# Patient Record
Sex: MALE | Race: BLACK OR AFRICAN AMERICAN | Employment: OTHER | URBAN - METROPOLITAN AREA
[De-identification: names, ages, dates, MRNs, and addresses within clinical notes are randomized per-mention and may not be internally consistent; named-entity substitution may affect disease eponyms.]

---

## 2017-06-01 ENCOUNTER — ED HISTORICAL/CONVERTED ENCOUNTER (OUTPATIENT)
Dept: OTHER | Age: 72
End: 2017-06-01

## 2019-04-05 ENCOUNTER — OP HISTORICAL/CONVERTED ENCOUNTER (OUTPATIENT)
Dept: OTHER | Age: 74
End: 2019-04-05

## 2019-10-17 ENCOUNTER — OP HISTORICAL/CONVERTED ENCOUNTER (OUTPATIENT)
Dept: OTHER | Age: 74
End: 2019-10-17

## 2019-11-21 ENCOUNTER — IP HISTORICAL/CONVERTED ENCOUNTER (OUTPATIENT)
Dept: OTHER | Age: 74
End: 2019-11-21

## 2019-12-05 ENCOUNTER — OP HISTORICAL/CONVERTED ENCOUNTER (OUTPATIENT)
Dept: OTHER | Age: 74
End: 2019-12-05

## 2020-06-04 ENCOUNTER — OP HISTORICAL/CONVERTED ENCOUNTER (OUTPATIENT)
Dept: OTHER | Age: 75
End: 2020-06-04

## 2020-10-26 ENCOUNTER — APPOINTMENT (OUTPATIENT)
Dept: GENERAL RADIOLOGY | Age: 75
DRG: 375 | End: 2020-10-26
Attending: EMERGENCY MEDICINE
Payer: MEDICARE

## 2020-10-26 ENCOUNTER — HOSPITAL ENCOUNTER (INPATIENT)
Age: 75
LOS: 16 days | Discharge: HOME HEALTH | DRG: 375 | End: 2020-11-11
Attending: EMERGENCY MEDICINE | Admitting: INTERNAL MEDICINE
Payer: MEDICARE

## 2020-10-26 ENCOUNTER — APPOINTMENT (OUTPATIENT)
Dept: ENDOSCOPY | Age: 75
DRG: 375 | End: 2020-10-26
Attending: INTERNAL MEDICINE
Payer: MEDICARE

## 2020-10-26 ENCOUNTER — APPOINTMENT (OUTPATIENT)
Dept: CT IMAGING | Age: 75
DRG: 375 | End: 2020-10-26
Attending: EMERGENCY MEDICINE
Payer: MEDICARE

## 2020-10-26 DIAGNOSIS — E86.0 DEHYDRATION: ICD-10-CM

## 2020-10-26 DIAGNOSIS — E87.6 HYPOKALEMIA: ICD-10-CM

## 2020-10-26 DIAGNOSIS — R63.4 WEIGHT LOSS: ICD-10-CM

## 2020-10-26 DIAGNOSIS — K22.89 ESOPHAGEAL MASS: Primary | ICD-10-CM

## 2020-10-26 LAB
ALBUMIN SERPL-MCNC: 3.1 G/DL (ref 3.5–5)
ALBUMIN/GLOB SERPL: 0.7 {RATIO} (ref 1.1–2.2)
ALP SERPL-CCNC: 54 U/L (ref 45–117)
ALT SERPL-CCNC: 26 U/L (ref 12–78)
ANION GAP SERPL CALC-SCNC: 8 MMOL/L (ref 5–15)
AST SERPL W P-5'-P-CCNC: 37 U/L (ref 15–37)
BASOPHILS # BLD: 0 K/UL (ref 0–0.1)
BASOPHILS NFR BLD: 0 % (ref 0–1)
BILIRUB SERPL-MCNC: 1.5 MG/DL (ref 0.2–1)
BUN SERPL-MCNC: 8 MG/DL (ref 6–20)
BUN/CREAT SERPL: 10 (ref 12–20)
CA-I BLD-MCNC: 9.5 MG/DL (ref 8.5–10.1)
CHLORIDE SERPL-SCNC: 105 MMOL/L (ref 97–108)
CO2 SERPL-SCNC: 26 MMOL/L (ref 21–32)
CREAT SERPL-MCNC: 0.78 MG/DL (ref 0.7–1.3)
DIFFERENTIAL METHOD BLD: NORMAL
EOSINOPHIL # BLD: 0.1 K/UL (ref 0–0.4)
EOSINOPHIL NFR BLD: 1 % (ref 0–7)
ERYTHROCYTE [DISTWIDTH] IN BLOOD BY AUTOMATED COUNT: 12.6 % (ref 11.5–14.5)
GLOBULIN SER CALC-MCNC: 4.4 G/DL (ref 2–4)
GLUCOSE SERPL-MCNC: 102 MG/DL (ref 65–100)
HCT VFR BLD AUTO: 41.9 % (ref 36.6–50.3)
HGB BLD-MCNC: 13.9 G/DL (ref 12.1–17)
IMM GRANULOCYTES # BLD AUTO: 0 K/UL (ref 0–0.04)
IMM GRANULOCYTES NFR BLD AUTO: 0 % (ref 0–0.5)
LACTATE SERPL-SCNC: 1.3 MMOL/L (ref 0.4–2)
LYMPHOCYTES # BLD: 2.1 K/UL (ref 0.8–3.5)
LYMPHOCYTES NFR BLD: 34 % (ref 12–49)
MCH RBC QN AUTO: 32.4 PG (ref 26–34)
MCHC RBC AUTO-ENTMCNC: 33.2 G/DL (ref 30–36.5)
MCV RBC AUTO: 97.7 FL (ref 80–99)
MONOCYTES # BLD: 0.7 K/UL (ref 0–1)
MONOCYTES NFR BLD: 12 % (ref 5–13)
NEUTS SEG # BLD: 3.2 K/UL (ref 1.8–8)
NEUTS SEG NFR BLD: 53 % (ref 32–75)
PLATELET # BLD AUTO: 193 K/UL (ref 150–400)
PMV BLD AUTO: 11.8 FL (ref 8.9–12.9)
POTASSIUM SERPL-SCNC: 3.2 MMOL/L (ref 3.5–5.1)
PROCALCITONIN SERPL-MCNC: 0.09 NG/ML
PROT SERPL-MCNC: 7.5 G/DL (ref 6.4–8.2)
RBC # BLD AUTO: 4.29 M/UL (ref 4.1–5.7)
SODIUM SERPL-SCNC: 139 MMOL/L (ref 136–145)
TSH SERPL DL<=0.05 MIU/L-ACNC: 0.74 UIU/ML (ref 0.36–3.74)
WBC # BLD AUTO: 6 K/UL (ref 4.1–11.1)

## 2020-10-26 PROCEDURE — 65270000029 HC RM PRIVATE

## 2020-10-26 PROCEDURE — 99283 EMERGENCY DEPT VISIT LOW MDM: CPT

## 2020-10-26 PROCEDURE — 74011000636 HC RX REV CODE- 636: Performed by: EMERGENCY MEDICINE

## 2020-10-26 PROCEDURE — 84443 ASSAY THYROID STIM HORMONE: CPT

## 2020-10-26 PROCEDURE — 80053 COMPREHEN METABOLIC PANEL: CPT

## 2020-10-26 PROCEDURE — 36415 COLL VENOUS BLD VENIPUNCTURE: CPT

## 2020-10-26 PROCEDURE — 83605 ASSAY OF LACTIC ACID: CPT

## 2020-10-26 PROCEDURE — 96360 HYDRATION IV INFUSION INIT: CPT

## 2020-10-26 PROCEDURE — 96361 HYDRATE IV INFUSION ADD-ON: CPT

## 2020-10-26 PROCEDURE — 70491 CT SOFT TISSUE NECK W/DYE: CPT

## 2020-10-26 PROCEDURE — 71045 X-RAY EXAM CHEST 1 VIEW: CPT

## 2020-10-26 PROCEDURE — 71260 CT THORAX DX C+: CPT

## 2020-10-26 PROCEDURE — 85025 COMPLETE CBC W/AUTO DIFF WBC: CPT

## 2020-10-26 PROCEDURE — 84145 PROCALCITONIN (PCT): CPT

## 2020-10-26 PROCEDURE — 74011250637 HC RX REV CODE- 250/637: Performed by: EMERGENCY MEDICINE

## 2020-10-26 PROCEDURE — 74011250636 HC RX REV CODE- 250/636: Performed by: EMERGENCY MEDICINE

## 2020-10-26 RX ORDER — ENOXAPARIN SODIUM 100 MG/ML
40 INJECTION SUBCUTANEOUS DAILY
Status: DISCONTINUED | OUTPATIENT
Start: 2020-10-27 | End: 2020-10-29

## 2020-10-26 RX ORDER — POLYETHYLENE GLYCOL 3350 17 G/17G
17 POWDER, FOR SOLUTION ORAL DAILY PRN
Status: DISCONTINUED | OUTPATIENT
Start: 2020-10-26 | End: 2020-10-31

## 2020-10-26 RX ORDER — SODIUM CHLORIDE 0.9 % (FLUSH) 0.9 %
5-40 SYRINGE (ML) INJECTION AS NEEDED
Status: DISCONTINUED | OUTPATIENT
Start: 2020-10-26 | End: 2020-11-11 | Stop reason: HOSPADM

## 2020-10-26 RX ORDER — POTASSIUM CHLORIDE 1.5 G/1.77G
40 POWDER, FOR SOLUTION ORAL
Status: ACTIVE | OUTPATIENT
Start: 2020-10-26 | End: 2020-10-27

## 2020-10-26 RX ORDER — DILTIAZEM HYDROCHLORIDE 120 MG/1
120 CAPSULE, COATED, EXTENDED RELEASE ORAL DAILY
Status: DISCONTINUED | OUTPATIENT
Start: 2020-10-27 | End: 2020-10-31

## 2020-10-26 RX ORDER — POTASSIUM CHLORIDE 1.5 G/1.77G
20 POWDER, FOR SOLUTION ORAL 2 TIMES DAILY WITH MEALS
Status: DISCONTINUED | OUTPATIENT
Start: 2020-10-26 | End: 2020-10-26

## 2020-10-26 RX ORDER — METOPROLOL SUCCINATE 25 MG/1
25 TABLET, EXTENDED RELEASE ORAL DAILY
Status: DISCONTINUED | OUTPATIENT
Start: 2020-10-27 | End: 2020-10-31

## 2020-10-26 RX ORDER — SODIUM CHLORIDE 9 MG/ML
75 INJECTION, SOLUTION INTRAVENOUS CONTINUOUS
Status: DISCONTINUED | OUTPATIENT
Start: 2020-10-26 | End: 2020-10-29

## 2020-10-26 RX ORDER — ACETAMINOPHEN 650 MG/1
650 SUPPOSITORY RECTAL
Status: DISCONTINUED | OUTPATIENT
Start: 2020-10-26 | End: 2020-11-11 | Stop reason: HOSPADM

## 2020-10-26 RX ORDER — SODIUM CHLORIDE 0.9 % (FLUSH) 0.9 %
5-40 SYRINGE (ML) INJECTION EVERY 8 HOURS
Status: DISCONTINUED | OUTPATIENT
Start: 2020-10-26 | End: 2020-11-11 | Stop reason: HOSPADM

## 2020-10-26 RX ORDER — ONDANSETRON 2 MG/ML
4 INJECTION INTRAMUSCULAR; INTRAVENOUS
Status: DISCONTINUED | OUTPATIENT
Start: 2020-10-26 | End: 2020-11-11 | Stop reason: HOSPADM

## 2020-10-26 RX ORDER — PROMETHAZINE HYDROCHLORIDE 25 MG/1
12.5 TABLET ORAL
Status: DISCONTINUED | OUTPATIENT
Start: 2020-10-26 | End: 2020-11-11 | Stop reason: HOSPADM

## 2020-10-26 RX ORDER — ACETAMINOPHEN 325 MG/1
650 TABLET ORAL
Status: DISCONTINUED | OUTPATIENT
Start: 2020-10-26 | End: 2020-11-11 | Stop reason: HOSPADM

## 2020-10-26 RX ADMIN — Medication 10 ML: at 21:46

## 2020-10-26 RX ADMIN — Medication 10 ML: at 22:00

## 2020-10-26 RX ADMIN — SODIUM CHLORIDE 1000 ML: 9 INJECTION, SOLUTION INTRAVENOUS at 13:33

## 2020-10-26 RX ADMIN — IOPAMIDOL 100 ML: 755 INJECTION, SOLUTION INTRAVENOUS at 14:25

## 2020-10-26 RX ADMIN — POTASSIUM CHLORIDE 20 MEQ: 1.5 FOR SOLUTION ORAL at 15:25

## 2020-10-26 NOTE — ED TRIAGE NOTES
Reports difficulty swallowing, change in voice, and difficulty eating (35lbs). Pt reports significant weight loss recently. Unable to eat.

## 2020-10-26 NOTE — ED PROVIDER NOTES
EMERGENCY DEPARTMENT HISTORY AND PHYSICAL EXAM      Date: 10/26/2020  Patient Name: Soni Hendricks    History of Presenting Illness     Chief Complaint   Patient presents with    Gland Swelling    Weight Loss       History Provided By: Patient    HPI: Soni Hendricks, 76 y.o. male with a past medical history significant HTN presents to the ED with chief complaint of Gland Swelling and Weight Loss  . Patient has been having weight loss of at least 30 pounds. Wants to eat but tries to eat and then it comes right back up. He is very hungry. Having lots of discomfort in his esophagus. Stool. Does have a very dry mouth. Concerned his glands are swelling. No fevers that he knows of. Does feel weak. Has not seen a doctor about the issue yet. There are no other complaints, changes, or physical findings at this time. PCP: No primary care provider on file. Current Facility-Administered Medications   Medication Dose Route Frequency Provider Last Rate Last Dose    potassium chloride (KLOR-CON) packet for solution 20 mEq  20 mEq Oral BID WITH MEALS Nolvia Serrano MD   20 mEq at 10/26/20 1525    0.9% sodium chloride infusion  75 mL/hr IntraVENous CONTINUOUS Nolvia Serrano MD           Past History     Past Medical History:  Past Medical History:   Diagnosis Date    Hypertension        Past Surgical History:  History reviewed. No pertinent surgical history. Family History:  History reviewed. No pertinent family history. Social History:  Social History     Tobacco Use    Smoking status: Current Some Day Smoker    Smokeless tobacco: Never Used   Substance Use Topics    Alcohol use: Not on file    Drug use: Not on file       Allergies:  No Known Allergies      Review of Systems   Review of Systems   Constitutional: Positive for chills, fatigue and unexpected weight change. Negative for fever. HENT: Negative. Negative for congestion, ear pain, nosebleeds and sore throat.     Eyes: Negative. Negative for pain, discharge and visual disturbance. Respiratory: Negative. Negative for cough, chest tightness and shortness of breath. Cardiovascular: Negative. Negative for chest pain and leg swelling. Gastrointestinal: Positive for vomiting. Negative for abdominal pain, blood in stool, constipation, diarrhea and nausea. Endocrine: Negative. Genitourinary: Negative. Negative for difficulty urinating, dysuria and flank pain. Musculoskeletal: Negative. Negative for back pain and myalgias. Skin: Negative. Negative for rash and wound. Allergic/Immunologic: Negative. Neurological: Negative. Negative for dizziness, syncope, weakness, numbness and headaches. Hematological: Negative. Does not bruise/bleed easily. Psychiatric/Behavioral: Negative. Negative for agitation, confusion, hallucinations and suicidal ideas. All other systems reviewed and are negative. Physical Exam   Physical Exam  Vitals signs and nursing note reviewed. Constitutional:       General: He is not in acute distress. Appearance: He is normal weight. He is not ill-appearing. HENT:      Head: Normocephalic and atraumatic. Right Ear: External ear normal.      Left Ear: External ear normal.      Nose: Nose normal. No rhinorrhea. Mouth/Throat:      Mouth: Mucous membranes are dry. Pharynx: Oropharynx is clear. Eyes:      Extraocular Movements: Extraocular movements intact. Conjunctiva/sclera: Conjunctivae normal.      Pupils: Pupils are equal, round, and reactive to light. Neck:      Musculoskeletal: Normal range of motion and neck supple. Cardiovascular:      Rate and Rhythm: Normal rate and regular rhythm. Pulses: Normal pulses. Heart sounds: Normal heart sounds. Pulmonary:      Effort: Pulmonary effort is normal. No respiratory distress. Breath sounds: Normal breath sounds. Abdominal:      General: Abdomen is flat.  Bowel sounds are normal. Palpations: Abdomen is soft. Musculoskeletal: Normal range of motion. General: No tenderness or deformity. Skin:     General: Skin is warm and dry. Capillary Refill: Capillary refill takes less than 2 seconds. Findings: No bruising, lesion or rash. Neurological:      General: No focal deficit present. Mental Status: He is alert and oriented to person, place, and time. Mental status is at baseline. Psychiatric:         Mood and Affect: Mood normal.         Behavior: Behavior normal.         Thought Content: Thought content normal.         Judgment: Judgment normal.         Diagnostic Study Results     Labs -     Recent Results (from the past 12 hour(s))   CBC WITH AUTOMATED DIFF    Collection Time: 10/26/20 12:30 PM   Result Value Ref Range    WBC 6.0 4.1 - 11.1 K/uL    RBC 4.29 4. 10 - 5.70 M/uL    HGB 13.9 12.1 - 17.0 g/dL    HCT 41.9 36.6 - 50.3 %    MCV 97.7 80.0 - 99.0 FL    MCH 32.4 26.0 - 34.0 PG    MCHC 33.2 30.0 - 36.5 g/dL    RDW 12.6 11.5 - 14.5 %    PLATELET 817 341 - 321 K/uL    MPV 11.8 8.9 - 12.9 FL    NEUTROPHILS 53 32 - 75 %    LYMPHOCYTES 34 12 - 49 %    MONOCYTES 12 5 - 13 %    EOSINOPHILS 1 0 - 7 %    BASOPHILS 0 0 - 1 %    IMMATURE GRANULOCYTES 0 0.0 - 0.5 %    ABS. NEUTROPHILS 3.2 1.8 - 8.0 K/UL    ABS. LYMPHOCYTES 2.1 0.8 - 3.5 K/UL    ABS. MONOCYTES 0.7 0.0 - 1.0 K/UL    ABS. EOSINOPHILS 0.1 0.0 - 0.4 K/UL    ABS. BASOPHILS 0.0 0.0 - 0.1 K/UL    ABS. IMM.  GRANS. 0.0 0.00 - 0.04 K/UL    DF AUTOMATED     METABOLIC PANEL, COMPREHENSIVE    Collection Time: 10/26/20 12:30 PM   Result Value Ref Range    Sodium 139 136 - 145 mmol/L    Potassium 3.2 (L) 3.5 - 5.1 mmol/L    Chloride 105 97 - 108 mmol/L    CO2 26 21 - 32 mmol/L    Anion gap 8 5 - 15 mmol/L    Glucose 102 (H) 65 - 100 mg/dL    BUN 8 6 - 20 mg/dL    Creatinine 0.78 0.70 - 1.30 mg/dL    BUN/Creatinine ratio 10 (L) 12 - 20      GFR est AA >60 >60 ml/min/1.73m2    GFR est non-AA >60 >60 ml/min/1.73m2    Calcium 9.5 8.5 - 10.1 mg/dL    Bilirubin, total 1.5 (H) 0.2 - 1.0 mg/dL    AST (SGOT) 37 15 - 37 U/L    ALT (SGPT) 26 12 - 78 U/L    Alk. 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 I called Dr. Padma Brennan with this result and recommendation   for upper endoscopy at 3:15 PM.      Emphysema. Coronary artery calcifications. Right kidney stone. Please see full report. CT NECK SOFT TISSUE W CONT   Final Result      XR CHEST SNGL V   Final Result   Impression: Unremarkable chest x-ray, except for degenerative change of the   spine. 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 I called Dr. Padma Brennan with this result and recommendation   for upper endoscopy at 3:15 PM.       Emphysema. Coronary artery calcifications. Right kidney stone. Please see full report. Narrative:  History is pain.  Weight loss. Difficulty swallowing. No comparison chest CT is available. TECHNIQUE: Serial axial images were obtained from the thoracic inlet through the   lung bases at 5 mm intervals during IV contrast administration of 100 cc   Isovue-370. Lung, bone and soft tissue windows are evaluated as well as sagittal   and coronal reformatted images. Dose reduction: All CT scans at this facility are performed using dose reduction   optimization techniques as appropriate to a performed exam including the   following: Automated exposure control, adjustments of the mA and/or kV according   to patient size, or use of iterative reconstruction technique. Findings: The heart is not enlarged. There are coronary artery calcifications. There is no pericardial or pleural effusion. The pulmonary arteries enhance   adequately and no filling defects are identified to suggest pulmonary emboli. The aorta is not dilated and there is no dissection. There is no axillary,   mediastinal or hilar adenopathy. The esophagus is dilated and filled with fluid from image    , series 2       No evidence of acute. On images 67-90, series 2, there appears to be a soft   tissue mass of the mid esophagus and right esophageal wall. It extends   approximately 6 cm in length. On image 79, series 2, the esophagus measures 3.8   x 2.9 cm and 56 HU in density. The right lateral esophageal wall enhances on   image 82 measuring 68 HU in density. This is consistent with neoplasm until   proven otherwise. Recommend upper endoscopy and biopsy. On lung windows, there is no pneumothorax or infiltrate. There is emphysema. Most delayed mass is identified. Incidental note is made of a right midpole   renal calcification. On bone windows, the osseous structures appear within normal limits. There is no   soft tissue contusion.             10/26/20 1440  CT NECK SOFT TISSUE W CONT Final result    Narrative:  History is weight loss. Dysphagia. Dose reduction: All CT scans at this facility are performed using dose reduction   optimization techniques as appropriate to a performed exam including the   following: Automated exposure control, adjustments of the mA and/or kV according   to patient size, or use of iterative reconstruction technique. Serial axial images were obtained from the upper chest through the lower skull   at 3 mm intervals during IV contrast injection 100 cc Isovue 370. Sagittal and   coronal reformatted images reviewed. Please see the chest CT for remarks regarding the patient's esophageal mass and   dilatation. There is mild prominence of the adenoidal tissue. The patient is edentulous. No   nasopharyngeal, oropharyngeal or hypopharyngeal mass is identified. There is   mild left sphenoid sinusitis. There is no mastoiditis. No bone destruction is   identified. There is diffuse degenerative change of the cervical spine. Multiple   bridging osteophytes are present from C4 to C7 anteriorly. There is also   multilevel facet hypertrophy. No lytic or blastic lesion is identified. Esophageal mass with fluid better described on the chest CT of the same day. No acute pathology of the neck identified. Significant degenerative change of   the mid to lower cervical spine. Please see full report. CXR Results  (Last 48 hours)               10/26/20 1303  XR CHEST SNGL V Final result    Impression:  Impression: Unremarkable chest x-ray, except for degenerative change of the   spine. Narrative:  History is weak. Comparison is with a chest x-ray of 6/4/2020. An AP portable upright view of the chest demonstrates no pneumonia, pneumothorax   or effusion. The heart is not enlarged. The osseous structures demonstrate   degenerative change of the spine. Medical Decision Making and ED Course   I am the first provider for this patient.     I reviewed the vital signs, available nursing notes, past medical history, past surgical history, family history and social history. Vital Signs-Reviewed the patient's vital signs. Patient Vitals for the past 12 hrs:   Temp Pulse Resp BP SpO2   10/26/20 1217 99.1 °F (37.3 °C) 75 18 (!) 143/114 100 %       EKG interpretation:         Records Reviewed: Previous Hospital chart. EMS run report      ED Course:   Initial assessment performed. The patients presenting problems have been discussed, and they are in agreement with the care plan formulated and outlined with them. I have encouraged them to ask questions as they arise throughout their visit. Orders Placed This Encounter    XR CHEST SNGL V     Standing Status:   Standing     Number of Occurrences:   1     Order Specific Question:   Transport     Answer:   BED [2]     Order Specific Question:   Reason for Exam     Answer:   weak    CT CHEST W CONT     Standing Status:   Standing     Number of Occurrences:   1     Order Specific Question:   Transport     Answer:   Ambulatory [1]     Order Specific Question:   Reason for Exam     Answer:   pain     Order Specific Question:   Does patient have history of Renal Disease? Answer:   No    CT NECK SOFT TISSUE W CONT     Standing Status:   Standing     Number of Occurrences:   1     Order Specific Question:   Transport     Answer:   Ambulatory [1]     Order Specific Question:   Reason for Exam     Answer:   neck     Order Specific Question:   Does patient have history of Renal Disease?      Answer:   No    CBC WITH AUTOMATED DIFF     Standing Status:   Standing     Number of Occurrences:   1    METABOLIC PANEL, COMPREHENSIVE     Standing Status:   Standing     Number of Occurrences:   1    TSH 3RD GENERATION     Standing Status:   Standing     Number of Occurrences:   1    PROCALCITONIN     Standing Status:   Standing     Number of Occurrences:   1    LACTIC ACID     Standing Status:   Standing     Number of Occurrences:   1    NOTIFY PROVIDER: SPECIFY Notify provider on pt's arrival to floor ONE TIME STAT     Standing Status:   Standing     Number of Occurrences:   1     Order Specific Question:   Please describe the test or procedure you would like to order. Answer:   Notify provider on pt's arrival to floor    IP CONSULT TO GASTROENTEROLOGY     Standing Status:   Standing     Number of Occurrences:   1     Order Specific Question:   Reason for Consult: Answer:   esophageal mass     Order Specific Question:   Did you call or speak to the consulting provider? Answer: Yes    sodium chloride 0.9 % bolus infusion 1,000 mL    potassium chloride (KLOR-CON) packet for solution 20 mEq    iopamidoL (ISOVUE-370) 76 % injection 100 mL    0.9% sodium chloride infusion    IP CONSULT TO HOSPITALIST     Standing Status:   Standing     Number of Occurrences:   1     Order Specific Question:   Reason for Consult: Answer:   TO ADMIT     Order Specific Question:   Did you call or speak to the consulting provider? Answer: Yes              CONSULTANTS:  Discussed the case with radiology Dr. Alma Garsia  Gastroenterology consulted based on CAT scan findings. Dr Addi Sanders  discussed case with hospitalist for admission. Dr Kayli Bolanos    Provider Notes (Medical Decision Making):   72-year-old male disks presents with dysphagia. Differential includes mass, esophagitis, lymphadenopathy, dehydration. CAT scan does suggest neoplasm. Patient is dehydrated will IV give fluids as well as consult GI who will gladly do an upper endoscopy. Hospitalist will admit. Procedures                       Disposition       Emergency Department Disposition:  Admitted      Diagnosis     Clinical Impression:   1. Esophageal mass    2. Hypokalemia    3. Weight loss    4. Dehydration        Attestations:    Diana Gonsales MD    Please note that this dictation was completed with [employer], the computer voice recognition software.   Quite often unanticipated grammatical, syntax, homophones, and other interpretive errors are inadvertently transcribed by the computer software. Please disregard these errors. Please excuse any errors that have escaped final proofreading. Thank you.

## 2020-10-26 NOTE — H&P
GENERAL GENERIC H&P/CONSULT  Presenting complaint: Generalized weakness    Subjective: 66-year-old male with past medical history of hypertension presents with complaints of difficulty swallowing as well as generalized weakness. Patient states the symptoms have been going on over the past few months following which he presented to the ED. Patient denies any fevers chills lightheadedness dizziness dyspnea orthopnea paroxysmal nocturnal dyspnea chest pain palpitations headache focal weakness loss of sensation auditory or visual symptoms abdominal stool or urinary complaints or any other associated symptoms. Patient endorses no recent sick contacts or travel activity    Past Medical History:   Diagnosis Date    Hypertension       History reviewed. No pertinent surgical history. Prior to Admission medications    Not on File     No Known Allergies social historypatient lives at home, current some day smoker, no history of EtOH abuse or substance abuse. Social History     Tobacco Use    Smoking status: Current Some Day Smoker    Smokeless tobacco: Never Used   Substance Use Topics    Alcohol use: Not on file      History reviewed. No pertinent family history. Review of Systems   Constitutional: Positive for activity change, appetite change, fatigue and unexpected weight change. Negative for chills, diaphoresis and fever. HENT: Negative for congestion, dental problem, drooling, ear discharge, ear pain, facial swelling, hearing loss, mouth sores, nosebleeds, postnasal drip, rhinorrhea, sinus pressure, sinus pain, sneezing, sore throat, tinnitus, trouble swallowing and voice change. Eyes: Negative for photophobia, pain, discharge, redness, itching and visual disturbance. Respiratory: Negative for apnea, cough, choking, chest tightness, shortness of breath, wheezing and stridor. Cardiovascular: Negative for chest pain, palpitations and leg swelling.    Gastrointestinal: Negative for abdominal distention, abdominal pain, anal bleeding, blood in stool, constipation, diarrhea, nausea, rectal pain and vomiting. Endocrine: Negative for cold intolerance, heat intolerance, polydipsia, polyphagia and polyuria. Genitourinary: Negative for decreased urine volume, difficulty urinating, discharge, dysuria, enuresis, flank pain, frequency, genital sores, hematuria, penile pain, penile swelling, scrotal swelling, testicular pain and urgency. Musculoskeletal: Negative for arthralgias, back pain, gait problem, joint swelling, myalgias, neck pain and neck stiffness. Skin: Negative for color change, pallor, rash and wound. Allergic/Immunologic: Negative for environmental allergies, food allergies and immunocompromised state. Neurological: Negative for dizziness, tremors, seizures, syncope, facial asymmetry, speech difficulty, weakness, light-headedness, numbness and headaches. Hematological: Negative for adenopathy. Does not bruise/bleed easily. Psychiatric/Behavioral: Negative for agitation, behavioral problems, confusion, decreased concentration, dysphoric mood, hallucinations, self-injury, sleep disturbance and suicidal ideas. The patient is not nervous/anxious and is not hyperactive. Objective:    No intake/output data recorded. No intake/output data recorded. Patient Vitals for the past 8 hrs:   BP Temp Pulse Resp SpO2 Height Weight   10/26/20 1217 (!) 143/114 99.1 °F (37.3 °C) 75 18 100 % 6' 1\" (1.854 m) 64.1 kg (141 lb 6.4 oz)     Physical Exam  Constitutional:       General: He is not in acute distress. Appearance: Normal appearance. He is normal weight. He is not ill-appearing, toxic-appearing or diaphoretic. HENT:      Head: Normocephalic and atraumatic. Nose: Nose normal. No congestion or rhinorrhea. Mouth/Throat:      Mouth: Mucous membranes are moist.      Pharynx: Oropharynx is clear. No oropharyngeal exudate or posterior oropharyngeal erythema.    Eyes:      General: No scleral icterus. Right eye: No discharge. Left eye: No discharge. Extraocular Movements: Extraocular movements intact. Conjunctiva/sclera: Conjunctivae normal.      Pupils: Pupils are equal, round, and reactive to light. Neck:      Musculoskeletal: Normal range of motion and neck supple. No neck rigidity or muscular tenderness. Vascular: No carotid bruit. Cardiovascular:      Rate and Rhythm: Normal rate and regular rhythm. Pulses: Normal pulses. Heart sounds: Normal heart sounds. No murmur. No friction rub. No gallop. Pulmonary:      Effort: Pulmonary effort is normal. No respiratory distress. Breath sounds: Normal breath sounds. No stridor. No wheezing, rhonchi or rales. Chest:      Chest wall: No tenderness. Abdominal:      General: Abdomen is flat. Bowel sounds are normal. There is no distension. Palpations: Abdomen is soft. There is no mass. Tenderness: There is no abdominal tenderness. There is no right CVA tenderness, left CVA tenderness, guarding or rebound. Hernia: No hernia is present. Musculoskeletal: Normal range of motion. General: No swelling, tenderness, deformity or signs of injury. Right lower leg: No edema. Left lower leg: No edema. Lymphadenopathy:      Cervical: No cervical adenopathy. Skin:     General: Skin is warm. Capillary Refill: Capillary refill takes less than 2 seconds. Coloration: Skin is not jaundiced or pale. Findings: No bruising, erythema, lesion or rash. Neurological:      General: No focal deficit present. Mental Status: He is alert and oriented to person, place, and time. Mental status is at baseline. Cranial Nerves: No cranial nerve deficit. Sensory: No sensory deficit. Motor: No weakness.       Coordination: Coordination normal.      Gait: Gait normal.      Deep Tendon Reflexes: Reflexes normal.   Psychiatric:         Mood and Affect: Mood normal. Behavior: Behavior normal.         Thought Content: Thought content normal.         Judgment: Judgment normal.          Labs:    Recent Results (from the past 24 hour(s))   CBC WITH AUTOMATED DIFF    Collection Time: 10/26/20 12:30 PM   Result Value Ref Range    WBC 6.0 4.1 - 11.1 K/uL    RBC 4.29 4. 10 - 5.70 M/uL    HGB 13.9 12.1 - 17.0 g/dL    HCT 41.9 36.6 - 50.3 %    MCV 97.7 80.0 - 99.0 FL    MCH 32.4 26.0 - 34.0 PG    MCHC 33.2 30.0 - 36.5 g/dL    RDW 12.6 11.5 - 14.5 %    PLATELET 215 982 - 916 K/uL    MPV 11.8 8.9 - 12.9 FL    NEUTROPHILS 53 32 - 75 %    LYMPHOCYTES 34 12 - 49 %    MONOCYTES 12 5 - 13 %    EOSINOPHILS 1 0 - 7 %    BASOPHILS 0 0 - 1 %    IMMATURE GRANULOCYTES 0 0.0 - 0.5 %    ABS. NEUTROPHILS 3.2 1.8 - 8.0 K/UL    ABS. LYMPHOCYTES 2.1 0.8 - 3.5 K/UL    ABS. MONOCYTES 0.7 0.0 - 1.0 K/UL    ABS. EOSINOPHILS 0.1 0.0 - 0.4 K/UL    ABS. BASOPHILS 0.0 0.0 - 0.1 K/UL    ABS. IMM. GRANS. 0.0 0.00 - 0.04 K/UL    DF AUTOMATED     METABOLIC PANEL, COMPREHENSIVE    Collection Time: 10/26/20 12:30 PM   Result Value Ref Range    Sodium 139 136 - 145 mmol/L    Potassium 3.2 (L) 3.5 - 5.1 mmol/L    Chloride 105 97 - 108 mmol/L    CO2 26 21 - 32 mmol/L    Anion gap 8 5 - 15 mmol/L    Glucose 102 (H) 65 - 100 mg/dL    BUN 8 6 - 20 mg/dL    Creatinine 0.78 0.70 - 1.30 mg/dL    BUN/Creatinine ratio 10 (L) 12 - 20      GFR est AA >60 >60 ml/min/1.73m2    GFR est non-AA >60 >60 ml/min/1.73m2    Calcium 9.5 8.5 - 10.1 mg/dL    Bilirubin, total 1.5 (H) 0.2 - 1.0 mg/dL    AST (SGOT) 37 15 - 37 U/L    ALT (SGPT) 26 12 - 78 U/L    Alk.  phosphatase 54 45 - 117 U/L    Protein, total 7.5 6.4 - 8.2 g/dL    Albumin 3.1 (L) 3.5 - 5.0 g/dL    Globulin 4.4 (H) 2.0 - 4.0 g/dL    A-G Ratio 0.7 (L) 1.1 - 2.2     TSH 3RD GENERATION    Collection Time: 10/26/20 12:30 PM   Result Value Ref Range    TSH 0.74 0.36 - 3.74 uIU/mL   PROCALCITONIN    Collection Time: 10/26/20 12:30 PM   Result Value Ref Range    Procalcitonin 0.09 (H) 0 ng/mL   LACTIC ACID    Collection Time: 10/26/20 12:30 PM   Result Value Ref Range    Lactic acid 1.3 0.4 - 2.0 mmol/L       ECG: normal EKG, normal sinus rhythm, unchanged from previous tracings   CT chest:IMPRESSION  Impression: Enhancing mass of the mid thoracic esophagus measuring proximally 6  x 4 x 3 cm causing dilatation esophagus and obstruction of the upper esophagus  which is fluid filled and patulous. These findings are consistent with neoplasm  until proven otherwise. I called Dr. Kimmie Canales with this result and recommendation  for upper endoscopy at 3:15 PM.     CT neck soft tissue: There is mild prominence of the adenoidal tissue. The patient is edentulous. No  nasopharyngeal, oropharyngeal or hypopharyngeal mass is identified. There is  mild left sphenoid sinusitis. There is no mastoiditis. No bone destruction is  identified. There is diffuse degenerative change of the cervical spine. Multiple  bridging osteophytes are present from C4 to C7 anteriorly. There is also  multilevel facet hypertrophy. No lytic or blastic lesion is identified.     Esophageal mass with fluid better described on the chest CT of the same day.     No acute pathology of the neck identified. Significant degenerative change of  the mid to lower cervical spine. Please see full report.     Assessment:  Active Problems:    Esophageal mass (10/26/2020)        Plan:    Esophageal masspatient presents with above-mentioned symptomatology and based on patient's clinical presentation as well as physical examination patient symptomatology is consistent with an esophageal mass due to unclear etiology  We will obtain gastroenterology consult further evaluation  We will obtain oncology consult further evaluation    Hypokalemiareplete potassium recheck potassium    Hypertensioncontinue home antihypertensive medications    ProphylaxisHeparin subcu  FENcardiac diet, replete potassium and magnesium  Full code, will clarify about surrogate decision-maker, admitted for further management    Signed:   Evie Melo MD 10/26/2020

## 2020-10-26 NOTE — Clinical Note
Resolve 8.5F catheter sutured in place. Dsg applied with stayfix/gauze and tape. Catheter attached to atrium with -20 cm of suction.

## 2020-10-26 NOTE — ED NOTES
Assumed care of patient. A&Ox4; no acute distress; no respiratory distress. Patient requesting something to eat; patient is requesting soup specifically. Told patient  I would review his chart for diet order, and see what I can get him to eat that he can tolerate with his throat.

## 2020-10-27 ENCOUNTER — ANESTHESIA (OUTPATIENT)
Dept: ENDOSCOPY | Age: 75
DRG: 375 | End: 2020-10-27
Payer: MEDICARE

## 2020-10-27 ENCOUNTER — ANESTHESIA EVENT (OUTPATIENT)
Dept: ENDOSCOPY | Age: 75
DRG: 375 | End: 2020-10-27
Payer: MEDICARE

## 2020-10-27 ENCOUNTER — APPOINTMENT (OUTPATIENT)
Dept: ENDOSCOPY | Age: 75
DRG: 375 | End: 2020-10-27
Attending: INTERNAL MEDICINE
Payer: MEDICARE

## 2020-10-27 LAB
ALBUMIN SERPL-MCNC: 2.8 G/DL (ref 3.5–5)
ALBUMIN/GLOB SERPL: 0.7 {RATIO} (ref 1.1–2.2)
ALP SERPL-CCNC: 48 U/L (ref 45–117)
ALT SERPL-CCNC: 25 U/L (ref 12–78)
ANION GAP SERPL CALC-SCNC: 3 MMOL/L (ref 5–15)
AST SERPL W P-5'-P-CCNC: 37 U/L (ref 15–37)
BASOPHILS # BLD: 0 K/UL (ref 0–0.1)
BASOPHILS NFR BLD: 1 % (ref 0–1)
BILIRUB SERPL-MCNC: 1.5 MG/DL (ref 0.2–1)
BUN SERPL-MCNC: 7 MG/DL (ref 6–20)
BUN/CREAT SERPL: 11 (ref 12–20)
CA-I BLD-MCNC: 9.6 MG/DL (ref 8.5–10.1)
CHLORIDE SERPL-SCNC: 111 MMOL/L (ref 97–108)
CO2 SERPL-SCNC: 26 MMOL/L (ref 21–32)
CREAT SERPL-MCNC: 0.63 MG/DL (ref 0.7–1.3)
DIFFERENTIAL METHOD BLD: ABNORMAL
EOSINOPHIL # BLD: 0.1 K/UL (ref 0–0.4)
EOSINOPHIL NFR BLD: 2 % (ref 0–7)
ERYTHROCYTE [DISTWIDTH] IN BLOOD BY AUTOMATED COUNT: 12.4 % (ref 11.5–14.5)
GLOBULIN SER CALC-MCNC: 4.2 G/DL (ref 2–4)
GLUCOSE SERPL-MCNC: 72 MG/DL (ref 65–100)
HCT VFR BLD AUTO: 38.5 % (ref 36.6–50.3)
HGB BLD-MCNC: 12.7 G/DL (ref 12.1–17)
IMM GRANULOCYTES # BLD AUTO: 0 K/UL (ref 0–0.04)
IMM GRANULOCYTES NFR BLD AUTO: 0 % (ref 0–0.5)
LYMPHOCYTES # BLD: 1.8 K/UL (ref 0.8–3.5)
LYMPHOCYTES NFR BLD: 30 % (ref 12–49)
MCH RBC QN AUTO: 31.9 PG (ref 26–34)
MCHC RBC AUTO-ENTMCNC: 33 G/DL (ref 30–36.5)
MCV RBC AUTO: 96.7 FL (ref 80–99)
MONOCYTES # BLD: 1 K/UL (ref 0–1)
MONOCYTES NFR BLD: 18 % (ref 5–13)
NEUTS SEG # BLD: 2.9 K/UL (ref 1.8–8)
NEUTS SEG NFR BLD: 49 % (ref 32–75)
PLATELET # BLD AUTO: 178 K/UL (ref 150–400)
PMV BLD AUTO: 12.1 FL (ref 8.9–12.9)
POTASSIUM SERPL-SCNC: 3.9 MMOL/L (ref 3.5–5.1)
PROT SERPL-MCNC: 7 G/DL (ref 6.4–8.2)
RBC # BLD AUTO: 3.98 M/UL (ref 4.1–5.7)
SODIUM SERPL-SCNC: 140 MMOL/L (ref 136–145)
WBC # BLD AUTO: 5.9 K/UL (ref 4.1–11.1)

## 2020-10-27 PROCEDURE — 74011250636 HC RX REV CODE- 250/636: Performed by: NURSE ANESTHETIST, CERTIFIED REGISTERED

## 2020-10-27 PROCEDURE — 65270000029 HC RM PRIVATE

## 2020-10-27 PROCEDURE — 85025 COMPLETE CBC W/AUTO DIFF WBC: CPT

## 2020-10-27 PROCEDURE — 36415 COLL VENOUS BLD VENIPUNCTURE: CPT

## 2020-10-27 PROCEDURE — 88305 TISSUE EXAM BY PATHOLOGIST: CPT

## 2020-10-27 PROCEDURE — 74011250637 HC RX REV CODE- 250/637: Performed by: INTERNAL MEDICINE

## 2020-10-27 PROCEDURE — 2709999900 HC NON-CHARGEABLE SUPPLY: Performed by: INTERNAL MEDICINE

## 2020-10-27 PROCEDURE — 74011000250 HC RX REV CODE- 250: Performed by: NURSE ANESTHETIST, CERTIFIED REGISTERED

## 2020-10-27 PROCEDURE — 0DB18ZX EXCISION OF UPPER ESOPHAGUS, VIA NATURAL OR ARTIFICIAL OPENING ENDOSCOPIC, DIAGNOSTIC: ICD-10-PCS | Performed by: INTERNAL MEDICINE

## 2020-10-27 PROCEDURE — 76040000019: Performed by: INTERNAL MEDICINE

## 2020-10-27 PROCEDURE — 80053 COMPREHEN METABOLIC PANEL: CPT

## 2020-10-27 PROCEDURE — 76060000031 HC ANESTHESIA FIRST 0.5 HR: Performed by: INTERNAL MEDICINE

## 2020-10-27 RX ORDER — LIDOCAINE HYDROCHLORIDE 20 MG/ML
INJECTION, SOLUTION EPIDURAL; INFILTRATION; INTRACAUDAL; PERINEURAL AS NEEDED
Status: DISCONTINUED | OUTPATIENT
Start: 2020-10-27 | End: 2020-10-27 | Stop reason: HOSPADM

## 2020-10-27 RX ORDER — SODIUM CHLORIDE 9 MG/ML
INJECTION, SOLUTION INTRAVENOUS
Status: DISCONTINUED | OUTPATIENT
Start: 2020-10-27 | End: 2020-10-27 | Stop reason: HOSPADM

## 2020-10-27 RX ORDER — PROPOFOL 10 MG/ML
INJECTION, EMULSION INTRAVENOUS AS NEEDED
Status: DISCONTINUED | OUTPATIENT
Start: 2020-10-27 | End: 2020-10-27 | Stop reason: HOSPADM

## 2020-10-27 RX ADMIN — LIDOCAINE HYDROCHLORIDE 100 MG: 20 INJECTION, SOLUTION EPIDURAL; INFILTRATION; INTRACAUDAL; PERINEURAL at 15:10

## 2020-10-27 RX ADMIN — PROPOFOL 100 MG: 10 INJECTION, EMULSION INTRAVENOUS at 15:13

## 2020-10-27 RX ADMIN — Medication 10 ML: at 21:52

## 2020-10-27 RX ADMIN — PROPOFOL 50 MG: 10 INJECTION, EMULSION INTRAVENOUS at 15:10

## 2020-10-27 RX ADMIN — DILTIAZEM HYDROCHLORIDE 120 MG: 120 CAPSULE, COATED, EXTENDED RELEASE ORAL at 11:09

## 2020-10-27 RX ADMIN — Medication 10 ML: at 05:13

## 2020-10-27 RX ADMIN — METOPROLOL SUCCINATE 25 MG: 25 TABLET, EXTENDED RELEASE ORAL at 11:09

## 2020-10-27 RX ADMIN — SODIUM CHLORIDE: 9 INJECTION, SOLUTION INTRAVENOUS at 15:06

## 2020-10-27 NOTE — CONSULTS
Consult    Patient: Rayna Teague MRN: [id number]  SSN: [id number]    YOB: 1945  Age: 76 y.o. Sex: male      Subjective:      Rayna Teague is a 76 y.o. male who is being seen for Dysphagia, frequent choking, severe weight loss. Patient has a difficult swallowing with heartburn, chest discomfort months, noted the abdominal pain, constipation, weights 2030 pound weight loss,   no blood in stool,No pneumonia history,  Long history of tobacco use, alcohol use,    Past Medical History:   Diagnosis Date    Hypertension      History reviewed. No pertinent surgical history. History reviewed. No pertinent family history.   Social History     Tobacco Use    Smoking status: Current Some Day Smoker    Smokeless tobacco: Never Used   Substance Use Topics    Alcohol use: Not on file      Current Facility-Administered Medications   Medication Dose Route Frequency Provider Last Rate Last Dose    0.9% sodium chloride infusion  75 mL/hr IntraVENous CONTINUOUS Pettis, Danna Kayser, MD        potassium chloride (KLOR-CON) packet for solution 40 mEq  40 mEq Oral NOW Karmen Coyle MD        sodium chloride (NS) flush 5-40 mL  5-40 mL IntraVENous Q8H Karmen Coyle MD        sodium chloride (NS) flush 5-40 mL  5-40 mL IntraVENous PRN Jeanette Herndon MD        acetaminophen (TYLENOL) tablet 650 mg  650 mg Oral Q6H PRN Jeanette Herndon MD        Or    acetaminophen (TYLENOL) suppository 650 mg  650 mg Rectal Q6H PRN Jeanette Herndon MD        polyethylene glycol [facility]) packet 17 g  17 g Oral DAILY PRN Jeanette Herndon MD        promethazine (PHENERGAN) tablet 12.5 mg  12.5 mg Oral Q6H PRN Jeanette Herndon MD        Or    [facility]ron [facility]) injection 4 mg  4 mg IntraVENous Q6H PRTYLER Herndon MD        [START ON 10/27/2020] enoxaparin (LOVENOX) injection 40 mg  40 mg SubCUTAneous DAILY Jonna Moises Elder MD        [START ON 10/27/2020] dilTIAZem ER (CARDIZEM CD) capsule 120 mg  120 mg Oral DAILY Moises Coyle MD        [START ON 10/27/2020] metoprolol succinate (TOPROL-XL) XL tablet 25 mg  25 mg Oral DAILY Karmen Coyle MD            No Known Allergies    Review of Systems:  Review of Systems   Constitutional: Negative for chills and fever. Eyes: Negative for double vision. Respiratory: Negative for sputum production. Cardiovascular: Positive for chest pain. Gastrointestinal: Positive for constipation, heartburn and nausea. Genitourinary: Negative for frequency. Musculoskeletal: Positive for myalgias. Skin: Negative. Neurological: Negative for tremors, weakness and headaches. Psychiatric/Behavioral: Positive for depression. Objective:     Vitals:    10/26/20 1217 10/26/20 1400 10/26/20 1900 10/26/20 2000   BP: (!) 143/114 (!) 160/68 (!) 156/64 (!) 169/65   Pulse: 75 69 (!) 58 61   Resp: 18  16 18   Temp: 99.1 °F (37.3 °C)      SpO2: 100% 100% 97% 97%   Weight: 64.1 kg (141 lb 6.4 oz)      Height: 6' 1\" (1.854 m)           Physical Exam:  Physical Exam   Constitutional: He appears distressed. HENT:   Head: Atraumatic. Eyes: EOM are normal.   Neck: Neck supple. No JVD present. Tracheal deviation present. Cardiovascular: Normal rate. No murmur heard. Pulmonary/Chest: No respiratory distress. He has no wheezes. He has no rales. Abdominal: He exhibits no distension and no mass. There is abdominal tenderness. There is no guarding. Musculoskeletal: Normal range of motion. General: No deformity. Neurological: He is alert. No cranial nerve deficit. Coordination normal.   Skin: Skin is warm. Psychiatric: He has a normal mood and affect. Recent Results (from the past 24 hour(s))   CBC WITH AUTOMATED DIFF    Collection Time: 10/26/20 12:30 PM   Result Value Ref Range    WBC 6.0 4.1 - 11.1 K/uL    RBC 4.29 4. 10 - 5.70 M/uL    HGB 13.9 12.1 - 17.0 g/dL    HCT 41.9 36.6 - 50.3 %    MCV 97.7 80.0 - 99.0 FL    MCH 32.4 26.0 - 34.0 PG    MCHC 33.2 30.0 - 36.5 g/dL    RDW 12.6 11.5 - 14.5 %    PLATELET 586 333 - 243 K/uL    MPV 11.8 8.9 - 12.9 FL    NEUTROPHILS 53 32 - 75 %    LYMPHOCYTES 34 12 - 49 %    MONOCYTES 12 5 - 13 %    EOSINOPHILS 1 0 - 7 %    BASOPHILS 0 0 - 1 %    IMMATURE GRANULOCYTES 0 0.0 - 0.5 %    ABS. NEUTROPHILS 3.2 1.8 - 8.0 K/UL    ABS. LYMPHOCYTES 2.1 0.8 - 3.5 K/UL    ABS. MONOCYTES 0.7 0.0 - 1.0 K/UL    ABS. EOSINOPHILS 0.1 0.0 - 0.4 K/UL    ABS. BASOPHILS 0.0 0.0 - 0.1 K/UL    ABS. IMM. GRANS. 0.0 0.00 - 0.04 K/UL    DF AUTOMATED     METABOLIC PANEL, COMPREHENSIVE    Collection Time: 10/26/20 12:30 PM   Result Value Ref Range    Sodium 139 136 - 145 mmol/L    Potassium 3.2 (L) 3.5 - 5.1 mmol/L    Chloride 105 97 - 108 mmol/L    CO2 26 21 - 32 mmol/L    Anion gap 8 5 - 15 mmol/L    Glucose 102 (H) 65 - 100 mg/dL    BUN 8 6 - 20 mg/dL    Creatinine 0.78 0.70 - 1.30 mg/dL    BUN/Creatinine ratio 10 (L) 12 - 20      GFR est AA >60 >60 ml/min/1.73m2    GFR est non-AA >60 >60 ml/min/1.73m2    Calcium 9.5 8.5 - 10.1 mg/dL    Bilirubin, total 1.5 (H) 0.2 - 1.0 mg/dL    AST (SGOT) 37 15 - 37 U/L    ALT (SGPT) 26 12 - 78 U/L    Alk.  phosphatase 54 45 - 117 U/L    Protein, total 7.5 6.4 - 8.2 g/dL    Albumin 3.1 (L) 3.5 - 5.0 g/dL    Globulin 4.4 (H) 2.0 - 4.0 g/dL    A-G Ratio 0.7 (L) 1.1 - 2.2     TSH 3RD GENERATION    Collection Time: 10/26/20 12:30 PM   Result Value Ref Range    TSH 0.74 0.36 - 3.74 uIU/mL   PROCALCITONIN    Collection Time: 10/26/20 12:30 PM   Result Value Ref Range    Procalcitonin 0.09 (H) 0 ng/mL   LACTIC ACID    Collection Time: 10/26/20 12:30 PM   Result Value Ref Range    Lactic acid 1.3 0.4 - 2.0 mmol/L        CT CHEST W CONT   Final Result   Impression: Enhancing mass of the mid thoracic esophagus measuring proximally 6   x 4 x 3 cm causing dilatation esophagus and obstruction of the upper esophagus   which is fluid filled and patulous. 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 I called Dr. Abigail Haddad with this result and recommendation   for upper endoscopy at 3:15 PM.      Emphysema. Coronary artery calcifications. Right kidney stone. Please see full report. CT NECK SOFT TISSUE W CONT   Final Result      XR CHEST SNGL V   Final Result   Impression: Unremarkable chest x-ray, except for degenerative change of the   spine. Assessment:     Hospital Problems  Never Reviewed          Codes Class Noted POA    Esophageal mass ICD-10-CM: K22.8  ICD-9-CM: 530.89  10/26/2020 Unknown          fascia, nausea 1, esophageal mass, esophageal obstruction,  History of tobacco use and alcohol abuse,  Weight loss, possible esophageal malignancy    Plan:    This nothing by mouth for now  IV hydration,  EGD in the morning to evaluate esophagus,  Indication,  risk was discussed with patient in detail    Signed By: Nacho Hays MD     October 26, 2020         Thank you for allowing me to participate in this patients care  Cc Referring Physician   None

## 2020-10-27 NOTE — ANESTHESIA POSTPROCEDURE EVALUATION
Procedure(s):  ESOPHAGOGASTRODUODENOSCOPY (EGD). total IV anesthesia, general    Anesthesia Post Evaluation      Multimodal analgesia: multimodal analgesia not used between 6 hours prior to anesthesia start to PACU discharge  Patient location during evaluation: bedside (Endoscopy suite)  Patient participation: complete - patient cannot participate  Level of consciousness: sleepy but conscious  Pain score: 0  Pain management: adequate  Airway patency: patent  Anesthetic complications: no  Cardiovascular status: acceptable  Respiratory status: acceptable and nasal cannula  Hydration status: acceptable  Comments: This patient remained on the stretcher. The patient was handed off to the endoscopy nursing team.  All questions regarding pre-, intra-, and postoperative care were answered.   Post anesthesia nausea and vomiting:  none      INITIAL Post-op Vital signs:   Vitals Value Taken Time   /62 10/27/2020  3:18 PM   Temp 36.9 °C (98.4 °F) 10/27/2020  3:18 PM   Pulse 70 10/27/2020  3:18 PM   Resp 17 10/27/2020  3:18 PM   SpO2 98 % 10/27/2020  3:18 PM

## 2020-10-27 NOTE — ROUTINE PROCESS
TRANSFER - OUT REPORT: 
 
Verbal report given to Chata Samano RN on Jim Hi  being transferred to [address] [place] [address] for routine progression of care Report consisted of patients Situation, Background, Assessment and  
Recommendations(SBAR). Information from the following report(s) SBAR and ED Summary was reviewed with the receiving nurse. Lines:  
Peripheral IV 10/26/20 Left Antecubital (Active) Opportunity for questions and clarification was provided. Patient transported with: 
 Registered Nurse

## 2020-10-27 NOTE — PROGRESS NOTES
Progress Note    Patient: Jim Hi MRN: [id number]  SSN: [id number]    YOB: 1945  Age: 76 y.o. Sex: male      Admit Date: 10/26/2020    LOS: 1 day     Subjective:     75M, H/o HTN and GERD with dysphagia likely s/t suspected esophageal cancer. SUBJECTIVE: Reports significant heart burn and saliva. PLAN:   NPO  IV protonix  Plan for EGD  Depending on EGD, will evaluate for speech and fix his nutrition (PEG) before any plans for discharge. Review of Systems:  History reviewed. No pertinent family history. Review of Systems   Constitutional: Positive for activity change, appetite change, fatigue and unexpected weight change. Negative for chills, diaphoresis and fever. HENT: Negative for congestion, dental problem, drooling, ear discharge, ear pain, facial swelling, hearing loss, mouth sores, nosebleeds, postnasal drip, rhinorrhea, sinus pressure, sinus pain, sneezing, sore throat, tinnitus, trouble swallowing and voice change. Eyes: Negative for photophobia, pain, discharge, redness, itching and visual disturbance. Respiratory: Negative for apnea, cough, choking, chest tightness, shortness of breath, wheezing and stridor. Cardiovascular: Negative for chest pain, palpitations and leg swelling. Gastrointestinal: Negative for abdominal distention, abdominal pain, anal bleeding, blood in stool, constipation, diarrhea, nausea, rectal pain and vomiting. Endocrine: Negative for cold intolerance, heat intolerance, polydipsia, polyphagia and polyuria. Genitourinary: Negative for decreased urine volume, difficulty urinating, discharge, dysuria, enuresis, flank pain, frequency, genital sores, hematuria, penile pain, penile swelling, scrotal swelling, testicular pain and urgency. Musculoskeletal: Negative for arthralgias, back pain, gait problem, joint swelling, myalgias, neck pain and neck stiffness. Skin: Negative for color change, pallor, rash and wound. Allergic/Immunologic: Negative for environmental allergies, food allergies and immunocompromised state. Neurological: Negative for dizziness, tremors, seizures, syncope, facial asymmetry, speech difficulty, weakness, light-headedness, numbness and headaches. Hematological: Negative for adenopathy. Does not bruise/bleed easily. Psychiatric/Behavioral: Negative for agitation, behavioral problems, confusion, decreased concentration, dysphoric mood, hallucinations, self-injury, sleep disturbance and suicidal ideas. The patient is not nervous/anxious and is not hyperactive. Objective:     Vitals:    10/26/20 1400 10/26/20 1900 10/26/20 2000 10/27/20 0404   BP: (!) 160/68 (!) 156/64 (!) 169/65    Pulse: 69 (!) 58 61    Resp:  16 18    Temp:       SpO2: 100% 97% 97%    Weight:    64 kg (141 lb 1.5 oz)   Height:            Intake and Output:  Current Shift: No intake/output data recorded. Last three shifts: 10/25 1901 - 10/27 0700  In: 1000 [I.V.:1000]  Out: -       Physical Exam:   Physical Exam  Constitutional:       General: He is not in acute distress. Appearance: Normal appearance. He is normal weight. He is not ill-appearing, toxic-appearing or diaphoretic. HENT:      Head: Normocephalic and atraumatic. Nose: Nose normal. No congestion or rhinorrhea. Mouth/Throat:      Mouth: Mucous membranes are moist.      Pharynx: Oropharynx is clear. No oropharyngeal exudate or posterior oropharyngeal erythema. Eyes:      General: No scleral icterus. Right eye: No discharge. Left eye: No discharge. Extraocular Movements: Extraocular movements intact. Conjunctiva/sclera: Conjunctivae normal.      Pupils: Pupils are equal, round, and reactive to light. Neck:      Musculoskeletal: Normal range of motion and neck supple. No neck rigidity or muscular tenderness. Vascular: No carotid bruit. Cardiovascular:      Rate and Rhythm: Normal rate and regular rhythm.       Pulses: Normal pulses. Heart sounds: Normal heart sounds. No murmur. No friction rub. No gallop. Pulmonary:      Effort: Pulmonary effort is normal. No respiratory distress. Breath sounds: Normal breath sounds. No stridor. No wheezing, rhonchi or rales. Chest:      Chest wall: No tenderness. Abdominal:      General: Abdomen is flat. Bowel sounds are normal. There is no distension. Palpations: Abdomen is soft. There is no mass. Tenderness: There is no abdominal tenderness. There is no right CVA tenderness, left CVA tenderness, guarding or rebound. Hernia: No hernia is present. Musculoskeletal: Normal range of motion. General: No swelling, tenderness, deformity or signs of injury. Right lower leg: No edema. Left lower leg: No edema. Lymphadenopathy:      Cervical: No cervical adenopathy. Skin:     General: Skin is warm. Capillary Refill: Capillary refill takes less than 2 seconds. Coloration: Skin is not jaundiced or pale. Findings: No bruising, erythema, lesion or rash. Neurological:      General: No focal deficit present. Mental Status: He is alert and oriented to person, place, and time. Mental status is at baseline. Cranial Nerves: No cranial nerve deficit. Sensory: No sensory deficit. Motor: No weakness. Coordination: Coordination normal.      Gait: Gait normal.      Deep Tendon Reflexes: Reflexes normal.   Psychiatric:         Mood and Affect: Mood normal.         Behavior: Behavior normal.         Thought Content: Thought content normal.         Judgment: Judgment normal.        Lab/Data Review:  Recent Results (from the past 24 hour(s))   CBC WITH AUTOMATED DIFF    Collection Time: 10/26/20 12:30 PM   Result Value Ref Range    WBC 6.0 4.1 - 11.1 K/uL    RBC 4.29 4. 10 - 5.70 M/uL    HGB 13.9 12.1 - 17.0 g/dL    HCT 41.9 36.6 - 50.3 %    MCV 97.7 80.0 - 99.0 FL    MCH 32.4 26.0 - 34.0 PG    MCHC 33.2 30.0 - 36.5 g/dL    RDW 12.6 11.5 - 14.5 %    PLATELET 342 629 - 434 K/uL    MPV 11.8 8.9 - 12.9 FL    NEUTROPHILS 53 32 - 75 %    LYMPHOCYTES 34 12 - 49 %    MONOCYTES 12 5 - 13 %    EOSINOPHILS 1 0 - 7 %    BASOPHILS 0 0 - 1 %    IMMATURE GRANULOCYTES 0 0.0 - 0.5 %    ABS. NEUTROPHILS 3.2 1.8 - 8.0 K/UL    ABS. LYMPHOCYTES 2.1 0.8 - 3.5 K/UL    ABS. MONOCYTES 0.7 0.0 - 1.0 K/UL    ABS. EOSINOPHILS 0.1 0.0 - 0.4 K/UL    ABS. BASOPHILS 0.0 0.0 - 0.1 K/UL    ABS. IMM. GRANS. 0.0 0.00 - 0.04 K/UL    DF AUTOMATED     METABOLIC PANEL, COMPREHENSIVE    Collection Time: 10/26/20 12:30 PM   Result Value Ref Range    Sodium 139 136 - 145 mmol/L    Potassium 3.2 (L) 3.5 - 5.1 mmol/L    Chloride 105 97 - 108 mmol/L    CO2 26 21 - 32 mmol/L    Anion gap 8 5 - 15 mmol/L    Glucose 102 (H) 65 - 100 mg/dL    BUN 8 6 - 20 mg/dL    Creatinine 0.78 0.70 - 1.30 mg/dL    BUN/Creatinine ratio 10 (L) 12 - 20      GFR est AA >60 >60 ml/min/1.73m2    GFR est non-AA >60 >60 ml/min/1.73m2    Calcium 9.5 8.5 - 10.1 mg/dL    Bilirubin, total 1.5 (H) 0.2 - 1.0 mg/dL    AST (SGOT) 37 15 - 37 U/L    ALT (SGPT) 26 12 - 78 U/L    Alk. phosphatase 54 45 - 117 U/L    Protein, total 7.5 6.4 - 8.2 g/dL    Albumin 3.1 (L) 3.5 - 5.0 g/dL    Globulin 4.4 (H) 2.0 - 4.0 g/dL    A-G Ratio 0.7 (L) 1.1 - 2.2     TSH 3RD GENERATION    Collection Time: 10/26/20 12:30 PM   Result Value Ref Range    TSH 0.74 0.36 - 3.74 uIU/mL   PROCALCITONIN    Collection Time: 10/26/20 12:30 PM   Result Value Ref Range    Procalcitonin 0.09 (H) 0 ng/mL   LACTIC ACID    Collection Time: 10/26/20 12:30 PM   Result Value Ref Range    Lactic acid 1.3 0.4 - 2.0 mmol/L         Assessment and plan:      Plan:     (1) Esophageal massEGD, NPO, IVF and IV protonix. Depending on EGD, will evaluate for speech and fix his nutrition (PEG) before any plans for discharge.  Patient agrees with the plan.      (2) Hypokalemiareplete potassium recheck potassium     (3) Hypertensioncontinue home antihypertensive medications     ProphylaxisHeparin subcu  FENcardiac diet, replete potassium and magnesium  Full code, will clarify about surrogate decision-maker, admitted for further management       Signed By: Debra Neal MD     October 27, 2020

## 2020-10-27 NOTE — CONSULTS
Consult    Subjective:     Cookie Villela is a 76 y.o.  male who is being seen for esophageal mass. Pt admitted with problems with swallowing since more than a month. Pt also having nausea. Denies bleeding. patient losing weight. CT chest and neck done in ER showed large esophageal mass. Pt seen by GI. Pt going to get EGD today. Denies SOB or fever. Past Medical History:   Diagnosis Date    Hypertension       History reviewed. No pertinent surgical history. History reviewed. No pertinent family history.    Social History     Tobacco Use    Smoking status: Current Some Day Smoker    Smokeless tobacco: Never Used   Substance Use Topics    Alcohol use: Not on file       Current Facility-Administered Medications   Medication Dose Route Frequency Provider Last Rate Last Dose    pantoprazole (PROTONIX) 40 mg in 0.9% sodium chloride 10 mL injection  40 mg IntraVENous DAILY Wilberto Pierre MD   Stopped at 10/27/20 1000    0.9% sodium chloride infusion  75 mL/hr IntraVENous CONTINUOUS Luis Serrano MD        sodium chloride (NS) flush 5-40 mL  5-40 mL IntraVENous Karmen Noble MD   10 mL at 10/27/20 0513    sodium chloride (NS) flush 5-40 mL  5-40 mL IntraVENous PRN Salena Lazo MD   10 mL at 10/26/20 2146    acetaminophen (TYLENOL) tablet 650 mg  650 mg Oral Q6H PRN Salena Lazo MD        Or    acetaminophen (TYLENOL) suppository 650 mg  650 mg Rectal Q6H PRN Salena Lazo MD        polyethylene glycol [facility]) packet 17 g  17 g Oral DAILY PRN Salena Lazo MD        promethazine (PHENERGAN) tablet 12.5 mg  12.5 mg Oral Q6H PRN Salena Lazo MD        Or    ondansetron [facility]) injection 4 mg  4 mg IntraVENous Q6H PRN Salena Lazo MD        enoxaparin (LOVENOX) injection 40 mg  40 mg SubCUTAneous DAILY Kardar, Weber Pallas, MD   Stopped at 10/27/20 0900    dilTIAZem ER (CARDIZEM CD) capsule 120 mg  120 mg Oral DAILY Karmen Coyle MD   120 mg at 10/27/20 1109    metoprolol succinate (TOPROL-XL) XL tablet 25 mg  25 mg Oral DAILY Karmen Coyle MD   25 mg at 10/27/20 1109     Facility-Administered Medications Ordered in Other Encounters   Medication Dose Route Frequency Provider Last Rate Last Dose    lidocaine (PF) (XYLOCAINE) 20 mg/mL (2 %) injection   IntraVENous PRN Alysiaken Frank CRNA   100 mg at 10/27/20 1510    propofoL (DIPRIVAN) 10 mg/mL injection   IntraVENous PRN Fracisco rByson CRNA   100 mg at 10/27/20 1513    0.9% sodium chloride infusion   IntraVENous CONTINUOUS Fracisco Bryson CRNA            No Known Allergies     Review of Systems:  12 point review of systems negative other than mentioned in HPI    Objective: Intake and Output:    No intake/output data recorded. 10/25 1901 - 10/27 0700  In: 1000 [I.V.:1000]  Out: -   Blood pressure (!) 157/69, pulse (!) 57, temperature 98.3 °F (36.8 °C), resp. rate 16, height 6' 1\" (1.854 m), weight 64 kg (141 lb 1.5 oz), SpO2 99 %. Physical Exam:   General:  Alert, cooperative, no distress, appears stated age. Lungs:   Clear to auscultation bilaterally. Chest wall:  No tenderness or deformity. Heart:  Regular rate and rhythm, S1, S2 normal, no murmur, click, rub or gallop. Abdomen:   Soft, non-tender. Bowel sounds normal. No masses,  No organomegaly. Extremities: Extremities normal, atraumatic, no cyanosis or edema. Pulses: 2+ and symmetric all extremities. Skin: Skin color, texture, turgor normal. No rashes or lesions   Neurologic: CNII-XII intact. No gross sensory or motor deficits       Images:   CT CHEST W CONT   Final Result   Impression: Enhancing mass of the mid thoracic esophagus measuring proximally 6   x 4 x 3 cm causing dilatation esophagus and obstruction of the upper esophagus   which is fluid filled and patulous. 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  I called Dr. Vides Congress with this result and recommendation   for upper endoscopy at 3:15 PM.      Emphysema. Coronary artery calcifications. Right kidney stone. Please see full report. CT NECK SOFT TISSUE W CONT   Final Result      XR CHEST SNGL V   Final Result   Impression: Unremarkable chest x-ray, except for degenerative change of the   spine. Data Review:   Recent Results (from the past 24 hour(s))   METABOLIC PANEL, COMPREHENSIVE    Collection Time: 10/27/20  6:57 AM   Result Value Ref Range    Sodium 140 136 - 145 mmol/L    Potassium 3.9 3.5 - 5.1 mmol/L    Chloride 111 (H) 97 - 108 mmol/L    CO2 26 21 - 32 mmol/L    Anion gap 3 (L) 5 - 15 mmol/L    Glucose 72 65 - 100 mg/dL    BUN 7 6 - 20 mg/dL    Creatinine 0.63 (L) 0.70 - 1.30 mg/dL    BUN/Creatinine ratio 11 (L) 12 - 20      GFR est AA >60 >60 ml/min/1.73m2    GFR est non-AA >60 >60 ml/min/1.73m2    Calcium 9.6 8.5 - 10.1 mg/dL    Bilirubin, total 1.5 (H) 0.2 - 1.0 mg/dL    AST (SGOT) 37 15 - 37 U/L    ALT (SGPT) 25 12 - 78 U/L    Alk. phosphatase 48 45 - 117 U/L    Protein, total 7.0 6.4 - 8.2 g/dL    Albumin 2.8 (L) 3.5 - 5.0 g/dL    Globulin 4.2 (H) 2.0 - 4.0 g/dL    A-G Ratio 0.7 (L) 1.1 - 2.2     CBC WITH AUTOMATED DIFF    Collection Time: 10/27/20  6:57 AM   Result Value Ref Range    WBC 5.9 4.1 - 11.1 K/uL    RBC 3.98 (L) 4.10 - 5.70 M/uL    HGB 12.7 12.1 - 17.0 g/dL    HCT 38.5 36.6 - 50.3 %    MCV 96.7 80.0 - 99.0 FL    MCH 31.9 26.0 - 34.0 PG    MCHC 33.0 30.0 - 36.5 g/dL    RDW 12.4 11.5 - 14.5 %    PLATELET 252 307 - 699 K/uL    MPV 12.1 8.9 - 12.9 FL    NEUTROPHILS 49 32 - 75 %    LYMPHOCYTES 30 12 - 49 %    MONOCYTES 18 (H) 5 - 13 %    EOSINOPHILS 2 0 - 7 %    BASOPHILS 1 0 - 1 %    IMMATURE GRANULOCYTES 0 0.0 - 0.5 %    ABS. NEUTROPHILS 2.9 1.8 - 8.0 K/UL    ABS. LYMPHOCYTES 1.8 0.8 - 3.5 K/UL    ABS. MONOCYTES 1.0 0.0 - 1.0 K/UL    ABS. EOSINOPHILS 0.1 0.0 - 0.4 K/UL    ABS. BASOPHILS 0.0 0.0 - 0.1 K/UL    ABS. IMM. GRANS. 0.0 0.00 - 0.04 K/UL    DF AUTOMATED          A/p  Esophageal mass:Highly suspicious for primary esophageal cancer. Pt seen by GI. Patient getting EGD and biopsy of esophageal mass today. Check CEA and CT abdomen,pelvis. Further recommendations after biopsy. Zofran prn for nausea. Weight loss:Due to possible malignancy. Start ensure. Pt advised to quit smoking . Thank you for the consult.

## 2020-10-27 NOTE — PROGRESS NOTES
Comprehensive Nutrition Assessment    Type and Reason for Visit: Initial(low BMI)    Nutrition Recommendations/Plan:     Rec'd SLP eval prior to PO diet advancement  RD to add supplementation as appropriate    Consider G-tube placement if deemed inappropriate for PO diet  If G-tube placed and TF desired, rec'd initiation of Jevity 1.5 at 20mL/hr  Continue advancing 10mL q4h to goal rate of 50mL/hr, continuous  Flush with 120mL H20 q4h. Goal feeds provide 1800kcal, 77g pro, 1632mL fluid (Meeting 100% EENs)      Nutrition Assessment:  Admitted for difficulty swallowing and generalized weakness. CT chest indicated neoplasm (10/26). CT neck indicated diffuse degenerative change of cervical spine . Pt is edentulous. Significant degenerative change of mid to lower cervical spine per MD (10/26). Dx with Esophageal mass. EGD scheduled for today. Per MD notes, plans for SLP eval and likely will receive PEG placement. MD notes indicate pt reports being very hungry but unable to eat as food comes right back up 2/2 GERD and dysphagia likely 2/2 suspected esophageal cancer. Has dry mouth. Attempted to interview pt x2, unsuccessful d/t pt d/t out of room for EGD. Labs: Anion gap 3, Cr 0.63. Meds: NS 75mL/hr, diltiazem, PPI, anti-emetic, miralax. Malnutrition Assessment:  Malnutrition Status:   Moderate malnutrition    Context:  Acute illness     Findings of the 6 clinical characteristics of malnutrition:   Energy Intake:  1 - 75% or less of est energy req for 7 or more days  Weight Loss:  7.00 - Greater than 7.5% over 3 months(Per MD notes/chart review - 15.9kg wt loss over past few months >10% BW)     Body Fat Loss:  Unable to assess,     Muscle Mass Loss:  Unable to assess,    Fluid Accumulation:  Unable to assess,      Estimated Daily Nutrient Needs:  Energy (kcal):  1800kcal(28kcal/kg)  Protein (g):  77g pro(1.2g/kg)       Fluid (ml/day):  1600mL(25mL/kg)    Nutrition Related Findings:  Unable to perform NFPE 2/2 pt unavailable. RN relays pt appears thin, visible muscle losses to UE, suspects fat loss. +Swallowing difficulty 2/2 esophageal mass. No current indication n/v or c/d. No recent BM recorded. No edema. Wounds:    None       Current Nutrition Therapies:  DIET NPO    Anthropometric Measures:  · Height:  6' 1\" (185.4 cm)  · Current Body Wt:  64 kg (141 lb 1.5 oz)   · Admission Body Wt:  141 lb 1.5 oz    · Usual Body Wt:    CLIFF    · Ideal Body Wt:  184 lbs:  76.7 %   · BMI Category:  Underweight (BMI less than 22) age over 72     Per H&P, pt reports 35lb wt loss (15.9kg) over past few months - noted possible 19.9%BW loss during this time - severe in nature. Will f/u with pt to confirm time frame. No other wt hx available to assess    Nutrition Diagnosis:   · Inadequate protein-energy intake related to swallowing difficulty(2/2 esophageal dysfunction) as evidenced by weight loss, BMI, poor intake prior to admission      Nutrition Interventions:   Food and/or Nutrient Delivery: (Advance PO diet per SLP recs; possible initiation of TF)  Nutrition Education and Counseling: No recommendations at this time  Coordination of Nutrition Care: Continued inpatient monitoring    Goals:  Initiate means of nutrition to meet >75% EENs  Wt gain 1kg/week +/- 0.5kg  Maintain skin integrity       Nutrition Monitoring and Evaluation:   Behavioral-Environmental Outcomes:  N/A  Food/Nutrient Intake Outcomes: Diet advancement/tolerance  Physical Signs/Symptoms Outcomes: Chewing or swallowing, Nausea/vomiting, Weight, Skin    Discharge Planning:     Too soon to determine     Electronically signed by Danna Schmidt on 10/27/2020 at [address]: [id number]

## 2020-10-27 NOTE — ANESTHESIA PREPROCEDURE EVALUATION
Relevant Problems   No relevant active problems       Anesthetic History   No history of anesthetic complications            Review of Systems / Medical History  Patient summary reviewed, nursing notes reviewed and pertinent labs reviewed    Pulmonary  Within defined limits                 Neuro/Psych   Within defined limits           Cardiovascular    Hypertension                   GI/Hepatic/Renal     GERD: poorly controlled          Comments: Esophageal Mass suspected Esophageal Adenocarcinoma Endo/Other  Within defined limits           Other Findings              Physical Exam    Airway  Mallampati: II  TM Distance: 4 - 6 cm  Neck ROM: normal range of motion   Mouth opening: Normal     Cardiovascular    Rhythm: regular  Rate: normal         Dental  No notable dental hx       Pulmonary  Breath sounds clear to auscultation               Abdominal  GI exam deferred       Other Findings            Anesthetic Plan    ASA: 2  Anesthesia type: total IV anesthesia and general          Induction: Intravenous  Anesthetic plan and risks discussed with: Patient and Family      General anesthesia was prescribed for this patient because by definition it is \"a drug-induced loss of consciousness during which patients are not arousable, even by painful stimulation. \" Sometimes, the ability to independently maintain ventilatory function is often impaired and patients often require assistance in maintaining a patent airway. Occasionally, positive pressure ventilation may be required because of depressed spontaneous ventilation or drug-induced depression of neuromuscular function. This depth of anesthesia is preferred for endoscopic procedures to facilitate the procedure and for patient safety/quality of care.

## 2020-10-27 NOTE — PROGRESS NOTES
CM assessed patient at bedside. Patient's son Maricarmen Messer [phone]) was present at bedside. Patient was admitted to the hospital for difficulty swallowing and generalized weakness. Patient reported that symptoms have been going on for months. Patient has an RUR of 8%. Patient reportedly resides in a one story rooming house with four housemates. Patient uses no DME, no falling, and does not drive. Patient is under the care of Dr. Logan Johnson and had last appointment three weeks ago. Patient uses [facility]. Patient has no POA and no AMD.    NOK is son Maricarmen Messer [phone]). CM will continue to follow patient for discharge planning needs.

## 2020-10-28 ENCOUNTER — APPOINTMENT (OUTPATIENT)
Dept: CT IMAGING | Age: 75
DRG: 375 | End: 2020-10-28
Attending: INTERNAL MEDICINE
Payer: MEDICARE

## 2020-10-28 PROCEDURE — 74011250637 HC RX REV CODE- 250/637: Performed by: PHYSICIAN ASSISTANT

## 2020-10-28 PROCEDURE — 74011250636 HC RX REV CODE- 250/636: Performed by: HOSPITALIST

## 2020-10-28 PROCEDURE — 74177 CT ABD & PELVIS W/CONTRAST: CPT

## 2020-10-28 PROCEDURE — 74011250637 HC RX REV CODE- 250/637: Performed by: INTERNAL MEDICINE

## 2020-10-28 PROCEDURE — 65270000029 HC RM PRIVATE

## 2020-10-28 PROCEDURE — 74011250636 HC RX REV CODE- 250/636: Performed by: INTERNAL MEDICINE

## 2020-10-28 PROCEDURE — C9113 INJ PANTOPRAZOLE SODIUM, VIA: HCPCS | Performed by: HOSPITALIST

## 2020-10-28 PROCEDURE — 74011000636 HC RX REV CODE- 636: Performed by: HOSPITALIST

## 2020-10-28 RX ADMIN — ENOXAPARIN SODIUM 40 MG: 40 INJECTION SUBCUTANEOUS at 09:06

## 2020-10-28 RX ADMIN — Medication 10 ML: at 14:59

## 2020-10-28 RX ADMIN — DILTIAZEM HYDROCHLORIDE 120 MG: 120 CAPSULE, COATED, EXTENDED RELEASE ORAL at 09:06

## 2020-10-28 RX ADMIN — Medication 10 ML: at 05:07

## 2020-10-28 RX ADMIN — NITROGLYCERIN 1 INCH: 20 OINTMENT TOPICAL at 19:02

## 2020-10-28 RX ADMIN — PANTOPRAZOLE SODIUM 40 MG: 40 INJECTION, POWDER, FOR SOLUTION INTRAVENOUS at 09:06

## 2020-10-28 RX ADMIN — METOPROLOL SUCCINATE 25 MG: 25 TABLET, EXTENDED RELEASE ORAL at 09:06

## 2020-10-28 RX ADMIN — Medication 10 ML: at 21:36

## 2020-10-28 RX ADMIN — IOPAMIDOL 100 ML: 755 INJECTION, SOLUTION INTRAVENOUS at 07:40

## 2020-10-28 NOTE — PROGRESS NOTES
Hospitalist Progress Note    Subjective:   Daily Progress Note: 10/28/2020 5:41 PM    Patient with dysphagia and esophageal mass. Unable to eat due to complete obstruction of the esophagus. Patient will need PEG tube placement. Denies chest pain or shortness of breath    Current Facility-Administered Medications   Medication Dose Route Frequency    pantoprazole (PROTONIX) 40 mg in 0.9% sodium chloride 10 mL injection  40 mg IntraVENous DAILY    0.9% sodium chloride infusion  75 mL/hr IntraVENous CONTINUOUS    sodium chloride (NS) flush 5-40 mL  5-40 mL IntraVENous Q8H    sodium chloride (NS) flush 5-40 mL  5-40 mL IntraVENous PRN    acetaminophen (TYLENOL) tablet 650 mg  650 mg Oral Q6H PRN    Or    acetaminophen (TYLENOL) suppository 650 mg  650 mg Rectal Q6H PRN    polyethylene glycol (MIRALAX) packet 17 g  17 g Oral DAILY PRN    promethazine (PHENERGAN) tablet 12.5 mg  12.5 mg Oral Q6H PRN    Or    ondansetron (ZOFRAN) injection 4 mg  4 mg IntraVENous Q6H PRN    enoxaparin (LOVENOX) injection 40 mg  40 mg SubCUTAneous DAILY    dilTIAZem ER (CARDIZEM CD) capsule 120 mg  120 mg Oral DAILY    metoprolol succinate (TOPROL-XL) XL tablet 25 mg  25 mg Oral DAILY        Review of Systems  Review of Systems   Constitutional: Negative for chills and fever. HENT: Positive for sore throat. Negative for tinnitus. Eyes: Negative. Respiratory: Negative for cough and shortness of breath. Cardiovascular: Negative for chest pain and palpitations. Gastrointestinal: Negative for abdominal pain, nausea and vomiting. Dysphagia   Genitourinary: Negative. Musculoskeletal: Negative. Skin: Negative. Neurological: Negative. Psychiatric/Behavioral: Negative.              Objective:     Visit Vitals  BP (!) 177/76 (BP 1 Location: Right arm, BP Patient Position: At rest)   Pulse (!) 55   Temp 98.5 °F (36.9 °C)   Resp 20   Ht 6' 1\" (1.854 m)   Wt 71.7 kg (158 lb 1.1 oz)   SpO2 100%   BMI 20.85 kg/m²      O2 Device: Room air    Temp (24hrs), Av.4 °F (36.9 °C), Min:98 °F (36.7 °C), Max:99 °F (37.2 °C)      10/28 0701 - 10/28 1900  In: -   Out: 200 [Urine:200]  10/26 1901 - 10/28 0700  In: 270 [P.O.:20; I.V.:250]  Out: 550 [Urine:550]    PHYSICAL EXAM:    Physical Exam  Vitals signs reviewed. Constitutional:       Appearance: Normal appearance. He is ill-appearing. HENT:      Head: Normocephalic and atraumatic. Nose: Nose normal.      Mouth/Throat:      Mouth: Mucous membranes are dry. Eyes:      Conjunctiva/sclera: Conjunctivae normal.   Neck:      Musculoskeletal: Normal range of motion and neck supple. Cardiovascular:      Rate and Rhythm: Regular rhythm. Bradycardia present. Pulses: Normal pulses. Heart sounds: Normal heart sounds. Pulmonary:      Effort: Pulmonary effort is normal.      Breath sounds: Normal breath sounds. Abdominal:      General: Bowel sounds are normal.      Palpations: Abdomen is soft. Musculoskeletal: Normal range of motion. Skin:     General: Skin is dry. Neurological:      General: No focal deficit present. Mental Status: He is alert and oriented to person, place, and time. Mental status is at baseline. Data Review    No results found for this or any previous visit (from the past 24 hour(s)). Assessment/Plan:     Active Problems:    Esophageal mass (10/26/2020)      Hospital course:    Patient admitted on 2020 with difficulty swallowing and generalized weakness and 30 pound weight loss. Patient has been unable to swallow solid foods. CT of the chest revealed an obstructing esophageal mass. EGD performed which revealed the same with biopsy taken. Oncology consultation. Patient will need PEG tube placement and will be set up for a stent placement. Considering patient's need for chemotherapy and radiation if biopsy is consistent with a malignant process patient will require PEG tube placement anyways.   IR consultation for PEG on October 20, 2020. Impression\plan:    1. Esophageal mass  EGD performed with complete obstruction of the esophagus  Biopsy pending  Oncology consultation  Continue IV Protonix  Will need immediate PEG for nutrition as NG tube cannot be placed and EGD is unsuccessful due to obstruction of the esophagus  IR consult for PEG tube placement in a.m. Hold anticoagulation for procedure  Remains n.p.o.  CT of the chest reveals a mid thoracic esophageal mass measuring 6 x 4 x 3 cm causing dilatation of the esophagus and complete obstruction of the upper esophagus. 2.  Hypokalemia  Resolved    3. Essential hypertension  Cannot take home antihypertensives at this point  Bradycardia and not a candidate for labetalol  Nitro patch as needed    DVT prophylaxis: Lovenox  Ulcer prophylaxis: IV Protonix    FEN:  Continue IV fluids  N.p.o. due to obstructing mass in the esophagus  Replace electrolytes as needed    CODE STATUS: Full code    Care Plan discussed with: Patient/Family     Discussed case with patient's son Essie Patterson, phone number [phone]    Total time spent with patient: 45 minutes.

## 2020-10-28 NOTE — PROGRESS NOTES
Problem: Pressure Injury - Risk of  Goal: *Prevention of pressure injury  Description: Document Robb Scale and appropriate interventions in the flowsheet.   Outcome: Progressing Towards Goal  Note: Pressure Injury Interventions:  Sensory Interventions: Keep linens dry and wrinkle-free, Maintain/enhance activity level, Minimize linen layers              Mobility Interventions: HOB 30 degrees or less

## 2020-10-29 ENCOUNTER — ANESTHESIA EVENT (OUTPATIENT)
Dept: ENDOSCOPY | Age: 75
DRG: 375 | End: 2020-10-29
Payer: MEDICARE

## 2020-10-29 LAB
ALBUMIN SERPL-MCNC: 2.5 G/DL (ref 3.5–5)
ALBUMIN/GLOB SERPL: 0.7 {RATIO} (ref 1.1–2.2)
ALP SERPL-CCNC: 43 U/L (ref 45–117)
ALT SERPL-CCNC: 21 U/L (ref 12–78)
ANION GAP SERPL CALC-SCNC: 8 MMOL/L (ref 5–15)
AST SERPL W P-5'-P-CCNC: 34 U/L (ref 15–37)
BASOPHILS # BLD: 0 K/UL (ref 0–0.1)
BASOPHILS NFR BLD: 0 % (ref 0–1)
BILIRUB SERPL-MCNC: 1.4 MG/DL (ref 0.2–1)
BUN SERPL-MCNC: 5 MG/DL (ref 6–20)
BUN/CREAT SERPL: 9 (ref 12–20)
CA-I BLD-MCNC: 8.9 MG/DL (ref 8.5–10.1)
CHLORIDE SERPL-SCNC: 108 MMOL/L (ref 97–108)
CO2 SERPL-SCNC: 24 MMOL/L (ref 21–32)
CREAT SERPL-MCNC: 0.56 MG/DL (ref 0.7–1.3)
DIFFERENTIAL METHOD BLD: ABNORMAL
EOSINOPHIL # BLD: 0.1 K/UL (ref 0–0.4)
EOSINOPHIL NFR BLD: 1 % (ref 0–7)
ERYTHROCYTE [DISTWIDTH] IN BLOOD BY AUTOMATED COUNT: 12.1 % (ref 11.5–14.5)
GLOBULIN SER CALC-MCNC: 3.7 G/DL (ref 2–4)
GLUCOSE SERPL-MCNC: 90 MG/DL (ref 65–100)
HCT VFR BLD AUTO: 35.4 % (ref 36.6–50.3)
HGB BLD-MCNC: 11.7 G/DL (ref 12.1–17)
IMM GRANULOCYTES # BLD AUTO: 0 K/UL (ref 0–0.04)
IMM GRANULOCYTES NFR BLD AUTO: 0 % (ref 0–0.5)
LYMPHOCYTES # BLD: 1.3 K/UL (ref 0.8–3.5)
LYMPHOCYTES NFR BLD: 24 % (ref 12–49)
MCH RBC QN AUTO: 31.1 PG (ref 26–34)
MCHC RBC AUTO-ENTMCNC: 33.1 G/DL (ref 30–36.5)
MCV RBC AUTO: 94.1 FL (ref 80–99)
MONOCYTES # BLD: 0.9 K/UL (ref 0–1)
MONOCYTES NFR BLD: 17 % (ref 5–13)
NEUTS SEG # BLD: 3.2 K/UL (ref 1.8–8)
NEUTS SEG NFR BLD: 58 % (ref 32–75)
PLATELET # BLD AUTO: 162 K/UL (ref 150–400)
PMV BLD AUTO: 12.1 FL (ref 8.9–12.9)
POTASSIUM SERPL-SCNC: 3.5 MMOL/L (ref 3.5–5.1)
PROT SERPL-MCNC: 6.2 G/DL (ref 6.4–8.2)
RBC # BLD AUTO: 3.76 M/UL (ref 4.1–5.7)
SODIUM SERPL-SCNC: 140 MMOL/L (ref 136–145)
WBC # BLD AUTO: 5.5 K/UL (ref 4.1–11.1)

## 2020-10-29 PROCEDURE — C9113 INJ PANTOPRAZOLE SODIUM, VIA: HCPCS | Performed by: HOSPITALIST

## 2020-10-29 PROCEDURE — 65270000029 HC RM PRIVATE

## 2020-10-29 PROCEDURE — 74011250636 HC RX REV CODE- 250/636: Performed by: PHYSICIAN ASSISTANT

## 2020-10-29 PROCEDURE — 36415 COLL VENOUS BLD VENIPUNCTURE: CPT

## 2020-10-29 PROCEDURE — 80053 COMPREHEN METABOLIC PANEL: CPT

## 2020-10-29 PROCEDURE — 74011000258 HC RX REV CODE- 258: Performed by: PHYSICIAN ASSISTANT

## 2020-10-29 PROCEDURE — 74011250637 HC RX REV CODE- 250/637: Performed by: PHYSICIAN ASSISTANT

## 2020-10-29 PROCEDURE — 74011250636 HC RX REV CODE- 250/636: Performed by: HOSPITALIST

## 2020-10-29 PROCEDURE — 85025 COMPLETE CBC W/AUTO DIFF WBC: CPT

## 2020-10-29 RX ORDER — CEFAZOLIN SODIUM 2 G/100ML
2 INJECTION, SOLUTION INTRAVENOUS ONCE
Status: COMPLETED | OUTPATIENT
Start: 2020-10-30 | End: 2020-10-30

## 2020-10-29 RX ORDER — DEXTROSE MONOHYDRATE AND SODIUM CHLORIDE 5; .9 G/100ML; G/100ML
75 INJECTION, SOLUTION INTRAVENOUS CONTINUOUS
Status: DISCONTINUED | OUTPATIENT
Start: 2020-10-29 | End: 2020-10-30

## 2020-10-29 RX ADMIN — THIAMINE HYDROCHLORIDE 200 MG: 100 INJECTION, SOLUTION INTRAMUSCULAR; INTRAVENOUS at 12:13

## 2020-10-29 RX ADMIN — NITROGLYCERIN 1 INCH: 20 OINTMENT TOPICAL at 15:05

## 2020-10-29 RX ADMIN — DEXTROSE AND SODIUM CHLORIDE 75 ML/HR: 5; 900 INJECTION, SOLUTION INTRAVENOUS at 09:11

## 2020-10-29 RX ADMIN — Medication 10 ML: at 14:00

## 2020-10-29 RX ADMIN — DEXTROSE AND SODIUM CHLORIDE 75 ML/HR: 5; 900 INJECTION, SOLUTION INTRAVENOUS at 21:45

## 2020-10-29 RX ADMIN — PANTOPRAZOLE SODIUM 40 MG: 40 INJECTION, POWDER, FOR SOLUTION INTRAVENOUS at 09:12

## 2020-10-29 RX ADMIN — Medication 10 ML: at 05:13

## 2020-10-29 RX ADMIN — Medication 10 ML: at 21:47

## 2020-10-29 NOTE — PROGRESS NOTES
Problem: Falls - Risk of  Goal: *Absence of Falls  Description: Document Dontrell Mcdonald Fall Risk and appropriate interventions in the flowsheet.   Outcome: Progressing Towards Goal  Note: Fall Risk Interventions:            Medication Interventions: Patient to call before getting OOB, Teach patient to arise slowly

## 2020-10-29 NOTE — PROGRESS NOTES
Progress Note    Patient: Tayla Pleitez MRN: [id number]  SSN: [id number]    YOB: 1945  Age: 76 y.o.   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,  Past Medical History:   Diagnosis Date    Hypertension         Current Facility-Administered Medications:     dextrose 5% and 0.9% NaCl infusion, 75 mL/hr, IntraVENous, CONTINUOUS, Alejandro Doll PA-C, Last Rate: 75 mL/hr at 10/29/20 0911, 75 mL/hr at 10/29/20 0911    thiamine (B-1) 200 mg in 0.9% sodium chloride 50 mL IVPB, 200 mg, IntraVENous, DAILY, Murali Doll PA-C, 200 mg at 10/29/20 1213    nitroglycerin (NITROBID) 2 % ointment 1 Inch, 1 Inch, Topical, BID PRN, Alejandro Doll PA-C, 1 Inch at 10/29/20 1505    pantoprazole (PROTONIX) 40 mg in 0.9% sodium chloride 10 mL injection, 40 mg, IntraVENous, DAILY, Isabel Lepe MD, 40 mg at 10/29/20 0912    sodium chloride (NS) flush 5-40 mL, 5-40 mL, IntraVENous, Q8H, Karmen Coyle MD, 10 mL at 10/29/20 1400    sodium chloride (NS) flush 5-40 mL, 5-40 mL, IntraVENous, PRN, Karmen Coyle MD, 10 mL at 10/26/20 2146    acetaminophen (TYLENOL) tablet 650 mg, 650 mg, Oral, Q6H PRN **OR** acetaminophen (TYLENOL) suppository 650 mg, 650 mg, Rectal, Q6H PRN, Karmen Coyle MD    polyethylene glycol (MIRALAX) packet 17 g, 17 g, Oral, DAILY PRN, Karmen Coyle MD    promethazine (PHENERGAN) tablet 12.5 mg, 12.5 mg, Oral, Q6H PRN **OR** ondansetron (ZOFRAN) injection 4 mg, 4 mg, IntraVENous, Q6H PRN, Aleja Ron MD    [Held by provider] enoxaparin (LOVENOX) injection 40 mg, 40 mg, SubCUTAneous, DAILY, Don Wilson MD, Stopped at 10/29/20 0900    dilTIAZem ER (CARDIZEM CD) capsule 120 mg, 120 mg, Oral, DAILY, Karmen Coyle MD, Stopped at 10/29/20 0900    [Held by provider] metoprolol succinate (TOPROL-XL) XL tablet 25 mg, 25 mg, Oral, DAILY, Karmen Coyle MD, Stopped at 10/29/20 0900    Objective:     Vitals:    10/29/20 0853 10/29/20 1110 10/29/20 1500 10/29/20 1706   BP:  (!) 173/74 (!) 184/79 (!) 182/82   Pulse:  (!) 55 (!) 52 (!) 57   Resp:  20 20    Temp:  99.2 °F (37.3 °C) 98.3 °F (36.8 °C)    SpO2:  100% 100%    Weight:       Height: 6' 0.99\" (1.854 m)           Intake and Output:  Current Shift: 10/29 0701 - 10/29 1900  In: 0   Out: 400 [Urine:400]  Last three shifts: 10/27 1901 - 10/29 0700  In: 20 [P.O.:20]  Out: 750 [Urine:750]    Physical Exam:   Physical Exam   Constitutional: He appears cachectic. He appears distressed. HENT:   Head: Atraumatic. Neck: Normal carotid pulses and no JVD present. No erythema and normal range of motion present. No thyromegaly present. Cardiovascular: Regular rhythm. Pulmonary/Chest: Effort normal and breath sounds normal. No apnea and no bradypnea. Abdominal: Soft. Bowel sounds are normal. He exhibits no distension and no fluid wave. Neurological: He is alert. He has normal strength. Lab/Data Review:  Recent Results (from the past 24 hour(s))   CBC WITH AUTOMATED DIFF    Collection Time: 10/29/20  8:08 AM   Result Value Ref Range    WBC 5.5 4.1 - 11.1 K/uL    RBC 3.76 (L) 4.10 - 5.70 M/uL    HGB 11.7 (L) 12.1 - 17.0 g/dL    HCT 35.4 (L) 36.6 - 50.3 %    MCV 94.1 80.0 - 99.0 FL    MCH 31.1 26.0 - 34.0 PG    MCHC 33.1 30.0 - 36.5 g/dL    RDW 12.1 11.5 - 14.5 %    PLATELET 524 137 - 488 K/uL    MPV 12.1 8.9 - 12.9 FL    NEUTROPHILS 58 32 - 75 %    LYMPHOCYTES 24 12 - 49 %    MONOCYTES 17 (H) 5 - 13 %    EOSINOPHILS 1 0 - 7 %    BASOPHILS 0 0 - 1 %    IMMATURE GRANULOCYTES 0 0.0 - 0.5 %    ABS. NEUTROPHILS 3.2 1.8 - 8.0 K/UL    ABS.  LYMPHOCYTES 1.3 0.8 - 3.5 K/UL    ABS. MONOCYTES 0.9 0.0 - 1.0 K/UL    ABS. EOSINOPHILS 0.1 0.0 - 0.4 K/UL    ABS. BASOPHILS 0.0 0.0 - 0.1 K/UL    ABS. IMM. GRANS. 0.0 0.00 - 0.04 K/UL    DF AUTOMATED     METABOLIC PANEL, COMPREHENSIVE    Collection Time: 10/29/20  8:08 AM   Result Value Ref Range    Sodium 140 136 - 145 mmol/L    Potassium 3.5 3.5 - 5.1 mmol/L    Chloride 108 97 - 108 mmol/L    CO2 24 21 - 32 mmol/L    Anion gap 8 5 - 15 mmol/L    Glucose 90 65 - 100 mg/dL    BUN 5 (L) 6 - 20 mg/dL    Creatinine 0.56 (L) 0.70 - 1.30 mg/dL    BUN/Creatinine ratio 9 (L) 12 - 20      GFR est AA >60 >60 ml/min/1.73m2    GFR est non-AA >60 >60 ml/min/1.73m2    Calcium 8.9 8.5 - 10.1 mg/dL    Bilirubin, total 1.4 (H) 0.2 - 1.0 mg/dL    AST (SGOT) 34 15 - 37 U/L    ALT (SGPT) 21 12 - 78 U/L    Alk. phosphatase 43 (L) 45 - 117 U/L    Protein, total 6.2 (L) 6.4 - 8.2 g/dL    Albumin 2.5 (L) 3.5 - 5.0 g/dL    Globulin 3.7 2.0 - 4.0 g/dL    A-G Ratio 0.7 (L) 1.1 - 2.2          CT ABD PELV W CONT   Final Result   Impression:   1. No specific evidence of abdominopelvic metastatic disease. 2.  Prostatomegaly. Wall thickening of the urinary bladder may be due to chronic   outlet obstruction and/or cystitis. 3.  Borderline wall thickening of the stomach. May be related to   underdistention, but correlate for gastritis. 4.  Nonobstructing right renal calculus. 5.  See full report for detailed findings. See recently reported chest CT for   supradiaphragmatic findings. CT CHEST W CONT   Final Result   Impression: Enhancing mass of the mid thoracic esophagus measuring proximally 6   x 4 x 3 cm causing dilatation esophagus and obstruction of the upper esophagus   which is fluid filled and patulous. 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 I called Dr. Judson Vasquez with this result and recommendation   for upper endoscopy at 3:15 PM.      Emphysema. Coronary artery calcifications. Right kidney stone.  Please see full report. CT NECK SOFT TISSUE W CONT   Final Result      XR CHEST SNGL V   Final Result   Impression: Unremarkable chest x-ray, except for degenerative change of the   spine.            Assessment:     Active Problems:    Esophageal mass (10/26/2020)    upper esophageal mass, likely due to the esophageal squamous cell cancer, biopsy taken,  Difficult swallow,  Malnutrition,    Plan:   Continue IV hydration, D5 half-normal saline,  An esophageal stent placement with PEG tube placement will be performed  In the am  Indication, risks, options discussed with patient  Plan was discussed with oncologist, patient admitting physician    Signed By: Sandra Goldstein MD     October 29, 2020        Thank you for allowing me to participate in this patients care  Cc Referring Physician   None

## 2020-10-29 NOTE — PROGRESS NOTES
Skin assessment done by this RN with Lane Mccloud RN. Patient has dry, flaking skin on bilateral foot otherwise skin is dry and intact.

## 2020-10-29 NOTE — ANESTHESIA PREPROCEDURE EVALUATION
Relevant Problems   No relevant active problems       Anesthetic History   No history of anesthetic complications            Review of Systems / Medical History  Patient summary reviewed, nursing notes reviewed and pertinent labs reviewed    Pulmonary          Smoker         Neuro/Psych   Within defined limits           Cardiovascular    Hypertension                   GI/Hepatic/Renal  Within defined limits             Comments: Dysphagia  Esophageal Mass Endo/Other        Cancer and anemia     Other Findings   Comments: Possible Esophageal CA    Hypokalemia       Past Medical History:   Diagnosis Date    Hypertension        History reviewed. No pertinent surgical history.     No current outpatient medications    Current Facility-Administered Medications   Medication Dose Route Frequency    potassium chloride 10 mEq in 100 ml IVPB  10 mEq IntraVENous Q1H    dextrose 5% - 0.9% NaCl with KCl 20 mEq/L infusion  75 mL/hr IntraVENous CONTINUOUS    amino acid 4.25 % dextrose 5 % with electrolytes (CLINIMIX E) infusion   IntraVENous CONTINUOUS    fat emulsion 20% (LIPOSYN, INTRAlipid) infusion 250 mL  250 mL IntraVENous CONTINUOUS    thiamine (B-1) 200 mg in 0.9% sodium chloride 50 mL IVPB  200 mg IntraVENous DAILY    ceFAZolin (ANCEF) 2g in 100 mL dextrose (iso-osmotic) IVPB  2 g IntraVENous ONCE    nitroglycerin (NITROBID) 2 % ointment 1 Inch  1 Inch Topical BID PRN    pantoprazole (PROTONIX) 40 mg in 0.9% sodium chloride 10 mL injection  40 mg IntraVENous DAILY    sodium chloride (NS) flush 5-40 mL  5-40 mL IntraVENous Q8H    sodium chloride (NS) flush 5-40 mL  5-40 mL IntraVENous PRN    acetaminophen (TYLENOL) tablet 650 mg  650 mg Oral Q6H PRN    Or    acetaminophen (TYLENOL) suppository 650 mg  650 mg Rectal Q6H PRN    polyethylene glycol (MIRALAX) packet 17 g  17 g Oral DAILY PRN    promethazine (PHENERGAN) tablet 12.5 mg  12.5 mg Oral Q6H PRN    Or    ondansetron (ZOFRAN) injection 4 mg  4 mg IntraVENous Q6H PRN    dilTIAZem ER (CARDIZEM CD) capsule 120 mg  120 mg Oral DAILY    [Held by provider] metoprolol succinate (TOPROL-XL) XL tablet 25 mg  25 mg Oral DAILY       Patient Vitals for the past 24 hrs:   Temp Pulse Resp BP SpO2   10/30/20 1126  (!) 56 16 (!) 181/77 98 %   10/30/20 1044 36.6 °C (97.9 °F) 60 18 (!) 145/80 99 %   10/30/20 0824 37.3 °C (99.2 °F) (!) 56 18 (!) 179/8 100 %   10/30/20 0218 37.1 °C (98.8 °F) 61 18 (!) 148/75 99 %   10/29/20 1936 37.6 °C (99.6 °F) (!) 59 18 (!) 179/78 99 %   10/29/20 1811    (!) 167/79    10/29/20 1706  (!) 57  (!) 182/82    10/29/20 1500 36.8 °C (98.3 °F) (!) 52 20 (!) 184/79 100 %       Lab Results   Component Value Date/Time    WBC 4.9 10/30/2020 06:46 AM    HGB 11.8 (L) 10/30/2020 06:46 AM    HCT 35.7 (L) 10/30/2020 06:46 AM    PLATELET 084 77/15/8846 06:46 AM    MCV 93.9 10/30/2020 06:46 AM     Lab Results   Component Value Date/Time    Sodium 141 10/30/2020 06:46 AM    Potassium 3.0 (L) 10/30/2020 06:46 AM    Chloride 109 (H) 10/30/2020 06:46 AM    CO2 25 10/30/2020 06:46 AM    Anion gap 7 10/30/2020 06:46 AM    Glucose 118 (H) 10/30/2020 06:46 AM    BUN 2 (L) 10/30/2020 06:46 AM    Creatinine 0.50 (L) 10/30/2020 06:46 AM    BUN/Creatinine ratio 4 (L) 10/30/2020 06:46 AM    GFR est AA >60 10/30/2020 06:46 AM    GFR est non-AA >60 10/30/2020 06:46 AM    Calcium 8.9 10/30/2020 06:46 AM     No results found for: APTT, PTP, INR, INREXT  Lab Results   Component Value Date/Time    Glucose 118 (H) 10/30/2020 06:46 AM     Physical Exam    Airway  Mallampati: I  TM Distance: 4 - 6 cm  Neck ROM: normal range of motion   Mouth opening: Normal     Cardiovascular    Rhythm: regular  Rate: normal         Dental  No notable dental hx       Pulmonary  Breath sounds clear to auscultation               Abdominal  GI exam deferred       Other Findings            Anesthetic Plan    ASA: 4  Anesthesia type: general and total IV anesthesia          Induction: Intravenous  Anesthetic plan and risks discussed with: Patient      General anesthesia was prescribed for this patient because by definition it is \"a drug-induced loss of consciousness during which patients are not arousable, even by painful stimulation. \" Sometimes, the ability to independently maintain ventilatory function is often impaired and patients often require assistance in maintaining a patent airway. Occasionally, positive pressure ventilation may be required because of depressed spontaneous ventilation or drug-induced depression of neuromuscular function. This depth of anesthesia is preferred for endoscopic procedures to facilitate the procedure and for patient safety/quality of care.

## 2020-10-29 NOTE — PROGRESS NOTES
Subjective problem with  swallowing  Subjective:      HPI :Pt having difficulty with swallowing. Nausea better.     {   Objective     Current Facility-Administered Medications:     nitroglycerin (NITROBID) 2 % ointment 1 Inch, 1 Inch, Topical, BID PRN, Evan Doll PA-C, 1 Inch at 10/28/20 1902    pantoprazole (PROTONIX) 40 mg in 0.9% sodium chloride 10 mL injection, 40 mg, IntraVENous, DAILY, Adriana Lepe MD, 40 mg at 10/28/20 0906    0.9% sodium chloride infusion, 75 mL/hr, IntraVENous, CONTINUOUS, Jessica Serrano MD    sodium chloride (NS) flush 5-40 mL, 5-40 mL, IntraVENous, Q8H, Karmen Coyle MD, 10 mL at 10/28/20 2136    sodium chloride (NS) flush 5-40 mL, 5-40 mL, IntraVENous, PRN, Karmen Coyle MD, 10 mL at 10/26/20 2146    acetaminophen (TYLENOL) tablet 650 mg, 650 mg, Oral, Q6H PRN **OR** acetaminophen (TYLENOL) suppository 650 mg, 650 mg, Rectal, Q6H PRN, Karmen Coyle MD    polyethylene glycol (MIRALAX) packet 17 g, 17 g, Oral, DAILY PRN, Karmen Coyle MD    promethazine (PHENERGAN) tablet 12.5 mg, 12.5 mg, Oral, Q6H PRN **OR** ondansetron (ZOFRAN) injection 4 mg, 4 mg, IntraVENous, Q6H PRN, Huseyin Vera MD    [Held by provider] enoxaparin (LOVENOX) injection 40 mg, 40 mg, SubCUTAneous, DAILY, Moises Coyle MD, 40 mg at 10/28/20 0347    dilTIAZem ER (CARDIZEM CD) capsule 120 mg, 120 mg, Oral, DAILY, Huseyin Vera MD, 120 mg at 10/28/20 1342    [Held by provider] metoprolol succinate (TOPROL-XL) XL tablet 25 mg, 25 mg, Oral, DAILY, Karmen Coyle MD, 25 mg at 10/28/20 0883    Objective:     Visit Vitals  BP (!) 163/74   Pulse 68   Temp 99.5 °F (37.5 °C)   Resp 18   Ht 6' 1\" (1.854 m)   Wt 71.7 kg (158 lb 1.1 oz)   SpO2 100%   BMI 20.85 kg/m²      Physical Exam   Lungs:CTA  Heart:RRR  Abdomen:soft,NT  EXT:no edema    Data Review:   No results found for this or any previous visit (from the past 24 hour(s)). Assessment   Assessment/Plan:      Esophageal mass:highly suspicious for esophageal cancer. EGD showed upper esophageal mass. Biopsy results pending. CT abdomen,pelvis negative except enlarged prostate. Plans for stent placement and PEG tube noted. PET scan as out pt. Most likely needs radiation and chemotherapy. D/w GI.may need EUS also. Pt advised to quit smoking.

## 2020-10-29 NOTE — PROGRESS NOTES
Hospitalist Progress Note    Subjective:   Daily Progress Note: 10/29/2020 5:41 PM    Patient with dysphagia and esophageal mass. Unable to eat due to complete obstruction of the esophagus. Patient will need PEG tube placement. Denies chest pain or shortness of breath    Current Facility-Administered Medications   Medication Dose Route Frequency    dextrose 5% and 0.9% NaCl infusion  75 mL/hr IntraVENous CONTINUOUS    thiamine (B-1) 200 mg in 0.9% sodium chloride 50 mL IVPB  200 mg IntraVENous DAILY    nitroglycerin (NITROBID) 2 % ointment 1 Inch  1 Inch Topical BID PRN    pantoprazole (PROTONIX) 40 mg in 0.9% sodium chloride 10 mL injection  40 mg IntraVENous DAILY    sodium chloride (NS) flush 5-40 mL  5-40 mL IntraVENous Q8H    sodium chloride (NS) flush 5-40 mL  5-40 mL IntraVENous PRN    acetaminophen (TYLENOL) tablet 650 mg  650 mg Oral Q6H PRN    Or    acetaminophen (TYLENOL) suppository 650 mg  650 mg Rectal Q6H PRN    polyethylene glycol (MIRALAX) packet 17 g  17 g Oral DAILY PRN    promethazine (PHENERGAN) tablet 12.5 mg  12.5 mg Oral Q6H PRN    Or    ondansetron (ZOFRAN) injection 4 mg  4 mg IntraVENous Q6H PRN    [Held by provider] enoxaparin (LOVENOX) injection 40 mg  40 mg SubCUTAneous DAILY    dilTIAZem ER (CARDIZEM CD) capsule 120 mg  120 mg Oral DAILY    [Held by provider] metoprolol succinate (TOPROL-XL) XL tablet 25 mg  25 mg Oral DAILY        Review of Systems  Review of Systems   Constitutional: Negative for chills and fever. HENT: Positive for sore throat. Negative for tinnitus. Eyes: Negative. Respiratory: Negative for cough and shortness of breath. Cardiovascular: Negative for chest pain and palpitations. Gastrointestinal: Negative for abdominal pain, nausea and vomiting. Dysphagia   Genitourinary: Negative. Musculoskeletal: Negative. Skin: Negative. Neurological: Negative. Psychiatric/Behavioral: Negative.              Objective:     Visit Vitals  BP (!) 162/69 (BP 1 Location: Right arm, BP Patient Position: At rest)   Pulse (!) 54   Temp 99.2 °F (37.3 °C)   Resp 20   Ht 6' 0.99\" (1.854 m)   Wt 71.7 kg (158 lb 1.1 oz)   SpO2 100%   BMI 20.86 kg/m²      O2 Device: Room air    Temp (24hrs), Av.9 °F (37.2 °C), Min:98 °F (36.7 °C), Max:99.5 °F (37.5 °C)      10/29 0701 - 10/29 1900  In: -   Out: 100 [Urine:100]  10/27 1901 - 10/29 0700  In: 20 [P.O.:20]  Out: 750 [Urine:750]    PHYSICAL EXAM:    Physical Exam  Vitals signs reviewed. Constitutional:       Appearance: Normal appearance. He is ill-appearing. HENT:      Head: Normocephalic and atraumatic. Nose: Nose normal.      Mouth/Throat:      Mouth: Mucous membranes are dry. Eyes:      Conjunctiva/sclera: Conjunctivae normal.   Neck:      Musculoskeletal: Normal range of motion and neck supple. Cardiovascular:      Rate and Rhythm: Regular rhythm. Bradycardia present. Pulses: Normal pulses. Heart sounds: Normal heart sounds. Pulmonary:      Effort: Pulmonary effort is normal.      Breath sounds: Normal breath sounds. Abdominal:      General: Bowel sounds are normal.      Palpations: Abdomen is soft. Musculoskeletal: Normal range of motion. Skin:     General: Skin is dry. Neurological:      General: No focal deficit present. Mental Status: He is alert and oriented to person, place, and time. Mental status is at baseline. Data Review    No results found for this or any previous visit (from the past 24 hour(s)). Assessment/Plan:     Active Problems:    Esophageal mass (10/26/2020)      Hospital course:    Patient admitted on 2020 with difficulty swallowing and generalized weakness and 30 pound weight loss. Patient has been unable to swallow solid foods. CT of the chest revealed an obstructing esophageal mass. EGD performed which revealed the same with biopsy taken. Oncology consultation.   Patient will need PEG tube placement and will be set up for a stent placement. Considering patient's need for chemotherapy and radiation if biopsy is consistent with a malignant process patient will require PEG tube placement anyways. IR consultation for PEG on October 20, 2020. Impression\plan:    1. Esophageal mass  EGD performed with complete obstruction of the esophagus  Biopsy consistent with adenocarcinoma  Oncology consultation  Continue IV Protonix  Will need immediate PEG for nutrition as NG tube cannot be placed and EGD is unsuccessful due to obstruction of the esophagus  IR consult for PEG tube placement  Hold anticoagulation for procedure  Remains n.p.o.  CT of the chest reveals a mid thoracic esophageal mass measuring 6 x 4 x 3 cm causing dilatation of the esophagus and complete obstruction of the upper esophagus. 2.  Hypokalemia  Resolved    3. Essential hypertension  Cannot take home antihypertensives at this point  Bradycardia and not a candidate for labetalol  Nitro patch as needed    DVT prophylaxis: Lovenox  Ulcer prophylaxis: IV Protonix    FEN:  Continue IV fluids  N.p.o. due to obstructing mass in the esophagus  Replace electrolytes as needed    CODE STATUS: Full code    Care Plan discussed with: Patient/Family     Discussed case with patient's son Francine Moore, phone number [phone]    Total time spent with patient: 40 minutes.

## 2020-10-29 NOTE — PROGRESS NOTES
Progress Note    Patient: Cookie Villela MRN: [id number]  SSN: [id number]    YOB: 1945  Age: 76 y.o.   Sex: male      Admit Date: 10/26/2020    LOS: 2 days     Subjective:   Patient seen, examined, difficult swallow, frequent spread of saliva  Yesterday's EGD showed esophageal mass at the upper esophagus about 5 cm below the upper esophageal sphincter, biopsy taken, result pending  Past Medical History:   Diagnosis Date    Hypertension         Current Facility-Administered Medications:     nitroglycerin (NITROBID) 2 % ointment 1 Inch, 1 Inch, Topical, BID PRN, Michael Doll PA-C, 1 Inch at 10/28/20 1902    pantoprazole (PROTONIX) 40 mg in 0.9% sodium chloride 10 mL injection, 40 mg, IntraVENous, DAILY, Sorin Lepe MD, 40 mg at 10/28/20 0906    0.9% sodium chloride infusion, 75 mL/hr, IntraVENous, CONTINUOUS, Luis Serrano MD    sodium chloride (NS) flush 5-40 mL, 5-40 mL, IntraVENous, Q8H, Karmen Coyle MD, 10 mL at 10/28/20 2136    sodium chloride (NS) flush 5-40 mL, 5-40 mL, IntraVENous, PRN, Karmen Coyle MD, 10 mL at 10/26/20 2146    acetaminophen (TYLENOL) tablet 650 mg, 650 mg, Oral, Q6H PRN **OR** acetaminophen (TYLENOL) suppository 650 mg, 650 mg, Rectal, Q6H PRN, Karmen Coyle MD    polyethylene glycol (MIRALAX) packet 17 g, 17 g, Oral, DAILY PRN, Karmen Coyle MD    promethazine (PHENERGAN) tablet 12.5 mg, 12.5 mg, Oral, Q6H PRN **OR** ondansetron (ZOFRAN) injection 4 mg, 4 mg, IntraVENous, Q6H PRN, Salena Lazo MD    [Held by provider] enoxaparin (LOVENOX) injection 40 mg, 40 mg, SubCUTAneous, DAILY, Karmen Coyle MD, 40 mg at 10/28/20 2043    dilTIAZem ER (CARDIZEM CD) capsule 120 mg, 120 mg, Oral, DAILY, Kardar, Weber Pallas, MD, 120 mg at 10/28/20 0906    [Held by provider] metoprolol succinate (TOPROL-XL) XL tablet 25 mg, 25 mg, Oral, DAILY, Karmen Coyle Kori Holden MD, 25 mg at 10/28/20 6654    Objective:     Vitals:    10/28/20 1058 10/28/20 1456 10/28/20 1901 10/28/20 2042   BP: (!) 166/74 (!) 177/76 (!) 187/79 (!) 163/74   Pulse: (!) 57 (!) 55 68    Resp: 19 20 18    Temp: 98 °F (36.7 °C) 98.5 °F (36.9 °C) 99.5 °F (37.5 °C)    SpO2: 100% 100% 100%    Weight:       Height:            Intake and Output:  Current Shift: No intake/output data recorded. Last three shifts: 10/27 0701 - 10/28 1900  In: 270 [P.O.:20; I.V.:250]  Out: 750 [Urine:750]    Physical Exam:   Physical Exam   Constitutional: He appears cachectic. He appears distressed. HENT:   Head: Atraumatic. Neck: Normal carotid pulses and no JVD present. No erythema and normal range of motion present. No thyromegaly present. Cardiovascular: Regular rhythm. Pulmonary/Chest: Effort normal and breath sounds normal. No apnea and no bradypnea. Abdominal: Soft. Bowel sounds are normal. He exhibits no distension and no fluid wave. Neurological: He is alert. He has normal strength. Lab/Data Review:  No results found for this or any previous visit (from the past 24 hour(s)). CT ABD PELV W CONT   Final Result   Impression:   1. No specific evidence of abdominopelvic metastatic disease. 2.  Prostatomegaly. Wall thickening of the urinary bladder may be due to chronic   outlet obstruction and/or cystitis. 3.  Borderline wall thickening of the stomach. May be related to   underdistention, but correlate for gastritis. 4.  Nonobstructing right renal calculus. 5.  See full report for detailed findings. See recently reported chest CT for   supradiaphragmatic findings. CT CHEST W CONT   Final Result   Impression: Enhancing mass of the mid thoracic esophagus measuring proximally 6   x 4 x 3 cm causing dilatation esophagus and obstruction of the upper esophagus   which is fluid filled and patulous. 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  I called Dr. Chente Fitzpatrick with this result and recommendation   for upper endoscopy at 3:15 PM.      Emphysema. Coronary artery calcifications. Right kidney stone. Please see full report. CT NECK SOFT TISSUE W CONT   Final Result      XR CHEST SNGL V   Final Result   Impression: Unremarkable chest x-ray, except for degenerative change of the   spine.            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

## 2020-10-29 NOTE — PROGRESS NOTES
Comprehensive Nutrition Assessment    Type and Reason for Visit: Reassess(Interim)    Nutrition Recommendations/Plan:     Consider addition of D5 or D10 for protein-sparing kcal while awaiting G-tube placement     Monitor need for insulin for euglycemia     Replete lytes PRN     Add IV thiamine 200mg daily for at least 3 days, pt at risk for refeeding    As PEG-tube placed and ready for use, rec'd initiation of Jevity 1.5 at 20mL/hr  Continue advancing 10mL q4h to goal rate of 50mL/hr, continuous  Flush with 120mL H20 q4h. Goal feeds provide 1800kcal, 77g pro, 1632mL fluid (Meeting 100% EENs)    Nursing to document TF rate, flushes, GRV, and GI s/s in I/Os    Nutrition Assessment:  Admitted for difficulty swallowing and generalized weakness. CT chest indicated neoplasm (10/26). CT neck indicated diffuse degenerative change of cervical spine. Significant degenerative change of mid to lower cervical spine per MD (10/26). Dx with Esophageal mass. S/p EGD confirmed esophageal mass with obstruction - biopsy pending. Heme/onc consulted. GI plans for PEG with esophageal stent placement tomorrow. MD notes indicate pt reports being very hungry but unable to eat as food comes right back up 2/2 GERD and dysphagia likely 2/2 suspected esophageal cancer. Has dry mouth. Currently NPO x4. Rec'd addition of D10 while awaiting PEG placement for protein sparing kcal. Briefly spoke with pt at bedside who appears eager for PEG. Labs (10/27): Anion gap 3, Cr 0.63. Meds:  diltiazem, metoprolol, PPI, anti-emetic, miralax. Malnutrition Assessment:  Malnutrition Status:  Severe malnutrition    Context:  Chronic illness         Estimated Daily Nutrient Needs:  Energy (kcal): 1800kcal(28kcal/kg); Weight Used for Energy Requirements: Admission  Protein (g): 77g pro(1.2g/kg); Weight Used for Protein Requirements: Admission  Fluid (ml/day): 1600mL(25mL/kg);  Weight Used for Fluid Requirements: Admission      Nutrition Related Findings: NFPE finding severe fat and muscle losses. +Swallowing difficulty 2/2 esophageal mass. Pt is edentulous. No current indication n/v or c/d. Last BM 10/26. No edema. Wounds:    None       Current Nutrition Therapies:  DIET NPO  · Goal TF & Flush Orders Provides: As PEG placed and available for use, rec'd initiation of Jevity 1.5 at 20mL/hr, advancing 10mL q4h to goal rate of 50mL/hr +120mL H20 q4h; provides 1800kcal, 77g pro, 1632mL fluid      Anthropometric Measures:  · Height:  6' 0.99\" (185.4 cm)  · Current Body Wt:  71.7 kg (158 lb 1.1 oz)   · Admission Body Wt:  141 lb 1.5 oz    · Usual Body Wt:  79.4 kg (175 lb)(~2 months ago per pt)     · Ideal Body Wt:  184 lbs:  85.9 %   · BMI Category:  Underweight (BMI less than 22) age over 72       Nutrition Diagnosis:   · Inadequate protein-energy intake related to swallowing difficulty(2/2 esophageal dysfunction) as evidenced by weight loss, BMI, poor intake prior to admission      Nutrition Interventions:   Food and/or Nutrient Delivery: Continue NPO, Start tube feeding(once PEG placed)  Nutrition Education and Counseling: No recommendations at this time  Coordination of Nutrition Care: Continue to monitor while inpatient    Goals:  Initiate means of nutrition to meet >75% EENs (progressing)  Wt gain 1kg/week +/- 0.5kg (not progressing)  Maintain skin integrity (progressing)    Nutrition Monitoring and Evaluation:   Behavioral-Environmental Outcomes:  N/A  Food/Nutrient Intake Outcomes: Enteral nutrition intake/tolerance  Physical Signs/Symptoms Outcomes: Weight    Discharge Planning:     Too soon to determine     Electronically signed by Deven Beyer on 10/29/2020 at 9:06 AM    Contact:  [phone]

## 2020-10-30 ENCOUNTER — ANESTHESIA (OUTPATIENT)
Dept: ENDOSCOPY | Age: 75
DRG: 375 | End: 2020-10-30
Payer: MEDICARE

## 2020-10-30 ENCOUNTER — APPOINTMENT (OUTPATIENT)
Dept: RADIATION THERAPY | Age: 75
End: 2020-10-30

## 2020-10-30 ENCOUNTER — APPOINTMENT (OUTPATIENT)
Dept: GENERAL RADIOLOGY | Age: 75
DRG: 375 | End: 2020-10-30
Attending: INTERNAL MEDICINE
Payer: MEDICARE

## 2020-10-30 ENCOUNTER — APPOINTMENT (OUTPATIENT)
Dept: ENDOSCOPY | Age: 75
DRG: 375 | End: 2020-10-30
Attending: INTERNAL MEDICINE
Payer: MEDICARE

## 2020-10-30 LAB
ALBUMIN SERPL-MCNC: 2.5 G/DL (ref 3.5–5)
ALBUMIN/GLOB SERPL: 0.6 {RATIO} (ref 1.1–2.2)
ALP SERPL-CCNC: 44 U/L (ref 45–117)
ALT SERPL-CCNC: 22 U/L (ref 12–78)
ANION GAP SERPL CALC-SCNC: 7 MMOL/L (ref 5–15)
AST SERPL W P-5'-P-CCNC: 34 U/L (ref 15–37)
BASOPHILS # BLD: 0 K/UL (ref 0–0.1)
BASOPHILS NFR BLD: 0 % (ref 0–1)
BILIRUB SERPL-MCNC: 1.4 MG/DL (ref 0.2–1)
BUN SERPL-MCNC: 2 MG/DL (ref 6–20)
BUN/CREAT SERPL: 4 (ref 12–20)
CA-I BLD-MCNC: 8.9 MG/DL (ref 8.5–10.1)
CHLORIDE SERPL-SCNC: 109 MMOL/L (ref 97–108)
CO2 SERPL-SCNC: 25 MMOL/L (ref 21–32)
CREAT SERPL-MCNC: 0.5 MG/DL (ref 0.7–1.3)
DIFFERENTIAL METHOD BLD: ABNORMAL
EOSINOPHIL # BLD: 0.1 K/UL (ref 0–0.4)
EOSINOPHIL NFR BLD: 3 % (ref 0–7)
ERYTHROCYTE [DISTWIDTH] IN BLOOD BY AUTOMATED COUNT: 12.1 % (ref 11.5–14.5)
GLOBULIN SER CALC-MCNC: 3.9 G/DL (ref 2–4)
GLUCOSE SERPL-MCNC: 118 MG/DL (ref 65–100)
HCT VFR BLD AUTO: 35.7 % (ref 36.6–50.3)
HGB BLD-MCNC: 11.8 G/DL (ref 12.1–17)
IMM GRANULOCYTES # BLD AUTO: 0 K/UL (ref 0–0.04)
IMM GRANULOCYTES NFR BLD AUTO: 0 % (ref 0–0.5)
LYMPHOCYTES # BLD: 1.4 K/UL (ref 0.8–3.5)
LYMPHOCYTES NFR BLD: 29 % (ref 12–49)
MCH RBC QN AUTO: 31.1 PG (ref 26–34)
MCHC RBC AUTO-ENTMCNC: 33.1 G/DL (ref 30–36.5)
MCV RBC AUTO: 93.9 FL (ref 80–99)
MONOCYTES # BLD: 0.8 K/UL (ref 0–1)
MONOCYTES NFR BLD: 17 % (ref 5–13)
NEUTS SEG # BLD: 2.5 K/UL (ref 1.8–8)
NEUTS SEG NFR BLD: 51 % (ref 32–75)
PLATELET # BLD AUTO: 169 K/UL (ref 150–400)
PMV BLD AUTO: 11.8 FL (ref 8.9–12.9)
POTASSIUM SERPL-SCNC: 3 MMOL/L (ref 3.5–5.1)
PROT SERPL-MCNC: 6.4 G/DL (ref 6.4–8.2)
RBC # BLD AUTO: 3.8 M/UL (ref 4.1–5.7)
SODIUM SERPL-SCNC: 141 MMOL/L (ref 136–145)
WBC # BLD AUTO: 4.9 K/UL (ref 4.1–11.1)

## 2020-10-30 PROCEDURE — 74011250636 HC RX REV CODE- 250/636: Performed by: INTERNAL MEDICINE

## 2020-10-30 PROCEDURE — 74011000250 HC RX REV CODE- 250

## 2020-10-30 PROCEDURE — 0DH63UZ INSERTION OF FEEDING DEVICE INTO STOMACH, PERCUTANEOUS APPROACH: ICD-10-PCS | Performed by: INTERNAL MEDICINE

## 2020-10-30 PROCEDURE — 76040000009: Performed by: INTERNAL MEDICINE

## 2020-10-30 PROCEDURE — 74011250636 HC RX REV CODE- 250/636: Performed by: HOSPITALIST

## 2020-10-30 PROCEDURE — 76000 FLUOROSCOPY <1 HR PHYS/QHP: CPT

## 2020-10-30 PROCEDURE — 80053 COMPREHEN METABOLIC PANEL: CPT

## 2020-10-30 PROCEDURE — C9113 INJ PANTOPRAZOLE SODIUM, VIA: HCPCS | Performed by: HOSPITALIST

## 2020-10-30 PROCEDURE — 3E0G76Z INTRODUCTION OF NUTRITIONAL SUBSTANCE INTO UPPER GI, VIA NATURAL OR ARTIFICIAL OPENING: ICD-10-PCS | Performed by: HOSPITALIST

## 2020-10-30 PROCEDURE — 74011250637 HC RX REV CODE- 250/637: Performed by: INTERNAL MEDICINE

## 2020-10-30 PROCEDURE — 74011250636 HC RX REV CODE- 250/636

## 2020-10-30 PROCEDURE — 77030008684 HC TU ET CUF COVD -B: Performed by: NURSE ANESTHETIST, CERTIFIED REGISTERED

## 2020-10-30 PROCEDURE — C1874 STENT, COATED/COV W/DEL SYS: HCPCS | Performed by: INTERNAL MEDICINE

## 2020-10-30 PROCEDURE — 74011000258 HC RX REV CODE- 258: Performed by: PHYSICIAN ASSISTANT

## 2020-10-30 PROCEDURE — 74011000250 HC RX REV CODE- 250: Performed by: NURSE ANESTHETIST, CERTIFIED REGISTERED

## 2020-10-30 PROCEDURE — 77030018712 HC DEV BLLN INFL BSC -B: Performed by: INTERNAL MEDICINE

## 2020-10-30 PROCEDURE — 0D718DZ DILATION OF UPPER ESOPHAGUS WITH INTRALUMINAL DEVICE, VIA NATURAL OR ARTIFICIAL OPENING ENDOSCOPIC: ICD-10-PCS | Performed by: INTERNAL MEDICINE

## 2020-10-30 PROCEDURE — 74011250636 HC RX REV CODE- 250/636: Performed by: PHYSICIAN ASSISTANT

## 2020-10-30 PROCEDURE — 77030012596 HC SPHNTOM BILI BSC -E: Performed by: INTERNAL MEDICINE

## 2020-10-30 PROCEDURE — 85025 COMPLETE CBC W/AUTO DIFF WBC: CPT

## 2020-10-30 PROCEDURE — 76060000034 HC ANESTHESIA 1.5 TO 2 HR: Performed by: INTERNAL MEDICINE

## 2020-10-30 PROCEDURE — 65270000029 HC RM PRIVATE

## 2020-10-30 PROCEDURE — 74011250637 HC RX REV CODE- 250/637: Performed by: PHYSICIAN ASSISTANT

## 2020-10-30 PROCEDURE — 36415 COLL VENOUS BLD VENIPUNCTURE: CPT

## 2020-10-30 PROCEDURE — 74011250636 HC RX REV CODE- 250/636: Performed by: NURSE ANESTHETIST, CERTIFIED REGISTERED

## 2020-10-30 PROCEDURE — 77030005122 HC CATH GASTMY PEG BSC -B: Performed by: INTERNAL MEDICINE

## 2020-10-30 PROCEDURE — 74011000250 HC RX REV CODE- 250: Performed by: PHYSICIAN ASSISTANT

## 2020-10-30 DEVICE — STENT SYSTEM
Type: IMPLANTABLE DEVICE | Site: ESOPHAGUS | Status: FUNCTIONAL
Brand: WALLFLEX™ ESOPHAGEAL

## 2020-10-30 RX ORDER — SODIUM CHLORIDE 9 MG/ML
INJECTION, SOLUTION INTRAVENOUS
Status: DISCONTINUED | OUTPATIENT
Start: 2020-10-30 | End: 2020-10-30 | Stop reason: HOSPADM

## 2020-10-30 RX ORDER — PHENYLEPHRINE HCL IN 0.9% NACL 1 MG/10 ML
SYRINGE (ML) INTRAVENOUS
Status: DISPENSED
Start: 2020-10-30 | End: 2020-10-31

## 2020-10-30 RX ORDER — SUCCINYLCHOLINE CHLORIDE 20 MG/ML
INJECTION INTRAMUSCULAR; INTRAVENOUS AS NEEDED
Status: DISCONTINUED | OUTPATIENT
Start: 2020-10-30 | End: 2020-10-30 | Stop reason: HOSPADM

## 2020-10-30 RX ORDER — CEFAZOLIN SODIUM 1 G/3ML
INJECTION, POWDER, FOR SOLUTION INTRAMUSCULAR; INTRAVENOUS AS NEEDED
Status: DISCONTINUED | OUTPATIENT
Start: 2020-10-30 | End: 2020-10-30 | Stop reason: HOSPADM

## 2020-10-30 RX ORDER — ONDANSETRON 2 MG/ML
INJECTION INTRAMUSCULAR; INTRAVENOUS AS NEEDED
Status: DISCONTINUED | OUTPATIENT
Start: 2020-10-30 | End: 2020-10-30 | Stop reason: HOSPADM

## 2020-10-30 RX ORDER — HYDRALAZINE HYDROCHLORIDE 20 MG/ML
10 INJECTION INTRAMUSCULAR; INTRAVENOUS
Status: DISCONTINUED | OUTPATIENT
Start: 2020-10-30 | End: 2020-10-31

## 2020-10-30 RX ORDER — FENTANYL CITRATE 50 UG/ML
INJECTION, SOLUTION INTRAMUSCULAR; INTRAVENOUS AS NEEDED
Status: DISCONTINUED | OUTPATIENT
Start: 2020-10-30 | End: 2020-10-30 | Stop reason: HOSPADM

## 2020-10-30 RX ORDER — PHENYLEPHRINE HCL IN 0.9% NACL 1 MG/10 ML
SYRINGE (ML) INTRAVENOUS
Status: DISCONTINUED
Start: 2020-10-30 | End: 2020-10-30 | Stop reason: WASHOUT

## 2020-10-30 RX ORDER — POTASSIUM CHLORIDE 7.45 MG/ML
10 INJECTION INTRAVENOUS
Status: COMPLETED | OUTPATIENT
Start: 2020-10-30 | End: 2020-10-30

## 2020-10-30 RX ORDER — HYDROMORPHONE HYDROCHLORIDE 1 MG/ML
0.5 INJECTION, SOLUTION INTRAMUSCULAR; INTRAVENOUS; SUBCUTANEOUS
Status: DISCONTINUED | OUTPATIENT
Start: 2020-10-30 | End: 2020-11-11 | Stop reason: HOSPADM

## 2020-10-30 RX ORDER — LIDOCAINE HYDROCHLORIDE 20 MG/ML
INJECTION, SOLUTION EPIDURAL; INFILTRATION; INTRACAUDAL; PERINEURAL AS NEEDED
Status: DISCONTINUED | OUTPATIENT
Start: 2020-10-30 | End: 2020-10-30 | Stop reason: HOSPADM

## 2020-10-30 RX ORDER — LABETALOL HYDROCHLORIDE 5 MG/ML
INJECTION, SOLUTION INTRAVENOUS
Status: COMPLETED
Start: 2020-10-30 | End: 2020-10-30

## 2020-10-30 RX ORDER — LABETALOL HCL 20 MG/4 ML
5 SYRINGE (ML) INTRAVENOUS
Status: DISCONTINUED | OUTPATIENT
Start: 2020-10-30 | End: 2020-10-31

## 2020-10-30 RX ORDER — DEXAMETHASONE SODIUM PHOSPHATE 4 MG/ML
INJECTION, SOLUTION INTRA-ARTICULAR; INTRALESIONAL; INTRAMUSCULAR; INTRAVENOUS; SOFT TISSUE AS NEEDED
Status: DISCONTINUED | OUTPATIENT
Start: 2020-10-30 | End: 2020-10-30 | Stop reason: HOSPADM

## 2020-10-30 RX ORDER — PROPOFOL 10 MG/ML
INJECTION, EMULSION INTRAVENOUS
Status: COMPLETED
Start: 2020-10-30 | End: 2020-10-30

## 2020-10-30 RX ORDER — LIDOCAINE HYDROCHLORIDE 20 MG/ML
INJECTION, SOLUTION EPIDURAL; INFILTRATION; INTRACAUDAL; PERINEURAL
Status: COMPLETED
Start: 2020-10-30 | End: 2020-10-30

## 2020-10-30 RX ORDER — DEXTROSE, SODIUM CHLORIDE, AND POTASSIUM CHLORIDE 5; .9; .15 G/100ML; G/100ML; G/100ML
75 INJECTION INTRAVENOUS CONTINUOUS
Status: DISCONTINUED | OUTPATIENT
Start: 2020-10-30 | End: 2020-11-05

## 2020-10-30 RX ORDER — FENTANYL CITRATE 50 UG/ML
INJECTION, SOLUTION INTRAMUSCULAR; INTRAVENOUS
Status: COMPLETED
Start: 2020-10-30 | End: 2020-10-30

## 2020-10-30 RX ORDER — METOPROLOL TARTRATE 5 MG/5ML
2.5 INJECTION INTRAVENOUS
Status: DISCONTINUED | OUTPATIENT
Start: 2020-10-30 | End: 2020-10-31

## 2020-10-30 RX ORDER — PROPOFOL 10 MG/ML
INJECTION, EMULSION INTRAVENOUS AS NEEDED
Status: DISCONTINUED | OUTPATIENT
Start: 2020-10-30 | End: 2020-10-30 | Stop reason: HOSPADM

## 2020-10-30 RX ORDER — CEFAZOLIN SODIUM 1 G/3ML
INJECTION, POWDER, FOR SOLUTION INTRAMUSCULAR; INTRAVENOUS
Status: COMPLETED
Start: 2020-10-30 | End: 2020-10-30

## 2020-10-30 RX ORDER — ONDANSETRON 2 MG/ML
INJECTION INTRAMUSCULAR; INTRAVENOUS
Status: COMPLETED
Start: 2020-10-30 | End: 2020-10-30

## 2020-10-30 RX ORDER — LABETALOL HCL 20 MG/4 ML
10 SYRINGE (ML) INTRAVENOUS ONCE
Status: COMPLETED | OUTPATIENT
Start: 2020-10-30 | End: 2020-10-30

## 2020-10-30 RX ORDER — DEXAMETHASONE SODIUM PHOSPHATE 4 MG/ML
INJECTION, SOLUTION INTRA-ARTICULAR; INTRALESIONAL; INTRAMUSCULAR; INTRAVENOUS; SOFT TISSUE
Status: COMPLETED
Start: 2020-10-30 | End: 2020-10-30

## 2020-10-30 RX ORDER — POTASSIUM CHLORIDE 7.45 MG/ML
10 INJECTION INTRAVENOUS
Status: DISPENSED | OUTPATIENT
Start: 2020-10-30 | End: 2020-10-30

## 2020-10-30 RX ADMIN — Medication 10 MG: at 11:56

## 2020-10-30 RX ADMIN — PHENYLEPHRINE HYDROCHLORIDE 300 MCG: 10 INJECTION INTRAVENOUS at 14:01

## 2020-10-30 RX ADMIN — LABETALOL HYDROCHLORIDE 10 MG: 5 INJECTION INTRAVENOUS at 11:56

## 2020-10-30 RX ADMIN — NITROGLYCERIN 1 INCH: 20 OINTMENT TOPICAL at 17:00

## 2020-10-30 RX ADMIN — FENTANYL CITRATE 50 MCG: 50 INJECTION, SOLUTION INTRAMUSCULAR; INTRAVENOUS at 13:51

## 2020-10-30 RX ADMIN — POTASSIUM CHLORIDE 10 MEQ: 7.46 INJECTION, SOLUTION INTRAVENOUS at 16:06

## 2020-10-30 RX ADMIN — CEFAZOLIN SODIUM 2 G: 2 INJECTION, SOLUTION INTRAVENOUS at 14:00

## 2020-10-30 RX ADMIN — POTASSIUM CHLORIDE, DEXTROSE MONOHYDRATE AND SODIUM CHLORIDE 75 ML/HR: 150; 5; 900 INJECTION, SOLUTION INTRAVENOUS at 10:41

## 2020-10-30 RX ADMIN — CEFAZOLIN SODIUM 2 G: 1 INJECTION, POWDER, FOR SOLUTION INTRAMUSCULAR; INTRAVENOUS at 13:57

## 2020-10-30 RX ADMIN — PHENYLEPHRINE HYDROCHLORIDE 100 MCG: 10 INJECTION INTRAVENOUS at 14:33

## 2020-10-30 RX ADMIN — PHENYLEPHRINE HYDROCHLORIDE 100 MCG: 10 INJECTION INTRAVENOUS at 14:05

## 2020-10-30 RX ADMIN — PHENYLEPHRINE HYDROCHLORIDE 100 MCG: 10 INJECTION INTRAVENOUS at 14:38

## 2020-10-30 RX ADMIN — FENTANYL CITRATE 50 MCG: 50 INJECTION, SOLUTION INTRAMUSCULAR; INTRAVENOUS at 14:09

## 2020-10-30 RX ADMIN — Medication 10 ML: at 22:53

## 2020-10-30 RX ADMIN — HYDROMORPHONE HYDROCHLORIDE 0.5 MG: 1 INJECTION, SOLUTION INTRAMUSCULAR; INTRAVENOUS; SUBCUTANEOUS at 17:00

## 2020-10-30 RX ADMIN — LIDOCAINE HYDROCHLORIDE 100 MG: 20 INJECTION, SOLUTION EPIDURAL; INFILTRATION; INTRACAUDAL; PERINEURAL at 13:51

## 2020-10-30 RX ADMIN — POTASSIUM CHLORIDE 10 MEQ: 7.46 INJECTION, SOLUTION INTRAVENOUS at 19:32

## 2020-10-30 RX ADMIN — PROPOFOL 50 MG: 10 INJECTION, EMULSION INTRAVENOUS at 14:24

## 2020-10-30 RX ADMIN — ONDANSETRON 4 MG: 2 INJECTION INTRAMUSCULAR; INTRAVENOUS at 13:55

## 2020-10-30 RX ADMIN — SUCCINYLCHOLINE CHLORIDE 100 MG: 20 INJECTION, SOLUTION INTRAMUSCULAR; INTRAVENOUS at 13:52

## 2020-10-30 RX ADMIN — POTASSIUM CHLORIDE 10 MEQ: 7.46 INJECTION, SOLUTION INTRAVENOUS at 21:06

## 2020-10-30 RX ADMIN — PHENYLEPHRINE HYDROCHLORIDE 200 MCG: 10 INJECTION INTRAVENOUS at 14:25

## 2020-10-30 RX ADMIN — PANTOPRAZOLE SODIUM 40 MG: 40 INJECTION, POWDER, FOR SOLUTION INTRAVENOUS at 08:52

## 2020-10-30 RX ADMIN — PHENYLEPHRINE HYDROCHLORIDE 100 MCG: 10 INJECTION INTRAVENOUS at 14:36

## 2020-10-30 RX ADMIN — CEFAZOLIN SODIUM: 1 INJECTION, POWDER, FOR SOLUTION INTRAMUSCULAR; INTRAVENOUS at 14:00

## 2020-10-30 RX ADMIN — PROPOFOL 150 MG: 10 INJECTION, EMULSION INTRAVENOUS at 13:52

## 2020-10-30 RX ADMIN — DILTIAZEM HYDROCHLORIDE 120 MG: 120 CAPSULE, COATED, EXTENDED RELEASE ORAL at 08:52

## 2020-10-30 RX ADMIN — ASCORBIC ACID, VITAMIN A PALMITATE, CHOLECALCIFEROL, THIAMINE HYDROCHLORIDE, RIBOFLAVIN-5 PHOSPHATE SODIUM, PYRIDOXINE HYDROCHLORIDE, NIACINAMIDE, DEXPANTHENOL, ALPHA-TOCOPHEROL ACETATE, VITAMIN K1, FOLIC ACID, BIOTIN, CYANOCOBALAMIN: 200; 3300; 200; 6; 3.6; 6; 40; 15; 10; 150; 600; 60; 5 INJECTION, SOLUTION INTRAVENOUS at 22:47

## 2020-10-30 RX ADMIN — DEXAMETHASONE SODIUM PHOSPHATE 4 MG: 4 INJECTION, SOLUTION INTRA-ARTICULAR; INTRALESIONAL; INTRAMUSCULAR; INTRAVENOUS; SOFT TISSUE at 13:55

## 2020-10-30 RX ADMIN — SUCCINYLCHOLINE CHLORIDE 100 MG: 20 INJECTION, SOLUTION INTRAMUSCULAR; INTRAVENOUS at 14:24

## 2020-10-30 RX ADMIN — POTASSIUM CHLORIDE 10 MEQ: 7.46 INJECTION, SOLUTION INTRAVENOUS at 10:40

## 2020-10-30 RX ADMIN — I.V. FAT EMULSION 250 ML: 20 EMULSION INTRAVENOUS at 22:47

## 2020-10-30 RX ADMIN — Medication 10 ML: at 06:11

## 2020-10-30 RX ADMIN — SODIUM CHLORIDE: 9 INJECTION, SOLUTION INTRAVENOUS at 13:27

## 2020-10-30 NOTE — CONSULTS
Radiation Oncology Consult    Patient: Melodie Devine MRN: [id number]  SSN: [id number]    YOB: 1945  Age: 76 y.o. Sex: male      Subjective:      Melodie Devine is a 76 y.o. male with past history of low risk prostate cancer (T1c N0 M0, Gl 3+3=6, pre-treatment PSA of 6.8 gn/mL treated with radiotherapy to a total dose of 7560 cGy, completed on 11/23/16), admitted on October 26, 2020 with difficulty swallowing and generalized weakness and 30 pound weight loss. Patient has been unable to swallow solid foods. CT of the chest revealed a near obstructing esophageal mass. 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 Oncology has been consultated, and workup is ongoing. Patient needs a PEG tube and is set up for this and an esophageal stent placement later today. He is now referred for consideration of radiotherapy. Past Medical History:   Diagnosis Date    Hypertension      History reviewed. No pertinent surgical history. Social History     Tobacco Use    Smoking status: Current Some Day Smoker    Smokeless tobacco: Never Used   Substance Use Topics    Alcohol use: Not on file     History reviewed. No pertinent family history.   Prior to Admission medications    Not on File        No Known Allergies    Review of Systems:  Constitutional: No fevers, No chills, No fatigue, +weakness, +30lb weight loss  Eyes: No visual disturbance  Ears, Nose, Mouth, Throat, and Face: No nasal congestion, +sore throat  Respiratory: No cough, No sputum, No wheezing, No SOB  Cardiovascular: No chest pain, No lower extremity edema, No Palpitations   Gastrointestinal: No nausea, No vomiting, No diarrhea, No constipation, No abdominal pain, +dysphagia  Genitourinary: No frequency, No dysuria, No hematuria  Integument/Breast: No rash, No skin lesion(s), No dryness  Musculoskeletal: No arthralgias, No neck pain, No back pain  Neurological: No headaches, No dizziness, No confusion,  No seizures  Behavioral/Psychiatric: No anxiety, No depression      Objective:     Vitals:    10/29/20 1811 10/29/20 1936 10/30/20 0218 10/30/20 0824   BP: (!) 167/79 (!) 179/78 (!) 148/75 (!) 179/8   Pulse:  (!) 59 61 (!) 56   Resp:  18 18 18   Temp:  99.6 °F (37.6 °C) 98.8 °F (37.1 °C) 99.2 °F (37.3 °C)   SpO2:  99% 99% 100%   Weight:       Height:            Physical Exam:  General: alert, cooperative, no distress, cachectic  Eye: conjunctivae/corneas clear. PERRL, EOM's intact. Throat and Neck: normal and no erythema or exudates noted. No mass   Lung: clear to auscultation bilaterally  Heart: regular rate and rhythm,   Abdomen: soft, non-tender. Bowel sounds normal. No masses,  Extremities:  able to move all extremities normal, atraumatic  Skin: Normal.  Neurologic: AOx3. Cranial nerves 2-12 and sensation grossly intact. Psychiatric: non focal    CBC:   Lab Results   Component Value Date/Time    WBC 4.9 10/30/2020 06:46 AM    RBC 3.80 (L) 10/30/2020 06:46 AM    HGB 11.8 (L) 10/30/2020 06:46 AM    HCT 35.7 (L) 10/30/2020 06:46 AM    PLATELET 285 60/64/3466 06:46 AM     BMP:   Lab Results   Component Value Date/Time    Glucose 118 (H) 10/30/2020 06:46 AM    Sodium 141 10/30/2020 06:46 AM    Potassium 3.0 (L) 10/30/2020 06:46 AM    Chloride 109 (H) 10/30/2020 06:46 AM    CO2 25 10/30/2020 06:46 AM    BUN 2 (L) 10/30/2020 06:46 AM    Creatinine 0.50 (L) 10/30/2020 06:46 AM    Calcium 8.9 10/30/2020 06:46 AM     CMP:   Lab Results   Component Value Date/Time    Glucose 118 (H) 10/30/2020 06:46 AM    Sodium 141 10/30/2020 06:46 AM    Potassium 3.0 (L) 10/30/2020 06:46 AM    Chloride 109 (H) 10/30/2020 06:46 AM    CO2 25 10/30/2020 06:46 AM    BUN 2 (L) 10/30/2020 06:46 AM    Creatinine 0.50 (L) 10/30/2020 06:46 AM    Calcium 8.9 10/30/2020 06:46 AM    Anion gap 7 10/30/2020 06:46 AM    BUN/Creatinine ratio 4 (L) 10/30/2020 06:46 AM    Alk.  phosphatase 44 (L) 10/30/2020 06:46 AM    Protein, total 6.4 10/30/2020 06:46 AM    Albumin 2.5 (L) 10/30/2020 06:46 AM    Globulin 3.9 10/30/2020 06:46 AM    A-G Ratio 0.6 (L) 10/30/2020 06:46 AM     Radiology review: Results   CT CHEST W CONT (Accession [id number]) (Order [id number])   Allergies      No Known Allergies    Exam Information     Status  Exam Begun   Exam Ended     Final [99]  10/26/2020  02:20  10/26/2020  14:40    Result Information     Status: Final result (Exam End: 10/26/2020 14:40)  Provider Status: Open    Study Result     History is pain. Weight loss. Difficulty swallowing.     No comparison chest CT is available.     TECHNIQUE: Serial axial images were obtained from the thoracic inlet through the  lung bases at 5 mm intervals during IV contrast administration of 100 cc  Isovue-370. Lung, bone and soft tissue windows are evaluated as well as sagittal  and coronal reformatted images.     Dose reduction: All CT scans at this facility are performed using dose reduction  optimization techniques as appropriate to a performed exam including the  following: Automated exposure control, adjustments of the mA and/or kV according  to patient size, or use of iterative reconstruction technique.     Findings: The heart is not enlarged. There are coronary artery calcifications. There is no pericardial or pleural effusion. The pulmonary arteries enhance  adequately and no filling defects are identified to suggest pulmonary emboli. The aorta is not dilated and there is no dissection. There is no axillary,  mediastinal or hilar adenopathy.     The esophagus is dilated and filled with fluid from image   , series 2     No evidence of acute. On images 67-90, series 2, there appears to be a soft  tissue mass of the mid esophagus and right esophageal wall. It extends  approximately 6 cm in length. On image 79, series 2, the esophagus measures 3.8  x 2.9 cm and 56 HU in density. The right lateral esophageal wall enhances on  image 82 measuring 68 HU in density.  This is consistent with neoplasm until  proven otherwise. Recommend upper endoscopy and biopsy.      On lung windows, there is no pneumothorax or infiltrate. There is emphysema. Most delayed mass is identified. Incidental note is made of a right midpole  renal calcification.     On bone windows, the osseous structures appear within normal limits. There is no  soft tissue contusion.      IMPRESSION  Impression: Enhancing mass of the mid thoracic esophagus measuring proximally 6  x 4 x 3 cm causing dilatation esophagus and obstruction of the upper esophagus  which is fluid filled and patulous. These findings are consistent with neoplasm  until proven otherwise. I called Dr. Bailee Jordan with this result and recommendation  for upper endoscopy at 3:15 PM.     Emphysema.     Coronary artery calcifications. Right kidney stone.  Please see full report.         Imaging     CT CHEST W CONT (Order: [id number]) - 10/26/2020   Result History     CT CHEST W CONT (Order #[id number]) on 10/26/2020 - Order Result History Report           External Results Report    Signed by     Signed  Date/Time   Phone  Pager    Blossom Hernandez  10/26/2020  15:20  [phone]     IR Summary Links     IR Procedure Log    PACS Images      Show images for CT CHEST W CONT    Order Report      Order Details   Results Routing Details     Order ID:  [id number]    Result contact date:  10/26/20    Result status:  Final result    Outcome:  Primary notification suppressed, CC list and Care Team evaluated    Routing Scheme Used:  [facility] ED RESULTS ROUTING SCHEME [1021]    Routing Scheme Line:  Default    Resulting User:  Cristobal Lagos Results In [employer]    Routing Instant:  Mon Oct 26, 2020  3:22 PM    Comments:  Routing scheme or rule configured to have no recipients    Current Status:  Routing Complete    Status History:  Result contact created Mon Oct 26, 2020  3:22 PM       Routing started Mon Oct 26, 2020  3:22 PM       Routing Complete Mon Oct 26, 2020  3:22 PM    Result contact date:  10/26/20    Result status: In process    Outcome:  Result not sent    Resulting User:  Isabel Noonan [[id number]]    Routing Instant:  Mon Oct 26, 2020  2:40 PM    Comments:  Imaging procedure result in progress    Current Status:  Routing Complete    Status History:  Result contact created Mon Oct 26, 2020  2:40 PM       Routing Complete Mon Oct 26, 2020  2:40 PM    Chart Review Routing History     No routing history on file. Results   CT CHEST W CONT (Accession [id number]) (Order [id number])   Allergies      No Known Allergies    Exam Information     Status  Exam Begun   Exam Ended     Final [99]  10/26/2020  02:20  10/26/2020  14:40    Result Information     Status: Final result (Exam End: 10/26/2020 14:40)  Provider Status: Open    Study Result     History is pain. Weight loss. Difficulty swallowing.     No comparison chest CT is available.     TECHNIQUE: Serial axial images were obtained from the thoracic inlet through the  lung bases at 5 mm intervals during IV contrast administration of 100 cc  Isovue-370. Lung, bone and soft tissue windows are evaluated as well as sagittal  and coronal reformatted images.     Dose reduction: All CT scans at this facility are performed using dose reduction  optimization techniques as appropriate to a performed exam including the  following: Automated exposure control, adjustments of the mA and/or kV according  to patient size, or use of iterative reconstruction technique.     Findings: The heart is not enlarged. There are coronary artery calcifications. There is no pericardial or pleural effusion. The pulmonary arteries enhance  adequately and no filling defects are identified to suggest pulmonary emboli. The aorta is not dilated and there is no dissection. There is no axillary,  mediastinal or hilar adenopathy.     The esophagus is dilated and filled with fluid from image   , series 2     No evidence of acute.  On images 67-90, series 2, there appears to be a soft  tissue mass of the mid esophagus and right esophageal wall. It extends  approximately 6 cm in length. On image 79, series 2, the esophagus measures 3.8  x 2.9 cm and 56 HU in density. The right lateral esophageal wall enhances on  image 82 measuring 68 HU in density. This is consistent with neoplasm until  proven otherwise. Recommend upper endoscopy and biopsy.      On lung windows, there is no pneumothorax or infiltrate. There is emphysema. Most delayed mass is identified. Incidental note is made of a right midpole  renal calcification.     On bone windows, the osseous structures appear within normal limits. There is no  soft tissue contusion.      IMPRESSION  Impression: Enhancing mass of the mid thoracic esophagus measuring proximally 6  x 4 x 3 cm causing dilatation esophagus and obstruction of the upper esophagus  which is fluid filled and patulous. These findings are consistent with neoplasm  until proven otherwise. I called Dr. Padma Brennan with this result and recommendation  for upper endoscopy at 3:15 PM.     Emphysema.     Coronary artery calcifications. Right kidney stone.  Please see full report.         Imaging     CT CHEST W CONT (Order: [id number]) - 10/26/2020   Result History     CT CHEST W CONT (Order #[id number]) on 10/26/2020 - Order Result History Report           External Results Report    Signed by     Signed  Date/Time   Phone  Pager    Shelle Goodell  10/26/2020  15:20  [phone]     IR Summary Links     IR Procedure Log    PACS Images      Show images for CT CHEST W CONT    Order Report      Order Details   Results Routing Details     Order ID:  [id number]    Result contact date:  10/26/20    Result status:  Final result    Outcome:  Primary notification suppressed, CC list and Care Team evaluated    Routing Scheme Used:  [facility] ED RESULTS ROUTING SCHEME [1021]    Routing Scheme Line:  Default    Resulting User:  Demond, Rad Results In [employer]    Routing Instant:  Mon Oct 26, 2020  3:22 PM    Comments:  Routing scheme or rule configured to have no recipients    Current Status:  Routing Complete    Status History:  Result contact created Mon Oct 26, 2020  3:22 PM       Routing started Mon Oct 26, 2020  3:22 PM       Routing Complete Mon Oct 26, 2020  3:22 PM    Result contact date:  10/26/20    Result status: In process    Outcome:  Result not sent    Resulting User:  Nandini Morocho [[id number]]    Routing Instant:  Mon Oct 26, 2020  2:40 PM    Comments:  Imaging procedure result in progress    Current Status:  Routing Complete    Status History:  Result contact created Mon Oct 26, 2020  2:40 PM       Routing Complete Mon Oct 26, 2020  2:40 PM    Chart Review Routing History     No routing history on file. Assessment:   77 yo male with history of prostate cancer treated with radiotherapy, now with newly discovered near-obstructing esophageal mass, histologically identified as a poorly differentiated squamous cell carcinoma. Staging work up in progress. Plan:     Pt likely a candidate for radiotherapy. Palliative vs definitive intent depends on outcome of staging work up. Discussed need for pre-treatment PET imaging (ok as an outpatient). Plan likely to include 5040 cGy over 28 days with chemotherapy. Discussed potential acute and late sequelae of radiotherapy. Will discuss with team of physicians and will follow with Mr. Seth Frost after discharge.     Signed By: Donetta Eisenmenger, MD     October 30, 2020

## 2020-10-30 NOTE — PROGRESS NOTES
Subjective problem with  swallowing  Subjective:      Pt having difficulty with swallowing. Denies bleeding. Nausea better.     {  Objective     Current Facility-Administered Medications:     dextrose 5% and 0.9% NaCl infusion, 75 mL/hr, IntraVENous, CONTINUOUS, Sparkle Doll PA-C, Last Rate: 75 mL/hr at 10/29/20 2145, 75 mL/hr at 10/29/20 2145    thiamine (B-1) 200 mg in 0.9% sodium chloride 50 mL IVPB, 200 mg, IntraVENous, DAILY, Murali Doll PA-C, 200 mg at 10/29/20 1213    [START ON 10/30/2020] ceFAZolin (ANCEF) 2g in 100 mL dextrose (iso-osmotic) IVPB, 2 g, IntraVENous, ONCE, Ovi Juan MD    nitroglycerin (NITROBID) 2 % ointment 1 Inch, 1 Inch, Topical, BID PRN, Sparkle Doll PA-C, 1 Inch at 10/29/20 1505    pantoprazole (PROTONIX) 40 mg in 0.9% sodium chloride 10 mL injection, 40 mg, IntraVENous, DAILY, Yoly Lepe MD, 40 mg at 10/29/20 0912    sodium chloride (NS) flush 5-40 mL, 5-40 mL, IntraVENous, Q8H, Karmen Coyle MD, 10 mL at 10/29/20 2147    sodium chloride (NS) flush 5-40 mL, 5-40 mL, IntraVENous, PRN, Karmen Coyle MD, 10 mL at 10/26/20 2146    acetaminophen (TYLENOL) tablet 650 mg, 650 mg, Oral, Q6H PRN **OR** acetaminophen (TYLENOL) suppository 650 mg, 650 mg, Rectal, Q6H PRN, Karmen Coyle MD    polyethylene glycol (MIRALAX) packet 17 g, 17 g, Oral, DAILY PRN, Karmen Coyle MD    promethazine (PHENERGAN) tablet 12.5 mg, 12.5 mg, Oral, Q6H PRN **OR** ondansetron (ZOFRAN) injection 4 mg, 4 mg, IntraVENous, Q6H PRN, Vianey Coyle MD    dilTIAZem ER (CARDIZEM CD) capsule 120 mg, 120 mg, Oral, DAILY, Vianey Coyle MD, Stopped at 10/29/20 0900    [Held by provider] metoprolol succinate (TOPROL-XL) XL tablet 25 mg, 25 mg, Oral, DAILY, Isaura Escobar MD, Stopped at 10/29/20 0900    Objective:     Visit Vitals  BP (!) 179/78 (BP 1 Location: Right arm, BP Patient Position: At rest)   Pulse (!) 59   Temp 99.6 °F (37.6 °C)   Resp 18   Ht 6' 0.99\" (1.854 m)   Wt 71.7 kg (158 lb 1.1 oz)   SpO2 99%   BMI 20.86 kg/m²      Physical Exam   Lungs:CTA  Heart:RRR  Abdomen:soft,NT  EXT:no edema    Data Review:   Recent Results (from the past 24 hour(s))   CBC WITH AUTOMATED DIFF    Collection Time: 10/29/20  8:08 AM   Result Value Ref Range    WBC 5.5 4.1 - 11.1 K/uL    RBC 3.76 (L) 4.10 - 5.70 M/uL    HGB 11.7 (L) 12.1 - 17.0 g/dL    HCT 35.4 (L) 36.6 - 50.3 %    MCV 94.1 80.0 - 99.0 FL    MCH 31.1 26.0 - 34.0 PG    MCHC 33.1 30.0 - 36.5 g/dL    RDW 12.1 11.5 - 14.5 %    PLATELET 476 480 - 210 K/uL    MPV 12.1 8.9 - 12.9 FL    NEUTROPHILS 58 32 - 75 %    LYMPHOCYTES 24 12 - 49 %    MONOCYTES 17 (H) 5 - 13 %    EOSINOPHILS 1 0 - 7 %    BASOPHILS 0 0 - 1 %    IMMATURE GRANULOCYTES 0 0.0 - 0.5 %    ABS. NEUTROPHILS 3.2 1.8 - 8.0 K/UL    ABS. LYMPHOCYTES 1.3 0.8 - 3.5 K/UL    ABS. MONOCYTES 0.9 0.0 - 1.0 K/UL    ABS. EOSINOPHILS 0.1 0.0 - 0.4 K/UL    ABS. BASOPHILS 0.0 0.0 - 0.1 K/UL    ABS. IMM. GRANS. 0.0 0.00 - 0.04 K/UL    DF AUTOMATED     METABOLIC PANEL, COMPREHENSIVE    Collection Time: 10/29/20  8:08 AM   Result Value Ref Range    Sodium 140 136 - 145 mmol/L    Potassium 3.5 3.5 - 5.1 mmol/L    Chloride 108 97 - 108 mmol/L    CO2 24 21 - 32 mmol/L    Anion gap 8 5 - 15 mmol/L    Glucose 90 65 - 100 mg/dL    BUN 5 (L) 6 - 20 mg/dL    Creatinine 0.56 (L) 0.70 - 1.30 mg/dL    BUN/Creatinine ratio 9 (L) 12 - 20      GFR est AA >60 >60 ml/min/1.73m2    GFR est non-AA >60 >60 ml/min/1.73m2    Calcium 8.9 8.5 - 10.1 mg/dL    Bilirubin, total 1.4 (H) 0.2 - 1.0 mg/dL    AST (SGOT) 34 15 - 37 U/L    ALT (SGPT) 21 12 - 78 U/L    Alk.  phosphatase 43 (L) 45 - 117 U/L    Protein, total 6.2 (L) 6.4 - 8.2 g/dL    Albumin 2.5 (L) 3.5 - 5.0 g/dL    Globulin 3.7 2.0 - 4.0 g/dL    A-G Ratio 0.7 (L) 1.1 - 2.2         Assessment   Assessment/Plan:      Squamous cell carcinoma of Esophagus:  CT abdomen,pelvis negative except enlarged prostate. Plans for stent placement and PEG tube noted. PET scan as out pt. Discussed about role of chemotherapy and radiation. D/w GI.may need EUS also. Pt advised to quit smoking. Consult radiation oncology and CTS . Weight loss:due to malignancy    Weight loss:Due to malignancy.

## 2020-10-30 NOTE — ANESTHESIA POSTPROCEDURE EVALUATION
Procedure(s):  PERCUTANEOUS ENDOSCOPIC GASTROSTOMY TUBE INSERTION  ESOPHAGEAL DILATION  ENDOSCOPY WITH PROSTHESIS OR STENT PLACEMENT. general    Anesthesia Post Evaluation      Multimodal analgesia: multimodal analgesia not used between 6 hours prior to anesthesia start to PACU discharge  Patient location during evaluation: bedside  Patient participation: complete - patient participated  Level of consciousness: awake  Pain score: 0  Airway patency: patent  Anesthetic complications: no  Cardiovascular status: acceptable  Respiratory status: acceptable  Hydration status: acceptable  Post anesthesia nausea and vomiting:  none      INITIAL Post-op Vital signs: No vitals data found for the desired time range.

## 2020-10-30 NOTE — PROGRESS NOTES
Post op skin assessment performed by this Rn with Adrianne Alexis Rn. Peg tube noted on the left quadrant of the abdomen-split gauze applied and abdominal binder. . Bilateral feet very dry. No other skin issues noted.

## 2020-10-30 NOTE — PROGRESS NOTES
Hospitalist Progress Note    Subjective:   Daily Progress Note: 10/30/2020 5:41 PM    Patient with dysphagia and esophageal mass. Unable to eat due to complete obstruction of the esophagus. Patient will need PEG tube placement. Denies chest pain or shortness of breath    Current Facility-Administered Medications   Medication Dose Route Frequency    potassium chloride 10 mEq in 100 ml IVPB  10 mEq IntraVENous Q1H    dextrose 5% and 0.9% NaCl infusion  75 mL/hr IntraVENous CONTINUOUS    thiamine (B-1) 200 mg in 0.9% sodium chloride 50 mL IVPB  200 mg IntraVENous DAILY    ceFAZolin (ANCEF) 2g in 100 mL dextrose (iso-osmotic) IVPB  2 g IntraVENous ONCE    nitroglycerin (NITROBID) 2 % ointment 1 Inch  1 Inch Topical BID PRN    pantoprazole (PROTONIX) 40 mg in 0.9% sodium chloride 10 mL injection  40 mg IntraVENous DAILY    sodium chloride (NS) flush 5-40 mL  5-40 mL IntraVENous Q8H    sodium chloride (NS) flush 5-40 mL  5-40 mL IntraVENous PRN    acetaminophen (TYLENOL) tablet 650 mg  650 mg Oral Q6H PRN    Or    acetaminophen (TYLENOL) suppository 650 mg  650 mg Rectal Q6H PRN    polyethylene glycol (MIRALAX) packet 17 g  17 g Oral DAILY PRN    promethazine (PHENERGAN) tablet 12.5 mg  12.5 mg Oral Q6H PRN    Or    ondansetron (ZOFRAN) injection 4 mg  4 mg IntraVENous Q6H PRN    dilTIAZem ER (CARDIZEM CD) capsule 120 mg  120 mg Oral DAILY    [Held by provider] metoprolol succinate (TOPROL-XL) XL tablet 25 mg  25 mg Oral DAILY        Review of Systems  Review of Systems   Constitutional: Negative for chills and fever. HENT: Positive for sore throat. Negative for tinnitus. Eyes: Negative. Respiratory: Negative for cough and shortness of breath. Cardiovascular: Negative for chest pain and palpitations. Gastrointestinal: Negative for abdominal pain, nausea and vomiting. Dysphagia   Genitourinary: Negative. Musculoskeletal: Negative. Skin: Negative. Neurological: Negative. Psychiatric/Behavioral: Negative. Objective:     Visit Vitals  BP (!) 179/8 (BP 1 Location: Right arm, BP Patient Position: At rest)   Pulse (!) 56   Temp 99.2 °F (37.3 °C)   Resp 18   Ht 6' 0.99\" (1.854 m)   Wt 71.7 kg (158 lb 1.1 oz)   SpO2 100%   BMI 20.86 kg/m²      O2 Device: Room air    Temp (24hrs), Av °F (37.2 °C), Min:98.3 °F (36.8 °C), Max:99.6 °F (37.6 °C)      No intake/output data recorded. 10/28 1901 - 10/30 0700  In: 0   Out: 1150 [Urine:1150]    PHYSICAL EXAM:    Physical Exam  Vitals signs reviewed. Constitutional:       Appearance: Normal appearance. He is ill-appearing. HENT:      Head: Normocephalic and atraumatic. Nose: Nose normal.      Mouth/Throat:      Mouth: Mucous membranes are dry. Eyes:      Conjunctiva/sclera: Conjunctivae normal.   Neck:      Musculoskeletal: Normal range of motion and neck supple. Cardiovascular:      Rate and Rhythm: Regular rhythm. Bradycardia present. Pulses: Normal pulses. Heart sounds: Normal heart sounds. Pulmonary:      Effort: Pulmonary effort is normal.      Breath sounds: Normal breath sounds. Abdominal:      General: Bowel sounds are normal.      Palpations: Abdomen is soft. Musculoskeletal: Normal range of motion. Skin:     General: Skin is dry. Neurological:      General: No focal deficit present. Mental Status: He is alert and oriented to person, place, and time. Mental status is at baseline.           Data Review    Recent Results (from the past 24 hour(s))   CBC WITH AUTOMATED DIFF    Collection Time: 10/30/20  6:46 AM   Result Value Ref Range    WBC 4.9 4.1 - 11.1 K/uL    RBC 3.80 (L) 4.10 - 5.70 M/uL    HGB 11.8 (L) 12.1 - 17.0 g/dL    HCT 35.7 (L) 36.6 - 50.3 %    MCV 93.9 80.0 - 99.0 FL    MCH 31.1 26.0 - 34.0 PG    MCHC 33.1 30.0 - 36.5 g/dL    RDW 12.1 11.5 - 14.5 %    PLATELET 537 914 - 907 K/uL    MPV 11.8 8.9 - 12.9 FL    NEUTROPHILS 51 32 - 75 %    LYMPHOCYTES 29 12 - 49 %    MONOCYTES 17 (H) 5 - 13 %    EOSINOPHILS 3 0 - 7 %    BASOPHILS 0 0 - 1 %    IMMATURE GRANULOCYTES 0 0.0 - 0.5 %    ABS. NEUTROPHILS 2.5 1.8 - 8.0 K/UL    ABS. LYMPHOCYTES 1.4 0.8 - 3.5 K/UL    ABS. MONOCYTES 0.8 0.0 - 1.0 K/UL    ABS. EOSINOPHILS 0.1 0.0 - 0.4 K/UL    ABS. BASOPHILS 0.0 0.0 - 0.1 K/UL    ABS. IMM. GRANS. 0.0 0.00 - 0.04 K/UL    DF AUTOMATED     METABOLIC PANEL, COMPREHENSIVE    Collection Time: 10/30/20  6:46 AM   Result Value Ref Range    Sodium 141 136 - 145 mmol/L    Potassium 3.0 (L) 3.5 - 5.1 mmol/L    Chloride 109 (H) 97 - 108 mmol/L    CO2 25 21 - 32 mmol/L    Anion gap 7 5 - 15 mmol/L    Glucose 118 (H) 65 - 100 mg/dL    BUN 2 (L) 6 - 20 mg/dL    Creatinine 0.50 (L) 0.70 - 1.30 mg/dL    BUN/Creatinine ratio 4 (L) 12 - 20      GFR est AA >60 >60 ml/min/1.73m2    GFR est non-AA >60 >60 ml/min/1.73m2    Calcium 8.9 8.5 - 10.1 mg/dL    Bilirubin, total 1.4 (H) 0.2 - 1.0 mg/dL    AST (SGOT) 34 15 - 37 U/L    ALT (SGPT) 22 12 - 78 U/L    Alk. phosphatase 44 (L) 45 - 117 U/L    Protein, total 6.4 6.4 - 8.2 g/dL    Albumin 2.5 (L) 3.5 - 5.0 g/dL    Globulin 3.9 2.0 - 4.0 g/dL    A-G Ratio 0.6 (L) 1.1 - 2.2          Assessment/Plan:     Active Problems:    Esophageal mass (10/26/2020)      Hospital course:    Patient admitted on October 26, 2020 with difficulty swallowing and generalized weakness and 30 pound weight loss. Patient has been unable to swallow solid foods. CT of the chest revealed an obstructing esophageal mass. EGD performed which revealed the same with biopsy taken. Oncology consultation. Patient will need PEG tube placement and will be set up for a stent placement. Considering patient's need for chemotherapy and radiation if biopsy is consistent with a malignant process patient will require PEG tube placement anyways. PEG on October 30, 2020. Impression\plan:    1.   Esophageal mass  EGD performed with complete obstruction of the esophagus  Biopsy consistent with adenocarcinoma  Oncology consultation  Continue IV Protonix  Will need immediate PEG for nutrition as NG tube cannot be placed and EGD is unsuccessful due to obstruction of the esophagus  IR consult for PEG tube placement  Hold anticoagulation for procedure  Remains n.p.o.  CT of the chest reveals a mid thoracic esophageal mass measuring 6 x 4 x 3 cm causing dilatation of the esophagus and complete obstruction of the upper esophagus. 2.  Hypokalemia  IV potassium replacement    3. Essential hypertension  Cannot take home antihypertensives at this point  Bradycardia and not a candidate for labetalol  Nitro patch as needed    DVT prophylaxis: Lovenox  Ulcer prophylaxis: IV Protonix    FEN:  Continue IV fluids  N.p.o. due to obstructing mass in the esophagus  Replace electrolytes as needed    CODE STATUS: Full code    Care Plan discussed with: Patient/Family     Discussed case with patient's son Cindy Aldridge, phone number [phone]    Total time spent with patient: 37 minutes.

## 2020-10-31 LAB
ALBUMIN SERPL-MCNC: 3 G/DL (ref 3.5–5)
ALBUMIN/GLOB SERPL: 0.7 {RATIO} (ref 1.1–2.2)
ALP SERPL-CCNC: 51 U/L (ref 45–117)
ALT SERPL-CCNC: 23 U/L (ref 12–78)
ANION GAP SERPL CALC-SCNC: 5 MMOL/L (ref 5–15)
AST SERPL W P-5'-P-CCNC: 38 U/L (ref 15–37)
BASOPHILS # BLD: 0 K/UL (ref 0–0.1)
BASOPHILS NFR BLD: 0 % (ref 0–1)
BILIRUB SERPL-MCNC: 1.2 MG/DL (ref 0.2–1)
BUN SERPL-MCNC: 4 MG/DL (ref 6–20)
BUN/CREAT SERPL: 6 (ref 12–20)
CA-I BLD-MCNC: 9.5 MG/DL (ref 8.5–10.1)
CHLORIDE SERPL-SCNC: 105 MMOL/L (ref 97–108)
CO2 SERPL-SCNC: 27 MMOL/L (ref 21–32)
CREAT SERPL-MCNC: 0.71 MG/DL (ref 0.7–1.3)
DIFFERENTIAL METHOD BLD: ABNORMAL
EOSINOPHIL # BLD: 0 K/UL (ref 0–0.4)
EOSINOPHIL NFR BLD: 0 % (ref 0–7)
ERYTHROCYTE [DISTWIDTH] IN BLOOD BY AUTOMATED COUNT: 12.2 % (ref 11.5–14.5)
GLOBULIN SER CALC-MCNC: 4.5 G/DL (ref 2–4)
GLUCOSE BLD STRIP.AUTO-MCNC: 125 MG/DL (ref 65–100)
GLUCOSE SERPL-MCNC: 145 MG/DL (ref 65–100)
HCT VFR BLD AUTO: 38.4 % (ref 36.6–50.3)
HGB BLD-MCNC: 12.5 G/DL (ref 12.1–17)
IMM GRANULOCYTES # BLD AUTO: 0.1 K/UL (ref 0–0.04)
IMM GRANULOCYTES NFR BLD AUTO: 1 % (ref 0–0.5)
LYMPHOCYTES # BLD: 1.3 K/UL (ref 0.8–3.5)
LYMPHOCYTES NFR BLD: 12 % (ref 12–49)
MAGNESIUM SERPL-MCNC: 1.4 MG/DL (ref 1.6–2.4)
MCH RBC QN AUTO: 31.1 PG (ref 26–34)
MCHC RBC AUTO-ENTMCNC: 32.6 G/DL (ref 30–36.5)
MCV RBC AUTO: 95.5 FL (ref 80–99)
MONOCYTES # BLD: 1.3 K/UL (ref 0–1)
MONOCYTES NFR BLD: 12 % (ref 5–13)
NEUTS SEG # BLD: 8.6 K/UL (ref 1.8–8)
NEUTS SEG NFR BLD: 75 % (ref 32–75)
PERFORMED BY, TECHID: ABNORMAL
PLATELET # BLD AUTO: 181 K/UL (ref 150–400)
PMV BLD AUTO: 12.3 FL (ref 8.9–12.9)
POTASSIUM SERPL-SCNC: 3.8 MMOL/L (ref 3.5–5.1)
PROT SERPL-MCNC: 7.5 G/DL (ref 6.4–8.2)
RBC # BLD AUTO: 4.02 M/UL (ref 4.1–5.7)
SODIUM SERPL-SCNC: 137 MMOL/L (ref 136–145)
WBC # BLD AUTO: 11.3 K/UL (ref 4.1–11.1)

## 2020-10-31 PROCEDURE — 94762 N-INVAS EAR/PLS OXIMTRY CONT: CPT

## 2020-10-31 PROCEDURE — 83735 ASSAY OF MAGNESIUM: CPT

## 2020-10-31 PROCEDURE — 82962 GLUCOSE BLOOD TEST: CPT

## 2020-10-31 PROCEDURE — 74011000258 HC RX REV CODE- 258: Performed by: PHYSICIAN ASSISTANT

## 2020-10-31 PROCEDURE — 74011250636 HC RX REV CODE- 250/636: Performed by: HOSPITALIST

## 2020-10-31 PROCEDURE — 74011250637 HC RX REV CODE- 250/637: Performed by: PHYSICIAN ASSISTANT

## 2020-10-31 PROCEDURE — 80053 COMPREHEN METABOLIC PANEL: CPT

## 2020-10-31 PROCEDURE — 74011250637 HC RX REV CODE- 250/637: Performed by: HOSPITALIST

## 2020-10-31 PROCEDURE — 36415 COLL VENOUS BLD VENIPUNCTURE: CPT

## 2020-10-31 PROCEDURE — 65270000029 HC RM PRIVATE

## 2020-10-31 PROCEDURE — 74011250636 HC RX REV CODE- 250/636: Performed by: PHYSICIAN ASSISTANT

## 2020-10-31 PROCEDURE — C9113 INJ PANTOPRAZOLE SODIUM, VIA: HCPCS | Performed by: HOSPITALIST

## 2020-10-31 PROCEDURE — 85025 COMPLETE CBC W/AUTO DIFF WBC: CPT

## 2020-10-31 PROCEDURE — 93005 ELECTROCARDIOGRAM TRACING: CPT

## 2020-10-31 PROCEDURE — 77010033678 HC OXYGEN DAILY

## 2020-10-31 PROCEDURE — 74011000250 HC RX REV CODE- 250: Performed by: PHYSICIAN ASSISTANT

## 2020-10-31 PROCEDURE — 74011250636 HC RX REV CODE- 250/636: Performed by: ANESTHESIOLOGY

## 2020-10-31 PROCEDURE — 94760 N-INVAS EAR/PLS OXIMETRY 1: CPT

## 2020-10-31 RX ORDER — METOPROLOL TARTRATE 25 MG/1
25 TABLET, FILM COATED ORAL EVERY 12 HOURS
Status: DISCONTINUED | OUTPATIENT
Start: 2020-10-31 | End: 2020-11-11 | Stop reason: HOSPADM

## 2020-10-31 RX ORDER — FOLIC ACID 1 MG/1
1 TABLET ORAL DAILY
Status: DISCONTINUED | OUTPATIENT
Start: 2020-11-01 | End: 2020-11-11 | Stop reason: HOSPADM

## 2020-10-31 RX ORDER — METOPROLOL TARTRATE 25 MG/1
25 TABLET, FILM COATED ORAL 2 TIMES DAILY
Status: DISCONTINUED | OUTPATIENT
Start: 2020-10-31 | End: 2020-10-31

## 2020-10-31 RX ORDER — DILTIAZEM HYDROCHLORIDE 30 MG/1
30 TABLET, FILM COATED ORAL EVERY 4 HOURS
Status: DISCONTINUED | OUTPATIENT
Start: 2020-10-31 | End: 2020-11-01

## 2020-10-31 RX ORDER — ADENOSINE 3 MG/ML
6 INJECTION, SOLUTION INTRAVENOUS ONCE
Status: DISCONTINUED | OUTPATIENT
Start: 2020-10-31 | End: 2020-10-31 | Stop reason: SDUPTHER

## 2020-10-31 RX ORDER — FAMOTIDINE 20 MG/1
20 TABLET, FILM COATED ORAL 2 TIMES DAILY
Status: DISCONTINUED | OUTPATIENT
Start: 2020-10-31 | End: 2020-11-11 | Stop reason: HOSPADM

## 2020-10-31 RX ORDER — METOPROLOL TARTRATE 5 MG/5ML
2.5 INJECTION INTRAVENOUS
Status: DISCONTINUED | OUTPATIENT
Start: 2020-10-31 | End: 2020-11-11 | Stop reason: HOSPADM

## 2020-10-31 RX ORDER — ADENOSINE 3 MG/ML
6 INJECTION, SOLUTION INTRAVENOUS ONCE
Status: COMPLETED | OUTPATIENT
Start: 2020-10-31 | End: 2020-10-31

## 2020-10-31 RX ORDER — BISMUTH SUBSALICYLATE 262 MG
1 TABLET,CHEWABLE ORAL DAILY
Status: DISCONTINUED | OUTPATIENT
Start: 2020-11-01 | End: 2020-11-11 | Stop reason: HOSPADM

## 2020-10-31 RX ORDER — ADENOSINE 3 MG/ML
6 INJECTION, SOLUTION INTRAVENOUS ONCE
Status: DISCONTINUED | OUTPATIENT
Start: 2020-10-31 | End: 2020-10-31

## 2020-10-31 RX ORDER — ADENOSINE 3 MG/ML
12 INJECTION, SOLUTION INTRAVENOUS ONCE
Status: COMPLETED | OUTPATIENT
Start: 2020-10-31 | End: 2020-10-31

## 2020-10-31 RX ORDER — METOPROLOL TARTRATE 5 MG/5ML
5 INJECTION INTRAVENOUS ONCE
Status: DISCONTINUED | OUTPATIENT
Start: 2020-10-31 | End: 2020-10-31

## 2020-10-31 RX ORDER — ASPIRIN 325 MG/1
100 TABLET, FILM COATED ORAL DAILY
Status: DISCONTINUED | OUTPATIENT
Start: 2020-11-01 | End: 2020-11-11 | Stop reason: HOSPADM

## 2020-10-31 RX ORDER — MAGNESIUM SULFATE HEPTAHYDRATE 40 MG/ML
2 INJECTION, SOLUTION INTRAVENOUS ONCE
Status: COMPLETED | OUTPATIENT
Start: 2020-10-31 | End: 2020-10-31

## 2020-10-31 RX ADMIN — HYDROMORPHONE HYDROCHLORIDE 0.5 MG: 1 INJECTION, SOLUTION INTRAMUSCULAR; INTRAVENOUS; SUBCUTANEOUS at 03:20

## 2020-10-31 RX ADMIN — DILTIAZEM HYDROCHLORIDE 30 MG: 30 TABLET, FILM COATED ORAL at 20:55

## 2020-10-31 RX ADMIN — Medication 10 ML: at 21:47

## 2020-10-31 RX ADMIN — ADENOSINE 6 MG: 3 INJECTION, SOLUTION INTRAVENOUS at 12:39

## 2020-10-31 RX ADMIN — FAMOTIDINE 20 MG: 20 TABLET, FILM COATED ORAL at 20:56

## 2020-10-31 RX ADMIN — MAGNESIUM SULFATE HEPTAHYDRATE 2 G: 40 INJECTION, SOLUTION INTRAVENOUS at 20:56

## 2020-10-31 RX ADMIN — METOPROLOL TARTRATE 2.5 MG: 5 INJECTION INTRAVENOUS at 12:40

## 2020-10-31 RX ADMIN — ADENOSINE 12 MG: 3 INJECTION, SOLUTION INTRAVENOUS at 12:39

## 2020-10-31 RX ADMIN — Medication 10 ML: at 13:24

## 2020-10-31 RX ADMIN — HYDRALAZINE HYDROCHLORIDE 10 MG: 20 INJECTION INTRAMUSCULAR; INTRAVENOUS at 05:49

## 2020-10-31 RX ADMIN — HYDRALAZINE HYDROCHLORIDE 10 MG: 20 INJECTION INTRAMUSCULAR; INTRAVENOUS at 19:22

## 2020-10-31 RX ADMIN — PANTOPRAZOLE SODIUM 40 MG: 40 INJECTION, POWDER, FOR SOLUTION INTRAVENOUS at 09:32

## 2020-10-31 RX ADMIN — HYDROMORPHONE HYDROCHLORIDE 0.5 MG: 1 INJECTION, SOLUTION INTRAMUSCULAR; INTRAVENOUS; SUBCUTANEOUS at 10:27

## 2020-10-31 RX ADMIN — METOPROLOL TARTRATE 25 MG: 25 TABLET, FILM COATED ORAL at 20:56

## 2020-10-31 RX ADMIN — THIAMINE HYDROCHLORIDE 200 MG: 100 INJECTION, SOLUTION INTRAMUSCULAR; INTRAVENOUS at 11:06

## 2020-10-31 RX ADMIN — HYDROMORPHONE HYDROCHLORIDE 0.5 MG: 1 INJECTION, SOLUTION INTRAMUSCULAR; INTRAVENOUS; SUBCUTANEOUS at 15:33

## 2020-10-31 RX ADMIN — HYDRALAZINE HYDROCHLORIDE 10 MG: 20 INJECTION INTRAMUSCULAR; INTRAVENOUS at 09:32

## 2020-10-31 RX ADMIN — POTASSIUM CHLORIDE, DEXTROSE MONOHYDRATE AND SODIUM CHLORIDE 75 ML/HR: 150; 5; 900 INJECTION, SOLUTION INTRAVENOUS at 15:37

## 2020-10-31 RX ADMIN — Medication 10 ML: at 05:22

## 2020-10-31 NOTE — PROGRESS NOTES
Subjective problem with  swallowing  Subjective:      Pt feeling tired. Denies chest pain or dizziness.     {  Objective     Current Facility-Administered Medications:     metoprolol (LOPRESSOR) injection 2.5 mg, 2.5 mg, IntraVENous, Q6H PRN, Murali Doll PA-C, 2.5 mg at 10/31/20 1240    fat emulsion 20% (LIPOSYN, INTRAlipid) infusion 250 mL, 250 mL, IntraVENous, CONTINUOUS, Nasim Doll PA-C    [START ON 11/1/2020] thiamine (B-1) 200 mg in 0.9% sodium chloride 50 mL IVPB, 200 mg, IntraVENous, DAILY, Murali Doll PA-C    metoprolol tartrate (LOPRESSOR) tablet 25 mg, 25 mg, Oral, Q12H, Murali Doll PA-C    magnesium sulfate 2 g/50 ml IVPB (premix or compounded), 2 g, IntraVENous, ONCE, Joel Anderson MD    dextrose 5% - 0.9% NaCl with KCl 20 mEq/L infusion, 75 mL/hr, IntraVENous, CONTINUOUS, Nasim Doll PA-C, Last Rate: 75 mL/hr at 10/31/20 1537, 75 mL/hr at 10/31/20 1537    amino acid 4.25 % dextrose 5 % with electrolytes (CLINIMIX E) infusion, , IntraVENous, CONTINUOUS, Nasim Doll PA-C, Last Rate: 40 mL/hr at 10/30/20 2247    hydrALAZINE (APRESOLINE) 20 mg/mL injection 10 mg, 10 mg, IntraVENous, Q10MIN PRN, Elieser Camarena MD, 10 mg at 10/31/20 1922    HYDROmorphone (DILAUDID) injection 0.5 mg, 0.5 mg, IntraVENous, Q4H PRN, Murali Doll PA-C, 0.5 mg at 10/31/20 1533    sodium chloride (NS) flush 5-40 mL, 5-40 mL, IntraVENous, Q8H, Karmen Coyle MD, 10 mL at 10/31/20 1324    sodium chloride (NS) flush 5-40 mL, 5-40 mL, IntraVENous, PRN, Jay Hunter MD, 10 mL at 10/26/20 2146    acetaminophen (TYLENOL) tablet 650 mg, 650 mg, Oral, Q6H PRN **OR** acetaminophen (TYLENOL) suppository 650 mg, 650 mg, Rectal, Q6H PRN, Karmen Coyle MD    polyethylene glycol [facility]) packet 17 g, 17 g, Oral, DAILY PRN, Karmen Coyle MD    promethazine (PHENERGAN) tablet 12.5 mg, 12.5 mg, Oral, Q6H PRN **OR** ondansetron (ZOFRAN) injection 4 mg, 4 mg, IntraVENous, Q6H PRN, Karmen Coyle MD    dilTIAZem ER (CARDIZEM CD) capsule 120 mg, 120 mg, Oral, DAILY, Karmen Coyle MD, Stopped at 10/31/20 0900    Objective:     Visit Vitals  BP (!) 183/87 (BP 1 Location: Right arm, BP Patient Position: At rest)   Pulse 89   Temp 98.9 °F (37.2 °C)   Resp 18   Ht 6' 0.99\" (1.854 m)   Wt 64.6 kg (142 lb 6.7 oz)   SpO2 98%   BMI 18.79 kg/m²      Physical Exam   Lungs:CTA  Heart:RRR  Abdomen:soft,NT  EXT:no edema    Data Review:   Recent Results (from the past 24 hour(s))   METABOLIC PANEL, COMPREHENSIVE    Collection Time: 10/31/20  5:55 AM   Result Value Ref Range    Sodium 137 136 - 145 mmol/L    Potassium 3.8 3.5 - 5.1 mmol/L    Chloride 105 97 - 108 mmol/L    CO2 27 21 - 32 mmol/L    Anion gap 5 5 - 15 mmol/L    Glucose 145 (H) 65 - 100 mg/dL    BUN 4 (L) 6 - 20 mg/dL    Creatinine 0.71 0.70 - 1.30 mg/dL    BUN/Creatinine ratio 6 (L) 12 - 20      GFR est AA >60 >60 ml/min/1.73m2    GFR est non-AA >60 >60 ml/min/1.73m2    Calcium 9.5 8.5 - 10.1 mg/dL    Bilirubin, total 1.2 (H) 0.2 - 1.0 mg/dL    AST (SGOT) 38 (H) 15 - 37 U/L    ALT (SGPT) 23 12 - 78 U/L    Alk. phosphatase 51 45 - 117 U/L    Protein, total 7.5 6.4 - 8.2 g/dL    Albumin 3.0 (L) 3.5 - 5.0 g/dL    Globulin 4.5 (H) 2.0 - 4.0 g/dL    A-G Ratio 0.7 (L) 1.1 - 2.2     CBC WITH AUTOMATED DIFF    Collection Time: 10/31/20  1:15 PM   Result Value Ref Range    WBC 11.3 (H) 4.1 - 11.1 K/uL    RBC 4.02 (L) 4.10 - 5.70 M/uL    HGB 12.5 12.1 - 17.0 g/dL    HCT 38.4 36.6 - 50.3 %    MCV 95.5 80.0 - 99.0 FL    MCH 31.1 26.0 - 34.0 PG    MCHC 32.6 30.0 - 36.5 g/dL    RDW 12.2 11.5 - 14.5 %    PLATELET 705 836 - 921 K/uL    MPV 12.3 8.9 - 12.9 FL    NEUTROPHILS 75 32 - 75 %    LYMPHOCYTES 12 12 - 49 %    MONOCYTES 12 5 - 13 %    EOSINOPHILS 0 0 - 7 %    BASOPHILS 0 0 - 1 %    IMMATURE GRANULOCYTES 1 (H) 0.0 - 0.5 %    ABS.  NEUTROPHILS 8.6 (H) 1.8 - 8.0 K/UL ABS. LYMPHOCYTES 1.3 0.8 - 3.5 K/UL    ABS. MONOCYTES 1.3 (H) 0.0 - 1.0 K/UL    ABS. EOSINOPHILS 0.0 0.0 - 0.4 K/UL    ABS. BASOPHILS 0.0 0.0 - 0.1 K/UL    ABS. IMM. GRANS. 0.1 (H) 0.00 - 0.04 K/UL    DF AUTOMATED     GLUCOSE, POC    Collection Time: 10/31/20  3:15 PM   Result Value Ref Range    Glucose (POC) 125 (H) 65 - 100 mg/dL    Performed by Melody Dao   Assessment/Plan:      Squamous cell carcinoma of Esophagus:  CT abdomen,pelvis negative for metastatic disease except enlarged prostate. S/p  stent placement due to obstruction. S/p PEG tube . Plans for starting tube feedings noted. PET scan as out pt. Discussed about role of chemotherapy and radiation. D/w GI. Ingrid De La Vega Pt advised to quit smoking and alcohol. Radiation oncology consult noted. F/u with me in 1wk. Weight loss:due to malignancy. SVT:management for primary team and cardiology. Weight loss:Due to malignancy. Leukocyosis. Mild. Possible reactive. Monitor. D/w primary team and GI.

## 2020-10-31 NOTE — ROUTINE PROCESS
Patient had burst of SVT with heart rate in the 170s that lasted about 10 minutes. Dr. Hue Tovar notified and  patient's heart rate dropped back down to 90's. Dr. Hue Tovar stated he will notify Dr. Argenis Salazar to come and see the patient.

## 2020-10-31 NOTE — PROGRESS NOTES
Progress Note    Patient: Manjinder Rodriguez MRN: [id number]  SSN: [id number]    YOB: 1945  Age: 76 y.o. Sex: male      Admit Date: 10/26/2020    LOS: 5 days     Subjective:   Less  spit of saliva   esophageal mass. 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 Status post EGD with balloon dilatation of the esophagus and stent placement.      A PEG tube placed as well  Past Medical History:   Diagnosis Date    Hypertension         Current Facility-Administered Medications:     metoprolol (LOPRESSOR) injection 2.5 mg, 2.5 mg, IntraVENous, Q6H PRN, Murali Doll PA-C, 2.5 mg at 10/31/20 1240    fat emulsion 20% (LIPOSYN, INTRAlipid) infusion 250 mL, 250 mL, IntraVENous, CONTINUOUS, Rosalba Doll PA-C    [START ON 11/1/2020] thiamine (B-1) 200 mg in 0.9% sodium chloride 50 mL IVPB, 200 mg, IntraVENous, DAILY, Murali Doll PA-C    fat emulsion 20% (LIPOSYN, INTRAlipid) infusion 250 mL, 250 mL, IntraVENous, CONTINUOUS, Murali Doll PA-C    dextrose 5% - 0.9% NaCl with KCl 20 mEq/L infusion, 75 mL/hr, IntraVENous, CONTINUOUS, Rosalba Doll PA-C, Last Rate: 75 mL/hr at 10/31/20 1537, 75 mL/hr at 10/31/20 1537    amino acid 4.25 % dextrose 5 % with electrolytes (CLINIMIX E) infusion, , IntraVENous, CONTINUOUS, Rosalba Doll PA-C, Last Rate: 40 mL/hr at 10/30/20 2247    hydrALAZINE (APRESOLINE) 20 mg/mL injection 10 mg, 10 mg, IntraVENous, Q10MIN PRN, Farida Sinha MD, 10 mg at 10/31/20 0932    HYDROmorphone (DILAUDID) injection 0.5 mg, 0.5 mg, IntraVENous, Q4H PRN, Murali Doll PA-C, 0.5 mg at 10/31/20 1533    nitroglycerin (NITROBID) 2 % ointment 1 Inch, 1 Inch, Topical, BID PRN, Rosalba Doll PA-C, 1 Inch at 10/30/20 1700    pantoprazole (PROTONIX) 40 mg in 0.9% sodium chloride 10 mL injection, 40 mg, IntraVENous, DAILY, Jasmine Lepe MD, 40 mg at 10/31/20 0932    sodium chloride (NS) flush 5-40 mL, 5-40 mL, IntraVENous, Q8H, Karmen Coyle MD, 10 mL at 10/31/20 1324    sodium chloride (NS) flush 5-40 mL, 5-40 mL, IntraVENous, PRN, Karmen Coyle MD, 10 mL at 10/26/20 2146    acetaminophen (TYLENOL) tablet 650 mg, 650 mg, Oral, Q6H PRN **OR** acetaminophen (TYLENOL) suppository 650 mg, 650 mg, Rectal, Q6H PRN, Karmen Coyle MD    polyethylene glycol (MIRALAX) packet 17 g, 17 g, Oral, DAILY PRN, Karmen Coyle MD    promethazine (PHENERGAN) tablet 12.5 mg, 12.5 mg, Oral, Q6H PRN **OR** ondansetron (ZOFRAN) injection 4 mg, 4 mg, IntraVENous, Q6H PRN, Esha De La Vega MD    dilTIAZem ER (CARDIZEM CD) capsule 120 mg, 120 mg, Oral, DAILY, Esha De La Vega MD, Stopped at 10/31/20 0900    [Held by provider] metoprolol succinate (TOPROL-XL) XL tablet 25 mg, 25 mg, Oral, DAILY, Liliana Coyle MD, Stopped at 10/29/20 0900    Objective:     Vitals:    10/31/20 1022 10/31/20 1027 10/31/20 1234 10/31/20 1600   BP: (!) 142/79  (!) 151/81    Pulse:    89   Resp:       Temp:       SpO2:       Weight:  64.6 kg (142 lb 6.7 oz)     Height:            Intake and Output:  Current Shift: No intake/output data recorded. Last three shifts: 10/29 1901 - 10/31 0700  In: 500 [I.V.:500]  Out: 1250 [Urine:1250]    Physical Exam:   Physical Exam   Constitutional: He appears cachectic. He appears distressed. HENT:   Head: Atraumatic. Neck: Normal carotid pulses and no JVD present. No erythema and normal range of motion present. No thyromegaly present. Cardiovascular: Regular rhythm. Pulmonary/Chest: Effort normal and breath sounds normal. No apnea and no bradypnea. Abdominal: Soft. Bowel sounds are normal. He exhibits no distension and no fluid wave. Neurological: He is alert. He has normal strength.     PEG tube site skin  minimal lirritation    Lab/Data Review:  Recent Results (from the past 24 hour(s))   METABOLIC PANEL, COMPREHENSIVE    Collection Time: 10/31/20  5:55 AM   Result Value Ref Range    Sodium 137 136 - 145 mmol/L    Potassium 3.8 3.5 - 5.1 mmol/L    Chloride 105 97 - 108 mmol/L    CO2 27 21 - 32 mmol/L    Anion gap 5 5 - 15 mmol/L    Glucose 145 (H) 65 - 100 mg/dL    BUN 4 (L) 6 - 20 mg/dL    Creatinine 0.71 0.70 - 1.30 mg/dL    BUN/Creatinine ratio 6 (L) 12 - 20      GFR est AA >60 >60 ml/min/1.73m2    GFR est non-AA >60 >60 ml/min/1.73m2    Calcium 9.5 8.5 - 10.1 mg/dL    Bilirubin, total 1.2 (H) 0.2 - 1.0 mg/dL    AST (SGOT) 38 (H) 15 - 37 U/L    ALT (SGPT) 23 12 - 78 U/L    Alk. phosphatase 51 45 - 117 U/L    Protein, total 7.5 6.4 - 8.2 g/dL    Albumin 3.0 (L) 3.5 - 5.0 g/dL    Globulin 4.5 (H) 2.0 - 4.0 g/dL    A-G Ratio 0.7 (L) 1.1 - 2.2     CBC WITH AUTOMATED DIFF    Collection Time: 10/31/20  1:15 PM   Result Value Ref Range    WBC 11.3 (H) 4.1 - 11.1 K/uL    RBC 4.02 (L) 4.10 - 5.70 M/uL    HGB 12.5 12.1 - 17.0 g/dL    HCT 38.4 36.6 - 50.3 %    MCV 95.5 80.0 - 99.0 FL    MCH 31.1 26.0 - 34.0 PG    MCHC 32.6 30.0 - 36.5 g/dL    RDW 12.2 11.5 - 14.5 %    PLATELET 692 497 - 650 K/uL    MPV 12.3 8.9 - 12.9 FL    NEUTROPHILS 75 32 - 75 %    LYMPHOCYTES 12 12 - 49 %    MONOCYTES 12 5 - 13 %    EOSINOPHILS 0 0 - 7 %    BASOPHILS 0 0 - 1 %    IMMATURE GRANULOCYTES 1 (H) 0.0 - 0.5 %    ABS. NEUTROPHILS 8.6 (H) 1.8 - 8.0 K/UL    ABS. LYMPHOCYTES 1.3 0.8 - 3.5 K/UL    ABS. MONOCYTES 1.3 (H) 0.0 - 1.0 K/UL    ABS. EOSINOPHILS 0.0 0.0 - 0.4 K/UL    ABS. BASOPHILS 0.0 0.0 - 0.1 K/UL    ABS. IMM. GRANS. 0.1 (H) 0.00 - 0.04 K/UL    DF AUTOMATED     GLUCOSE, POC    Collection Time: 10/31/20  3:15 PM   Result Value Ref Range    Glucose (POC) 125 (H) 65 - 100 mg/dL    Performed by [place]         XR FLUOROSCOPY UNDER 60 MINUTES   Final Result      CT ABD PELV W CONT   Final Result   Impression:   1. No specific evidence of abdominopelvic metastatic disease. 2.  Prostatomegaly. Wall thickening of the urinary bladder may be due to chronic   outlet obstruction and/or cystitis. 3.  Borderline wall thickening of the stomach. May be related to   underdistention, but correlate for gastritis. 4.  Nonobstructing right renal calculus. 5.  See full report for detailed findings. See recently reported chest CT for   supradiaphragmatic findings. CT CHEST W CONT   Final Result   Impression: Enhancing mass of the mid thoracic esophagus measuring proximally 6   x 4 x 3 cm causing dilatation esophagus and obstruction of the upper esophagus   which is fluid filled and patulous. 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 I called Dr. Steven Guan with this result and recommendation   for upper endoscopy at 3:15 PM.      Emphysema. Coronary artery calcifications. Right kidney stone. Please see full report. CT NECK SOFT TISSUE W CONT   Final Result      XR CHEST SNGL V   Final Result   Impression: Unremarkable chest x-ray, except for degenerative change of the   spine.            Assessment:     Active Problems:    Esophageal mass (10/26/2020)    upper esophageal mass, lthe esophageal squamous cell cancer, Difficult swallow,  Malnutrition,  Status post dilation stent replacement  PEG  placement  SVT episode today  Plan:   Continue IV hydration, D5 half-normal saline,  Try clear liquid  Start on tube feeding  Plan was discussed with oncologist, patient admitting physician  Cardiology to follow to control the heart rate  Signed By: Davina Auguste MD     October 31, 2020        Thank you for allowing me to participate in this patients care  Cc Referring Physician   None

## 2020-10-31 NOTE — ROUTINE PROCESS
Jevity 1.5 tube feeding ordered. No kangaroo pump found on the unit. Darrius Berry was notified and changed order to 50mL tube feeding with 10mL flush water to be done every 3 hours.

## 2020-10-31 NOTE — PROGRESS NOTES
Hospitalist Progress Note    Subjective:   Daily Progress Note: 10/31/2020 5:41 PM    Patient with dysphagia and esophageal mass. Unable to eat due to complete obstruction of the esophagus. Status post EGD with balloon dilatation of the esophagus and stent placement.     Current Facility-Administered Medications   Medication Dose Route Frequency    adenosine (ADENOCARD) injection 6 mg  6 mg IntraVENous ONCE    adenosine (ADENOCARD) injection 6 mg  6 mg IntraVENous ONCE    adenosine (ADENOCARD) injection 6 mg  6 mg IntraVENous ONCE    metoprolol (LOPRESSOR) injection 5 mg  5 mg IntraVENous ONCE    adenosine (ADENOCARD) injection 12 mg  12 mg IntraVENous ONCE    dextrose 5% - 0.9% NaCl with KCl 20 mEq/L infusion  75 mL/hr IntraVENous CONTINUOUS    amino acid 4.25 % dextrose 5 % with electrolytes (CLINIMIX E) infusion   IntraVENous CONTINUOUS    labetaloL (NORMODYNE;TRANDATE) 20 mg/4 mL (5 mg/mL) injection 5 mg  5 mg IntraVENous Q5MIN PRN    metoprolol (LOPRESSOR) injection 2.5 mg  2.5 mg IntraVENous Q5MIN PRN    hydrALAZINE (APRESOLINE) 20 mg/mL injection 10 mg  10 mg IntraVENous Q10MIN PRN    HYDROmorphone (DILAUDID) injection 0.5 mg  0.5 mg IntraVENous Q4H PRN    thiamine (B-1) 200 mg in 0.9% sodium chloride 50 mL IVPB  200 mg IntraVENous DAILY    nitroglycerin (NITROBID) 2 % ointment 1 Inch  1 Inch Topical BID PRN    pantoprazole (PROTONIX) 40 mg in 0.9% sodium chloride 10 mL injection  40 mg IntraVENous DAILY    sodium chloride (NS) flush 5-40 mL  5-40 mL IntraVENous Q8H    sodium chloride (NS) flush 5-40 mL  5-40 mL IntraVENous PRN    acetaminophen (TYLENOL) tablet 650 mg  650 mg Oral Q6H PRN    Or    acetaminophen (TYLENOL) suppository 650 mg  650 mg Rectal Q6H PRN    polyethylene glycol (MIRALAX) packet 17 g  17 g Oral DAILY PRN    promethazine (PHENERGAN) tablet 12.5 mg  12.5 mg Oral Q6H PRN    Or    ondansetron (ZOFRAN) injection 4 mg  4 mg IntraVENous Q6H PRN    dilTIAZem ER (CARDIZEM CD) capsule 120 mg  120 mg Oral DAILY    [Held by provider] metoprolol succinate (TOPROL-XL) XL tablet 25 mg  25 mg Oral DAILY        Review of Systems  Review of Systems   Constitutional: Negative for chills and fever. HENT: Positive for sore throat. Negative for tinnitus. Eyes: Negative. Respiratory: Negative for cough and shortness of breath. Cardiovascular: Negative for chest pain and palpitations. Gastrointestinal: Negative for abdominal pain, nausea and vomiting. Dysphagia   Genitourinary: Negative. Musculoskeletal: Negative. Skin: Negative. Neurological: Negative. Psychiatric/Behavioral: Negative. Objective:     Visit Vitals  BP (!) 142/79   Pulse 90   Temp 98.3 °F (36.8 °C)   Resp 20   Ht 6' 0.99\" (1.854 m)   Wt 64.6 kg (142 lb 6.7 oz)   SpO2 98%   BMI 18.79 kg/m²      O2 Device: Room air    Temp (24hrs), Av.8 °F (37.1 °C), Min:98.2 °F (36.8 °C), Max:99.9 °F (37.7 °C)      No intake/output data recorded. 10/29 1901 - 10/31 0700  In: 500 [I.V.:500]  Out: 1250 [Urine:1250]    PHYSICAL EXAM:    Physical Exam  Vitals signs reviewed. Constitutional:       Appearance: Normal appearance. He is ill-appearing. HENT:      Head: Normocephalic and atraumatic. Nose: Nose normal.      Mouth/Throat:      Mouth: Mucous membranes are dry. Eyes:      Conjunctiva/sclera: Conjunctivae normal.   Neck:      Musculoskeletal: Normal range of motion and neck supple. Cardiovascular:      Rate and Rhythm: Normal rate and regular rhythm. Pulses: Normal pulses. Heart sounds: Normal heart sounds. Pulmonary:      Effort: Pulmonary effort is normal.      Breath sounds: Normal breath sounds. Abdominal:      General: Bowel sounds are normal.      Palpations: Abdomen is soft. Musculoskeletal: Normal range of motion. Skin:     General: Skin is dry. Neurological:      General: No focal deficit present.       Mental Status: He is alert and oriented to person, place, and time. Mental status is at baseline. Data Review    Recent Results (from the past 24 hour(s))   METABOLIC PANEL, COMPREHENSIVE    Collection Time: 10/31/20  5:55 AM   Result Value Ref Range    Sodium 137 136 - 145 mmol/L    Potassium 3.8 3.5 - 5.1 mmol/L    Chloride 105 97 - 108 mmol/L    CO2 27 21 - 32 mmol/L    Anion gap 5 5 - 15 mmol/L    Glucose 145 (H) 65 - 100 mg/dL    BUN 4 (L) 6 - 20 mg/dL    Creatinine 0.71 0.70 - 1.30 mg/dL    BUN/Creatinine ratio 6 (L) 12 - 20      GFR est AA >60 >60 ml/min/1.73m2    GFR est non-AA >60 >60 ml/min/1.73m2    Calcium 9.5 8.5 - 10.1 mg/dL    Bilirubin, total 1.2 (H) 0.2 - 1.0 mg/dL    AST (SGOT) 38 (H) 15 - 37 U/L    ALT (SGPT) 23 12 - 78 U/L    Alk. phosphatase 51 45 - 117 U/L    Protein, total 7.5 6.4 - 8.2 g/dL    Albumin 3.0 (L) 3.5 - 5.0 g/dL    Globulin 4.5 (H) 2.0 - 4.0 g/dL    A-G Ratio 0.7 (L) 1.1 - 2.2          Assessment/Plan:     Active Problems:    Esophageal mass (10/26/2020)      Hospital course:    Patient admitted on October 26, 2020 with difficulty swallowing and generalized weakness and 30 pound weight loss. Patient has been unable to swallow solid foods. CT of the chest revealed an obstructing esophageal mass. EGD performed which revealed the same with biopsy taken. Oncology consultation. Patient will need PEG tube placement and will be set up for a stent placement. Considering patient's need for chemotherapy and radiation if biopsy is consistent with a malignant process patient will require PEG tube placement anyways. EGD with balloon dilatation of the esophagus and stent placement. PEG placed. Impression\plan:    1. Esophageal mass  EGD performed with complete obstruction of the esophagus.   Repeat EGD on October 30, 2020 with EGD and balloon dilatation of the esophagus stent placement and PEG placement  Biopsy consistent with, squamous cell carcinoma  Oncology consultation  Continue IV Protonix  Will need immediate PEG for nutrition as NG tube cannot be placed and EGD is unsuccessful due to obstruction of the esophagus  Hold anticoagulation for procedure  Remains n.p.o.  CT of the chest reveals a mid thoracic esophageal mass measuring 6 x 4 x 3 cm causing dilatation of the esophagus and complete obstruction of the upper esophagus. PPN    2. Hypokalemia  Replace as needed    3. Essential hypertension  Diltiazem extended release    4. Protein malnutrition, moderate  30 to 40 pound weight loss  PPN ordered  Tube feedings when appropriate  Consult nutrition    5. Supraventricular tachycardia  Adenosine x2 failed  Metoprolol x1 IV 2.5 mg with good rate control and converted to normal sinus rhythm  Cardiology consultation,   EKG with SVT     DVT prophylaxis: Lovenox  Ulcer prophylaxis: IV Protonix    FEN:  Continue IV fluids  N.p.o. due to obstructing mass in the esophagus  Replace electrolytes as needed  Tube feeding when appropriate    CODE STATUS: Full code    Care Plan discussed with: Patient/Family     Discussed case with patient's son Essie Patterson, phone number [phone]    Critical care timet: 65 minutes. Patient with tachypnea and heart rate of 178. EKG was consistent with SVT. Patient was given adenosine 6 mg and 12 mg with no resolution of the SVT. Patient was given metoprolol 2.5 mg IV with good rate control and converted to normal sinus rhythm. Cardiac consultation pending. Patient denies chest pain or severe shortness of breath during this episode.   Patient maintained blood pressure of approximately 150/80 during this episode

## 2020-10-31 NOTE — ROUTINE PROCESS
Patient's heart rate was 170-180s, [employer] PA notified. EKG was completed and patient was in SVT. Adenosine was ordered for the patient with nursing supervisor Tristan Tang and Darrius Berry present. Code cart was brought in the room and patient connected to defib pads. 6mg of adenosine was pushed IV, followed by 12mg of adenosine IV. Patient heart rate was still in the 170s. 2.5mg metoprolol was given IV and patient's heart rate went down to 88. Patient was put on oxygen and telemetry. Patient is resting with no signs of distress right now. Will continue to monitor heart rate and blood pressure.

## 2020-11-01 ENCOUNTER — APPOINTMENT (OUTPATIENT)
Dept: GENERAL RADIOLOGY | Age: 75
DRG: 375 | End: 2020-11-01
Attending: PHYSICIAN ASSISTANT
Payer: MEDICARE

## 2020-11-01 ENCOUNTER — APPOINTMENT (OUTPATIENT)
Dept: NON INVASIVE DIAGNOSTICS | Age: 75
DRG: 375 | End: 2020-11-01
Attending: INTERNAL MEDICINE
Payer: MEDICARE

## 2020-11-01 LAB
ALBUMIN SERPL-MCNC: 2.9 G/DL (ref 3.5–5)
ALBUMIN/GLOB SERPL: 0.7 {RATIO} (ref 1.1–2.2)
ALP SERPL-CCNC: 56 U/L (ref 45–117)
ALT SERPL-CCNC: 23 U/L (ref 12–78)
ANION GAP SERPL CALC-SCNC: 7 MMOL/L (ref 5–15)
AST SERPL W P-5'-P-CCNC: 44 U/L (ref 15–37)
ATRIAL RATE: 168 BPM
BASOPHILS # BLD: 0 K/UL (ref 0–0.1)
BASOPHILS NFR BLD: 0 % (ref 0–1)
BILIRUB SERPL-MCNC: 1.4 MG/DL (ref 0.2–1)
BUN SERPL-MCNC: 6 MG/DL (ref 6–20)
BUN/CREAT SERPL: 10 (ref 12–20)
CA-I BLD-MCNC: 9 MG/DL (ref 8.5–10.1)
CALCULATED R AXIS, ECG10: 21 DEGREES
CALCULATED T AXIS, ECG11: 61 DEGREES
CHLORIDE SERPL-SCNC: 105 MMOL/L (ref 97–108)
CO2 SERPL-SCNC: 26 MMOL/L (ref 21–32)
CREAT SERPL-MCNC: 0.63 MG/DL (ref 0.7–1.3)
DIAGNOSIS, 93000: NORMAL
DIFFERENTIAL METHOD BLD: ABNORMAL
EOSINOPHIL # BLD: 0 K/UL (ref 0–0.4)
EOSINOPHIL NFR BLD: 0 % (ref 0–7)
ERYTHROCYTE [DISTWIDTH] IN BLOOD BY AUTOMATED COUNT: 12.5 % (ref 11.5–14.5)
GLOBULIN SER CALC-MCNC: 4.2 G/DL (ref 2–4)
GLUCOSE SERPL-MCNC: 125 MG/DL (ref 65–100)
HCT VFR BLD AUTO: 40.4 % (ref 36.6–50.3)
HGB BLD-MCNC: 13.3 G/DL (ref 12.1–17)
IMM GRANULOCYTES # BLD AUTO: 0.1 K/UL (ref 0–0.04)
IMM GRANULOCYTES NFR BLD AUTO: 1 % (ref 0–0.5)
LYMPHOCYTES # BLD: 1.2 K/UL (ref 0.8–3.5)
LYMPHOCYTES NFR BLD: 14 % (ref 12–49)
MCH RBC QN AUTO: 31.4 PG (ref 26–34)
MCHC RBC AUTO-ENTMCNC: 32.9 G/DL (ref 30–36.5)
MCV RBC AUTO: 95.5 FL (ref 80–99)
MONOCYTES # BLD: 1 K/UL (ref 0–1)
MONOCYTES NFR BLD: 11 % (ref 5–13)
NEUTS SEG # BLD: 6.5 K/UL (ref 1.8–8)
NEUTS SEG NFR BLD: 74 % (ref 32–75)
PLATELET # BLD AUTO: 195 K/UL (ref 150–400)
PMV BLD AUTO: 12.6 FL (ref 8.9–12.9)
POTASSIUM SERPL-SCNC: 3.7 MMOL/L (ref 3.5–5.1)
PROT SERPL-MCNC: 7.1 G/DL (ref 6.4–8.2)
Q-T INTERVAL, ECG07: 288 MS
QRS DURATION, ECG06: 72 MS
QTC CALCULATION (BEZET), ECG08: 481 MS
RBC # BLD AUTO: 4.23 M/UL (ref 4.1–5.7)
SODIUM SERPL-SCNC: 138 MMOL/L (ref 136–145)
VENTRICULAR RATE, ECG03: 168 BPM
WBC # BLD AUTO: 8.8 K/UL (ref 4.1–11.1)

## 2020-11-01 PROCEDURE — 71045 X-RAY EXAM CHEST 1 VIEW: CPT

## 2020-11-01 PROCEDURE — 74011000250 HC RX REV CODE- 250: Performed by: INTERNAL MEDICINE

## 2020-11-01 PROCEDURE — 74011000258 HC RX REV CODE- 258: Performed by: PHYSICIAN ASSISTANT

## 2020-11-01 PROCEDURE — 74011250637 HC RX REV CODE- 250/637: Performed by: INTERNAL MEDICINE

## 2020-11-01 PROCEDURE — 80053 COMPREHEN METABOLIC PANEL: CPT

## 2020-11-01 PROCEDURE — 74011000250 HC RX REV CODE- 250: Performed by: PHYSICIAN ASSISTANT

## 2020-11-01 PROCEDURE — C8929 TTE W OR WO FOL WCON,DOPPLER: HCPCS

## 2020-11-01 PROCEDURE — 85025 COMPLETE CBC W/AUTO DIFF WBC: CPT

## 2020-11-01 PROCEDURE — 74011250636 HC RX REV CODE- 250/636: Performed by: PHYSICIAN ASSISTANT

## 2020-11-01 PROCEDURE — 74011250637 HC RX REV CODE- 250/637: Performed by: PHYSICIAN ASSISTANT

## 2020-11-01 PROCEDURE — 74011250636 HC RX REV CODE- 250/636: Performed by: HOSPITALIST

## 2020-11-01 PROCEDURE — 74011250637 HC RX REV CODE- 250/637: Performed by: HOSPITALIST

## 2020-11-01 PROCEDURE — 65270000029 HC RM PRIVATE

## 2020-11-01 PROCEDURE — 36415 COLL VENOUS BLD VENIPUNCTURE: CPT

## 2020-11-01 RX ORDER — DILTIAZEM HCL/D5W 125 MG/125
5 PLASTIC BAG, INJECTION (ML) INTRAVENOUS CONTINUOUS
Status: DISCONTINUED | OUTPATIENT
Start: 2020-11-01 | End: 2020-11-02

## 2020-11-01 RX ADMIN — FOLIC ACID 1 MG: 1 TABLET ORAL at 09:48

## 2020-11-01 RX ADMIN — ASCORBIC ACID, VITAMIN A PALMITATE, CHOLECALCIFEROL, THIAMINE HYDROCHLORIDE, RIBOFLAVIN-5 PHOSPHATE SODIUM, PYRIDOXINE HYDROCHLORIDE, NIACINAMIDE, DEXPANTHENOL, ALPHA-TOCOPHEROL ACETATE, VITAMIN K1, FOLIC ACID, BIOTIN, CYANOCOBALAMIN: 200; 3300; 200; 6; 3.6; 6; 40; 15; 10; 150; 600; 60; 5 INJECTION, SOLUTION INTRAVENOUS at 21:37

## 2020-11-01 RX ADMIN — HYDROMORPHONE HYDROCHLORIDE 0.5 MG: 1 INJECTION, SOLUTION INTRAMUSCULAR; INTRAVENOUS; SUBCUTANEOUS at 06:03

## 2020-11-01 RX ADMIN — POTASSIUM CHLORIDE, DEXTROSE MONOHYDRATE AND SODIUM CHLORIDE 75 ML/HR: 150; 5; 900 INJECTION, SOLUTION INTRAVENOUS at 06:05

## 2020-11-01 RX ADMIN — HYDROMORPHONE HYDROCHLORIDE 0.5 MG: 1 INJECTION, SOLUTION INTRAMUSCULAR; INTRAVENOUS; SUBCUTANEOUS at 01:25

## 2020-11-01 RX ADMIN — METOPROLOL TARTRATE 25 MG: 25 TABLET, FILM COATED ORAL at 09:48

## 2020-11-01 RX ADMIN — Medication 5 MG/HR: at 10:05

## 2020-11-01 RX ADMIN — I.V. FAT EMULSION 250 ML: 20 EMULSION INTRAVENOUS at 21:36

## 2020-11-01 RX ADMIN — Medication 10 ML: at 22:00

## 2020-11-01 RX ADMIN — PIPERACILLIN AND TAZOBACTAM 3.38 G: 3; .375 INJECTION, POWDER, LYOPHILIZED, FOR SOLUTION INTRAVENOUS at 19:51

## 2020-11-01 RX ADMIN — DILTIAZEM HYDROCHLORIDE 30 MG: 30 TABLET, FILM COATED ORAL at 01:25

## 2020-11-01 RX ADMIN — FAMOTIDINE 20 MG: 20 TABLET, FILM COATED ORAL at 21:37

## 2020-11-01 RX ADMIN — MULTIVITAMIN TABLET 1 TABLET: TABLET at 09:48

## 2020-11-01 RX ADMIN — PERFLUTREN 2 ML: 6.52 INJECTION, SUSPENSION INTRAVENOUS at 14:30

## 2020-11-01 RX ADMIN — Medication 10 ML: at 06:28

## 2020-11-01 RX ADMIN — FAMOTIDINE 20 MG: 20 TABLET, FILM COATED ORAL at 09:48

## 2020-11-01 RX ADMIN — DILTIAZEM HYDROCHLORIDE 30 MG: 30 TABLET, FILM COATED ORAL at 06:03

## 2020-11-01 RX ADMIN — METOPROLOL TARTRATE 25 MG: 25 TABLET, FILM COATED ORAL at 21:37

## 2020-11-01 RX ADMIN — THIAMINE HCL TAB 100 MG 100 MG: 100 TAB at 09:48

## 2020-11-01 RX ADMIN — ACETAMINOPHEN 650 MG: 325 TABLET, FILM COATED ORAL at 21:37

## 2020-11-01 NOTE — PROGRESS NOTES
Approximately 8pm Dr. Khloe Mabry came to the floor and assessed patient and ordered 2g mag sulphate, po cardizem 30mg and PO metoprolol. . Also stated to start bolus tube feeding slowly (50mls) X2  and and increase in the morning, Continue PPN for tonight. Within a 2 hr timeframe telemetry called three times of -200 and it lasted less than a minute and the went back in the 90s. \    At 9pm patient fed via PEG tube with 50mls of Jevity 1.5 and 20mls water flush. Patient tolerated well. Will continue to monitor and update Dr. Khloe Mabry.

## 2020-11-01 NOTE — PROGRESS NOTES
Progress Note    Patient: Nobie Cheadle MRN: [id number]  SSN: [id number]    YOB: 1945  Age: 76 y.o. Sex: male      Admit Date: 10/26/2020    LOS: 6 days     Subjective:   Patient now in the cardiac unit,had cardia arrhythmia, heart rate appear to be controlled    Less  spit of saliva   esophageal mass. 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 Status post EGD with balloon dilatation of the esophagus and stent placement.      A PEG tube placed as well  Has shortness of breath, some chest discomfort, chest x-ray showed air under diaphragm, however patient had PEG tube placement 2 days ago with a stent plcement  Past Medical History:   Diagnosis Date    Hypertension         Current Facility-Administered Medications:     dilTIAZem (CARDIZEM) 125 mg/125 mL (1 mg/mL) dextrose 5% infusion, 5 mg/hr, IntraVENous, CONTINUOUS, Giles Khoury MD, Last Rate: 5 mL/hr at 11/01/20 1005, 5 mg/hr at 11/01/20 1005    amino acid 4.25 % dextrose 5 % with electrolytes (CLINIMIX E) infusion, , IntraVENous, CONTINUOUS, Murali Doll PA-C    fat emulsion 20% (LIPOSYN, INTRAlipid) infusion 250 mL, 250 mL, IntraVENous, CONTINUOUS, Murali Doll PA-C    perflutren lipid microspheres (DEFINITY) 1.1 mg/mL contrast injection, , , ,     metoprolol (LOPRESSOR) injection 2.5 mg, 2.5 mg, IntraVENous, Q6H PRN, Murali Doll PA-C, 2.5 mg at 10/31/20 1240    metoprolol tartrate (LOPRESSOR) tablet 25 mg, 25 mg, Oral, Q12H, Murali Doll PA-C, 25 mg at 11/01/20 1946    thiamine mononitrate (B-1) tablet 100 mg, 100 mg, Oral, DAILY, Earlene Mcneill MD, 100 mg at 93/16/58 2592    folic acid (FOLVITE) tablet 1 mg, 1 mg, Oral, DAILY, Earlene Mcneill MD, 1 mg at 11/01/20 0948    multivitamin (ONE A DAY) tablet 1 Tab, 1 Tab, Oral, DAILY, Earlene Mcneill MD, 1 Tab at 11/01/20 9639   famotidine (PEPCID) tablet 20 mg, 20 mg, Oral, BID, Eliana Franco MD, 20 mg at 11/01/20 0948    dextrose 5% - 0.9% NaCl with KCl 20 mEq/L infusion, 75 mL/hr, IntraVENous, CONTINUOUS, Mayelin Doll PA-C, Last Rate: 75 mL/hr at 11/01/20 0605, 75 mL/hr at 11/01/20 0605    HYDROmorphone (DILAUDID) injection 0.5 mg, 0.5 mg, IntraVENous, Q4H PRN, Mayelin Dlol PA-C, 0.5 mg at 11/01/20 0603    sodium chloride (NS) flush 5-40 mL, 5-40 mL, IntraVENous, Q8H, Karmen Coyle MD, 10 mL at 11/01/20 1719    sodium chloride (NS) flush 5-40 mL, 5-40 mL, IntraVENous, PRN, Karmen Whitlock MD, 10 mL at 10/26/20 2146    acetaminophen (TYLENOL) tablet 650 mg, 650 mg, Oral, Q6H PRN **OR** acetaminophen (TYLENOL) suppository 650 mg, 650 mg, Rectal, Q6H PRN, Karmen Whitlock MD    promethazine (PHENERGAN) tablet 12.5 mg, 12.5 mg, Oral, Q6H PRN **OR** ondansetron (ZOFRAN) injection 4 mg, 4 mg, IntraVENous, Q6H PRN, Karmen Whitlock MD    Objective:     Vitals:    11/01/20 0801 11/01/20 0917 11/01/20 1203 11/01/20 1356   BP: (!) 167/88 (!) 177/88 (!) 173/99 (!) 173/99   Pulse: 86 85 99    Resp: 18 20 20    Temp: 98.6 °F (37 °C) 98.8 °F (37.1 °C) 99.3 °F (37.4 °C)    SpO2: 95% 94% 96%    Weight:    64.6 kg (142 lb 6.7 oz)   Height:    6' 0.99\" (1.854 m)        Intake and Output:  Current Shift: 11/01 0701 - 11/01 1900  In: -   Out: 100 [Urine:100]  Last three shifts: 10/30 1901 - 11/01 0700  In: 10   Out: 750 [Urine:750]    Physical Exam:   Physical Exam   Constitutional: He appears cachectic. He appears distressed. HENT:   Head: Atraumatic. Neck: Normal carotid pulses and no JVD present. No erythema and normal range of motion present. No thyromegaly present. Cardiovascular: Regular rhythm. Pulmonary/Chest: Effort normal and breath sounds normal. No apnea and no bradypnea. Abdominal: Soft. Bowel sounds are normal. He exhibits no distension and no fluid wave. Neurological: He is alert. He has normal strength. PEG tube site skin  minimal lirritation    Lab/Data Review:  Recent Results (from the past 24 hour(s))   CBC WITH AUTOMATED DIFF    Collection Time: 11/01/20  2:05 AM   Result Value Ref Range    WBC 8.8 4.1 - 11.1 K/uL    RBC 4.23 4. 10 - 5.70 M/uL    HGB 13.3 12.1 - 17.0 g/dL    HCT 40.4 36.6 - 50.3 %    MCV 95.5 80.0 - 99.0 FL    MCH 31.4 26.0 - 34.0 PG    MCHC 32.9 30.0 - 36.5 g/dL    RDW 12.5 11.5 - 14.5 %    PLATELET 683 380 - 684 K/uL    MPV 12.6 8.9 - 12.9 FL    NEUTROPHILS 74 32 - 75 %    LYMPHOCYTES 14 12 - 49 %    MONOCYTES 11 5 - 13 %    EOSINOPHILS 0 0 - 7 %    BASOPHILS 0 0 - 1 %    IMMATURE GRANULOCYTES 1 (H) 0.0 - 0.5 %    ABS. NEUTROPHILS 6.5 1.8 - 8.0 K/UL    ABS. LYMPHOCYTES 1.2 0.8 - 3.5 K/UL    ABS. MONOCYTES 1.0 0.0 - 1.0 K/UL    ABS. EOSINOPHILS 0.0 0.0 - 0.4 K/UL    ABS. BASOPHILS 0.0 0.0 - 0.1 K/UL    ABS. IMM. GRANS. 0.1 (H) 0.00 - 0.04 K/UL    DF AUTOMATED     METABOLIC PANEL, COMPREHENSIVE    Collection Time: 11/01/20  2:05 AM   Result Value Ref Range    Sodium 138 136 - 145 mmol/L    Potassium 3.7 3.5 - 5.1 mmol/L    Chloride 105 97 - 108 mmol/L    CO2 26 21 - 32 mmol/L    Anion gap 7 5 - 15 mmol/L    Glucose 125 (H) 65 - 100 mg/dL    BUN 6 6 - 20 mg/dL    Creatinine 0.63 (L) 0.70 - 1.30 mg/dL    BUN/Creatinine ratio 10 (L) 12 - 20      GFR est AA >60 >60 ml/min/1.73m2    GFR est non-AA >60 >60 ml/min/1.73m2    Calcium 9.0 8.5 - 10.1 mg/dL    Bilirubin, total 1.4 (H) 0.2 - 1.0 mg/dL    AST (SGOT) 44 (H) 15 - 37 U/L    ALT (SGPT) 23 12 - 78 U/L    Alk.  phosphatase 56 45 - 117 U/L    Protein, total 7.1 6.4 - 8.2 g/dL    Albumin 2.9 (L) 3.5 - 5.0 g/dL    Globulin 4.2 (H) 2.0 - 4.0 g/dL    A-G Ratio 0.7 (L) 1.1 - 2.2     ECHO ADULT COMPLETE    Collection Time: 11/01/20  2:53 PM   Result Value Ref Range    LV ED Vol A4C 127.00 cm3    LV ED Vol A2C 117.00 cm3    LV ES Vol A4C 76.00 cm3    LV ES Vol A2C 51.50 cm3    LVIDd 4.89 4.2 - 5.9 cm Pulmonic Regurgitant End Max Velocity 104.00 cm/s    LVOT Peak Gradient 4.00 mmHg    LVPWd 1.34 (A) 0.6 - 1.0 cm    LV E' Septal Velocity 6.63 cm/s    BP EF 56.0 55 - 100 %    E/E' septal 8.19     LV Ejection Fraction MOD 2C 24 %    Aortic Regurgitant Pressure Half-time 416.00 ms    AR Max Wilner 187.00 cm/s    Pulmonic Regurgitant End Max Velocity 144.00 cm/s    AoV PG 8.00 mmHg    Mitral Valve E Wave Deceleration Time 211.00 ms    MV A Wilner 101.00 cm/s    MV E Wilner 54.30 cm/s    MV E/A 0.54     Pulmonic Regurgitant End Max Velocity 103.00 cm/s    Pulmonic Valve Systolic Peak Instantaneous Gradient 4.00 mmHg    P Vein A Dur 116.00 ms    Pulmonary Vein \"A\" Wave Velocity 42.00 cm/s    RVIDd 3.30 cm    Tricuspid Valve Max Velocity 235.00 cm/s    Triscuspid Valve Regurgitation Peak Gradient 22.00 mmHg        XR CHEST SNGL V   Final Result   Impression:      Interval placement of a proximal to mid esophageal stent. Free air in the imaged upper abdomen. Mild atelectasis at the lung bases. Findings discussed with William Armenta on 11/1/2020 at 1125. XR FLUOROSCOPY UNDER 60 MINUTES   Final Result      CT ABD PELV W CONT   Final Result   Impression:   1. No specific evidence of abdominopelvic metastatic disease. 2.  Prostatomegaly. Wall thickening of the urinary bladder may be due to chronic   outlet obstruction and/or cystitis. 3.  Borderline wall thickening of the stomach. May be related to   underdistention, but correlate for gastritis. 4.  Nonobstructing right renal calculus. 5.  See full report for detailed findings. See recently reported chest CT for   supradiaphragmatic findings. CT CHEST W CONT   Final Result   Impression: Enhancing mass of the mid thoracic esophagus measuring proximally 6   x 4 x 3 cm causing dilatation esophagus and obstruction of the upper esophagus   which is fluid filled and patulous. 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  I called Dr. Zuly Yanes with this result and recommendation   for upper endoscopy at 3:15 PM.      Emphysema. Coronary artery calcifications. Right kidney stone. Please see full report. CT NECK SOFT TISSUE W CONT   Final Result      XR CHEST SNGL V   Final Result   Impression: Unremarkable chest x-ray, except for degenerative change of the   spine.            Assessment:     Active Problems:    Esophageal mass (10/26/2020)    upper esophageal mass, esophageal squamous cell cancer, Difficult swallow,  Malnutrition,  Status post dilation stent replacement  PEG  Placement  Pneumoperitoneum,  likely due to the PEG tube placement, abdominal soft, general abdominal pain, WBC normal  SVT episode   Plan:   PEG tube suction, followed the progress of pneumoperitoneum closely    Continue IV hydration, cardiologist to follow  Try clear liquid  Start on tube feeding  Was on TPN, Protonix couple days  Plan was discussed with oncologist, patient admitting physician    Signed By: Ayala Plascencia MD     November 1, 2020        Thank you for allowing me to participate in this patients care  Cc Referring Physician   None

## 2020-11-01 NOTE — PROGRESS NOTES
As per Anurag Don in Pharmacy there was no order for PPN  Hence none was prepared. Dr. Lauri Ambrosio informed and she said to continue bolus feed and ordered a full liquid diet. She was updated about tele calls and we will continue to monitor.

## 2020-11-01 NOTE — CONSULTS
Consult    NAME: Manjinder Rodriguez   :     MRN:  [id number]     Date/Time:  2020 4:36 AM    Patient PCP: None  ________________________________________________________________________     Assessment:   Primary CARE physician: Lew Tinajero MD    Primary cardiologist: [address] [place] [address] cardiology (Stephen Perla MD)    PROBLEM LIST:  1. Incomplete database secondary to the patient being a poor historian  2. Patient presents for evaluation of dysphagia generalized weakness  3. 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 Status post percutaneous endoscopic gastrotomy (PEG) tube insertion  5. Status post esophageal dilation and stent placement  6. Status post cholecystectomy  7. Previous transient ischemia attack (TIA)   8. Grade II (moder ate) diastolic dysfunction, with pseudonormalization  9. Paroxysmal supraventricular tachycardia (PSVT)  10. Hypertension    11. Nicotine dependence  12. Chronic obstructive pulmonary disease (emphysema)  13. History of prostate adenocarcinoma (status post radiation therapy)  14. Status post cholecystectomy  15. Hypomagnesia      [x]        High complexity decision making was performed        Subjective:   CHIEF COMPLAINT:     HISTORY OF PRESENT ILLNESS:     This 80-year-old -American male with no known coronary disease presents for evaluation of dysphagia and generalized weakness. The history is obtained from a review of the patient's medical record, and the patient himself. He appears to deny any history of previous myocardial infarction, or congestive heart failure. A review of his medical records indicates that 2 years ago he had a similar episode of paroxysmal supraventricular tachycardia or AV hung reentrant tachycardia. At that time he was started on beta-blockers, and radiofrequency ablation was considered.     Since then the patient appears to have not followed up with his cardiologist.  He denies any cardiovascular complaints such as chest pain, pressure tightness. Further he denies any ongoing shortness of breath dyspnea on exertion. There is no orthopnea, or paroxysmal nocturnal dyspnea. The patient was in his usual state of health until recently when he began to experience difficulty swallowing. He also describes a significant weight loss. Because of his complaints he presented to the emergency department. Subsequently he is treated to the surgical unit with telemetry. He is found to have an esophageal mass consistent with poorly differentiated squamous cell carcinoma. He subsequent underwent a percutaneous endoscopic gastrotomy (PEG) tube insertion with esophageal dilation and stenting. While being monitored on telemetry he was found to have an episode of paroxysmal supraventricular tachycardia. Cardiology is consulted to assist in the evaluation and management. Past Medical History:   Diagnosis Date    Hypertension       History reviewed. No pertinent surgical history. No Known Allergies   Meds:  See below  Social History     Tobacco Use    Smoking status: Current Some Day Smoker    Smokeless tobacco: Never Used   Substance Use Topics    Alcohol use: Not on file      History reviewed. No pertinent family history. REVIEW OF SYSTEMS:     [x]         Unable to obtain  ROS due to patient being a poor historian  Pertinent Positives include : Weight loss, and difficulty swallowing    Objective:      Physical Exam:    Last 24hrs VS reviewed since prior progress note.  Most recent are:  Visit Vitals  BP (!) 177/84   Pulse 83   Temp 99.3 °F (37.4 °C)   Resp 18   Ht 6' 0.99\" (1.854 m)   Wt 64.6 kg (142 lb 6.7 oz)   SpO2 98%   BMI 18.79 kg/m²       Intake/Output Summary (Last 24 hours) at 11/1/2020 0436  Last data filed at 11/1/2020 0118  Gross per 24 hour   Intake    Output 350 ml   Net -350 ml         General Appearance: Well developed, well nourished, somnolent but arousable, no acute distress. Ears/Nose/Mouth/Throat: Pupils equal and round, Hearing grossly normal.  Neck: Supple. JVP within normal limits. Carotids good upstrokes, with no bruit. Chest: Lungs clear to auscultation bilaterally. Cardiovascular: JVP is not elevated, PMI is not palpable, normal intensity S1 and S2, without S3  Abdomen: Soft, non-tender, bowel sounds are active. No organomegaly. Extremities: No edema bilaterally. Femoral pulses +2, Distal Pulses +1. Skin: Warm and dry. Neuro: CN II-XII grossly intact, Strength and sensation grossly intact. Data:      Telemetry:  EKG:  []  No new EKG for review  XR FLUOROSCOPY UNDER 60 MINUTES   Final Result      CT ABD PELV W CONT   Final Result   Impression:   1. No specific evidence of abdominopelvic metastatic disease. 2.  Prostatomegaly. Wall thickening of the urinary bladder may be due to chronic   outlet obstruction and/or cystitis. 3.  Borderline wall thickening of the stomach. May be related to   underdistention, but correlate for gastritis. 4.  Nonobstructing right renal calculus. 5.  See full report for detailed findings. See recently reported chest CT for   supradiaphragmatic findings. CT CHEST W CONT   Final Result   Impression: Enhancing mass of the mid thoracic esophagus measuring proximally 6   x 4 x 3 cm causing dilatation esophagus and obstruction of the upper esophagus   which is fluid filled and patulous. 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 I called Dr. Judson Vasquez with this result and recommendation   for upper endoscopy at 3:15 PM.      Emphysema. Coronary artery calcifications. Right kidney stone. Please see full report. CT NECK SOFT TISSUE W CONT   Final Result      XR CHEST SNGL V   Final Result   Impression: Unremarkable chest x-ray, except for degenerative change of the   spine.            Prior to Admission medications    Not on File       Recent Results (from the past 24 hour(s))   METABOLIC PANEL, COMPREHENSIVE    Collection Time: 10/31/20  5:55 AM   Result Value Ref Range    Sodium 137 136 - 145 mmol/L    Potassium 3.8 3.5 - 5.1 mmol/L    Chloride 105 97 - 108 mmol/L    CO2 27 21 - 32 mmol/L    Anion gap 5 5 - 15 mmol/L    Glucose 145 (H) 65 - 100 mg/dL    BUN 4 (L) 6 - 20 mg/dL    Creatinine 0.71 0.70 - 1.30 mg/dL    BUN/Creatinine ratio 6 (L) 12 - 20      GFR est AA >60 >60 ml/min/1.73m2    GFR est non-AA >60 >60 ml/min/1.73m2    Calcium 9.5 8.5 - 10.1 mg/dL    Bilirubin, total 1.2 (H) 0.2 - 1.0 mg/dL    AST (SGOT) 38 (H) 15 - 37 U/L    ALT (SGPT) 23 12 - 78 U/L    Alk. phosphatase 51 45 - 117 U/L    Protein, total 7.5 6.4 - 8.2 g/dL    Albumin 3.0 (L) 3.5 - 5.0 g/dL    Globulin 4.5 (H) 2.0 - 4.0 g/dL    A-G Ratio 0.7 (L) 1.1 - 2.2     MAGNESIUM    Collection Time: 10/31/20  6:46 AM   Result Value Ref Range    Magnesium 1.4 (L) 1.6 - 2.4 mg/dL   CBC WITH AUTOMATED DIFF    Collection Time: 10/31/20  1:15 PM   Result Value Ref Range    WBC 11.3 (H) 4.1 - 11.1 K/uL    RBC 4.02 (L) 4.10 - 5.70 M/uL    HGB 12.5 12.1 - 17.0 g/dL    HCT 38.4 36.6 - 50.3 %    MCV 95.5 80.0 - 99.0 FL    MCH 31.1 26.0 - 34.0 PG    MCHC 32.6 30.0 - 36.5 g/dL    RDW 12.2 11.5 - 14.5 %    PLATELET 646 539 - 516 K/uL    MPV 12.3 8.9 - 12.9 FL    NEUTROPHILS 75 32 - 75 %    LYMPHOCYTES 12 12 - 49 %    MONOCYTES 12 5 - 13 %    EOSINOPHILS 0 0 - 7 %    BASOPHILS 0 0 - 1 %    IMMATURE GRANULOCYTES 1 (H) 0.0 - 0.5 %    ABS. NEUTROPHILS 8.6 (H) 1.8 - 8.0 K/UL    ABS. LYMPHOCYTES 1.3 0.8 - 3.5 K/UL    ABS. MONOCYTES 1.3 (H) 0.0 - 1.0 K/UL    ABS. EOSINOPHILS 0.0 0.0 - 0.4 K/UL    ABS. BASOPHILS 0.0 0.0 - 0.1 K/UL    ABS. IMM.  GRANS. 0.1 (H) 0.00 - 0.04 K/UL    DF AUTOMATED     GLUCOSE, POC    Collection Time: 10/31/20  3:15 PM   Result Value Ref Range    Glucose (POC) 125 (H) 65 - 100 mg/dL    Performed by Melody Mayberry    CBC WITH AUTOMATED DIFF    Collection Time: 11/01/20  2:05 AM   Result Value Ref Range    WBC 8.8 4.1 - 11.1 K/uL RBC 4.23 4. 10 - 5.70 M/uL    HGB 13.3 12.1 - 17.0 g/dL    HCT 40.4 36.6 - 50.3 %    MCV 95.5 80.0 - 99.0 FL    MCH 31.4 26.0 - 34.0 PG    MCHC 32.9 30.0 - 36.5 g/dL    RDW 12.5 11.5 - 14.5 %    PLATELET 431 933 - 385 K/uL    MPV 12.6 8.9 - 12.9 FL    NEUTROPHILS 74 32 - 75 %    LYMPHOCYTES 14 12 - 49 %    MONOCYTES 11 5 - 13 %    EOSINOPHILS 0 0 - 7 %    BASOPHILS 0 0 - 1 %    IMMATURE GRANULOCYTES 1 (H) 0.0 - 0.5 %    ABS. NEUTROPHILS 6.5 1.8 - 8.0 K/UL    ABS. LYMPHOCYTES 1.2 0.8 - 3.5 K/UL    ABS. MONOCYTES 1.0 0.0 - 1.0 K/UL    ABS. EOSINOPHILS 0.0 0.0 - 0.4 K/UL    ABS. BASOPHILS 0.0 0.0 - 0.1 K/UL    ABS. IMM. GRANS. 0.1 (H) 0.00 - 0.04 K/UL    DF AUTOMATED     METABOLIC PANEL, COMPREHENSIVE    Collection Time: 11/01/20  2:05 AM   Result Value Ref Range    Sodium 138 136 - 145 mmol/L    Potassium 3.7 3.5 - 5.1 mmol/L    Chloride 105 97 - 108 mmol/L    CO2 26 21 - 32 mmol/L    Anion gap 7 5 - 15 mmol/L    Glucose 125 (H) 65 - 100 mg/dL    BUN 6 6 - 20 mg/dL    Creatinine 0.63 (L) 0.70 - 1.30 mg/dL    BUN/Creatinine ratio 10 (L) 12 - 20      GFR est AA >60 >60 ml/min/1.73m2    GFR est non-AA >60 >60 ml/min/1.73m2    Calcium 9.0 8.5 - 10.1 mg/dL    Bilirubin, total 1.4 (H) 0.2 - 1.0 mg/dL    AST (SGOT) 44 (H) 15 - 37 U/L    ALT (SGPT) 23 12 - 78 U/L    Alk. phosphatase 56 45 - 117 U/L    Protein, total 7.1 6.4 - 8.2 g/dL    Albumin 2.9 (L) 3.5 - 5.0 g/dL    Globulin 4.2 (H) 2.0 - 4.0 g/dL    A-G Ratio 0.7 (L) 1.1 - 2.2             Plan:   1. Continue telemetry monitor while hospitalized  2. Continue to monitor serum electrolytes, renal functions  3. Replace, and recheck serum magnesium  4. Obtain complete 2D echocardiogram  5.   Continue current cardiovascular medications including diltiazem, and metoprolol     Peggy Feldman MD

## 2020-11-01 NOTE — PROGRESS NOTES
Hospitalist Progress Note    Subjective:   Daily Progress Note: 11/1/2020 5:41 PM    Patient with dysphagia and esophageal mass. Unable to eat due to complete obstruction of the esophagus. Status post EGD with balloon dilatation of the esophagus and stent placement. Denies chest pain or shortness of breath    Current Facility-Administered Medications   Medication Dose Route Frequency    dilTIAZem (CARDIZEM) 125 mg/125 mL (1 mg/mL) dextrose 5% infusion  5 mg/hr IntraVENous CONTINUOUS    metoprolol (LOPRESSOR) injection 2.5 mg  2.5 mg IntraVENous Q6H PRN    metoprolol tartrate (LOPRESSOR) tablet 25 mg  25 mg Oral Q12H    thiamine mononitrate (B-1) tablet 100 mg  100 mg Oral DAILY    folic acid (FOLVITE) tablet 1 mg  1 mg Oral DAILY    multivitamin (ONE A DAY) tablet 1 Tab  1 Tab Oral DAILY    famotidine (PEPCID) tablet 20 mg  20 mg Oral BID    dextrose 5% - 0.9% NaCl with KCl 20 mEq/L infusion  75 mL/hr IntraVENous CONTINUOUS    HYDROmorphone (DILAUDID) injection 0.5 mg  0.5 mg IntraVENous Q4H PRN    sodium chloride (NS) flush 5-40 mL  5-40 mL IntraVENous Q8H    sodium chloride (NS) flush 5-40 mL  5-40 mL IntraVENous PRN    acetaminophen (TYLENOL) tablet 650 mg  650 mg Oral Q6H PRN    Or    acetaminophen (TYLENOL) suppository 650 mg  650 mg Rectal Q6H PRN    promethazine (PHENERGAN) tablet 12.5 mg  12.5 mg Oral Q6H PRN    Or    ondansetron (ZOFRAN) injection 4 mg  4 mg IntraVENous Q6H PRN        Review of Systems  Review of Systems   Constitutional: Negative for chills and fever. HENT: Positive for sore throat. Negative for tinnitus. Eyes: Negative. Respiratory: Negative for cough and shortness of breath. Cardiovascular: Negative for chest pain and palpitations. Gastrointestinal: Negative for abdominal pain, nausea and vomiting. Dysphagia   Genitourinary: Negative. Musculoskeletal: Negative. Skin: Negative. Neurological: Negative. Psychiatric/Behavioral: Negative. Objective:     Visit Vitals  BP (!) 173/99   Pulse 99   Temp 99.3 °F (37.4 °C)   Resp 20   Ht 6' 0.99\" (1.854 m)   Wt 64.6 kg (142 lb 6.7 oz)   SpO2 96%   BMI 18.79 kg/m²    O2 Flow Rate (L/min): 2 l/min O2 Device: Room air    Temp (24hrs), Av °F (37.2 °C), Min:98.6 °F (37 °C), Max:99.3 °F (37.4 °C)      701 - 1900  In: -   Out: 100 [Urine:100]  10/30 1901 - 700  In: 10   Out: 750 [Urine:750]    PHYSICAL EXAM:    Physical Exam  Vitals signs reviewed. Constitutional:       Appearance: Normal appearance. He is ill-appearing. HENT:      Head: Normocephalic and atraumatic. Nose: Nose normal.      Mouth/Throat:      Mouth: Mucous membranes are dry. Eyes:      Conjunctiva/sclera: Conjunctivae normal.   Neck:      Musculoskeletal: Normal range of motion and neck supple. Cardiovascular:      Rate and Rhythm: Normal rate and regular rhythm. Pulses: Normal pulses. Heart sounds: Normal heart sounds. Pulmonary:      Effort: Pulmonary effort is normal.      Breath sounds: Normal breath sounds. Abdominal:      General: Bowel sounds are normal.      Palpations: Abdomen is soft. Musculoskeletal: Normal range of motion. Skin:     General: Skin is dry. Neurological:      General: No focal deficit present. Mental Status: He is alert and oriented to person, place, and time. Mental status is at baseline. Data Review    Recent Results (from the past 24 hour(s))   GLUCOSE, POC    Collection Time: 10/31/20  3:15 PM   Result Value Ref Range    Glucose (POC) 125 (H) 65 - 100 mg/dL    Performed by Elizabet Tate    CBC WITH AUTOMATED DIFF    Collection Time: 20  2:05 AM   Result Value Ref Range    WBC 8.8 4.1 - 11.1 K/uL    RBC 4.23 4. 10 - 5.70 M/uL    HGB 13.3 12.1 - 17.0 g/dL    HCT 40.4 36.6 - 50.3 %    MCV 95.5 80.0 - 99.0 FL    MCH 31.4 26.0 - 34.0 PG    MCHC 32.9 30.0 - 36.5 g/dL    RDW 12.5 11.5 - 14.5 %    PLATELET 641 207 - 133 K/uL    MPV 12.6 8.9 - 12.9 FL    NEUTROPHILS 74 32 - 75 %    LYMPHOCYTES 14 12 - 49 %    MONOCYTES 11 5 - 13 %    EOSINOPHILS 0 0 - 7 %    BASOPHILS 0 0 - 1 %    IMMATURE GRANULOCYTES 1 (H) 0.0 - 0.5 %    ABS. NEUTROPHILS 6.5 1.8 - 8.0 K/UL    ABS. LYMPHOCYTES 1.2 0.8 - 3.5 K/UL    ABS. MONOCYTES 1.0 0.0 - 1.0 K/UL    ABS. EOSINOPHILS 0.0 0.0 - 0.4 K/UL    ABS. BASOPHILS 0.0 0.0 - 0.1 K/UL    ABS. IMM. GRANS. 0.1 (H) 0.00 - 0.04 K/UL    DF AUTOMATED     METABOLIC PANEL, COMPREHENSIVE    Collection Time: 11/01/20  2:05 AM   Result Value Ref Range    Sodium 138 136 - 145 mmol/L    Potassium 3.7 3.5 - 5.1 mmol/L    Chloride 105 97 - 108 mmol/L    CO2 26 21 - 32 mmol/L    Anion gap 7 5 - 15 mmol/L    Glucose 125 (H) 65 - 100 mg/dL    BUN 6 6 - 20 mg/dL    Creatinine 0.63 (L) 0.70 - 1.30 mg/dL    BUN/Creatinine ratio 10 (L) 12 - 20      GFR est AA >60 >60 ml/min/1.73m2    GFR est non-AA >60 >60 ml/min/1.73m2    Calcium 9.0 8.5 - 10.1 mg/dL    Bilirubin, total 1.4 (H) 0.2 - 1.0 mg/dL    AST (SGOT) 44 (H) 15 - 37 U/L    ALT (SGPT) 23 12 - 78 U/L    Alk. phosphatase 56 45 - 117 U/L    Protein, total 7.1 6.4 - 8.2 g/dL    Albumin 2.9 (L) 3.5 - 5.0 g/dL    Globulin 4.2 (H) 2.0 - 4.0 g/dL    A-G Ratio 0.7 (L) 1.1 - 2.2          Assessment/Plan:     Active Problems:    Esophageal mass (10/26/2020)      Hospital course:    Patient admitted on October 26, 2020 with difficulty swallowing and generalized weakness and 30 pound weight loss. Patient has been unable to swallow solid foods. CT of the chest revealed an obstructing esophageal mass. EGD performed which revealed the same with biopsy taken. Oncology consultation. Patient will need PEG tube placement and will be set up for a stent placement. Considering patient's need for chemotherapy and radiation if biopsy is consistent with a malignant process patient will require PEG tube placement anyways. EGD with balloon dilatation of the esophagus and stent placement. PEG placed.  Cardiac consultation. Recurrent SVT requiring adenosine and ultimately metoprolol for rate control. Patient transferred to telemetry for Cardizem drip. Free air on chest x-ray most likely postprocedural.  Will discontinue PEG tube feedings and placed intermittent suction per gastroenterology. Patient with no abdominal pain and abdomen is soft. Impression\plan:    1. Esophageal mass  EGD performed with complete obstruction of the esophagus. Repeat EGD on October 30, 2020 with EGD and balloon dilatation of the esophagus stent placement and PEG placement  Biopsy consistent with, squamous cell carcinoma  Oncology consultation  Continue IV Protonix  Will need immediate PEG for nutrition as NG tube cannot be placed and EGD is unsuccessful due to obstruction of the esophagus  Hold anticoagulation for procedure  Remains n.p.o.  CT of the chest reveals a mid thoracic esophageal mass measuring 6 x 4 x 3 cm causing dilatation of the esophagus and complete obstruction of the upper esophagus. PPN    2. Hypokalemia  Replace as needed    3. Essential hypertension  Diltiazem extended release    4. Protein malnutrition, moderate  30 to 40 pound weight loss  PPN ordered  Tube feedings when appropriate  Consult nutrition    5. Supraventricular tachycardia  Adenosine x2 failed  Metoprolol x1 IV 2.5 mg with good rate control and converted to normal sinus rhythm  Cardiology consultation, diltiazem drip initiated and transferred to telemetry    6. Pneumoperitoneum on chest x-ray  Discussed case with gastroenterology  This is most likely postprocedural with obstruction superiorly from the esophageal mass.   PEG tube placed to suction  Hold tube feedings for now  Continue to monitor for signs of perforation  Patient without abdominal pain    DVT prophylaxis: Lovenox  Ulcer prophylaxis: IV Protonix    FEN:  Continue IV fluids  N.p.o. due to obstructing mass in the esophagus  Replace electrolytes as needed  Tube feeding when appropriate  PPN    CODE STATUS: Full code    Care Plan discussed with: Patient/Family     Discussed case with patient's son Jaime Crane, phone number [phone]

## 2020-11-01 NOTE — PROGRESS NOTES
Telemetry called around 0720 informing this nurse that patient is having busrt of svt 192-Reasoner,NP informed. Transferred to [facility] with cardiac monitor, accompanied by Gita-nursing suupervisor.  Report given to Lima Hutchins

## 2020-11-02 ENCOUNTER — APPOINTMENT (OUTPATIENT)
Dept: GENERAL RADIOLOGY | Age: 75
DRG: 375 | End: 2020-11-02
Attending: INTERNAL MEDICINE
Payer: MEDICARE

## 2020-11-02 ENCOUNTER — APPOINTMENT (OUTPATIENT)
Dept: CT IMAGING | Age: 75
DRG: 375 | End: 2020-11-02
Attending: PHYSICIAN ASSISTANT
Payer: MEDICARE

## 2020-11-02 LAB
ALBUMIN SERPL-MCNC: 2.5 G/DL (ref 3.5–5)
ALBUMIN/GLOB SERPL: 0.6 {RATIO} (ref 1.1–2.2)
ALP SERPL-CCNC: 49 U/L (ref 45–117)
ALT SERPL-CCNC: 20 U/L (ref 12–78)
ANION GAP SERPL CALC-SCNC: 6 MMOL/L (ref 5–15)
APPEARANCE UR: CLEAR
AST SERPL W P-5'-P-CCNC: 36 U/L (ref 15–37)
BACTERIA URNS QL MICRO: NEGATIVE /HPF
BASOPHILS # BLD: 0 K/UL (ref 0–0.1)
BASOPHILS NFR BLD: 0 % (ref 0–1)
BILIRUB SERPL-MCNC: 1.7 MG/DL (ref 0.2–1)
BILIRUB UR QL: NEGATIVE
BUN SERPL-MCNC: 6 MG/DL (ref 6–20)
BUN/CREAT SERPL: 10 (ref 12–20)
CA-I BLD-MCNC: 8.6 MG/DL (ref 8.5–10.1)
CHLORIDE SERPL-SCNC: 103 MMOL/L (ref 97–108)
CO2 SERPL-SCNC: 25 MMOL/L (ref 21–32)
COLOR UR: YELLOW
CREAT SERPL-MCNC: 0.62 MG/DL (ref 0.7–1.3)
DIFFERENTIAL METHOD BLD: ABNORMAL
ECHO AR MAX VEL PISA: 187 CM/S
ECHO AV PEAK GRADIENT: 8 MMHG
ECHO AV REGURGITANT PHT: 416 MS
ECHO LV E' SEPTAL VELOCITY: 6.63 CM/S
ECHO LV EDV A2C: 117 CM3
ECHO LV EDV A4C: 127 CM3
ECHO LV EJECTION FRACTION A2C: 24 %
ECHO LV EJECTION FRACTION BIPLANE: 56 % (ref 55–100)
ECHO LV ESV A2C: 51.5 CM3
ECHO LV ESV A4C: 76 CM3
ECHO LV INTERNAL DIMENSION DIASTOLIC: 4.89 CM (ref 4.2–5.9)
ECHO LV POSTERIOR WALL DIASTOLIC: 1.34 CM (ref 0.6–1)
ECHO LVOT PEAK GRADIENT: 4 MMHG
ECHO MV A VELOCITY: 101 CM/S
ECHO MV E DECELERATION TIME (DT): 211 MS
ECHO MV E VELOCITY: 54.3 CM/S
ECHO MV E/A RATIO: 0.54
ECHO MV E/E' SEPTAL: 8.19
ECHO PV PEAK INSTANTANEOUS GRADIENT SYSTOLIC: 4 MMHG
ECHO PV REGURGITANT MAX VELOCITY: 103 CM/S
ECHO PV REGURGITANT MAX VELOCITY: 104 CM/S
ECHO PV REGURGITANT MAX VELOCITY: 144 CM/S
ECHO PVEIN A DURATION: 116 MS
ECHO PVEIN A VELOCITY: 42 CM/S
ECHO RV INTERNAL DIMENSION: 3.3 CM
ECHO TV MAX VELOCITY: 235 CM/S
ECHO TV REGURGITANT PEAK GRADIENT: 22 MMHG
EOSINOPHIL # BLD: 0 K/UL (ref 0–0.4)
EOSINOPHIL NFR BLD: 0 % (ref 0–7)
ERYTHROCYTE [DISTWIDTH] IN BLOOD BY AUTOMATED COUNT: 12.6 % (ref 11.5–14.5)
GLOBULIN SER CALC-MCNC: 4.1 G/DL (ref 2–4)
GLUCOSE BLD STRIP.AUTO-MCNC: 112 MG/DL (ref 65–100)
GLUCOSE SERPL-MCNC: 137 MG/DL (ref 65–100)
GLUCOSE UR STRIP.AUTO-MCNC: NEGATIVE MG/DL
HCT VFR BLD AUTO: 35.2 % (ref 36.6–50.3)
HGB BLD-MCNC: 11.8 G/DL (ref 12.1–17)
HGB UR QL STRIP: NEGATIVE
IMM GRANULOCYTES # BLD AUTO: 0 K/UL (ref 0–0.04)
IMM GRANULOCYTES NFR BLD AUTO: 0 % (ref 0–0.5)
KETONES UR QL STRIP.AUTO: NEGATIVE MG/DL
LEUKOCYTE ESTERASE UR QL STRIP.AUTO: NEGATIVE
LYMPHOCYTES # BLD: 1.4 K/UL (ref 0.8–3.5)
LYMPHOCYTES NFR BLD: 13 % (ref 12–49)
MCH RBC QN AUTO: 31.5 PG (ref 26–34)
MCHC RBC AUTO-ENTMCNC: 33.5 G/DL (ref 30–36.5)
MCV RBC AUTO: 93.9 FL (ref 80–99)
MONOCYTES # BLD: 1.3 K/UL (ref 0–1)
MONOCYTES NFR BLD: 12 % (ref 5–13)
MUCOUS THREADS URNS QL MICRO: ABNORMAL /LPF
NEUTS SEG # BLD: 8.1 K/UL (ref 1.8–8)
NEUTS SEG NFR BLD: 75 % (ref 32–75)
NITRITE UR QL STRIP.AUTO: NEGATIVE
PERFORMED BY, TECHID: ABNORMAL
PH UR STRIP: 7 [PH] (ref 5–8)
PLATELET # BLD AUTO: 170 K/UL (ref 150–400)
PMV BLD AUTO: 11.8 FL (ref 8.9–12.9)
POTASSIUM SERPL-SCNC: 3.3 MMOL/L (ref 3.5–5.1)
PROCALCITONIN SERPL-MCNC: 0.11 NG/ML
PROT SERPL-MCNC: 6.6 G/DL (ref 6.4–8.2)
PROT UR STRIP-MCNC: NEGATIVE MG/DL
RBC # BLD AUTO: 3.75 M/UL (ref 4.1–5.7)
RBC #/AREA URNS HPF: ABNORMAL /HPF (ref 0–5)
SODIUM SERPL-SCNC: 134 MMOL/L (ref 136–145)
SP GR UR REFRACTOMETRY: 1.02 (ref 1–1.03)
TROPONIN I SERPL-MCNC: <0.05 NG/ML
UROBILINOGEN UR QL STRIP.AUTO: 4 EU/DL (ref 0.1–1)
WBC # BLD AUTO: 10.8 K/UL (ref 4.1–11.1)
WBC URNS QL MICRO: ABNORMAL /HPF (ref 0–4)

## 2020-11-02 PROCEDURE — 74011250637 HC RX REV CODE- 250/637: Performed by: PHYSICIAN ASSISTANT

## 2020-11-02 PROCEDURE — 84153 ASSAY OF PSA TOTAL: CPT

## 2020-11-02 PROCEDURE — 84145 PROCALCITONIN (PCT): CPT

## 2020-11-02 PROCEDURE — 80053 COMPREHEN METABOLIC PANEL: CPT

## 2020-11-02 PROCEDURE — 74011000250 HC RX REV CODE- 250: Performed by: INTERNAL MEDICINE

## 2020-11-02 PROCEDURE — 87086 URINE CULTURE/COLONY COUNT: CPT

## 2020-11-02 PROCEDURE — 36415 COLL VENOUS BLD VENIPUNCTURE: CPT

## 2020-11-02 PROCEDURE — 87077 CULTURE AEROBIC IDENTIFY: CPT

## 2020-11-02 PROCEDURE — 74177 CT ABD & PELVIS W/CONTRAST: CPT

## 2020-11-02 PROCEDURE — 74011000250 HC RX REV CODE- 250: Performed by: PHYSICIAN ASSISTANT

## 2020-11-02 PROCEDURE — 81001 URINALYSIS AUTO W/SCOPE: CPT

## 2020-11-02 PROCEDURE — 74011250637 HC RX REV CODE- 250/637: Performed by: HOSPITALIST

## 2020-11-02 PROCEDURE — 0DJ08ZZ INSPECTION OF UPPER INTESTINAL TRACT, VIA NATURAL OR ARTIFICIAL OPENING ENDOSCOPIC: ICD-10-PCS | Performed by: INTERNAL MEDICINE

## 2020-11-02 PROCEDURE — 87186 SC STD MICRODIL/AGAR DIL: CPT

## 2020-11-02 PROCEDURE — 87070 CULTURE OTHR SPECIMN AEROBIC: CPT

## 2020-11-02 PROCEDURE — 74011000636 HC RX REV CODE- 636: Performed by: HOSPITALIST

## 2020-11-02 PROCEDURE — 74011250636 HC RX REV CODE- 250/636: Performed by: PHYSICIAN ASSISTANT

## 2020-11-02 PROCEDURE — 74011000258 HC RX REV CODE- 258: Performed by: PHYSICIAN ASSISTANT

## 2020-11-02 PROCEDURE — 65270000029 HC RM PRIVATE

## 2020-11-02 PROCEDURE — 74011250637 HC RX REV CODE- 250/637: Performed by: INTERNAL MEDICINE

## 2020-11-02 PROCEDURE — 85025 COMPLETE CBC W/AUTO DIFF WBC: CPT

## 2020-11-02 PROCEDURE — 82962 GLUCOSE BLOOD TEST: CPT

## 2020-11-02 PROCEDURE — 87040 BLOOD CULTURE FOR BACTERIA: CPT

## 2020-11-02 PROCEDURE — 84484 ASSAY OF TROPONIN QUANT: CPT

## 2020-11-02 PROCEDURE — 74011250636 HC RX REV CODE- 250/636: Performed by: INTERNAL MEDICINE

## 2020-11-02 RX ORDER — LABETALOL HCL 20 MG/4 ML
20 SYRINGE (ML) INTRAVENOUS
Status: DISCONTINUED | OUTPATIENT
Start: 2020-11-02 | End: 2020-11-11 | Stop reason: HOSPADM

## 2020-11-02 RX ORDER — DILTIAZEM HYDROCHLORIDE 30 MG/1
30 TABLET, FILM COATED ORAL
Status: DISCONTINUED | OUTPATIENT
Start: 2020-11-02 | End: 2020-11-04

## 2020-11-02 RX ADMIN — POTASSIUM CHLORIDE, DEXTROSE MONOHYDRATE AND SODIUM CHLORIDE 75 ML/HR: 150; 5; 900 INJECTION, SOLUTION INTRAVENOUS at 05:31

## 2020-11-02 RX ADMIN — SODIUM CHLORIDE 1000 MG: 9 INJECTION, SOLUTION INTRAVENOUS at 06:24

## 2020-11-02 RX ADMIN — METOPROLOL TARTRATE 25 MG: 25 TABLET, FILM COATED ORAL at 23:04

## 2020-11-02 RX ADMIN — PIPERACILLIN AND TAZOBACTAM 3.38 G: 3; .375 INJECTION, POWDER, LYOPHILIZED, FOR SOLUTION INTRAVENOUS at 02:17

## 2020-11-02 RX ADMIN — PIPERACILLIN AND TAZOBACTAM 3.38 G: 3; .375 INJECTION, POWDER, LYOPHILIZED, FOR SOLUTION INTRAVENOUS at 11:15

## 2020-11-02 RX ADMIN — DILTIAZEM HYDROCHLORIDE 30 MG: 30 TABLET, FILM COATED ORAL at 09:43

## 2020-11-02 RX ADMIN — HYDROMORPHONE HYDROCHLORIDE 0.5 MG: 1 INJECTION, SOLUTION INTRAMUSCULAR; INTRAVENOUS; SUBCUTANEOUS at 08:56

## 2020-11-02 RX ADMIN — FAMOTIDINE 20 MG: 20 TABLET, FILM COATED ORAL at 23:03

## 2020-11-02 RX ADMIN — LABETALOL HYDROCHLORIDE 20 MG: 5 INJECTION, SOLUTION INTRAVENOUS at 23:40

## 2020-11-02 RX ADMIN — DIATRIZOATE MEGLUMINE AND DIATRIZOATE SODIUM 15 ML: 660; 100 LIQUID ORAL; RECTAL at 13:02

## 2020-11-02 RX ADMIN — I.V. FAT EMULSION 250 ML: 20 EMULSION INTRAVENOUS at 23:04

## 2020-11-02 RX ADMIN — HYDROMORPHONE HYDROCHLORIDE 0.5 MG: 1 INJECTION, SOLUTION INTRAMUSCULAR; INTRAVENOUS; SUBCUTANEOUS at 23:39

## 2020-11-02 RX ADMIN — Medication 5 MG/HR: at 02:18

## 2020-11-02 RX ADMIN — IOPAMIDOL 100 ML: 755 INJECTION, SOLUTION INTRAVENOUS at 14:19

## 2020-11-02 RX ADMIN — Medication 40 ML: at 22:00

## 2020-11-02 RX ADMIN — Medication 10 ML: at 11:11

## 2020-11-02 RX ADMIN — DILTIAZEM HYDROCHLORIDE 30 MG: 30 TABLET, FILM COATED ORAL at 17:53

## 2020-11-02 RX ADMIN — PIPERACILLIN AND TAZOBACTAM 3.38 G: 3; .375 INJECTION, POWDER, LYOPHILIZED, FOR SOLUTION INTRAVENOUS at 17:53

## 2020-11-02 RX ADMIN — ONDANSETRON 4 MG: 2 INJECTION INTRAMUSCULAR; INTRAVENOUS at 11:11

## 2020-11-02 RX ADMIN — SODIUM CHLORIDE 1000 MG: 9 INJECTION, SOLUTION INTRAVENOUS at 23:03

## 2020-11-02 RX ADMIN — ASCORBIC ACID, VITAMIN A PALMITATE, CHOLECALCIFEROL, THIAMINE HYDROCHLORIDE, RIBOFLAVIN-5 PHOSPHATE SODIUM, PYRIDOXINE HYDROCHLORIDE, NIACINAMIDE, DEXPANTHENOL, ALPHA-TOCOPHEROL ACETATE, VITAMIN K1, FOLIC ACID, BIOTIN, CYANOCOBALAMIN: 200; 3300; 200; 6; 3.6; 6; 40; 15; 10; 150; 600; 60; 5 INJECTION, SOLUTION INTRAVENOUS at 23:04

## 2020-11-02 RX ADMIN — Medication 10 ML: at 14:00

## 2020-11-02 RX ADMIN — DILTIAZEM HYDROCHLORIDE 30 MG: 30 TABLET, FILM COATED ORAL at 13:03

## 2020-11-02 NOTE — PROGRESS NOTES
Subjective problem with  swallowing  Subjective:      Pt feeling tired. denies abdominal pain.     {  Objective     Current Facility-Administered Medications:     dilTIAZem (CARDIZEM) 125 mg/125 mL (1 mg/mL) dextrose 5% infusion, 5 mg/hr, IntraVENous, CONTINUOUS, Gregorio Khoury MD, Last Rate: 5 mL/hr at 11/01/20 1005, 5 mg/hr at 11/01/20 1005    amino acid 4.25 % dextrose 5 % with electrolytes (CLINIMIX E) infusion, , IntraVENous, CONTINUOUS, Murali Doll PA-C    fat emulsion 20% (LIPOSYN, INTRAlipid) infusion 250 mL, 250 mL, IntraVENous, CONTINUOUS, Elder Doll BraulioRHONDA rivera    perflutren lipid microspheres (DEFINITY) 1.1 mg/mL contrast injection, , , , , Stopped at 11/01/20 1500    piperacillin-tazobactam (ZOSYN) 3.375 g in 0.9% sodium chloride (MBP/ADV) 100 mL MBP, 3.375 g, IntraVENous, Q8H, Murali Doll PA-C    metoprolol (LOPRESSOR) injection 2.5 mg, 2.5 mg, IntraVENous, Q6H PRN, Murali Doll PA-C, 2.5 mg at 10/31/20 1240    metoprolol tartrate (LOPRESSOR) tablet 25 mg, 25 mg, Oral, Q12H, Murali Doll PA-C, 25 mg at 11/01/20 4649    thiamine mononitrate (B-1) tablet 100 mg, 100 mg, Oral, DAILY, Eligio Nascimento MD, 100 mg at 72/39/96 8402    folic acid (FOLVITE) tablet 1 mg, 1 mg, Oral, DAILY, Eligio Nascimento MD, 1 mg at 11/01/20 0948    multivitamin (ONE A DAY) tablet 1 Tab, 1 Tab, Oral, DAILY, Eligio Nascimento MD, 1 Tab at 11/01/20 0948    famotidine (PEPCID) tablet 20 mg, 20 mg, Oral, BID, Eligio Nascimento MD, 20 mg at 11/01/20 0948    dextrose 5% - 0.9% NaCl with KCl 20 mEq/L infusion, 75 mL/hr, IntraVENous, CONTINUOUS, Elder Doll PA-C, Last Rate: 75 mL/hr at 11/01/20 0605, 75 mL/hr at 11/01/20 0605    HYDROmorphone (DILAUDID) injection 0.5 mg, 0.5 mg, IntraVENous, Q4H PRN, Murali Doll PA-C, 0.5 mg at 11/01/20 0603    sodium chloride (NS) flush 5-40 mL, 5-40 mL, IntraVENous, Q8H, Be Coyle MD, Stopped at 11/01/20 1400    sodium chloride (NS) flush 5-40 mL, 5-40 mL, IntraVENous, PRN, Karmen Coyle MD, 10 mL at 10/26/20 2146    acetaminophen (TYLENOL) tablet 650 mg, 650 mg, Oral, Q6H PRN **OR** acetaminophen (TYLENOL) suppository 650 mg, 650 mg, Rectal, Q6H PRN, Karmen Coyle MD    promethazine (PHENERGAN) tablet 12.5 mg, 12.5 mg, Oral, Q6H PRN **OR** ondansetron (ZOFRAN) injection 4 mg, 4 mg, IntraVENous, Q6H PRN, Karmen Coyle MD    Objective:     Visit Vitals  BP (!) 144/90 (BP 1 Location: Right arm, BP Patient Position: Sitting)   Pulse (!) 102   Temp (!) 100.8 °F (38.2 °C)   Resp 20   Ht 6' 0.99\" (1.854 m)   Wt 64.6 kg (142 lb 6.7 oz)   SpO2 97%   BMI 18.79 kg/m²      Physical Exam   Lungs:CTA  Heart:RRR  Abdomen:soft,NT  EXT:no edema    Data Review:   Recent Results (from the past 24 hour(s))   CBC WITH AUTOMATED DIFF    Collection Time: 11/01/20  2:05 AM   Result Value Ref Range    WBC 8.8 4.1 - 11.1 K/uL    RBC 4.23 4. 10 - 5.70 M/uL    HGB 13.3 12.1 - 17.0 g/dL    HCT 40.4 36.6 - 50.3 %    MCV 95.5 80.0 - 99.0 FL    MCH 31.4 26.0 - 34.0 PG    MCHC 32.9 30.0 - 36.5 g/dL    RDW 12.5 11.5 - 14.5 %    PLATELET 269 454 - 645 K/uL    MPV 12.6 8.9 - 12.9 FL    NEUTROPHILS 74 32 - 75 %    LYMPHOCYTES 14 12 - 49 %    MONOCYTES 11 5 - 13 %    EOSINOPHILS 0 0 - 7 %    BASOPHILS 0 0 - 1 %    IMMATURE GRANULOCYTES 1 (H) 0.0 - 0.5 %    ABS. NEUTROPHILS 6.5 1.8 - 8.0 K/UL    ABS. LYMPHOCYTES 1.2 0.8 - 3.5 K/UL    ABS. MONOCYTES 1.0 0.0 - 1.0 K/UL    ABS. EOSINOPHILS 0.0 0.0 - 0.4 K/UL    ABS. BASOPHILS 0.0 0.0 - 0.1 K/UL    ABS. IMM.  GRANS. 0.1 (H) 0.00 - 0.04 K/UL    DF AUTOMATED     METABOLIC PANEL, COMPREHENSIVE    Collection Time: 11/01/20  2:05 AM   Result Value Ref Range    Sodium 138 136 - 145 mmol/L    Potassium 3.7 3.5 - 5.1 mmol/L    Chloride 105 97 - 108 mmol/L    CO2 26 21 - 32 mmol/L    Anion gap 7 5 - 15 mmol/L    Glucose 125 (H) 65 - 100 mg/dL    BUN 6 6 - 20 mg/dL Creatinine 0.63 (L) 0.70 - 1.30 mg/dL    BUN/Creatinine ratio 10 (L) 12 - 20      GFR est AA >60 >60 ml/min/1.73m2    GFR est non-AA >60 >60 ml/min/1.73m2    Calcium 9.0 8.5 - 10.1 mg/dL    Bilirubin, total 1.4 (H) 0.2 - 1.0 mg/dL    AST (SGOT) 44 (H) 15 - 37 U/L    ALT (SGPT) 23 12 - 78 U/L    Alk. phosphatase 56 45 - 117 U/L    Protein, total 7.1 6.4 - 8.2 g/dL    Albumin 2.9 (L) 3.5 - 5.0 g/dL    Globulin 4.2 (H) 2.0 - 4.0 g/dL    A-G Ratio 0.7 (L) 1.1 - 2.2     ECHO ADULT COMPLETE    Collection Time: 11/01/20  2:53 PM   Result Value Ref Range    LV ED Vol A4C 127.00 cm3    LV ED Vol A2C 117.00 cm3    LV ES Vol A4C 76.00 cm3    LV ES Vol A2C 51.50 cm3    LVIDd 4.89 4.2 - 5.9 cm    Pulmonic Regurgitant End Max Velocity 104.00 cm/s    LVOT Peak Gradient 4.00 mmHg    LVPWd 1.34 (A) 0.6 - 1.0 cm    LV E' Septal Velocity 6.63 cm/s    BP EF 56.0 55 - 100 %    E/E' septal 8.19     LV Ejection Fraction MOD 2C 24 %    Aortic Regurgitant Pressure Half-time 416.00 ms    AR Max Wilner 187.00 cm/s    Pulmonic Regurgitant End Max Velocity 144.00 cm/s    AoV PG 8.00 mmHg    Mitral Valve E Wave Deceleration Time 211.00 ms    MV A Wilner 101.00 cm/s    MV E Wilner 54.30 cm/s    MV E/A 0.54     Pulmonic Regurgitant End Max Velocity 103.00 cm/s    Pulmonic Valve Systolic Peak Instantaneous Gradient 4.00 mmHg    P Vein A Dur 116.00 ms    Pulmonary Vein \"A\" Wave Velocity 42.00 cm/s    RVIDd 3.30 cm    Tricuspid Valve Max Velocity 235.00 cm/s    Triscuspid Valve Regurgitation Peak Gradient 22.00 mmHg       Assessment   Assessment/Plan:      Squamous cell carcinoma of Esophagus:  CT abdomen,pelvis negative for metastatic disease except enlarged prostate. S/p  stent placement due to obstruction. S/p PEG tube . PET scan as out pt. Discussed about role of chemotherapy and radiation. D/w GI. [place] Pt advised to quit smoking and alcohol. Radiation oncology consult noted. D/w pt and pt son Eber Lundberg.     Weight loss:due to malignancy. SVT:management for primary team and cardiology. H/o prostate ca:S/p radiation. Check PSA. Leukocyosis. Mild. Possible reactive. R/o infection. Better. Monitor. Pneumoperitoneum:GI following. S/p PEG tube. PEG tube placed on suction per GI.

## 2020-11-02 NOTE — PROGRESS NOTES
Bedside shift change report given to Ashleigh Neff RN (oncoming nurse) by Domo Blake RN (offgoing nurse). Report included the following information SBAR.

## 2020-11-02 NOTE — PROGRESS NOTES
Progress Note      11/2/2020 7:41 AM  NAME: Delfino Parker   MRN:  [id number]   Admit Diagnosis: Esophageal mass [K22.8]      Problem List:   1. Incomplete database secondary to the patient being a poor historian  2. Patient presents for evaluation of dysphagia generalized weakness  3. 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 Status post percutaneous endoscopic gastrotomy (PEG) tube insertion  5. Status post esophageal dilation and stent placement  6. Status post cholecystectomy  7. Previous transient ischemia attack (TIA)   8. Grade II (moderate) diastolic dysfunction, with pseudonormalization  9. Paroxysmal supraventricular tachycardia (PSVT)  10. Hypertension     11. Nicotine dependence  12. Chronic obstructive pulmonary disease (emphysema)  13. History of prostate adenocarcinoma (status post radiation therapy)  14. Status post cholecystectomy  15. Hypomagnesia       Subjective: The patient is seen and examined in room 489. There were no acute cardiovascular events reported overnight. Currently, he denies any cardiovascular complaints.   Medications Personally Reviewed:    Current Facility-Administered Medications   Medication Dose Route Frequency    labetaloL (NORMODYNE;TRANDATE) injection 20 mg  20 mg IntraVENous Q4H PRN    Vancomycin Pharmacy Dosing   Other PRN    vancomycin (VANCOCIN) 1,000 mg in 0.9% sodium chloride 250 mL (VIAL-MATE)  1,000 mg IntraVENous Q16H    [START ON 11/4/2020] Vancomycin Trough Level 11-4-2020 at 0400   Other ONCE    dilTIAZem (CARDIZEM) 125 mg/125 mL (1 mg/mL) dextrose 5% infusion  5 mg/hr IntraVENous CONTINUOUS    amino acid 4.25 % dextrose 5 % with electrolytes (CLINIMIX E) infusion   IntraVENous CONTINUOUS    fat emulsion 20% (LIPOSYN, INTRAlipid) infusion 250 mL  250 mL IntraVENous CONTINUOUS    piperacillin-tazobactam (ZOSYN) 3.375 g in 0.9% sodium chloride (MBP/ADV) 100 mL MBP  3.375 g IntraVENous Q8H    metoprolol (LOPRESSOR) injection 2.5 mg  2.5 mg IntraVENous Q6H PRN    metoprolol tartrate (LOPRESSOR) tablet 25 mg  25 mg Oral Q12H    thiamine mononitrate (B-1) tablet 100 mg  100 mg Oral DAILY    folic acid (FOLVITE) tablet 1 mg  1 mg Oral DAILY    multivitamin (ONE A DAY) tablet 1 Tab  1 Tab Oral DAILY    famotidine (PEPCID) tablet 20 mg  20 mg Oral BID    dextrose 5% - 0.9% NaCl with KCl 20 mEq/L infusion  75 mL/hr IntraVENous CONTINUOUS    HYDROmorphone (DILAUDID) injection 0.5 mg  0.5 mg IntraVENous Q4H PRN    sodium chloride (NS) flush 5-40 mL  5-40 mL IntraVENous Q8H    sodium chloride (NS) flush 5-40 mL  5-40 mL IntraVENous PRN    acetaminophen (TYLENOL) tablet 650 mg  650 mg Oral Q6H PRN    Or    acetaminophen (TYLENOL) suppository 650 mg  650 mg Rectal Q6H PRN    promethazine (PHENERGAN) tablet 12.5 mg  12.5 mg Oral Q6H PRN    Or    ondansetron (ZOFRAN) injection 4 mg  4 mg IntraVENous Q6H PRN           Objective:      Physical Exam:  Last 24hrs VS reviewed since prior progress note. Most recent are:    Visit Vitals  BP (!) 177/75   Pulse 71   Temp (!) 101.4 °F (38.6 °C)   Resp 20   Ht 6' 0.99\" (1.854 m)   Wt 65.4 kg (144 lb 2.9 oz)   SpO2 100%   BMI 19.03 kg/m²       Intake/Output Summary (Last 24 hours) at 11/2/2020 0741  Last data filed at 11/1/2020 1203  Gross per 24 hour   Intake    Output 100 ml   Net -100 ml        General Appearance: Well developed, well nourished, alert & oriented x 3, no acute distress. Chest: Lungs clear to auscultation bilaterally. Cardiovascular: JVP is not elevated, PMI is not displaced, normal intensity S1 and S2, without S3. Abdomen: Soft, non-tender, bowel sounds are active. Extremities: No edema bilaterally.     Data Review    Telemetry: Sinus rhythm without ventricular ectopy    EKG:   []  No new EKG for review    Lab Data Personally Reviewed:    Recent Labs     11/01/20  0205 10/31/20  1315   WBC 8.8 11.3*   HGB 13.3 12.5   HCT 40.4 38.4    181 No results for input(s): INR, PTP, APTT, INREXT in the last 72 hours. Recent Labs     11/01/20  0205 10/31/20  0646 10/31/20  0555     --  137   K 3.7  --  3.8     --  105   CO2 26  --  27   BUN 6  --  4*   CREA 0.63*  --  0.71   *  --  145*   CA 9.0  --  9.5   MG  --  1.4*  --      No results for input(s): CPK, CKNDX, TROIQ in the last 72 hours. No lab exists for component: CPKMB  No results found for: CHOL, CHOLX, CHLST, CHOLV, HDL, HDLP, LDL, LDLC, DLDLP, TGLX, TRIGL, TRIGP, CHHD, CHHDX    Recent Labs     11/01/20  0205 10/31/20  0555   AP 56 51   TP 7.1 7.5   ALB 2.9* 3.0*   GLOB 4.2* 4.5*     No results for input(s): PH, PCO2, PO2 in the last 72 hours. Assessment/Plan:   1. Continue telemetry monitor while hospitalized  2. Continue to monitor serum electrolytes, renal functions  3. Replace, and recheck serum magnesium  4. Obtain complete 2D echocardiogram  5.   Continue current cardiovascular medications including diltiazem, and metoprolol     Juni Levine MD

## 2020-11-02 NOTE — PROGRESS NOTES
Problem: Pressure Injury - Risk of  Goal: *Prevention of pressure injury  Description: Document Robb Scale and appropriate interventions in the flowsheet. Note: Pressure Injury Interventions:  Sensory Interventions: Keep linens dry and wrinkle-free, Minimize linen layers, Turn and reposition approx. every two hours (pillows and wedges if needed), Monitor skin under medical devices         Activity Interventions: Increase time out of bed, PT/OT evaluation    Mobility Interventions: HOB 30 degrees or less, PT/OT evaluation, Turn and reposition approx. every two hours(pillow and wedges)    Nutrition Interventions: Document food/fluid/supplement intake, Offer support with meals,snacks and hydration                     Problem: Patient Education: Go to Patient Education Activity  Goal: Patient/Family Education  Outcome: Progressing Towards Goal     Problem: Falls - Risk of  Goal: *Absence of Falls  Description: Document Edmonia Morning Fall Risk and appropriate interventions in the flowsheet.   Outcome: Progressing Towards Goal  Note: Fall Risk Interventions:            Medication Interventions: Teach patient to arise slowly

## 2020-11-03 LAB
ANION GAP SERPL CALC-SCNC: 7 MMOL/L (ref 5–15)
BASOPHILS # BLD: 0 K/UL (ref 0–0.1)
BASOPHILS NFR BLD: 0 % (ref 0–1)
BUN SERPL-MCNC: 8 MG/DL (ref 6–20)
BUN/CREAT SERPL: 15 (ref 12–20)
CA-I BLD-MCNC: 8.7 MG/DL (ref 8.5–10.1)
CHLORIDE SERPL-SCNC: 102 MMOL/L (ref 97–108)
CO2 SERPL-SCNC: 25 MMOL/L (ref 21–32)
CREAT SERPL-MCNC: 0.54 MG/DL (ref 0.7–1.3)
DIFFERENTIAL METHOD BLD: ABNORMAL
EOSINOPHIL # BLD: 0.1 K/UL (ref 0–0.4)
EOSINOPHIL NFR BLD: 1 % (ref 0–7)
ERYTHROCYTE [DISTWIDTH] IN BLOOD BY AUTOMATED COUNT: 12.6 % (ref 11.5–14.5)
GLUCOSE SERPL-MCNC: 138 MG/DL (ref 65–100)
HCT VFR BLD AUTO: 36.6 % (ref 36.6–50.3)
HGB BLD-MCNC: 12.1 G/DL (ref 12.1–17)
IMM GRANULOCYTES # BLD AUTO: 0 K/UL (ref 0–0.04)
IMM GRANULOCYTES NFR BLD AUTO: 0 % (ref 0–0.5)
LYMPHOCYTES # BLD: 1 K/UL (ref 0.8–3.5)
LYMPHOCYTES NFR BLD: 11 % (ref 12–49)
MCH RBC QN AUTO: 31.3 PG (ref 26–34)
MCHC RBC AUTO-ENTMCNC: 33.1 G/DL (ref 30–36.5)
MCV RBC AUTO: 94.6 FL (ref 80–99)
MONOCYTES # BLD: 1 K/UL (ref 0–1)
MONOCYTES NFR BLD: 11 % (ref 5–13)
NEUTS SEG # BLD: 7.6 K/UL (ref 1.8–8)
NEUTS SEG NFR BLD: 77 % (ref 32–75)
PLATELET # BLD AUTO: 168 K/UL (ref 150–400)
PMV BLD AUTO: 12.1 FL (ref 8.9–12.9)
POTASSIUM SERPL-SCNC: 3.5 MMOL/L (ref 3.5–5.1)
PSA SERPL-MCNC: 0.3 NG/ML (ref 0.01–4)
RBC # BLD AUTO: 3.87 M/UL (ref 4.1–5.7)
SODIUM SERPL-SCNC: 134 MMOL/L (ref 136–145)
WBC # BLD AUTO: 9.8 K/UL (ref 4.1–11.1)

## 2020-11-03 PROCEDURE — 74011250637 HC RX REV CODE- 250/637: Performed by: INTERNAL MEDICINE

## 2020-11-03 PROCEDURE — 74011000250 HC RX REV CODE- 250: Performed by: PHYSICIAN ASSISTANT

## 2020-11-03 PROCEDURE — 65270000029 HC RM PRIVATE

## 2020-11-03 PROCEDURE — 74011250637 HC RX REV CODE- 250/637: Performed by: HOSPITALIST

## 2020-11-03 PROCEDURE — 74011000250 HC RX REV CODE- 250: Performed by: INTERNAL MEDICINE

## 2020-11-03 PROCEDURE — 74011250637 HC RX REV CODE- 250/637: Performed by: PHYSICIAN ASSISTANT

## 2020-11-03 PROCEDURE — 85025 COMPLETE CBC W/AUTO DIFF WBC: CPT

## 2020-11-03 PROCEDURE — 80048 BASIC METABOLIC PNL TOTAL CA: CPT

## 2020-11-03 PROCEDURE — 74011000258 HC RX REV CODE- 258: Performed by: PHYSICIAN ASSISTANT

## 2020-11-03 PROCEDURE — 74011250636 HC RX REV CODE- 250/636: Performed by: PHYSICIAN ASSISTANT

## 2020-11-03 PROCEDURE — 74011250636 HC RX REV CODE- 250/636: Performed by: INTERNAL MEDICINE

## 2020-11-03 PROCEDURE — 36415 COLL VENOUS BLD VENIPUNCTURE: CPT

## 2020-11-03 RX ADMIN — FAMOTIDINE 20 MG: 20 TABLET, FILM COATED ORAL at 09:33

## 2020-11-03 RX ADMIN — DILTIAZEM HYDROCHLORIDE 30 MG: 30 TABLET, FILM COATED ORAL at 17:18

## 2020-11-03 RX ADMIN — I.V. FAT EMULSION 250 ML: 20 EMULSION INTRAVENOUS at 21:48

## 2020-11-03 RX ADMIN — METOPROLOL TARTRATE 25 MG: 25 TABLET, FILM COATED ORAL at 09:34

## 2020-11-03 RX ADMIN — METOPROLOL TARTRATE 25 MG: 25 TABLET, FILM COATED ORAL at 21:01

## 2020-11-03 RX ADMIN — SODIUM CHLORIDE 1000 MG: 9 INJECTION, SOLUTION INTRAVENOUS at 17:25

## 2020-11-03 RX ADMIN — LABETALOL HYDROCHLORIDE 20 MG: 5 INJECTION, SOLUTION INTRAVENOUS at 21:01

## 2020-11-03 RX ADMIN — FAMOTIDINE 20 MG: 20 TABLET, FILM COATED ORAL at 21:01

## 2020-11-03 RX ADMIN — PIPERACILLIN AND TAZOBACTAM 3.38 G: 3; .375 INJECTION, POWDER, LYOPHILIZED, FOR SOLUTION INTRAVENOUS at 18:26

## 2020-11-03 RX ADMIN — LABETALOL HYDROCHLORIDE 20 MG: 5 INJECTION, SOLUTION INTRAVENOUS at 06:31

## 2020-11-03 RX ADMIN — DILTIAZEM HYDROCHLORIDE 30 MG: 30 TABLET, FILM COATED ORAL at 11:30

## 2020-11-03 RX ADMIN — FOLIC ACID 1 MG: 1 TABLET ORAL at 09:34

## 2020-11-03 RX ADMIN — HYDROMORPHONE HYDROCHLORIDE 0.5 MG: 1 INJECTION, SOLUTION INTRAMUSCULAR; INTRAVENOUS; SUBCUTANEOUS at 23:43

## 2020-11-03 RX ADMIN — POTASSIUM CHLORIDE, DEXTROSE MONOHYDRATE AND SODIUM CHLORIDE 75 ML/HR: 150; 5; 900 INJECTION, SOLUTION INTRAVENOUS at 21:01

## 2020-11-03 RX ADMIN — PIPERACILLIN AND TAZOBACTAM 3.38 G: 3; .375 INJECTION, POWDER, LYOPHILIZED, FOR SOLUTION INTRAVENOUS at 11:43

## 2020-11-03 RX ADMIN — MULTIVITAMIN TABLET 1 TABLET: TABLET at 09:34

## 2020-11-03 RX ADMIN — POTASSIUM CHLORIDE, DEXTROSE MONOHYDRATE AND SODIUM CHLORIDE 75 ML/HR: 150; 5; 900 INJECTION, SOLUTION INTRAVENOUS at 08:11

## 2020-11-03 RX ADMIN — ASCORBIC ACID, VITAMIN A PALMITATE, CHOLECALCIFEROL, THIAMINE HYDROCHLORIDE, RIBOFLAVIN-5 PHOSPHATE SODIUM, PYRIDOXINE HYDROCHLORIDE, NIACINAMIDE, DEXPANTHENOL, ALPHA-TOCOPHEROL ACETATE, VITAMIN K1, FOLIC ACID, BIOTIN, CYANOCOBALAMIN: 200; 3300; 200; 6; 3.6; 6; 40; 15; 10; 150; 600; 60; 5 INJECTION, SOLUTION INTRAVENOUS at 21:49

## 2020-11-03 RX ADMIN — Medication 10 ML: at 17:18

## 2020-11-03 RX ADMIN — PIPERACILLIN AND TAZOBACTAM 3.38 G: 3; .375 INJECTION, POWDER, LYOPHILIZED, FOR SOLUTION INTRAVENOUS at 04:13

## 2020-11-03 RX ADMIN — DILTIAZEM HYDROCHLORIDE 30 MG: 30 TABLET, FILM COATED ORAL at 06:30

## 2020-11-03 RX ADMIN — THIAMINE HCL TAB 100 MG 100 MG: 100 TAB at 09:34

## 2020-11-03 RX ADMIN — Medication 10 ML: at 06:39

## 2020-11-03 NOTE — PROGRESS NOTES
Progress Note    Patient: Jaya Blake MRN: [id number]  SSN: [id number]    YOB: 1945  Age: 76 y.o. Sex: male      Admit Date: 10/26/2020    LOS: 7 days     Subjective:   Patient now in the cardiac unit,had cardia arrhythmia, heart rate appear to be controlled  Able to swallow saliva  . Status post EGD with balloon dilatation of the esophagus and stent placement.      A PEG tube placed as well  Has shortness of breath, some chest discomfort, chest x-ray showed air under diaphragm,   CT today showing pneumoperitoneum and some area on the skin around the PEG tube site  however patient had PEG tube placement 2 days ago with a stent plcement  Past Medical History:   Diagnosis Date    Hypertension         Current Facility-Administered Medications:     labetaloL (NORMODYNE;TRANDATE) injection 20 mg, 20 mg, IntraVENous, Q4H PRN, Karmen Coyle MD    Vancomycin Pharmacy Dosing, , Other, PRN, Cindy Shultz MD    vancomycin (VANCOCIN) 1,000 mg in 0.9% sodium chloride 250 mL (VIAL-MATE), 1,000 mg, IntraVENous, Q16H, Karmen Coyle MD, 1,000 mg at 11/02/20 2303    [START ON 11/4/2020] Vancomycin Trough Level 11-4-2020 at 0400, , Other, Leonor Frances, Zuri Bowen MD    dilTIAZem IR (CARDIZEM) tablet 30 mg, 30 mg, Oral, TIDAC, Dave RAMIREZ MD, 30 mg at 11/02/20 1753    amino acid 4.25 % dextrose 5 % with electrolytes (CLINIMIX E) infusion, , IntraVENous, CONTINUOUS, Dank Doll PA-C, Last Rate: 60 mL/hr at 11/02/20 2304    fat emulsion 20% (LIPOSYN, INTRAlipid) infusion 250 mL, 250 mL, IntraVENous, CONTINUOUS, Dank Doll PA-C, Last Rate: 20.83 mL/hr at 11/02/20 2304, 250 mL at 11/02/20 2304    piperacillin-tazobactam (ZOSYN) 3.375 g in 0.9% sodium chloride (MBP/ADV) 100 mL MBP, 3.375 g, IntraVENous, Q8H, Murali Doll PA-C, Stopped at 11/02/20 1900    metoprolol (LOPRESSOR) injection 2.5 mg, 2.5 mg, IntraVENous, Q6H PRN, Darleen Asher PA-C, 2.5 mg at 10/31/20 1240    metoprolol tartrate (LOPRESSOR) tablet 25 mg, 25 mg, Oral, Q12H, Murali Doll PA-C, 25 mg at 11/02/20 2304    thiamine mononitrate (B-1) tablet 100 mg, 100 mg, Oral, DAILY, Narayan Hudson MD, 100 mg at 83/85/19 2023    folic acid (FOLVITE) tablet 1 mg, 1 mg, Oral, DAILY, Narayan Hudson MD, 1 mg at 11/01/20 0948    multivitamin (ONE A DAY) tablet 1 Tab, 1 Tab, Oral, DAILY, Narayan Hudson MD, 1 Tab at 11/01/20 0948    famotidine (PEPCID) tablet 20 mg, 20 mg, Oral, BID, Narayan Hudson MD, 20 mg at 11/02/20 2303    dextrose 5% - 0.9% NaCl with KCl 20 mEq/L infusion, 75 mL/hr, IntraVENous, CONTINUOUS, Edy Doll PA-C, Last Rate: 75 mL/hr at 11/02/20 0531, 75 mL/hr at 11/02/20 0531    HYDROmorphone (DILAUDID) injection 0.5 mg, 0.5 mg, IntraVENous, Q4H PRN, Murali Doll PA-C, 0.5 mg at 11/02/20 0856    sodium chloride (NS) flush 5-40 mL, 5-40 mL, IntraVENous, Q8H, Karmen Coyle MD, 10 mL at 11/02/20 1400    sodium chloride (NS) flush 5-40 mL, 5-40 mL, IntraVENous, PRN, Karmen Coyle MD, 10 mL at 11/02/20 1111    acetaminophen (TYLENOL) tablet 650 mg, 650 mg, Oral, Q6H PRN, 650 mg at 11/01/20 2137 **OR** acetaminophen (TYLENOL) suppository 650 mg, 650 mg, Rectal, Q6H PRN, Karmen Coyle MD    promethazine (PHENERGAN) tablet 12.5 mg, 12.5 mg, Oral, Q6H PRN **OR** ondansetron (ZOFRAN) injection 4 mg, 4 mg, IntraVENous, Q6H PRN, Jose Roberto Carlisle MD, 4 mg at 11/02/20 1111    Objective:     Vitals:    11/02/20 1600 11/02/20 1905 11/02/20 2000 11/02/20 2304   BP:  (!) 194/85  (!) 186/85   Pulse: 90 95 85 89   Resp:  18  18   Temp:  98.6 °F (37 °C)  99.9 °F (37.7 °C)   SpO2:  98%  97%   Weight:       Height:            Intake and Output:  Current Shift: No intake/output data recorded.   Last three shifts: 11/01 0701 - 11/02 1900  In: -   Out: 500 [Urine:500]    Physical Exam:   Physical Exam   Constitutional: He appears cachectic. He appears distressed. HENT:   Head: Atraumatic. Neck: Normal carotid pulses and no JVD present. No erythema and normal range of motion present. No thyromegaly present. Cardiovascular: Regular rhythm. Pulmonary/Chest: Effort normal and breath sounds normal. No apnea and no bradypnea. Abdominal: Soft. Bowel sounds are normal. He exhibits no distension and no fluid wave. Neurological: He is alert. He has normal strength.     PEG tube site skin  minimal lirritation    Lab/Data Review:  Recent Results (from the past 24 hour(s))   URINALYSIS W/MICROSCOPIC    Collection Time: 11/02/20  2:15 AM   Result Value Ref Range    Color Yellow      Appearance Clear Clear      Specific gravity 1.019 1.003 - 1.030      pH (UA) 7.0 5.0 - 8.0      Protein Negative Negative mg/dL    Glucose Negative Negative mg/dL    Ketone Negative Negative mg/dL    Bilirubin Negative Negative      Blood Negative Negative      Urobilinogen 4.0 (H) 0.1 - 1.0 EU/dL    Nitrites Negative Negative      Leukocyte Esterase Negative Negative      WBC 0-4 0 - 4 /hpf    RBC 0-5 0 - 5 /hpf    Bacteria Negative Negative /hpf    Mucus Trace /lpf   CULTURE, RESPIRATORY/SPUTUM/BRONCH W GRAM STAIN    Collection Time: 11/02/20  2:15 AM    Specimen: Sputum   Result Value Ref Range    Special Requests: No Special Requests      GRAM STAIN 2+ Epithelial cells seen      GRAM STAIN 2+ WBCs seen      GRAM STAIN 2+ Gram Negative Rods      GRAM STAIN 1+ Gram Negative Diplococci      Culture result: PENDING    CULTURE, BLOOD    Collection Time: 11/02/20  2:15 AM    Specimen: Blood   Result Value Ref Range    Special Requests: No Special Requests      Culture result: No growth after 1 hour     CBC WITH AUTOMATED DIFF    Collection Time: 11/02/20  6:46 AM   Result Value Ref Range    WBC 10.8 4.1 - 11.1 K/uL    RBC 3.75 (L) 4.10 - 5.70 M/uL    HGB 11.8 (L) 12.1 - 17.0 g/dL    HCT 35.2 (L) 36.6 - 50.3 %    MCV 93.9 80.0 - 99.0 FL    MCH 31.5 26.0 - 34.0 PG    MCHC 33.5 30.0 - 36.5 g/dL    RDW 12.6 11.5 - 14.5 %    PLATELET 343 749 - 765 K/uL    MPV 11.8 8.9 - 12.9 FL    NEUTROPHILS 75 32 - 75 %    LYMPHOCYTES 13 12 - 49 %    MONOCYTES 12 5 - 13 %    EOSINOPHILS 0 0 - 7 %    BASOPHILS 0 0 - 1 %    IMMATURE GRANULOCYTES 0 0.0 - 0.5 %    ABS. NEUTROPHILS 8.1 (H) 1.8 - 8.0 K/UL    ABS. LYMPHOCYTES 1.4 0.8 - 3.5 K/UL    ABS. MONOCYTES 1.3 (H) 0.0 - 1.0 K/UL    ABS. EOSINOPHILS 0.0 0.0 - 0.4 K/UL    ABS. BASOPHILS 0.0 0.0 - 0.1 K/UL    ABS. IMM. GRANS. 0.0 0.00 - 0.04 K/UL    DF AUTOMATED     METABOLIC PANEL, COMPREHENSIVE    Collection Time: 11/02/20  6:46 AM   Result Value Ref Range    Sodium 134 (L) 136 - 145 mmol/L    Potassium 3.3 (L) 3.5 - 5.1 mmol/L    Chloride 103 97 - 108 mmol/L    CO2 25 21 - 32 mmol/L    Anion gap 6 5 - 15 mmol/L    Glucose 137 (H) 65 - 100 mg/dL    BUN 6 6 - 20 mg/dL    Creatinine 0.62 (L) 0.70 - 1.30 mg/dL    BUN/Creatinine ratio 10 (L) 12 - 20      GFR est AA >60 >60 ml/min/1.73m2    GFR est non-AA >60 >60 ml/min/1.73m2    Calcium 8.6 8.5 - 10.1 mg/dL    Bilirubin, total 1.7 (H) 0.2 - 1.0 mg/dL    AST (SGOT) 36 15 - 37 U/L    ALT (SGPT) 20 12 - 78 U/L    Alk. phosphatase 49 45 - 117 U/L    Protein, total 6.6 6.4 - 8.2 g/dL    Albumin 2.5 (L) 3.5 - 5.0 g/dL    Globulin 4.1 (H) 2.0 - 4.0 g/dL    A-G Ratio 0.6 (L) 1.1 - 2.2     PROCALCITONIN    Collection Time: 11/02/20 10:37 AM   Result Value Ref Range    Procalcitonin 0.11 (H) 0 ng/mL   TROPONIN I    Collection Time: 11/02/20 10:37 AM   Result Value Ref Range    Troponin-I, Qt. <0.05 <0.05 ng/mL   GLUCOSE, POC    Collection Time: 11/02/20  2:59 PM   Result Value Ref Range    Glucose (POC) 112 (H) 65 - 100 mg/dL    Performed by Memo Gonzales         CT ABD PELV W CONT   Final Result   IMPRESSION: (+) Free intraperitoneal air. Interval placement of G-tube with its   tip projected into stomach lumen.    Exact source for free air undetermined. Correlate to recent surgery. Unable to   exclude bowel perforation as possible cause. Moderate volume dependent pleural effusions with associated lower lobe lung   compressive atelectasis. Report called to 4W. Spoke with patient's nurse. XR CHEST PORT   Final Result   IMPRESSION: Probable right perihilar atelectasis. .. Pneumoperitoneum again   noted. Esophageal stent. XR CHEST SNGL V   Final Result   Impression:      Interval placement of a proximal to mid esophageal stent. Free air in the imaged upper abdomen. Mild atelectasis at the lung bases. Findings discussed with Dinorah Waller on 11/1/2020 at 1125. XR FLUOROSCOPY UNDER 60 MINUTES   Final Result      CT ABD PELV W CONT   Final Result   Impression:   1. No specific evidence of abdominopelvic metastatic disease. 2.  Prostatomegaly. Wall thickening of the urinary bladder may be due to chronic   outlet obstruction and/or cystitis. 3.  Borderline wall thickening of the stomach. May be related to   underdistention, but correlate for gastritis. 4.  Nonobstructing right renal calculus. 5.  See full report for detailed findings. See recently reported chest CT for   supradiaphragmatic findings. CT CHEST W CONT   Final Result   Impression: Enhancing mass of the mid thoracic esophagus measuring proximally 6   x 4 x 3 cm causing dilatation esophagus and obstruction of the upper esophagus   which is fluid filled and patulous. 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 I called Dr. Delilah Limon with this result and recommendation   for upper endoscopy at 3:15 PM.      Emphysema. Coronary artery calcifications. Right kidney stone. Please see full report. CT NECK SOFT TISSUE W CONT   Final Result      XR CHEST SNGL V   Final Result   Impression: Unremarkable chest x-ray, except for degenerative change of the   spine.            Assessment:     Active Problems: Esophageal mass (10/26/2020)    upper esophageal mass, esophageal squamous cell cancer, Difficult swallow,  Malnutrition,  Status post dilation stent replacement  PEG  Placement  Pneumoperitoneum,  likely due to the PEG tube placement, abdominal soft, general abdominal pain, WBC normal  SVT episode   Plan:   PEG tube suction, followed the progress of pneumoperitoneum closely  May released  The PEG tube bumper to release air in the abdominal cavity  Continue IV hydration, cardiologist to follow  Try clear liquid,  May start on tube feeding  Was on TPN, Protonix couple days  Plan was discussed with oncologist, patient admitting physician    Signed By: Birdie Madera MD     November 2, 2020        Thank you for allowing me to participate in this patients care  Cc Referring Physician   None

## 2020-11-03 NOTE — PROGRESS NOTES
Reasoner informed radiologist called states pt has a lot of free air in abdomen. No definitive cause.

## 2020-11-03 NOTE — PROGRESS NOTES
CM attempted to meet with patient to discuss DCP however patient was asleep on entrance to room, CM gently woke patient, patient was confused and very groggy and stated he needed his nurse to clean his bed and could not talk at this time. CM notified nurse. CM to follow-up at a later time.

## 2020-11-03 NOTE — PROGRESS NOTES
Progress Note    Patient: Umu Nixon MRN: [id number]  SSN: [id number]    YOB: 1945  Age: 76 y.o. Sex: male      Admit Date: 10/26/2020    LOS: 8 days     Subjective:   Patient examined,  Able to swallow saliva, but not  Tolerated clear liquid  . Status post EGD with balloon dilatation of the esophagus and stent placement.      A PEG tube placed as well      CT showed  pneumoperitoneum and some area on the skin around the PEG tube site  Past Medical History:   Diagnosis Date    Hypertension         Current Facility-Administered Medications:     amino acid 4.25 % dextrose 5 % with electrolytes (CLINIMIX E) infusion, , IntraVENous, CONTINUOUS, Sujata Murray PA-C    fat emulsion 20% (LIPOSYN, INTRAlipid) infusion 250 mL, 250 mL, IntraVENous, CONTINUOUS, Sujata Murray PA-C    labetaloL (NORMODYNE;TRANDATE) injection 20 mg, 20 mg, IntraVENous, Q4H PRN, Karmen Coyle MD, 20 mg at 11/03/20 0631    Vancomycin Pharmacy Dosing, , Other, PRN, Garcia Frost MD    vancomycin (VANCOCIN) 1,000 mg in 0.9% sodium chloride 250 mL (VIAL-MATE), 1,000 mg, IntraVENous, Q16H, Karmen Coyle MD, 1,000 mg at 11/02/20 2303    [START ON 11/4/2020] Vancomycin Trough Level 11-4-2020 at 0400, , Other, Lima Spencer MD    dilTIAZem IR (CARDIZEM) tablet 30 mg, 30 mg, Oral, TIDAC, Ashlie RAMIREZ MD, 30 mg at 11/03/20 1130    amino acid 4.25 % dextrose 5 % with electrolytes (CLINIMIX E) infusion, , IntraVENous, CONTINUOUS, [place] Doll PA-C, Last Rate: 60 mL/hr at 11/02/20 2304    piperacillin-tazobactam (ZOSYN) 3.375 g in 0.9% sodium chloride (MBP/ADV) 100 mL MBP, 3.375 g, IntraVENous, Q8H, Murali Doll PA-C, Last Rate: 25 mL/hr at 11/03/20 1143, 3.375 g at 11/03/20 1143    metoprolol (LOPRESSOR) injection 2.5 mg, 2.5 mg, IntraVENous, Q6H PRN, Murali Doll PA-C, 2.5 mg at 10/31/20 1240    metoprolol tartrate (LOPRESSOR) tablet 25 mg, 25 mg, Oral, Q12H, Murali Doll PA-C, 25 mg at 11/03/20 1689    thiamine mononitrate (B-1) tablet 100 mg, 100 mg, Oral, DAILY, Lissy Hess MD, 100 mg at 22/65/76 6207    folic acid (FOLVITE) tablet 1 mg, 1 mg, Oral, DAILY, Lissy Hess MD, 1 mg at 11/03/20 0934    multivitamin (ONE A DAY) tablet 1 Tab, 1 Tab, Oral, DAILY, Lissy Hess MD, 1 Tab at 11/03/20 0934    famotidine (PEPCID) tablet 20 mg, 20 mg, Oral, BID, Lissy Hess MD, 20 mg at 11/03/20 0933    dextrose 5% - 0.9% NaCl with KCl 20 mEq/L infusion, 75 mL/hr, IntraVENous, CONTINUOUS, Ann Doll PA-C, Last Rate: 75 mL/hr at 11/03/20 0811, 75 mL/hr at 11/03/20 0811    HYDROmorphone (DILAUDID) injection 0.5 mg, 0.5 mg, IntraVENous, Q4H PRN, Ann Doll PA-C, 0.5 mg at 11/02/20 2339    sodium chloride (NS) flush 5-40 mL, 5-40 mL, IntraVENous, Q8H, Karmen Coyle MD, 10 mL at 11/03/20 0435    sodium chloride (NS) flush 5-40 mL, 5-40 mL, IntraVENous, PRN, Karmen Coyle MD, 10 mL at 11/02/20 1111    acetaminophen (TYLENOL) tablet 650 mg, 650 mg, Oral, Q6H PRN, 650 mg at 11/01/20 2137 **OR** acetaminophen (TYLENOL) suppository 650 mg, 650 mg, Rectal, Q6H PRN, Karmen Coyle MD    promethazine (PHENERGAN) tablet 12.5 mg, 12.5 mg, Oral, Q6H PRN **OR** ondansetron (ZOFRAN) injection 4 mg, 4 mg, IntraVENous, Q6H PRN, Jannet Crow MD, 4 mg at 11/02/20 1111    Objective:     Vitals:    11/03/20 0833 11/03/20 1200 11/03/20 1240 11/03/20 1250   BP: (!) 177/89   (!) 167/76   Pulse: 82 71  81   Resp:       Temp: 98.5 °F (36.9 °C)   98 °F (36.7 °C)   SpO2: 96%   96%   Weight:       Height:   6' 1\" (1.854 m)         Intake and Output:  Current Shift: No intake/output data recorded.   Last three shifts: 11/01 1901 - 11/03 0700  In: -   Out: 950 [Urine:950]    Physical Exam:   Physical Exam   Constitutional: He appears cachectic. He appears distressed. HENT:   Head: Atraumatic. Neck: Normal carotid pulses and no JVD present. No erythema and normal range of motion present. No thyromegaly present. Cardiovascular: Regular rhythm. Pulmonary/Chest: Effort normal and breath sounds normal. No apnea and no bradypnea. Abdominal: Soft. Bowel sounds are normal. He exhibits no distension and no fluid wave. Neurological: He is alert. He has normal strength. PEG tube site skin  minimal lirritation    Lab/Data Review:  Recent Results (from the past 24 hour(s))   CBC WITH AUTOMATED DIFF    Collection Time: 11/03/20 10:57 AM   Result Value Ref Range    WBC 9.8 4.1 - 11.1 K/uL    RBC 3.87 (L) 4.10 - 5.70 M/uL    HGB 12.1 12.1 - 17.0 g/dL    HCT 36.6 36.6 - 50.3 %    MCV 94.6 80.0 - 99.0 FL    MCH 31.3 26.0 - 34.0 PG    MCHC 33.1 30.0 - 36.5 g/dL    RDW 12.6 11.5 - 14.5 %    PLATELET 384 296 - 589 K/uL    MPV 12.1 8.9 - 12.9 FL    NEUTROPHILS 77 (H) 32 - 75 %    LYMPHOCYTES 11 (L) 12 - 49 %    MONOCYTES 11 5 - 13 %    EOSINOPHILS 1 0 - 7 %    BASOPHILS 0 0 - 1 %    IMMATURE GRANULOCYTES 0 0.0 - 0.5 %    ABS. NEUTROPHILS 7.6 1.8 - 8.0 K/UL    ABS. LYMPHOCYTES 1.0 0.8 - 3.5 K/UL    ABS. MONOCYTES 1.0 0.0 - 1.0 K/UL    ABS. EOSINOPHILS 0.1 0.0 - 0.4 K/UL    ABS. BASOPHILS 0.0 0.0 - 0.1 K/UL    ABS. IMM. GRANS. 0.0 0.00 - 0.04 K/UL    DF AUTOMATED     METABOLIC PANEL, BASIC    Collection Time: 11/03/20 10:57 AM   Result Value Ref Range    Sodium 134 (L) 136 - 145 mmol/L    Potassium 3.5 3.5 - 5.1 mmol/L    Chloride 102 97 - 108 mmol/L    CO2 25 21 - 32 mmol/L    Anion gap 7 5 - 15 mmol/L    Glucose 138 (H) 65 - 100 mg/dL    BUN 8 6 - 20 mg/dL    Creatinine 0.54 (L) 0.70 - 1.30 mg/dL    BUN/Creatinine ratio 15 12 - 20      GFR est AA >60 >60 ml/min/1.73m2    GFR est non-AA >60 >60 ml/min/1.73m2    Calcium 8.7 8.5 - 10.1 mg/dL        CT ABD PELV W CONT   Final Result   IMPRESSION: (+) Free intraperitoneal air.  Interval placement of G-tube with its   tip projected into stomach lumen. Exact source for free air undetermined. Correlate to recent surgery. Unable to   exclude bowel perforation as possible cause. Moderate volume dependent pleural effusions with associated lower lobe lung   compressive atelectasis. Report called to 4W. Spoke with patient's nurse. XR CHEST PORT   Final Result   IMPRESSION: Probable right perihilar atelectasis. .. Pneumoperitoneum again   noted. Esophageal stent. XR CHEST SNGL V   Final Result   Impression:      Interval placement of a proximal to mid esophageal stent. Free air in the imaged upper abdomen. Mild atelectasis at the lung bases. Findings discussed with Marian Freedman on 11/1/2020 at 1125. XR FLUOROSCOPY UNDER 60 MINUTES   Final Result      CT ABD PELV W CONT   Final Result   Impression:   1. No specific evidence of abdominopelvic metastatic disease. 2.  Prostatomegaly. Wall thickening of the urinary bladder may be due to chronic   outlet obstruction and/or cystitis. 3.  Borderline wall thickening of the stomach. May be related to   underdistention, but correlate for gastritis. 4.  Nonobstructing right renal calculus. 5.  See full report for detailed findings. See recently reported chest CT for   supradiaphragmatic findings. CT CHEST W CONT   Final Result   Impression: Enhancing mass of the mid thoracic esophagus measuring proximally 6   x 4 x 3 cm causing dilatation esophagus and obstruction of the upper esophagus   which is fluid filled and patulous. 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 I called Dr. Do Settle with this result and recommendation   for upper endoscopy at 3:15 PM.      Emphysema. Coronary artery calcifications. Right kidney stone. Please see full report.             CT NECK SOFT TISSUE W CONT   Final Result      XR CHEST SNGL V   Final Result   Impression: Unremarkable chest x-ray, except for degenerative change of the   spine.            Assessment:     Active Problems:    Esophageal mass (10/26/2020)    upper esophageal mass, esophageal squamous cell cancer, Difficult swallow,  Malnutrition,  Status post dilation stent replacement  PEG  Placement  Pneumoperitoneum,  likely due to the PEG tube placement, abdominal soft,  No bdominal pain, WBC normal  SVT episode  , controlled  Plan:   PEG tube, feeding, slowly advanced,     followed the progress of pneumoperitoneum closely  Continue IV hydration, cardiologist to follow  Try clear liquid,   on TPN, Protonix couple days    Signed By: Davina Auguste MD     November 3, 2020        Thank you for allowing me to participate in this patients care  Cc Referring Physician   None

## 2020-11-03 NOTE — PROGRESS NOTES
Hospitalist Progress Note    Subjective:   Daily Progress Note: 11/3/2020 8:40 AM    Hospital Course:  Patient admitted on October 26, 2020 with difficulty swallowing and generalized weakness and 30 pound weight loss. Patient has been unable to swallow solid foods. CT of the chest revealed an obstructing esophageal mass. EGD performed which revealed the same with biopsy taken. Oncology consultation. Patient will need PEG tube placement and will be set up for a stent placement. Considering patient's need for chemotherapy and radiation if biopsy is consistent with a malignant process patient will require PEG tube placement anyways. EGD with balloon dilatation of the esophagus and stent placement. PEG placed. Cardiac consultation. Recurrent SVT requiring adenosine and ultimately metoprolol for rate control. Patient transferred to telemetry for Cardizem drip. Free air on chest x-ray most likely postprocedural.  Will discontinue PEG tube feedings and placed intermittent suction per gastroenterology. Patient with no abdominal pain and abdomen is soft. Worsening abdominal pain with persistent pneumoperitoneum. Also describes right lower quadrant abdominal pain. CT with p.o. and IV contrast.  Surgical consultation, Dr. Loraine Bettencourt. Has started Zosyn and vancomycin empirically. Subjective: Pateint says he has some abdominal discomfort. No ches tpain or SOB. Not passing flatus or have had a BM.      Current Facility-Administered Medications   Medication Dose Route Frequency    amino acid 4.25 % dextrose 5 % with electrolytes (CLINIMIX E) infusion   IntraVENous CONTINUOUS    fat emulsion 20% (LIPOSYN, INTRAlipid) infusion 250 mL  250 mL IntraVENous CONTINUOUS    labetaloL (NORMODYNE;TRANDATE) injection 20 mg  20 mg IntraVENous Q4H PRN    Vancomycin Pharmacy Dosing   Other PRN    vancomycin (VANCOCIN) 1,000 mg in 0.9% sodium chloride 250 mL (VIAL-MATE)  1,000 mg IntraVENous Q16H    [START ON 11/4/2020] Vancomycin Trough Level 11-4-2020 at 0400   Other ONCE    dilTIAZem IR (CARDIZEM) tablet 30 mg  30 mg Oral TIDAC    amino acid 4.25 % dextrose 5 % with electrolytes (CLINIMIX E) infusion   IntraVENous CONTINUOUS    fat emulsion 20% (LIPOSYN, INTRAlipid) infusion 250 mL  250 mL IntraVENous CONTINUOUS    piperacillin-tazobactam (ZOSYN) 3.375 g in 0.9% sodium chloride (MBP/ADV) 100 mL MBP  3.375 g IntraVENous Q8H    metoprolol (LOPRESSOR) injection 2.5 mg  2.5 mg IntraVENous Q6H PRN    metoprolol tartrate (LOPRESSOR) tablet 25 mg  25 mg Oral Q12H    thiamine mononitrate (B-1) tablet 100 mg  100 mg Oral DAILY    folic acid (FOLVITE) tablet 1 mg  1 mg Oral DAILY    multivitamin (ONE A DAY) tablet 1 Tab  1 Tab Oral DAILY    famotidine (PEPCID) tablet 20 mg  20 mg Oral BID    dextrose 5% - 0.9% NaCl with KCl 20 mEq/L infusion  75 mL/hr IntraVENous CONTINUOUS    HYDROmorphone (DILAUDID) injection 0.5 mg  0.5 mg IntraVENous Q4H PRN    sodium chloride (NS) flush 5-40 mL  5-40 mL IntraVENous Q8H    sodium chloride (NS) flush 5-40 mL  5-40 mL IntraVENous PRN    acetaminophen (TYLENOL) tablet 650 mg  650 mg Oral Q6H PRN    Or    acetaminophen (TYLENOL) suppository 650 mg  650 mg Rectal Q6H PRN    promethazine (PHENERGAN) tablet 12.5 mg  12.5 mg Oral Q6H PRN    Or    ondansetron (ZOFRAN) injection 4 mg  4 mg IntraVENous Q6H PRN        Review of Systems  Constitutional: No fevers, No chills, No sweats, No fatigue, No Weakness  Eyes: No redness  Ears, nose, mouth, throat, and face: No nasal congestion, No sore throat, No voice change  Respiratory: No Shortness of Breath, No cough, No wheezing  Cardiovascular: No chest pain, No palpitations, No extremity edema  Gastrointestinal: No nausea, No vomiting, No diarrhea, + abdominal pain  Genitourinary: No frequency, No dysuria, No hematuria  Integument/breast: No skin lesion(s)   Neurological: No Confusion, No headaches, No dizziness      Objective:     Visit Vitals  BP (!) 177/89   Pulse 82   Temp 98.5 °F (36.9 °C)   Resp 18   Ht 6' 0.99\" (1.854 m)   Wt 65.9 kg (145 lb 4.5 oz)   SpO2 96%   BMI 19.17 kg/m²    O2 Flow Rate (L/min): 2 l/min O2 Device: Nasal cannula    Temp (24hrs), Av.8 °F (37.1 °C), Min:98.2 °F (36.8 °C), Max:99.9 °F (37.7 °C)      No intake/output data recorded.  1901 -  0700  In: -   Out: 950 [Urine:950]    PHYSICAL EXAM:  Constitutional: No acute distress  Skin: Extremities and face reveal no rashes. HEENT: Sclerae anicteric. Extra-occular muscles are intact. No oral ulcers. The neck is supple and no masses. Cardiovascular: Regular rate and rhythm. Respiratory:  Clear breath sounds bilaterally with no wheezes, rales, or rhonchi. GI: Abdomen nondistended, soft, and nontender. Normal active bowel sounds. Musculoskeletal: No pitting edema of the lower legs. Able to move all ext  Neurological:  Patient is alert and oriented. Cranial nerves II-XII grossly intact  Psychiatric: Mood appears appropriate       Data Review    Recent Results (from the past 24 hour(s))   PROCALCITONIN    Collection Time: 20 10:37 AM   Result Value Ref Range    Procalcitonin 0.11 (H) 0 ng/mL   TROPONIN I    Collection Time: 20 10:37 AM   Result Value Ref Range    Troponin-I, Qt. <0.05 <0.05 ng/mL   GLUCOSE, POC    Collection Time: 20  2:59 PM   Result Value Ref Range    Glucose (POC) 112 (H) 65 - 100 mg/dL    Performed by Darion Willard        Radiology review: CT abdomen and pelvis    Assessment:   1. Esophageal mass  2. Hypokalemia  3. Hypertension  4. Protein malnutrition, moderate status post PEG tube  5. Supraventricular tachycardia  6. Pneumoperitoneum      Plan:    1. EGD performed with complete obstruction of the esophagus. Repeat EGD on 10/30/2020 with EGD and balloon dilation of the esophagus status post stent placement. CT of the chest revealed a mild thoracic esophageal mass measuring 6 x 4 x 3 cm. Oncology consulted  2. Electrolyte panel ordered for today. We will continue to monitor. Will supplement if potassium is still low  3. Patient is on extended diltiazem. Blood pressure stable. 4.  Patient is status post PEG tube placement. CT of the abdomen pelvis has been reviewed and cleared by GI to start PEG tube feedings. This will be patient sole source of nutrition  5. Patient had episode of supraventricular tachycardia. Cardiology consulted. Was on IV diltiazem and transitioned into oral diltiazem. 6.  Repeat CT of the and pelvis showed pneumoperitoneum with free air fluid. Discussed with GI and they think it is due to PEG tube. Placed on suction. General surgery consult. Procalcitonin within normal limits. Is started on Zosyn and vancomycin  7. CBC BMP in the a.m. CODE STATUS full     DVT prophylaxis: loveonx  Ulcer prophylaxis: pepcid    Care Plan discussed with: Patient/Family, Nurse and     Total time spent with patient: 34 minutes.

## 2020-11-03 NOTE — PROGRESS NOTES
Progress Note      11/3/2020 6:16 AM  NAME: Edna Redman   MRN:  [id number]   Admit Diagnosis: Esophageal mass [K22.8]      Problem List:   1.  Incomplete database secondary to the patient being a poor historian  2. Vivian Drummond presents for evaluation of dysphagia generalized weakness  3.  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  Status post percutaneous endoscopic gastrotomy (PEG) tube insertion  5.  Status post esophageal dilation and stent placement  6.  Status post cholecystectomy  7.  Previous transient ischemia attack (TIA)   8.  Grade II (moderate) diastolic dysfunction, with pseudonormalization  9.  Paroxysmal supraventricular tachycardia (PSVT)  10.  Hypertension     11.  Nicotine dependence  12.  Chronic obstructive pulmonary disease (emphysema)  13.  History of prostate adenocarcinoma (status post radiation therapy)  14.  Status post cholecystectomy  15.  Hypomagnesia       Subjective: The patient is seen and examined in room 489. There were no acute cardiovascular events reported overnight. Currently, he denies any cardiovascular complaints. Yesterday, the patient's IV diltiazem was converted to by mouth form. He is tolerated it well.     Medications Personally Reviewed:    Current Facility-Administered Medications   Medication Dose Route Frequency    labetaloL (NORMODYNE;TRANDATE) injection 20 mg  20 mg IntraVENous Q4H PRN    Vancomycin Pharmacy Dosing   Other PRN    vancomycin (VANCOCIN) 1,000 mg in 0.9% sodium chloride 250 mL (VIAL-MATE)  1,000 mg IntraVENous Q16H    [START ON 11/4/2020] Vancomycin Trough Level 11-4-2020 at 0400   Other ONCE    dilTIAZem IR (CARDIZEM) tablet 30 mg  30 mg Oral TIDAC    amino acid 4.25 % dextrose 5 % with electrolytes (CLINIMIX E) infusion   IntraVENous CONTINUOUS    fat emulsion 20% (LIPOSYN, INTRAlipid) infusion 250 mL  250 mL IntraVENous CONTINUOUS    piperacillin-tazobactam (ZOSYN) 3.375 g in 0.9% sodium chloride (MBP/ADV) 100 mL MBP  3.375 g IntraVENous Q8H    metoprolol (LOPRESSOR) injection 2.5 mg  2.5 mg IntraVENous Q6H PRN    metoprolol tartrate (LOPRESSOR) tablet 25 mg  25 mg Oral Q12H    thiamine mononitrate (B-1) tablet 100 mg  100 mg Oral DAILY    folic acid (FOLVITE) tablet 1 mg  1 mg Oral DAILY    multivitamin (ONE A DAY) tablet 1 Tab  1 Tab Oral DAILY    famotidine (PEPCID) tablet 20 mg  20 mg Oral BID    dextrose 5% - 0.9% NaCl with KCl 20 mEq/L infusion  75 mL/hr IntraVENous CONTINUOUS    HYDROmorphone (DILAUDID) injection 0.5 mg  0.5 mg IntraVENous Q4H PRN    sodium chloride (NS) flush 5-40 mL  5-40 mL IntraVENous Q8H    sodium chloride (NS) flush 5-40 mL  5-40 mL IntraVENous PRN    acetaminophen (TYLENOL) tablet 650 mg  650 mg Oral Q6H PRN    Or    acetaminophen (TYLENOL) suppository 650 mg  650 mg Rectal Q6H PRN    promethazine (PHENERGAN) tablet 12.5 mg  12.5 mg Oral Q6H PRN    Or    ondansetron (ZOFRAN) injection 4 mg  4 mg IntraVENous Q6H PRN           Objective:      Physical Exam:  Last 24hrs VS reviewed since prior progress note. Most recent are:    Visit Vitals  BP (!) 179/86 (BP 1 Location: Right arm)   Pulse 79   Temp 99.9 °F (37.7 °C)   Resp 18   Ht 6' 0.99\" (1.854 m)   Wt 65.4 kg (144 lb 2.9 oz)   SpO2 97%   BMI 19.03 kg/m²       Intake/Output Summary (Last 24 hours) at 11/3/2020 0616  Last data filed at 11/3/2020 0520  Gross per 24 hour   Intake    Output 950 ml   Net -950 ml      Physical exam: Essentially unchanged. Data Review  Telemetry: Sinus rhythm without ventricular ectopy    EKG:   []  No new EKG for review    Lab Data Personally Reviewed:    Recent Labs     11/02/20  0646 11/01/20  0205   WBC 10.8 8.8   HGB 11.8* 13.3   HCT 35.2* 40.4    195     No results for input(s): INR, PTP, APTT, INREXT in the last 72 hours.    Recent Labs     11/02/20  0646 11/01/20  0205   * 138   K 3.3* 3.7    105   CO2 25 26   BUN 6 6   CREA 0.62* 0.63*   * 125*   CA 8.6 9.0     Recent Labs     11/02/20  1037   TROIQ <0.05     No results found for: CHOL, CHOLX, CHLST, CHOLV, HDL, HDLP, LDL, LDLC, DLDLP, TGLX, TRIGL, TRIGP, CHHD, CHHDX    Recent Labs     11/02/20  0646 11/01/20  0205   AP 49 56   TP 6.6 7.1   ALB 2.5* 2.9*   GLOB 4.1* 4.2*     No results for input(s): PH, PCO2, PO2 in the last 72 hours. Assessment/Plan:   1. Continue telemetry monitoring while hospitalized  2. Continue to monitor serum electrolytes, and renal function  3. Continue to monitor hemoglobin/hematocrit  4. Continue current cardiovascular medications including diltiazem, and metoprolol  5.   We will continue to follow with you     Caryle Listen, MD

## 2020-11-03 NOTE — PROGRESS NOTES
Comprehensive Nutrition Assessment    Type and Reason for Visit: Reassess(goal)    Nutrition Recommendations/Plan:  Continue Jevity 1.5 at 20mL/hr  Continue advancing 10mL q4h to goal rate of 50mL/hr, continuous  Flush with 120mL H20 q4h. Goal feeds provide 1800kcal, 77g pro, 1632mL fluid (Meeting 100% EENs)    Rec'd d/c PPN and adjust IV fluids to non-calorie containing     Nursing to document TF rate, flushes, GRV, and GI s/s in I/Os  Monitor BM    Nutrition Assessment:  Admitted for difficulty swallowing and generalized weakness. CT chest indicated neoplasm (10/26). CT neck shows degenerative change throughout  spine. Dx with Esophageal mass. S/p EGD confirmed esophageal mass with obstruction - biopsy showed squamous cell carcinoma. Onc consulted. MD notes indicate pt reports being very hungry but unable to eat as food comes right back up 2/2 GERD and dysphagia likely 2/2 esophageal cancer. Noted pt was on PPN at 40ml/hr starting 10/30. S/p PEG placement and EGD with balloon dilatation of the esophagus and stent placement (10/30). Noted TF initiated 10/31, pt diet advanced to Full liquids, however diet d/c'd and pt back to TF on 11/01 at rate of 50ml/hr. Rate decreased to 20ml/hr on 11/03 2/2 abd distention. Spoke to RN; Confirmed pt on Jevity 1.5 continuous feed, observed at 20ml/hr, per RN manual water flushes provided q4hrs d/t tubing shortage. Reports pt still on PPN despite TF running x 4 days. Per NP pt to stay on PPN for 24 hrs to monitor for tolerance before d/c of PPN. Pt also on D5 at 75ml/hr providing 306kcal. Current regimen of TF, D5, and PPN (AA 4.25% D 5% w/ electrolytes, and fat emulsion 20% infusion 250ml) providing 2015 kcal, 91g pro, 1085ml fluid- not meeting needs. Pt also confirms taking some PO, however pt now NPO. IV nutrition now excessive considering pt tolerating TF well x 4 days, rec'd d/c PPN and D5. Labs: Na 134, Cr 0.54, Glu 138, Mg 1.4.  Meds:  D5, B-1,  diltiazem, pepcid, folvite, dilaudid, labetalol, metoprolol, MVI, zofran, zosyn, vancomycin. Malnutrition Assessment:  Malnutrition Status:  Severe malnutrition    Context:  Chronic illness     Findings of the 6 clinical characteristics of malnutrition:   Energy Intake:  1 - 75% or less of est energy req for 7 or more days  Weight Loss:  7.00 - Greater than 7.5% over 3 months(Per MD notes/chart review - 15.9kg wt loss over past few months >10% BW)     Body Fat Loss:  Unable to assess,     Muscle Mass Loss:  Unable to assess,    Fluid Accumulation:  Unable to assess,        Estimated Daily Nutrient Needs:  Energy (kcal): 1845kcal (28kcal/kg); Weight Used for Energy Requirements: Current  Protein (g): 79g (1.2g/kg); Weight Used for Protein Requirements: Current  Fluid (ml/day): 1650ml (25ml/kg); Weight Used for Fluid Requirements: Current  Needs for wt gain and Cancer    Nutrition Related Findings:  NFPE finding severe fat and muscle losses per previous assessment. Pt is edentulous. Swallowing difficulty 2/2 complete obstruction of esophagus per PA note (10/30). Noted Dysphagia per PA note (10/29). RN reports abd distention. No current indication N/V. No diarrhea. Pt reports severe constipation stating last BM was a while ago. Discussed started a bowel regimen w/ RN. Last documented BM 10/26. No edema.       Wounds:    None       Current Nutrition Therapies:  amino acid 4.25 % dextrose 5 % with electrolytes (CLINIMIX E) infusion  DIET NPO  DIET TUBE FEEDING Jevity 1.5  amino acid 4.25 % dextrose 5 % with electrolytes (CLINIMIX E) infusion  Current Tube Feeding (TF) Orders:   · Feeding Route: PEG  · Formula: Jevity 1.5  · Schedule:Continuous    · Regimen: 20 ml/hr  · Additives/Modulars:    · Water Flushes: manual 120ml q4hrs(per RN)  · Current TF & Flush Orders Provides: rec'd Jevity at 20ml/hr continuous, flush with 120ml q4hrs; providing 480kcal, 30g Pro, 1085ml fluid  · Goal TF & Flush Orders Provides: Rec'd Jevity 1.5 at goal 50ml/hr continuous, flush with 120ml water q4hrs; providing 1800kcal, 77g pro, 1632 ml fluid   Current Parenteral Nutrition Orders:  · Type and Formula: PPN   · Lipids: 250ml, Daily  · Duration: Continuous  · Rate/Volume: 60ml/hr  · Current PN Order Provides: PPN + Lipids + D5 provides 1295kcal, 61g pro  · Goal PN Orders Provides: rec'd d/c      Anthropometric Measures:  · Height:  6' 1\" (185.4 cm)  · Current Body Wt:  65.9 kg (145 lb 4.5 oz)(11/03)   · Admission Body Wt:  141 lb 1.5 oz    · Usual Body Wt:  79.4 kg (175 lb)(per pt ~ 2 months ago)     · Ideal Body Wt:  184 lbs:  79 %   · BMI Category:  Underweight (BMI less than 22) age over 72     Wt hx: 65.9kg (11/03), 65.4kg (11/02),  64.6kg (10/31), 64kg(10/26)  Per H&P, pt reports 35lb wt loss (15.9kg) over past few months - noted possible 19.9%BW loss during this time - severe in nature. Unable to obtain timeframe. Noted recent wt stability. Nutrition Diagnosis:   · Inadequate oral intake related to cognitive or neurological impairment, swallowing difficulty(esophageal obstruction) as evidenced by nutrition support-enteral nutrition      Nutrition Interventions:   Food and/or Nutrient Delivery: Modify tube feeding, Discontinue parenteral nutrition, IV fluid delivery(d/c D5)  Nutrition Education and Counseling: No recommendations at this time  Coordination of Nutrition Care: Continue to monitor while inpatient    Goals:  Intake >75% EEN > 5 days (goal met)  BM every 1-2 days,   wt gain +/-0.5kg/wk (goal met)      Nutrition Monitoring and Evaluation:   Behavioral-Environmental Outcomes: None identified  Food/Nutrient Intake Outcomes: Enteral nutrition intake/tolerance  Physical Signs/Symptoms Outcomes: Weight, Constipation    Discharge Planning:     Too soon to determine     Electronically signed by Reji Mcclain on 11/3/2020 at 10:52 AM    Contact:

## 2020-11-03 NOTE — PROGRESS NOTES
Dr. Hayder Kelsey on floor to see patient. Requested that nurse keep tube feeding rate at 20ml/hr and not advance the rate, nor give water flushes q4hr at this time due to patients distended abdomen. He has noted that his abdomen distention has improved some and it is soft today which has improved from yesterday when it was firm, but requests that the patient should not have too much intake due to the free air in stomach. He states that he will re-assess patient tomorrow.

## 2020-11-03 NOTE — CONSULTS
[id number] SURGERY CONSULT          Chief Complaint: abdominal distension    History of Present Illness:    Mr. Soni Hendricks is a 76y.o. year old * male admitted with esophageal mass. Initially underwent PEG tube placement. Later esophageal stent placed. Has free air & hence consulted. CT scan with contrast through PEG tube shows no extravasation of contrast through PEG tube site or small bowel. Has some abdominal pain with distension. No fever or chills. No BM or flatus. Past Medical History:   Past Medical History:   Diagnosis Date    Hypertension        Past Surgical History: Right inguinal hernia repair with mesh. Allergy:No Known Allergies    Social History:  reports that he has been smoking. He has never used smokeless tobacco.     Family History:History reviewed. No pertinent family history.      Current Medications:  Current Facility-Administered Medications:     labetaloL (NORMODYNE;TRANDATE) injection 20 mg, 20 mg, IntraVENous, Q4H PRN, Karmen Coyle MD    Vancomycin Pharmacy Dosing, , Other, PRN, Mayelin Chapa MD    vancomycin (VANCOCIN) 1,000 mg in 0.9% sodium chloride 250 mL (VIAL-MATE), 1,000 mg, IntraVENous, Q16H, Karmen Coyle MD, 1,000 mg at 11/02/20 2303    [START ON 11/4/2020] Vancomycin Trough Level 11-4-2020 at 0400, , Other, Sundeep Rutherford, Nelly Villarreal MD    dilTIAZem IR (CARDIZEM) tablet 30 mg, 30 mg, Oral, TIDAC, Martha RAMIREZ MD, 30 mg at 11/02/20 1753    amino acid 4.25 % dextrose 5 % with electrolytes (CLINIMIX E) infusion, , IntraVENous, CONTINUOUS, Tr Doll PA-C, Last Rate: 60 mL/hr at 11/02/20 2304    fat emulsion 20% (LIPOSYN, INTRAlipid) infusion 250 mL, 250 mL, IntraVENous, CONTINUOUS, Tr Doll PA-C, Last Rate: 20.83 mL/hr at 11/02/20 2304, 250 mL at 11/02/20 2304    piperacillin-tazobactam (ZOSYN) 3.375 g in 0.9% sodium chloride (MBP/ADV) 100 mL MBP, 3.375 g, IntraVENous, Q8H, Reasoner, Antonio Smith PA-C, Stopped at 11/02/20 1900    metoprolol (LOPRESSOR) injection 2.5 mg, 2.5 mg, IntraVENous, Q6H PRN, Antonio Doll PA-C, 2.5 mg at 10/31/20 1240    metoprolol tartrate (LOPRESSOR) tablet 25 mg, 25 mg, Oral, Q12H, Murali Doll PA-C, 25 mg at 11/02/20 2304    thiamine mononitrate (B-1) tablet 100 mg, 100 mg, Oral, DAILY, Xenia Wilkinson MD, 100 mg at 17/32/06 4671    folic acid (FOLVITE) tablet 1 mg, 1 mg, Oral, DAILY, Xenia Wilkinson MD, 1 mg at 11/01/20 0948    multivitamin (ONE A DAY) tablet 1 Tab, 1 Tab, Oral, DAILY, Xenia Wilkinson MD, 1 Tab at 11/01/20 0948    famotidine (PEPCID) tablet 20 mg, 20 mg, Oral, BID, Xenia Wilkinson MD, 20 mg at 11/02/20 2303    dextrose 5% - 0.9% NaCl with KCl 20 mEq/L infusion, 75 mL/hr, IntraVENous, CONTINUOUS, Murali Doll PA-C, Last Rate: 75 mL/hr at 11/02/20 0531, 75 mL/hr at 11/02/20 0531    HYDROmorphone (DILAUDID) injection 0.5 mg, 0.5 mg, IntraVENous, Q4H PRN, Murali Doll PA-C, 0.5 mg at 11/02/20 0856    sodium chloride (NS) flush 5-40 mL, 5-40 mL, IntraVENous, Q8H, Karmen Coyle MD, 10 mL at 11/02/20 1400    sodium chloride (NS) flush 5-40 mL, 5-40 mL, IntraVENous, PRN, Bird Chandler MD, 10 mL at 11/02/20 1111    acetaminophen (TYLENOL) tablet 650 mg, 650 mg, Oral, Q6H PRN, 650 mg at 11/01/20 2137 **OR** acetaminophen (TYLENOL) suppository 650 mg, 650 mg, Rectal, Q6H PRN, Karmen Coyle MD    promethazine (PHENERGAN) tablet 12.5 mg, 12.5 mg, Oral, Q6H PRN **OR** ondansetron (ZOFRAN) injection 4 mg, 4 mg, IntraVENous, Q6H PRN, Bird Chandler MD, 4 mg at 11/02/20 1111     Immunization History: There is no immunization history on file for this patient. Complete    Review of Systems:     Constitutional:  no fever,  no chills,  no sweats, No weakness, No fatigue, No decreased activity.   Eye: No recent visual problem, No icterus, No discharge, No double vision. Ear/Nose/Mouth/Throat: No decreased hearing, No ear pain, No nasal congestion, No sore throat. Respiratory: No shortness of breath, No cough, No sputum production, No hemoptysis, No wheezing, No cyanosis. Cardiovascular: No chest pain, No palpitations, No bradycardia, No tachycardia, No peripheral edema, No syncope. Gastrointestinal: No nausea,  No vomiting, No diarrhea, No constipation, No heartburn,   abdominal pain & distension. Genitourinary: No dysuria, No hematuria, No change in urine stream, No urethral discharge, No lesions. Hematology/Lymphatics: No bruising tendency, No bleeding tendency, No petechiae, No swollen lymph glands. Endocrine: No excessive thirst, No polyuria, No cold intolerance, No heat intolerance, No excessive hunger. Immunologic: Not immunocompromised, No recurrent fevers, No recurrent infections. Musculoskeletal: No back pain, No neck pain, No joint pain, No muscle pain, No claudication, No decreased range of motion, No trauma. Integumentary: No rash, No pruritus, No abrasions. Neurologic: Alert and oriented X4, No abnormal balance, No headache, No confusion, No numbness, No tingling. Psychiatric: No anxiety, No depression, No frandy. Physical Exam:     Vitals & Measurements:     Wt Readings from Last 3 Encounters:   11/02/20 65.4 kg (144 lb 2.9 oz)     Temp Readings from Last 3 Encounters:   11/02/20 99.9 °F (37.7 °C)     BP Readings from Last 3 Encounters:   11/02/20 (!) 186/85     Pulse Readings from Last 3 Encounters:   11/02/20 89      Ht Readings from Last 3 Encounters:   11/01/20 6' 0.99\" (1.854 m)          General: well appearing, no acute distress  Head: Normal  Face: Nornal  HEENT: atraumatic, PERRLA, moist mucosa, normal pharynx, normal tonsils and adenoids, normal tongue, no fluid in sinuses  Neck: Trachea midline, no carotid bruit, no masses  Chest: Normal.  Respiratory: Normal chest wall expansion, CTA B, no r/r/w, no rubs  Cardiovascular: RRR, no m/r/g, Normal S1 and S2  Abdomen: Soft, tender without guarding or rebound, distended, normal bowel sounds in all quadrants, no hepatosplenomegaly, no tympany. Incision scar: right inguinal area. Genitourinary: No inguinal hernia, normal external gentalia, Testis & scrotum normal, no renal angle tenderness  Rectal: deferred  Musculoskeletal: normal ROM in upper and lower extremities, No joint swelling.   Integumentary: Warm, dry, and pink, with no rash, purpura, or petechia  Heme/Lymph: No lymphadenopathy, no bruises  Neurological:Cranial Nerves II-XII grossly intact, no gross motor or sensory deficit  Psychiatric: Cooperative with normal mood, affect, and cognition      Laboratory Values:   Recent Results (from the past 24 hour(s))   URINALYSIS W/MICROSCOPIC    Collection Time: 11/02/20  2:15 AM   Result Value Ref Range    Color Yellow      Appearance Clear Clear      Specific gravity 1.019 1.003 - 1.030      pH (UA) 7.0 5.0 - 8.0      Protein Negative Negative mg/dL    Glucose Negative Negative mg/dL    Ketone Negative Negative mg/dL    Bilirubin Negative Negative      Blood Negative Negative      Urobilinogen 4.0 (H) 0.1 - 1.0 EU/dL    Nitrites Negative Negative      Leukocyte Esterase Negative Negative      WBC 0-4 0 - 4 /hpf    RBC 0-5 0 - 5 /hpf    Bacteria Negative Negative /hpf    Mucus Trace /lpf   CULTURE, RESPIRATORY/SPUTUM/BRONCH W GRAM STAIN    Collection Time: 11/02/20  2:15 AM    Specimen: Sputum   Result Value Ref Range    Special Requests: No Special Requests      GRAM STAIN 2+ Epithelial cells seen      GRAM STAIN 2+ WBCs seen      GRAM STAIN 2+ Gram Negative Rods      GRAM STAIN 1+ Gram Negative Diplococci      Culture result: PENDING    CULTURE, BLOOD    Collection Time: 11/02/20  2:15 AM    Specimen: Blood   Result Value Ref Range    Special Requests: No Special Requests      Culture result: No growth after 1 hour     CBC WITH AUTOMATED DIFF    Collection Time: 11/02/20  6:46 AM   Result Value Ref Range    WBC 10.8 4.1 - 11.1 K/uL    RBC 3.75 (L) 4.10 - 5.70 M/uL    HGB 11.8 (L) 12.1 - 17.0 g/dL    HCT 35.2 (L) 36.6 - 50.3 %    MCV 93.9 80.0 - 99.0 FL    MCH 31.5 26.0 - 34.0 PG    MCHC 33.5 30.0 - 36.5 g/dL    RDW 12.6 11.5 - 14.5 %    PLATELET 180 879 - 116 K/uL    MPV 11.8 8.9 - 12.9 FL    NEUTROPHILS 75 32 - 75 %    LYMPHOCYTES 13 12 - 49 %    MONOCYTES 12 5 - 13 %    EOSINOPHILS 0 0 - 7 %    BASOPHILS 0 0 - 1 %    IMMATURE GRANULOCYTES 0 0.0 - 0.5 %    ABS. NEUTROPHILS 8.1 (H) 1.8 - 8.0 K/UL    ABS. LYMPHOCYTES 1.4 0.8 - 3.5 K/UL    ABS. MONOCYTES 1.3 (H) 0.0 - 1.0 K/UL    ABS. EOSINOPHILS 0.0 0.0 - 0.4 K/UL    ABS. BASOPHILS 0.0 0.0 - 0.1 K/UL    ABS. IMM. GRANS. 0.0 0.00 - 0.04 K/UL    DF AUTOMATED     METABOLIC PANEL, COMPREHENSIVE    Collection Time: 11/02/20  6:46 AM   Result Value Ref Range    Sodium 134 (L) 136 - 145 mmol/L    Potassium 3.3 (L) 3.5 - 5.1 mmol/L    Chloride 103 97 - 108 mmol/L    CO2 25 21 - 32 mmol/L    Anion gap 6 5 - 15 mmol/L    Glucose 137 (H) 65 - 100 mg/dL    BUN 6 6 - 20 mg/dL    Creatinine 0.62 (L) 0.70 - 1.30 mg/dL    BUN/Creatinine ratio 10 (L) 12 - 20      GFR est AA >60 >60 ml/min/1.73m2    GFR est non-AA >60 >60 ml/min/1.73m2    Calcium 8.6 8.5 - 10.1 mg/dL    Bilirubin, total 1.7 (H) 0.2 - 1.0 mg/dL    AST (SGOT) 36 15 - 37 U/L    ALT (SGPT) 20 12 - 78 U/L    Alk.  phosphatase 49 45 - 117 U/L    Protein, total 6.6 6.4 - 8.2 g/dL    Albumin 2.5 (L) 3.5 - 5.0 g/dL    Globulin 4.1 (H) 2.0 - 4.0 g/dL    A-G Ratio 0.6 (L) 1.1 - 2.2     PROCALCITONIN    Collection Time: 11/02/20 10:37 AM   Result Value Ref Range    Procalcitonin 0.11 (H) 0 ng/mL   TROPONIN I    Collection Time: 11/02/20 10:37 AM   Result Value Ref Range    Troponin-I, Qt. <0.05 <0.05 ng/mL   GLUCOSE, POC    Collection Time: 11/02/20  2:59 PM   Result Value Ref Range    Glucose (POC) 112 (H) 65 - 100 mg/dL    Performed by Darion MELISSA W CONT   Final Result   IMPRESSION: (+) Free intraperitoneal air. Interval placement of G-tube with its   tip projected into stomach lumen. Exact source for free air undetermined. Correlate to recent surgery. Unable to   exclude bowel perforation as possible cause. Moderate volume dependent pleural effusions with associated lower lobe lung   compressive atelectasis. Report called to 4W. Spoke with patient's nurse. XR CHEST PORT   Final Result   IMPRESSION: Probable right perihilar atelectasis. .. Pneumoperitoneum again   noted. Esophageal stent. XR CHEST SNGL V   Final Result   Impression:      Interval placement of a proximal to mid esophageal stent. Free air in the imaged upper abdomen. Mild atelectasis at the lung bases. Findings discussed with Fabi Cardoso on 11/1/2020 at 1125. XR FLUOROSCOPY UNDER 60 MINUTES   Final Result      CT ABD PELV W CONT   Final Result   Impression:   1. No specific evidence of abdominopelvic metastatic disease. 2.  Prostatomegaly. Wall thickening of the urinary bladder may be due to chronic   outlet obstruction and/or cystitis. 3.  Borderline wall thickening of the stomach. May be related to   underdistention, but correlate for gastritis. 4.  Nonobstructing right renal calculus. 5.  See full report for detailed findings. See recently reported chest CT for   supradiaphragmatic findings. CT CHEST W CONT   Final Result   Impression: Enhancing mass of the mid thoracic esophagus measuring proximally 6   x 4 x 3 cm causing dilatation esophagus and obstruction of the upper esophagus   which is fluid filled and patulous. 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 I called Dr. Nahomy Gaona with this result and recommendation   for upper endoscopy at 3:15 PM.      Emphysema. Coronary artery calcifications. Right kidney stone. Please see full report.             CT NECK SOFT TISSUE W CONT   Final Result      XR CHEST SNGL V   Final Result   Impression: Unremarkable chest x-ray, except for degenerative change of the   spine. Assessment:  Problem List Items Addressed This Visit        Other    Esophageal mass - Primary      Other Visit Diagnoses     Hypokalemia        Weight loss        Dehydration           Free air after EGD for placement of esophageal stent by GI. I believe that the free air is secondary to leak through PEG tube site while performing EGD & esophageal stent placement by GI. CT scan shows no obvious extravasation of PO contrast.  However will closely monitor him with antibiotics and change in clinical /physical findings may necessitate an exploration. Plan:    1. Admission  2. Diet: NPO   3. IV fluids  4. SCD  5. IS  6. Pain medications  7. Antibiotics  8. Nausea medication  9. Christiansen  10. G tube to gravity  11. Labs  12. Thank you for the consultation & allowing me to participate in the care of this patient.

## 2020-11-03 NOTE — PROGRESS NOTES
Hematology and Oncology Progress Note    Patient: Tayla Pleitez MRN: [id number]  SSN: [id number]    YOB: 1945  Age: 76 y.o. Sex: male      Admit Date: 10/26/2020    LOS: 8 days     Chief Complaint: Patient was admitted with dysphagia and weight loss    Subjective:     Patient has started on tube feeding. He is not able to swallow anything more than his saliva. He does have some abdominal discomfort. No fever or chills. Objective:     Vitals:    11/03/20 0833 11/03/20 1200 11/03/20 1240 11/03/20 1250   BP: (!) 177/89   (!) 167/76   Pulse: 82 71  81   Resp:       Temp: 98.5 °F (36.9 °C)   98 °F (36.7 °C)   SpO2: 96%   96%   Weight:       Height:   6' 1\" (1.854 m)          Physical Exam:   Constitutional: Elderly -American male looks chronically ill. Not in any acute distress or pain. Eyes: Sclerae anicteric. Conjunctivae no pallor. Has bitemporal wasting. ENMT: Oral mucosa is moist, no thrush, mucositis, or petechiae. Neck: No adenopathy. Cardiovascular: Normal sinus rhythm. Abdomen: Soft. Mild lower abdominal tenderness. Patient has G-tube placed. No hepatosplenomegaly. Extremities: No edema. Skin: No petechiae; no skin rash. Neurologic: Alert/oriented x 3. Lab/Data Review:    Recent Results (from the past 24 hour(s))   CBC WITH AUTOMATED DIFF    Collection Time: 11/03/20 10:57 AM   Result Value Ref Range    WBC 9.8 4.1 - 11.1 K/uL    RBC 3.87 (L) 4.10 - 5.70 M/uL    HGB 12.1 12.1 - 17.0 g/dL    HCT 36.6 36.6 - 50.3 %    MCV 94.6 80.0 - 99.0 FL    MCH 31.3 26.0 - 34.0 PG    MCHC 33.1 30.0 - 36.5 g/dL    RDW 12.6 11.5 - 14.5 %    PLATELET 079 988 - 317 K/uL    MPV 12.1 8.9 - 12.9 FL    NEUTROPHILS 77 (H) 32 - 75 %    LYMPHOCYTES 11 (L) 12 - 49 %    MONOCYTES 11 5 - 13 %    EOSINOPHILS 1 0 - 7 %    BASOPHILS 0 0 - 1 %    IMMATURE GRANULOCYTES 0 0.0 - 0.5 %    ABS. NEUTROPHILS 7.6 1.8 - 8.0 K/UL    ABS. LYMPHOCYTES 1.0 0.8 - 3.5 K/UL    ABS.  MONOCYTES 1.0 0.0 - 1.0 K/UL    ABS. EOSINOPHILS 0.1 0.0 - 0.4 K/UL    ABS. BASOPHILS 0.0 0.0 - 0.1 K/UL    ABS. IMM. GRANS. 0.0 0.00 - 0.04 K/UL    DF AUTOMATED     METABOLIC PANEL, BASIC    Collection Time: 11/03/20 10:57 AM   Result Value Ref Range    Sodium 134 (L) 136 - 145 mmol/L    Potassium 3.5 3.5 - 5.1 mmol/L    Chloride 102 97 - 108 mmol/L    CO2 25 21 - 32 mmol/L    Anion gap 7 5 - 15 mmol/L    Glucose 138 (H) 65 - 100 mg/dL    BUN 8 6 - 20 mg/dL    Creatinine 0.54 (L) 0.70 - 1.30 mg/dL    BUN/Creatinine ratio 15 12 - 20      GFR est AA >60 >60 ml/min/1.73m2    GFR est non-AA >60 >60 ml/min/1.73m2    Calcium 8.7 8.5 - 10.1 mg/dL           Assessment and plan:     Squamous cell carcinoma of Esophagus:  CT abdomen,pelvis negative for metastatic disease except enlarged prostate. S/p  stent placement due to obstruction. S/p PEG tube . PET scan as out pt. Discussed about role of chemotherapy and radiation. D/w GI. Artur Plaza Pt advised to quit smoking and alcohol. Radiation oncology consult noted. -Patient to start chemotherapy on outpatient basis.   -Patient is started on tube feeding. -GI input appreciated. Weight loss:due to malignancy.     SVT:management for primary team and cardiology.     H/o prostate ca:S/p radiation. Check PSA.     Leukocytosis:  Leukocytosis has resolved. Patient's white cell count is now 9800. This dictation was done by dragon, computer voice recognition software. Often unanticipated grammatical, syntax, phones and other interpretive errors are inadvertently transcribed. Please excuse errors that have escaped final proofreading.        Signed By: Jl Duke MD     November 3, 2020

## 2020-11-04 ENCOUNTER — APPOINTMENT (OUTPATIENT)
Dept: CT IMAGING | Age: 75
DRG: 375 | End: 2020-11-04
Attending: SURGERY
Payer: MEDICARE

## 2020-11-04 ENCOUNTER — APPOINTMENT (OUTPATIENT)
Dept: GENERAL RADIOLOGY | Age: 75
DRG: 375 | End: 2020-11-04
Attending: PHYSICIAN ASSISTANT
Payer: MEDICARE

## 2020-11-04 LAB
ALBUMIN SERPL-MCNC: 2.2 G/DL (ref 3.5–5)
ALBUMIN/GLOB SERPL: 0.5 {RATIO} (ref 1.1–2.2)
ALP SERPL-CCNC: 50 U/L (ref 45–117)
ALT SERPL-CCNC: 29 U/L (ref 12–78)
ANION GAP SERPL CALC-SCNC: 1 MMOL/L (ref 5–15)
ANION GAP SERPL CALC-SCNC: 6 MMOL/L (ref 5–15)
AST SERPL W P-5'-P-CCNC: 101 U/L (ref 15–37)
BACTERIA SPEC CULT: NORMAL
BASOPHILS # BLD: 0 K/UL (ref 0–0.1)
BASOPHILS NFR BLD: 0 % (ref 0–1)
BILIRUB SERPL-MCNC: 1.3 MG/DL (ref 0.2–1)
BUN SERPL-MCNC: 8 MG/DL (ref 6–20)
BUN SERPL-MCNC: 8 MG/DL (ref 6–20)
BUN/CREAT SERPL: 15 (ref 12–20)
BUN/CREAT SERPL: 16 (ref 12–20)
CA-I BLD-MCNC: 8.7 MG/DL (ref 8.5–10.1)
CA-I BLD-MCNC: 8.7 MG/DL (ref 8.5–10.1)
CHLORIDE SERPL-SCNC: 102 MMOL/L (ref 97–108)
CHLORIDE SERPL-SCNC: 104 MMOL/L (ref 97–108)
CO2 SERPL-SCNC: 27 MMOL/L (ref 21–32)
CO2 SERPL-SCNC: 28 MMOL/L (ref 21–32)
CREAT SERPL-MCNC: 0.51 MG/DL (ref 0.7–1.3)
CREAT SERPL-MCNC: 0.54 MG/DL (ref 0.7–1.3)
DIFFERENTIAL METHOD BLD: ABNORMAL
EOSINOPHIL # BLD: 0.2 K/UL (ref 0–0.4)
EOSINOPHIL NFR BLD: 2 % (ref 0–7)
ERYTHROCYTE [DISTWIDTH] IN BLOOD BY AUTOMATED COUNT: 13.5 % (ref 11.5–14.5)
GLOBULIN SER CALC-MCNC: 4.6 G/DL (ref 2–4)
GLUCOSE BLD STRIP.AUTO-MCNC: 96 MG/DL (ref 65–100)
GLUCOSE SERPL-MCNC: 102 MG/DL (ref 65–100)
GLUCOSE SERPL-MCNC: 111 MG/DL (ref 65–100)
GRAM STN SPEC: NORMAL
HCT VFR BLD AUTO: 36.3 % (ref 36.6–50.3)
HGB BLD-MCNC: 11.8 G/DL (ref 12.1–17)
IMM GRANULOCYTES # BLD AUTO: 0.1 K/UL (ref 0–0.04)
IMM GRANULOCYTES NFR BLD AUTO: 1 % (ref 0–0.5)
LYMPHOCYTES # BLD: 1.3 K/UL (ref 0.8–3.5)
LYMPHOCYTES NFR BLD: 13 % (ref 12–49)
MAGNESIUM SERPL-MCNC: 1.8 MG/DL (ref 1.6–2.4)
MCH RBC QN AUTO: 30.9 PG (ref 26–34)
MCHC RBC AUTO-ENTMCNC: 32.5 G/DL (ref 30–36.5)
MCV RBC AUTO: 95 FL (ref 80–99)
MONOCYTES # BLD: 1.3 K/UL (ref 0–1)
MONOCYTES NFR BLD: 13 % (ref 5–13)
NEUTS SEG # BLD: 7.3 K/UL (ref 1.8–8)
NEUTS SEG NFR BLD: 71 % (ref 32–75)
NRBC # BLD: 0 K/UL (ref 0–0.01)
NRBC BLD-RTO: 0 PER 100 WBC
PERFORMED BY, TECHID: NORMAL
PLATELET # BLD AUTO: 270 K/UL (ref 150–400)
POTASSIUM SERPL-SCNC: 3.4 MMOL/L (ref 3.5–5.1)
POTASSIUM SERPL-SCNC: 6 MMOL/L (ref 3.5–5.1)
PROT SERPL-MCNC: 6.8 G/DL (ref 6.4–8.2)
RBC # BLD AUTO: 3.82 M/UL (ref 4.1–5.7)
RBC MORPH BLD: ABNORMAL
SODIUM SERPL-SCNC: 131 MMOL/L (ref 136–145)
SODIUM SERPL-SCNC: 137 MMOL/L (ref 136–145)
SPECIAL REQUESTS,SREQ: NORMAL
VANCOMYCIN TROUGH SERPL-MCNC: 8.5 UG/ML (ref 5–10)
WBC # BLD AUTO: 10.2 K/UL (ref 4.1–11.1)

## 2020-11-04 PROCEDURE — 71045 X-RAY EXAM CHEST 1 VIEW: CPT

## 2020-11-04 PROCEDURE — 85025 COMPLETE CBC W/AUTO DIFF WBC: CPT

## 2020-11-04 PROCEDURE — 74011000258 HC RX REV CODE- 258: Performed by: INTERNAL MEDICINE

## 2020-11-04 PROCEDURE — 82962 GLUCOSE BLOOD TEST: CPT

## 2020-11-04 PROCEDURE — 94640 AIRWAY INHALATION TREATMENT: CPT

## 2020-11-04 PROCEDURE — 74011250636 HC RX REV CODE- 250/636: Performed by: PHYSICIAN ASSISTANT

## 2020-11-04 PROCEDURE — 65270000029 HC RM PRIVATE

## 2020-11-04 PROCEDURE — 80202 ASSAY OF VANCOMYCIN: CPT

## 2020-11-04 PROCEDURE — 74011000250 HC RX REV CODE- 250: Performed by: INTERNAL MEDICINE

## 2020-11-04 PROCEDURE — 74011000636 HC RX REV CODE- 636: Performed by: SURGERY

## 2020-11-04 PROCEDURE — 36415 COLL VENOUS BLD VENIPUNCTURE: CPT

## 2020-11-04 PROCEDURE — 94760 N-INVAS EAR/PLS OXIMETRY 1: CPT

## 2020-11-04 PROCEDURE — 74011250636 HC RX REV CODE- 250/636: Performed by: INTERNAL MEDICINE

## 2020-11-04 PROCEDURE — 83735 ASSAY OF MAGNESIUM: CPT

## 2020-11-04 PROCEDURE — 80048 BASIC METABOLIC PNL TOTAL CA: CPT

## 2020-11-04 PROCEDURE — 74011000250 HC RX REV CODE- 250: Performed by: PHYSICIAN ASSISTANT

## 2020-11-04 PROCEDURE — 80053 COMPREHEN METABOLIC PANEL: CPT

## 2020-11-04 PROCEDURE — 74011000258 HC RX REV CODE- 258: Performed by: PHYSICIAN ASSISTANT

## 2020-11-04 PROCEDURE — 74011250636 HC RX REV CODE- 250/636: Performed by: NURSE PRACTITIONER

## 2020-11-04 RX ORDER — DILTIAZEM HCL/D5W 125 MG/125
5 PLASTIC BAG, INJECTION (ML) INTRAVENOUS CONTINUOUS
Status: DISCONTINUED | OUTPATIENT
Start: 2020-11-04 | End: 2020-11-07

## 2020-11-04 RX ORDER — DILTIAZEM HCL/D5W 125 MG/125
5 PLASTIC BAG, INJECTION (ML) INTRAVENOUS CONTINUOUS
Status: DISCONTINUED | OUTPATIENT
Start: 2020-11-04 | End: 2020-11-05

## 2020-11-04 RX ORDER — IPRATROPIUM BROMIDE AND ALBUTEROL SULFATE 2.5; .5 MG/3ML; MG/3ML
3 SOLUTION RESPIRATORY (INHALATION)
Status: COMPLETED | OUTPATIENT
Start: 2020-11-04 | End: 2020-11-05

## 2020-11-04 RX ORDER — METOCLOPRAMIDE HYDROCHLORIDE 5 MG/ML
5 INJECTION INTRAMUSCULAR; INTRAVENOUS ONCE
Status: COMPLETED | OUTPATIENT
Start: 2020-11-04 | End: 2020-11-04

## 2020-11-04 RX ORDER — METOCLOPRAMIDE HYDROCHLORIDE 5 MG/ML
5 INJECTION INTRAMUSCULAR; INTRAVENOUS EVERY 6 HOURS
Status: DISCONTINUED | OUTPATIENT
Start: 2020-11-04 | End: 2020-11-04

## 2020-11-04 RX ADMIN — IPRATROPIUM BROMIDE AND ALBUTEROL SULFATE 3 ML: .5; 3 SOLUTION RESPIRATORY (INHALATION) at 10:39

## 2020-11-04 RX ADMIN — IPRATROPIUM BROMIDE AND ALBUTEROL SULFATE 3 ML: .5; 3 SOLUTION RESPIRATORY (INHALATION) at 14:35

## 2020-11-04 RX ADMIN — IPRATROPIUM BROMIDE AND ALBUTEROL SULFATE 3 ML: .5; 3 SOLUTION RESPIRATORY (INHALATION) at 20:17

## 2020-11-04 RX ADMIN — PIPERACILLIN SODIUM AND TAZOBACTAM SODIUM 3.38 G: 3; .375 INJECTION, POWDER, LYOPHILIZED, FOR SOLUTION INTRAVENOUS at 21:24

## 2020-11-04 RX ADMIN — Medication 5 MG/HR: at 21:27

## 2020-11-04 RX ADMIN — SODIUM CHLORIDE 1000 MG: 9 INJECTION, SOLUTION INTRAVENOUS at 07:05

## 2020-11-04 RX ADMIN — LABETALOL HYDROCHLORIDE 20 MG: 5 INJECTION, SOLUTION INTRAVENOUS at 21:25

## 2020-11-04 RX ADMIN — Medication 10 ML: at 21:28

## 2020-11-04 RX ADMIN — PIPERACILLIN AND TAZOBACTAM 3.38 G: 3; .375 INJECTION, POWDER, LYOPHILIZED, FOR SOLUTION INTRAVENOUS at 02:48

## 2020-11-04 RX ADMIN — DIATRIZOATE MEGLUMINE AND DIATRIZOATE SODIUM 30 ML: 660; 100 LIQUID ORAL; RECTAL at 23:59

## 2020-11-04 RX ADMIN — METOCLOPRAMIDE HYDROCHLORIDE 5 MG: 5 INJECTION INTRAMUSCULAR; INTRAVENOUS at 22:49

## 2020-11-04 RX ADMIN — Medication 5 MG/HR: at 17:07

## 2020-11-04 RX ADMIN — PIPERACILLIN AND TAZOBACTAM 3.38 G: 3; .375 INJECTION, POWDER, LYOPHILIZED, FOR SOLUTION INTRAVENOUS at 12:19

## 2020-11-04 NOTE — ANTIMICROBIAL STEWARDSHIP
The Antimicrobial Stewardship Team has reviewed current therapy. Patient is on Zosyn and Vancomycin for infection. Sputum is growing normal marielle and urine is no growth. MRSA has not been isolated. Consider d/c Vancomycin, as it puts the patient at increased risk for developing resistance, along with C.diff.

## 2020-11-04 NOTE — PROGRESS NOTES
CM met with patient and his son, Marice Kehr, at bedside to discuss DCP. Patient reports feeling weak and has been weak since before hospitalization. CM discussed likely need for SNF at DC, patient agreeable, CM provided patient and son with discharge planning glossary and SNF choice list and explained the referral process and asked that they review SNF choice list and have 3-5 choices for CM next day. CM provided patient and son with CM number. CM continues to follow and will follow-up with patient next day 11/04/2020.

## 2020-11-04 NOTE — PROGRESS NOTES
PT consult received and acknowledged . However , as per nsg. , pt.not appropriate for PT eval at this time sec to pt  not doing well , BP- 178/80 . PT will continue to monitor and reattempt for PT eval when medically appropriate .  Thanks

## 2020-11-04 NOTE — PROGRESS NOTES
Hospitalist Progress Note    Subjective:   Daily Progress Note: 11/4/2020 8:40 AM    Hospital Course:  Patient admitted on October 26, 2020 with difficulty swallowing and generalized weakness and 30 pound weight loss. Patient has been unable to swallow solid foods. CT of the chest revealed an obstructing esophageal mass. EGD performed which revealed the same with biopsy taken. Oncology consultation. Patient will need PEG tube placement and will be set up for a stent placement. Considering patient's need for chemotherapy and radiation if biopsy is consistent with a malignant process patient will require PEG tube placement anyways. EGD with balloon dilatation of the esophagus and stent placement. PEG placed. Cardiac consultation. Recurrent SVT requiring adenosine and ultimately metoprolol for rate control. Patient transferred to telemetry for Cardizem drip. Free air on chest x-ray most likely postprocedural.  Will discontinue PEG tube feedings and placed intermittent suction per gastroenterology. Patient with no abdominal pain and abdomen is soft. Worsening abdominal pain with persistent pneumoperitoneum. Also describes right lower quadrant abdominal pain. CT with p.o. and IV contrast showed free air in the peritoneum however could be related to PEG tube placement. .  Surgical consultation, Dr. Govind Perez. Has started Zosyn and vancomycin empirically. He has not tolerating PEG tube feedings. Therefore we will hold. We will follow up with general surgery and GI    Subjective: Patient says that he is little more short of breath than normal.  He has been spitting up mucus. He denies any abdominal pain.   Per RN he has not tolerated tube feedings and has 120 cc of residual.    Current Facility-Administered Medications   Medication Dose Route Frequency    albuterol-ipratropium (DUO-NEB) 2.5 MG-0.5 MG/3 ML  3 mL Nebulization Q6H RT    amino acid 4.25 % dextrose 5 % with electrolytes (CLINIMIX E) infusion IntraVENous CONTINUOUS    fat emulsion 20% (LIPOSYN, INTRAlipid) infusion 250 mL  250 mL IntraVENous CONTINUOUS    amino acid 4.25 % dextrose 5 % with electrolytes (CLINIMIX E) infusion   IntraVENous CONTINUOUS    fat emulsion 20% (LIPOSYN, INTRAlipid) infusion 250 mL  250 mL IntraVENous CONTINUOUS    labetaloL (NORMODYNE;TRANDATE) injection 20 mg  20 mg IntraVENous Q4H PRN    Vancomycin Pharmacy Dosing   Other PRN    vancomycin (VANCOCIN) 1,000 mg in 0.9% sodium chloride 250 mL (VIAL-MATE)  1,000 mg IntraVENous Q16H    dilTIAZem IR (CARDIZEM) tablet 30 mg  30 mg Oral TIDAC    piperacillin-tazobactam (ZOSYN) 3.375 g in 0.9% sodium chloride (MBP/ADV) 100 mL MBP  3.375 g IntraVENous Q8H    metoprolol (LOPRESSOR) injection 2.5 mg  2.5 mg IntraVENous Q6H PRN    metoprolol tartrate (LOPRESSOR) tablet 25 mg  25 mg Oral Q12H    thiamine mononitrate (B-1) tablet 100 mg  100 mg Oral DAILY    folic acid (FOLVITE) tablet 1 mg  1 mg Oral DAILY    multivitamin (ONE A DAY) tablet 1 Tab  1 Tab Oral DAILY    famotidine (PEPCID) tablet 20 mg  20 mg Oral BID    dextrose 5% - 0.9% NaCl with KCl 20 mEq/L infusion  75 mL/hr IntraVENous CONTINUOUS    HYDROmorphone (DILAUDID) injection 0.5 mg  0.5 mg IntraVENous Q4H PRN    sodium chloride (NS) flush 5-40 mL  5-40 mL IntraVENous Q8H    sodium chloride (NS) flush 5-40 mL  5-40 mL IntraVENous PRN    acetaminophen (TYLENOL) tablet 650 mg  650 mg Oral Q6H PRN    Or    acetaminophen (TYLENOL) suppository 650 mg  650 mg Rectal Q6H PRN    promethazine (PHENERGAN) tablet 12.5 mg  12.5 mg Oral Q6H PRN    Or    ondansetron (ZOFRAN) injection 4 mg  4 mg IntraVENous Q6H PRN        Review of Systems  Constitutional: No fevers, No chills, No sweats, No fatigue, No Weakness  Eyes: No redness  Ears, nose, mouth, throat, and face: No nasal congestion, No sore throat, No voice change  Respiratory: + Shortness of Breath, No cough, No wheezing  Cardiovascular: No chest pain, No palpitations, No extremity edema  Gastrointestinal: No nausea, No vomiting, No diarrhea, + abdominal pain  Genitourinary: No frequency, No dysuria, No hematuria  Integument/breast: No skin lesion(s)   Neurological: No Confusion, No headaches, No dizziness      Objective:     Visit Vitals  BP (!) 194/96 (BP 1 Location: Right arm, BP Patient Position: At rest)   Pulse 93   Temp 100.4 °F (38 °C)   Resp 20   Ht 6' 1\" (1.854 m)   Wt 65.9 kg (145 lb 4.5 oz)   SpO2 98%   BMI 19.17 kg/m²    O2 Flow Rate (L/min): 2 l/min O2 Device: Room air    Temp (24hrs), Av.9 °F (37.2 °C), Min:98 °F (36.7 °C), Max:100.4 °F (38 °C)      No intake/output data recorded.  1901 -  0700  In: -   Out: 550 [Urine:550]    PHYSICAL EXAM:  Constitutional: No acute distress  Skin: Extremities and face reveal no rashes. HEENT: Sclerae anicteric. Extra-occular muscles are intact. No oral ulcers. The neck is supple and no masses. Cardiovascular: Regular rate and rhythm. Respiratory: Slightly labored. Mild stridor  GI: Abdomen distended, soft, and nontender. hypoatice active bowel sounds. Musculoskeletal: No pitting edema of the lower legs. Able to move all ext  Neurological:  Patient is alert and oriented. Cranial nerves II-XII grossly intact  Psychiatric: Mood appears appropriate       Data Review    Recent Results (from the past 24 hour(s))   CBC WITH AUTOMATED DIFF    Collection Time: 20 10:57 AM   Result Value Ref Range    WBC 9.8 4.1 - 11.1 K/uL    RBC 3.87 (L) 4.10 - 5.70 M/uL    HGB 12.1 12.1 - 17.0 g/dL    HCT 36.6 36.6 - 50.3 %    MCV 94.6 80.0 - 99.0 FL    MCH 31.3 26.0 - 34.0 PG    MCHC 33.1 30.0 - 36.5 g/dL    RDW 12.6 11.5 - 14.5 %    PLATELET 778 442 - 830 K/uL    MPV 12.1 8.9 - 12.9 FL    NEUTROPHILS 77 (H) 32 - 75 %    LYMPHOCYTES 11 (L) 12 - 49 %    MONOCYTES 11 5 - 13 %    EOSINOPHILS 1 0 - 7 %    BASOPHILS 0 0 - 1 %    IMMATURE GRANULOCYTES 0 0.0 - 0.5 %    ABS. NEUTROPHILS 7.6 1.8 - 8.0 K/UL    ABS. LYMPHOCYTES 1.0 0.8 - 3.5 K/UL    ABS. MONOCYTES 1.0 0.0 - 1.0 K/UL    ABS. EOSINOPHILS 0.1 0.0 - 0.4 K/UL    ABS. BASOPHILS 0.0 0.0 - 0.1 K/UL    ABS. IMM. GRANS. 0.0 0.00 - 0.04 K/UL    DF AUTOMATED     METABOLIC PANEL, BASIC    Collection Time: 11/03/20 10:57 AM   Result Value Ref Range    Sodium 134 (L) 136 - 145 mmol/L    Potassium 3.5 3.5 - 5.1 mmol/L    Chloride 102 97 - 108 mmol/L    CO2 25 21 - 32 mmol/L    Anion gap 7 5 - 15 mmol/L    Glucose 138 (H) 65 - 100 mg/dL    BUN 8 6 - 20 mg/dL    Creatinine 0.54 (L) 0.70 - 1.30 mg/dL    BUN/Creatinine ratio 15 12 - 20      GFR est AA >60 >60 ml/min/1.73m2    GFR est non-AA >60 >60 ml/min/1.73m2    Calcium 8.7 8.5 - 10.1 mg/dL       Radiology review: CT abdomen and pelvis    Assessment:   1. Esophageal mass  2. Hypokalemia  3. Hypertension  4. Protein malnutrition, moderate status post PEG tube  5. Supraventricular tachycardia  6. Pneumoperitoneum      Plan:    1. EGD performed with complete obstruction of the esophagus. Repeat EGD on 10/30/2020 with EGD and balloon dilation of the esophagus status post stent placement. CT of the chest revealed a mild thoracic esophageal mass measuring 6 x 4 x 3 cm. Oncology consulted  2. Electrolyte panel pending for today. We will continue to monitor. Will supplement if potassium is still low. Reordered PPN  3. Patient is on extended diltiazem. Blood pressure stable. 4.  Patient is status post PEG tube placement. CT of the abdomen pelvis has been reviewed. Stop Peg tube feedings at this time. We will get a chest xray. 5.  Patient had episode of supraventricular tachycardia. Cardiology consulted. Was on IV diltiazem and transitioned into oral diltiazem. 6.  Repeat CT of the and pelvis showed pneumoperitoneum with free air fluid. Discussed with GI and they think it is due to PEG tube. General surgery consult. Procalcitonin within normal limits. Is started on Zosyn and vancomycin  7.   CBC BMP in the a.m.     CODE STATUS full     DVT prophylaxis: loveonx  Ulcer prophylaxis: pepcid    Care Plan discussed with: Patient/Family, Nurse and     Total time spent with patient: 33 minutes.

## 2020-11-04 NOTE — PROGRESS NOTES
OT consult received. Nurse reports pt's BP is elevated and to hold therapy today. Will attempt to see pt another time. Thank you.

## 2020-11-04 NOTE — PROGRESS NOTES
Attempted visit rounding [facility] cardiac telemetry. Only the patient was present during the visit attempt. Patient seemed to be resting/sleeping and did not respond to door knock. Provided silent prayer and support. Chaplains will follow as able and/or needed.     Chaplain Mayito Brito M.Div.    can be reached by calling the  at [facility]  [phone]

## 2020-11-04 NOTE — PROGRESS NOTES
Progress Note      11/4/2020 7:51 AM  NAME: Katy Stevenson   MRN:  [id number]   Admit Diagnosis: Esophageal mass [K22.8]      Problem List:   1.  Incomplete database secondary to the patient being a poor historian  2. Renaldontwaqas Nevarezs presents for evaluation of dysphagia generalized weakness  3.  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  Status post percutaneous endoscopic gastrotomy (PEG) tube insertion  5.  Status post esophageal dilation and stent placement  6.  Status post cholecystectomy  7.  Previous transient ischemia attack (TIA)   8.  Grade II (moderate) diastolic dysfunction, with pseudonormalization  9.  Paroxysmal supraventricular tachycardia (PSVT)  10.  Hypertension     11.  Nicotine dependence  12.  Chronic obstructive pulmonary disease (emphysema)  13.  History of prostate adenocarcinoma (status post radiation therapy)  14.  Status post cholecystectomy  15.  Hypomagnesia       Subjective: The patient is seen and examined in room 489. There were no acute cardiovascular events recorder might. Currently, he denies any cardiovascular complaints. The patient does describe episodes awareness of rapid heart rate.   Telemetry monitor shows no significant dysrhythmia  Medications Personally Reviewed:    Current Facility-Administered Medications   Medication Dose Route Frequency    amino acid 4.25 % dextrose 5 % with electrolytes (CLINIMIX E) infusion   IntraVENous CONTINUOUS    fat emulsion 20% (LIPOSYN, INTRAlipid) infusion 250 mL  250 mL IntraVENous CONTINUOUS    labetaloL (NORMODYNE;TRANDATE) injection 20 mg  20 mg IntraVENous Q4H PRN    Vancomycin Pharmacy Dosing   Other PRN    vancomycin (VANCOCIN) 1,000 mg in 0.9% sodium chloride 250 mL (VIAL-MATE)  1,000 mg IntraVENous Q16H    dilTIAZem IR (CARDIZEM) tablet 30 mg  30 mg Oral TIDAC    piperacillin-tazobactam (ZOSYN) 3.375 g in 0.9% sodium chloride (MBP/ADV) 100 mL MBP  3.375 g IntraVENous Q8H    metoprolol (LOPRESSOR) injection 2.5 mg  2.5 mg IntraVENous Q6H PRN    metoprolol tartrate (LOPRESSOR) tablet 25 mg  25 mg Oral Q12H    thiamine mononitrate (B-1) tablet 100 mg  100 mg Oral DAILY    folic acid (FOLVITE) tablet 1 mg  1 mg Oral DAILY    multivitamin (ONE A DAY) tablet 1 Tab  1 Tab Oral DAILY    famotidine (PEPCID) tablet 20 mg  20 mg Oral BID    dextrose 5% - 0.9% NaCl with KCl 20 mEq/L infusion  75 mL/hr IntraVENous CONTINUOUS    HYDROmorphone (DILAUDID) injection 0.5 mg  0.5 mg IntraVENous Q4H PRN    sodium chloride (NS) flush 5-40 mL  5-40 mL IntraVENous Q8H    sodium chloride (NS) flush 5-40 mL  5-40 mL IntraVENous PRN    acetaminophen (TYLENOL) tablet 650 mg  650 mg Oral Q6H PRN    Or    acetaminophen (TYLENOL) suppository 650 mg  650 mg Rectal Q6H PRN    promethazine (PHENERGAN) tablet 12.5 mg  12.5 mg Oral Q6H PRN    Or    ondansetron (ZOFRAN) injection 4 mg  4 mg IntraVENous Q6H PRN           Objective:      Physical Exam:  Last 24hrs VS reviewed since prior progress note. Most recent are:    Visit Vitals  BP (!) 194/96 (BP 1 Location: Right arm, BP Patient Position: At rest)   Pulse 93   Temp 100.4 °F (38 °C)   Resp 20   Ht 6' 1\" (1.854 m)   Wt 65.9 kg (145 lb 4.5 oz)   SpO2 98%   BMI 19.17 kg/m²     No intake or output data in the 24 hours ending 11/04/20 0751     General Appearance: Well developed, well nourished, alert, no acute distress. Chest: Lungs clear to auscultation bilaterally. Cardiovascular: JVP is not elevated, PMI is not displaced, normal intensity S1 and S2, without S3. Abdomen: Soft, non-tender, bowel sounds are active. Extremities: No edema bilaterally.     Data Review    Telemetry: Sinus rhythm without ventricular ectopy    EKG:   []  No new EKG for review    Lab Data Personally Reviewed:    Recent Labs     11/03/20  1057 11/02/20  0646   WBC 9.8 10.8   HGB 12.1 11.8*   HCT 36.6 35.2*    170     No results for input(s): INR, PTP, APTT, INREXT in the last 72 hours. Recent Labs     11/03/20  1057 11/02/20  0646   * 134*   K 3.5 3.3*    103   CO2 25 25   BUN 8 6   CREA 0.54* 0.62*   * 137*   CA 8.7 8.6     Recent Labs     11/02/20  1037   TROIQ <0.05     No results found for: CHOL, CHOLX, CHLST, CHOLV, HDL, HDLP, LDL, LDLC, DLDLP, TGLX, TRIGL, TRIGP, CHHD, CHHDX    Recent Labs     11/02/20  0646   AP 49   TP 6.6   ALB 2.5*   GLOB 4.1*     No results for input(s): PH, PCO2, PO2 in the last 72 hours. Assessment/Plan:   1. Continue telemetry monitoring  2. Continue to monitor serum elects lites, and renal function  3.   Continue current cardiovascular medications as listed     Placido Flaherty MD

## 2020-11-05 ENCOUNTER — APPOINTMENT (OUTPATIENT)
Dept: INTERVENTIONAL RADIOLOGY/VASCULAR | Age: 75
DRG: 375 | End: 2020-11-05
Attending: SURGERY
Payer: MEDICARE

## 2020-11-05 ENCOUNTER — APPOINTMENT (OUTPATIENT)
Dept: CT IMAGING | Age: 75
DRG: 375 | End: 2020-11-05
Attending: SURGERY
Payer: MEDICARE

## 2020-11-05 LAB
ANION GAP SERPL CALC-SCNC: 5 MMOL/L (ref 5–15)
BASOPHILS # BLD: 0.1 K/UL (ref 0–0.1)
BASOPHILS NFR BLD: 1 % (ref 0–1)
BUN SERPL-MCNC: 8 MG/DL (ref 6–20)
BUN/CREAT SERPL: 17 (ref 12–20)
CA-I BLD-MCNC: 8.8 MG/DL (ref 8.5–10.1)
CHLORIDE SERPL-SCNC: 103 MMOL/L (ref 97–108)
CO2 SERPL-SCNC: 27 MMOL/L (ref 21–32)
CREAT SERPL-MCNC: 0.46 MG/DL (ref 0.7–1.3)
DIFFERENTIAL METHOD BLD: ABNORMAL
EOSINOPHIL # BLD: 0.3 K/UL (ref 0–0.4)
EOSINOPHIL NFR BLD: 3 % (ref 0–7)
ERYTHROCYTE [DISTWIDTH] IN BLOOD BY AUTOMATED COUNT: 12.6 % (ref 11.5–14.5)
GLUCOSE BLD STRIP.AUTO-MCNC: 120 MG/DL (ref 65–100)
GLUCOSE SERPL-MCNC: 116 MG/DL (ref 65–100)
HCT VFR BLD AUTO: 32.9 % (ref 36.6–50.3)
HGB BLD-MCNC: 11 G/DL (ref 12.1–17)
IMM GRANULOCYTES # BLD AUTO: 0 K/UL (ref 0–0.04)
IMM GRANULOCYTES NFR BLD AUTO: 0 % (ref 0–0.5)
LYMPHOCYTES # BLD: 1.2 K/UL (ref 0.8–3.5)
LYMPHOCYTES NFR BLD: 14 % (ref 12–49)
MCH RBC QN AUTO: 31.5 PG (ref 26–34)
MCHC RBC AUTO-ENTMCNC: 33.4 G/DL (ref 30–36.5)
MCV RBC AUTO: 94.3 FL (ref 80–99)
MONOCYTES # BLD: 1.6 K/UL (ref 0–1)
MONOCYTES NFR BLD: 19 % (ref 5–13)
NEUTS SEG # BLD: 5.2 K/UL (ref 1.8–8)
NEUTS SEG NFR BLD: 63 % (ref 32–75)
PERFORMED BY, TECHID: ABNORMAL
PLATELET # BLD AUTO: 183 K/UL (ref 150–400)
PMV BLD AUTO: 12.4 FL (ref 8.9–12.9)
POTASSIUM SERPL-SCNC: 3.5 MMOL/L (ref 3.5–5.1)
RBC # BLD AUTO: 3.49 M/UL (ref 4.1–5.7)
SODIUM SERPL-SCNC: 135 MMOL/L (ref 136–145)
WBC # BLD AUTO: 8.3 K/UL (ref 4.1–11.1)

## 2020-11-05 PROCEDURE — 74011000250 HC RX REV CODE- 250: Performed by: INTERNAL MEDICINE

## 2020-11-05 PROCEDURE — 74011250636 HC RX REV CODE- 250/636: Performed by: PHYSICIAN ASSISTANT

## 2020-11-05 PROCEDURE — 85025 COMPLETE CBC W/AUTO DIFF WBC: CPT

## 2020-11-05 PROCEDURE — 82962 GLUCOSE BLOOD TEST: CPT

## 2020-11-05 PROCEDURE — 97530 THERAPEUTIC ACTIVITIES: CPT

## 2020-11-05 PROCEDURE — 74011000636 HC RX REV CODE- 636: Performed by: SURGERY

## 2020-11-05 PROCEDURE — 74011250636 HC RX REV CODE- 250/636: Performed by: INTERNAL MEDICINE

## 2020-11-05 PROCEDURE — 74011000250 HC RX REV CODE- 250: Performed by: PHYSICIAN ASSISTANT

## 2020-11-05 PROCEDURE — 74177 CT ABD & PELVIS W/CONTRAST: CPT

## 2020-11-05 PROCEDURE — 36415 COLL VENOUS BLD VENIPUNCTURE: CPT

## 2020-11-05 PROCEDURE — 97161 PT EVAL LOW COMPLEX 20 MIN: CPT

## 2020-11-05 PROCEDURE — 74011000258 HC RX REV CODE- 258: Performed by: PHYSICIAN ASSISTANT

## 2020-11-05 PROCEDURE — 74011000258 HC RX REV CODE- 258: Performed by: INTERNAL MEDICINE

## 2020-11-05 PROCEDURE — 94640 AIRWAY INHALATION TREATMENT: CPT

## 2020-11-05 PROCEDURE — C1729 CATH, DRAINAGE: HCPCS

## 2020-11-05 PROCEDURE — 80048 BASIC METABOLIC PNL TOTAL CA: CPT

## 2020-11-05 PROCEDURE — 65270000029 HC RM PRIVATE

## 2020-11-05 RX ORDER — FUROSEMIDE 10 MG/ML
40 INJECTION INTRAMUSCULAR; INTRAVENOUS EVERY 12 HOURS
Status: COMPLETED | OUTPATIENT
Start: 2020-11-05 | End: 2020-11-05

## 2020-11-05 RX ADMIN — Medication 10 ML: at 22:17

## 2020-11-05 RX ADMIN — PIPERACILLIN SODIUM AND TAZOBACTAM SODIUM 3.38 G: 3; .375 INJECTION, POWDER, LYOPHILIZED, FOR SOLUTION INTRAVENOUS at 19:22

## 2020-11-05 RX ADMIN — ASCORBIC ACID, VITAMIN A PALMITATE, CHOLECALCIFEROL, THIAMINE HYDROCHLORIDE, RIBOFLAVIN-5 PHOSPHATE SODIUM, PYRIDOXINE HYDROCHLORIDE, NIACINAMIDE, DEXPANTHENOL, ALPHA-TOCOPHEROL ACETATE, VITAMIN K1, FOLIC ACID, BIOTIN, CYANOCOBALAMIN: 200; 3300; 200; 6; 3.6; 6; 40; 15; 10; 150; 600; 60; 5 INJECTION, SOLUTION INTRAVENOUS at 22:07

## 2020-11-05 RX ADMIN — I.V. FAT EMULSION 250 ML: 20 EMULSION INTRAVENOUS at 22:07

## 2020-11-05 RX ADMIN — FUROSEMIDE 40 MG: 10 INJECTION, SOLUTION INTRAMUSCULAR; INTRAVENOUS at 22:06

## 2020-11-05 RX ADMIN — HYDROMORPHONE HYDROCHLORIDE 0.5 MG: 1 INJECTION, SOLUTION INTRAMUSCULAR; INTRAVENOUS; SUBCUTANEOUS at 05:44

## 2020-11-05 RX ADMIN — I.V. FAT EMULSION 250 ML: 20 EMULSION INTRAVENOUS at 00:00

## 2020-11-05 RX ADMIN — Medication 10 ML: at 06:00

## 2020-11-05 RX ADMIN — Medication 5 MG/HR: at 19:22

## 2020-11-05 RX ADMIN — PIPERACILLIN SODIUM AND TAZOBACTAM SODIUM 3.38 G: 3; .375 INJECTION, POWDER, LYOPHILIZED, FOR SOLUTION INTRAVENOUS at 03:56

## 2020-11-05 RX ADMIN — PIPERACILLIN SODIUM AND TAZOBACTAM SODIUM 3.38 G: 3; .375 INJECTION, POWDER, LYOPHILIZED, FOR SOLUTION INTRAVENOUS at 11:38

## 2020-11-05 RX ADMIN — LABETALOL HYDROCHLORIDE 20 MG: 5 INJECTION, SOLUTION INTRAVENOUS at 04:43

## 2020-11-05 RX ADMIN — FUROSEMIDE 40 MG: 10 INJECTION, SOLUTION INTRAMUSCULAR; INTRAVENOUS at 11:38

## 2020-11-05 RX ADMIN — LABETALOL HYDROCHLORIDE 20 MG: 5 INJECTION, SOLUTION INTRAVENOUS at 09:01

## 2020-11-05 RX ADMIN — ASCORBIC ACID, VITAMIN A PALMITATE, CHOLECALCIFEROL, THIAMINE HYDROCHLORIDE, RIBOFLAVIN-5 PHOSPHATE SODIUM, PYRIDOXINE HYDROCHLORIDE, NIACINAMIDE, DEXPANTHENOL, ALPHA-TOCOPHEROL ACETATE, VITAMIN K1, FOLIC ACID, BIOTIN, CYANOCOBALAMIN: 200; 3300; 200; 6; 3.6; 6; 40; 15; 10; 150; 600; 60; 5 INJECTION, SOLUTION INTRAVENOUS at 00:01

## 2020-11-05 RX ADMIN — IOPAMIDOL 100 ML: 755 INJECTION, SOLUTION INTRAVENOUS at 03:34

## 2020-11-05 RX ADMIN — IPRATROPIUM BROMIDE AND ALBUTEROL SULFATE 3 ML: .5; 3 SOLUTION RESPIRATORY (INHALATION) at 02:05

## 2020-11-05 NOTE — PROGRESS NOTES
Progress Note      11/5/2020 6:37 AM  NAME: Edna Redman   MRN:  [id number]   Admit Diagnosis: Esophageal mass [K22.8]      Problem List:   1.  Incomplete database secondary to the patient being a poor historian  2. Vivian Drummond presents for evaluation of dysphagia generalized weakness  3.  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  Status post percutaneous endoscopic gastrotomy (PEG) tube insertion  5.  Status post esophageal dilation and stent placement  6.  Status post cholecystectomy  7.  Previous transient ischemia attack (TIA)   8.  Grade II (moderate) diastolic dysfunction, with pseudonormalization  9.  Paroxysmal supraventricular tachycardia (PSVT)  10.  Hypertension     11.  Nicotine dependence  12.  Chronic obstructive pulmonary disease (emphysema)  13.  History of prostate adenocarcinoma (status post radiation therapy)  14.  Status post cholecystectomy  15.  Hypomagnesia       Subjective: The patient is seen and examined in room 489. There were no acute cardiovascular events reported overnight. Currently, he denies any cardiovascular complaints. The patient denies awareness of rapid heart rate, palpitations or missed beats.      Medications Personally Reviewed:    Current Facility-Administered Medications   Medication Dose Route Frequency    amino acid 4.25 % dextrose 5 % with electrolytes (CLINIMIX E) infusion   IntraVENous CONTINUOUS    fat emulsion 20% (LIPOSYN, INTRAlipid) infusion 250 mL  250 mL IntraVENous CONTINUOUS    piperacillin-tazobactam (ZOSYN) 3.375 g in 0.9% sodium chloride (MBP/ADV) 100 mL MBP  3.375 g IntraVENous Q8H    dilTIAZem (CARDIZEM) 125 mg/125 mL (1 mg/mL) dextrose 5% infusion  5 mg/hr IntraVENous CONTINUOUS    labetaloL (NORMODYNE;TRANDATE) 20 mg/4 mL (5 mg/mL) injection 20 mg  20 mg IntraVENous Q4H PRN    metoprolol (LOPRESSOR) injection 2.5 mg  2.5 mg IntraVENous Q6H PRN    metoprolol tartrate (LOPRESSOR) tablet 25 mg  25 mg Oral Q12H    thiamine mononitrate (B-1) tablet 100 mg  100 mg Oral DAILY    folic acid (FOLVITE) tablet 1 mg  1 mg Oral DAILY    multivitamin (ONE A DAY) tablet 1 Tab  1 Tab Oral DAILY    famotidine (PEPCID) tablet 20 mg  20 mg Oral BID    dextrose 5% - 0.9% NaCl with KCl 20 mEq/L infusion  75 mL/hr IntraVENous CONTINUOUS    HYDROmorphone (DILAUDID) injection 0.5 mg  0.5 mg IntraVENous Q4H PRN    sodium chloride (NS) flush 5-40 mL  5-40 mL IntraVENous Q8H    sodium chloride (NS) flush 5-40 mL  5-40 mL IntraVENous PRN    acetaminophen (TYLENOL) tablet 650 mg  650 mg Oral Q6H PRN    Or    acetaminophen (TYLENOL) suppository 650 mg  650 mg Rectal Q6H PRN    promethazine (PHENERGAN) tablet 12.5 mg  12.5 mg Oral Q6H PRN    Or    ondansetron (ZOFRAN) injection 4 mg  4 mg IntraVENous Q6H PRN           Objective:      Physical Exam:  Last 24hrs VS reviewed since prior progress note. Most recent are:    Visit Vitals  BP (!) 195/85   Pulse 79   Temp 99.1 °F (37.3 °C)   Resp 20   Ht 6' 1\" (1.854 m)   Wt 66.1 kg (145 lb 11.6 oz)   SpO2 96%   BMI 19.23 kg/m²       Intake/Output Summary (Last 24 hours) at 11/5/2020 7009  Last data filed at 11/5/2020 0359  Gross per 24 hour   Intake    Output 2900 ml   Net -2900 ml        General Appearance: Well developed, well nourished, alert, no acute distress. Chest: Lungs clear to auscultation bilaterally. Cardiovascular: JVP is not elevated, PMI is not displaced, normal intensity S1 and S2, without S3. Abdomen: Soft, non-tender, bowel sounds are active. Extremities: No edema bilaterally. Data Review    Telemetry: Sinus rhythm without ventricular ectopy    EKG:   [x]  No new EKG for review    Lab Data Personally Reviewed:    Recent Labs     11/04/20  0745 11/03/20  1057   WBC 10.2 9.8   HGB 11.8* 12.1   HCT 36.3* 36.6    168     No results for input(s): INR, PTP, APTT, INREXT in the last 72 hours.    Recent Labs     11/04/20  2121 11/04/20  1335 11/03/20  1057    131* 134*   K 3.4* 6.0* 3.5    102 102   CO2 27 28 25   BUN 8 8 8   CREA 0.51* 0.54* 0.54*   * 102* 138*   CA 8.7 8.7 8.7   MG 1.8  --   --      Recent Labs     11/02/20  1037   TROIQ <0.05     No results found for: CHOL, CHOLX, CHLST, CHOLV, HDL, HDLP, LDL, LDLC, DLDLP, TGLX, TRIGL, TRIGP, CHHD, CHHDX    Recent Labs     11/04/20  1335 11/02/20  0646   AP 50 49   TP 6.8 6.6   ALB 2.2* 2.5*   GLOB 4.6* 4.1*     No results for input(s): PH, PCO2, PO2 in the last 72 hours. Assessment/Plan:   1. Continue telemetry monitoring  2. Continue to monitor serum electrolytes, renal function  3.   Continue current cardiovascular medications       Casey Covington MD

## 2020-11-05 NOTE — CONSULTS
Vascular and Interventional Radiology Consult Note     NAME:  Ellie Crow   :     MRN:  [id number]      Date:  10/26/2020    Consulting service:  Surgery  Consulting provider: Dr. Nazario Ambrosio    HPI:     Ellie Crow is a 76 y.o. male, w/ newly diagnosed esophageal SCC causing obstruction, s/p EGD w/ esophageal stenting and PEG placement. Patient was then found to have large pneumoperitoneum. Patient denies denies any N/V, F/C, diarrhea, SOA/CP, or new limb numbness/tingling. He does endorses some mild abdominal discomfort. ROS:  See HPI    Patient Vitals for the past 24 hrs:   Temp Pulse Resp BP SpO2   20 1052 98.8 °F (37.1 °C) 68 20 (!) 175/86 96 %   20 0717 98.9 °F (37.2 °C) 60 20 (!) 178/87 96 %   20 0354 99.1 °F (37.3 °C) 79 20 (!) 195/85 96 %   20 0201     97 %   20 2317 99.4 °F (37.4 °C) 74 20 (!) 164/81 96 %   20 2016     97 %   20 1940 98.8 °F (37.1 °C) 74 20 (!) 175/85 98 %   20 1504 98.3 °F (36.8 °C) 78 20 (!) 178/80 97 %   20 1436     98 %         Recent Results (from the past 24 hour(s))   METABOLIC PANEL, COMPREHENSIVE    Collection Time: 20  1:35 PM   Result Value Ref Range    Sodium 131 (L) 136 - 145 mmol/L    Potassium 6.0 (H) 3.5 - 5.1 mmol/L    Chloride 102 97 - 108 mmol/L    CO2 28 21 - 32 mmol/L    Anion gap 1 (L) 5 - 15 mmol/L    Glucose 102 (H) 65 - 100 mg/dL    BUN 8 6 - 20 mg/dL    Creatinine 0.54 (L) 0.70 - 1.30 mg/dL    BUN/Creatinine ratio 15 12 - 20      GFR est AA >60 >60 ml/min/1.73m2    GFR est non-AA >60 >60 ml/min/1.73m2    Calcium 8.7 8.5 - 10.1 mg/dL    Bilirubin, total 1.3 (H) 0.2 - 1.0 mg/dL    AST (SGOT) 101 (H) 15 - 37 U/L    ALT (SGPT) 29 12 - 78 U/L    Alk.  phosphatase 50 45 - 117 U/L    Protein, total 6.8 6.4 - 8.2 g/dL    Albumin 2.2 (L) 3.5 - 5.0 g/dL    Globulin 4.6 (H) 2.0 - 4.0 g/dL    A-G Ratio 0.5 (L) 1.1 - 2.2     VANCOMYCIN, TROUGH    Collection Time: 20  1:35 PM Result Value Ref Range    Vancomycin,trough 8.5 5.0 - 10.0 ug/mL   MAGNESIUM    Collection Time: 11/04/20  9:21 PM   Result Value Ref Range    Magnesium 1.8 1.6 - 2.4 mg/dL   METABOLIC PANEL, BASIC    Collection Time: 11/04/20  9:21 PM   Result Value Ref Range    Sodium 137 136 - 145 mmol/L    Potassium 3.4 (L) 3.5 - 5.1 mmol/L    Chloride 104 97 - 108 mmol/L    CO2 27 21 - 32 mmol/L    Anion gap 6 5 - 15 mmol/L    Glucose 111 (H) 65 - 100 mg/dL    BUN 8 6 - 20 mg/dL    Creatinine 0.51 (L) 0.70 - 1.30 mg/dL    BUN/Creatinine ratio 16 12 - 20      GFR est AA >60 >60 ml/min/1.73m2    GFR est non-AA >60 >60 ml/min/1.73m2    Calcium 8.7 8.5 - 10.1 mg/dL   GLUCOSE, POC    Collection Time: 11/04/20 10:48 PM   Result Value Ref Range    Glucose (POC) 96 65 - 100 mg/dL    Performed by SARAH BETH BRUMFIELD    METABOLIC PANEL, BASIC    Collection Time: 11/05/20  6:37 AM   Result Value Ref Range    Sodium 135 (L) 136 - 145 mmol/L    Potassium 3.5 3.5 - 5.1 mmol/L    Chloride 103 97 - 108 mmol/L    CO2 27 21 - 32 mmol/L    Anion gap 5 5 - 15 mmol/L    Glucose 116 (H) 65 - 100 mg/dL    BUN 8 6 - 20 mg/dL    Creatinine 0.46 (L) 0.70 - 1.30 mg/dL    BUN/Creatinine ratio 17 12 - 20      GFR est AA >60 >60 ml/min/1.73m2    GFR est non-AA >60 >60 ml/min/1.73m2    Calcium 8.8 8.5 - 10.1 mg/dL   CBC WITH AUTOMATED DIFF    Collection Time: 11/05/20  6:37 AM   Result Value Ref Range    WBC 8.3 4.1 - 11.1 K/uL    RBC 3.49 (L) 4.10 - 5.70 M/uL    HGB 11.0 (L) 12.1 - 17.0 g/dL    HCT 32.9 (L) 36.6 - 50.3 %    MCV 94.3 80.0 - 99.0 FL    MCH 31.5 26.0 - 34.0 PG    MCHC 33.4 30.0 - 36.5 g/dL    RDW 12.6 11.5 - 14.5 %    PLATELET 013 019 - 996 K/uL    MPV 12.4 8.9 - 12.9 FL    NEUTROPHILS 63 32 - 75 %    LYMPHOCYTES 14 12 - 49 %    MONOCYTES 19 (H) 5 - 13 %    EOSINOPHILS 3 0 - 7 %    BASOPHILS 1 0 - 1 %    IMMATURE GRANULOCYTES 0 0.0 - 0.5 %    ABS. NEUTROPHILS 5.2 1.8 - 8.0 K/UL    ABS. LYMPHOCYTES 1.2 0.8 - 3.5 K/UL    ABS.  MONOCYTES 1.6 (H) 0.0 - 1.0 K/UL    ABS. EOSINOPHILS 0.3 0.0 - 0.4 K/UL    ABS. BASOPHILS 0.1 0.0 - 0.1 K/UL    ABS. IMM. GRANS. 0.0 0.00 - 0.04 K/UL    DF AUTOMATED     GLUCOSE, POC    Collection Time: 11/05/20  7:16 AM   Result Value Ref Range    Glucose (POC) 120 (H) 65 - 100 mg/dL    Performed by Darion Willard         Imaging:  Xr Chest Sngl V    Result Date: 11/1/2020  Impression: Interval placement of a proximal to mid esophageal stent. Free air in the imaged upper abdomen. Mild atelectasis at the lung bases. Findings discussed with Janeth Blair on 11/1/2020 at 1125. Xr Chest Sngl V    Result Date: 10/26/2020  Impression: Unremarkable chest x-ray, except for degenerative change of the spine. Ct Chest W Cont    Result Date: 10/26/2020  Impression: Enhancing mass of the mid thoracic esophagus measuring proximally 6 x 4 x 3 cm causing dilatation esophagus and obstruction of the upper esophagus which is fluid filled and patulous. These findings are consistent with neoplasm until proven otherwise. I called Dr. Mabel Melgar with this result and recommendation for upper endoscopy at 3:15 PM. Emphysema. Coronary artery calcifications. Right kidney stone. Please see full report. Ct Abd Pelv W Cont    Result Date: 11/5/2020  Impression: 1. Persistent large volume of pneumoperitoneum. Hollow viscus perforation is not excluded, but there is no extraluminal contrast material. This alternatively could be due to leakage of the air around the gastrostomy tube insertion site. 2.  Moderate bilateral pleural effusions. 3.  Cholelithiasis. Nonobstructing right renal calculus. 4.  See full report for detailed findings. Ct Abd Pelv W Cont    Result Date: 11/2/2020  IMPRESSION: (+) Free intraperitoneal air. Interval placement of G-tube with its tip projected into stomach lumen. Exact source for free air undetermined. Correlate to recent surgery. Unable to exclude bowel perforation as possible cause.  Moderate volume dependent pleural effusions with associated lower lobe lung compressive atelectasis. Report called to 4W. Spoke with patient's nurse. Ct Abd Pelv W Cont    Result Date: 10/28/2020  Impression: 1. No specific evidence of abdominopelvic metastatic disease. 2.  Prostatomegaly. Wall thickening of the urinary bladder may be due to chronic outlet obstruction and/or cystitis. 3.  Borderline wall thickening of the stomach. May be related to underdistention, but correlate for gastritis. 4.  Nonobstructing right renal calculus. 5.  See full report for detailed findings. See recently reported chest CT for supradiaphragmatic findings. Xr Chest Port    Result Date: 11/2/2020  IMPRESSION: Probable right perihilar atelectasis. .. Pneumoperitoneum again noted. Esophageal stent. Physical Exam:  Skin:  Extremities and face reveal no rashes. HEENT: Sclerae anicteric. Extra-occular muscles are intact. Cardiovascular: Regular rate and rhythm. Respiratory:  Comfortable breathing with no accessory muscle use. GI:  Abdomen distended, soft, and nontender. Rectal:  Deferred  Musculoskeletal:  No pitting edema of the lower legs. Neurological:  Patient is alert and oriented. Assessment/Plan:  Melodie Devine is a 76 y.o. male, w/ h/o newly diagnosed obstructive esophageal SCC, s/p EGD stenting and PEG placement, who was found to have large pneumoperitoneum.      - Will placement a percutaneous drainage catheter since patient is symptomatic from the large pneumoperitoneum  --- Risk and benefit and alternative treatment was thoroughly explained to the patient  --- He expressed understanding and decided to proceed with the procedure  - Labs all reviewed, appropriate  - Thanks for involving [facility] in patient's care     Ramiro Ding MD PhD

## 2020-11-05 NOTE — PROGRESS NOTES
Hematology and Oncology Progress Note    Patient: Manjinder Rodriguez MRN: [id number]  SSN: [id number]    YOB: 1945  Age: 76 y.o. Sex: male      Admit Date: 10/26/2020    LOS: 9 days     Chief Complaint: Patient is complaining of abdominal pain    Subjective:     Patient is accompanied by his son. Patient is complaining of abdominal pain. His tube feeding is stopped. Objective:     Vitals:    11/04/20 1105 11/04/20 1436 11/04/20 1504 11/04/20 1940   BP: (!) 175/85  (!) 178/80 (!) 175/85   Pulse: 88  78 74   Resp: 20 20 20   Temp: 99.3 °F (37.4 °C)  98.3 °F (36.8 °C) 98.8 °F (37.1 °C)   SpO2: 97% 98% 97% 98%   Weight:       Height:          Physical Exam:   Constitutional: Elderly -American male looks chronically ill.  Not in any acute distress or pain.  Eyes: Sclerae anicteric. Conjunctivae no pallor. Has bitemporal wasting. ENMT: Oral mucosa is moist, no thrush, mucositis, or petechiae. Neck: No adenopathy. Cardiovascular: Normal sinus rhythm. Abdomen: Abdomen is distended and has some tenderness. Patient has G-tube placed. No hepatosplenomegaly. Extremities: No edema. Skin: No petechiae; no skin rash. Neurologic: Alert/oriented x 3. Lab/Data Review:    Recent Results (from the past 24 hour(s))   CBC WITH AUTOMATED DIFF    Collection Time: 11/04/20  7:45 AM   Result Value Ref Range    WBC 10.2 4.1 - 11.1 K/uL    RBC 3.82 (L) 4.10 - 5.70 M/uL    HGB 11.8 (L) 12.1 - 17.0 g/dL    HCT 36.3 (L) 36.6 - 50.3 %    MCV 95.0 80.0 - 99.0 FL    MCH 30.9 26.0 - 34.0 PG    MCHC 32.5 30.0 - 36.5 g/dL    RDW 13.5 11.5 - 14.5 %    PLATELET 325 114 - 697 K/uL    NRBC 0.0 0  WBC    ABSOLUTE NRBC 0.00 0.00 - 0.01 K/uL    NEUTROPHILS 71 32 - 75 %    LYMPHOCYTES 13 12 - 49 %    MONOCYTES 13 5 - 13 %    EOSINOPHILS 2 0 - 7 %    BASOPHILS 0 0 - 1 %    IMMATURE GRANULOCYTES 1 (H) 0.0 - 0.5 %    ABS. NEUTROPHILS 7.3 1.8 - 8.0 K/UL    ABS. LYMPHOCYTES 1.3 0.8 - 3.5 K/UL    ABS.  MONOCYTES 1.3 (H) 0.0 - 1.0 K/UL    ABS. EOSINOPHILS 0.2 0.0 - 0.4 K/UL    ABS. BASOPHILS 0.0 0.0 - 0.1 K/UL    ABS. IMM. GRANS. 0.1 (H) 0.00 - 0.04 K/UL    DF AUTOMATED      RBC COMMENTS Pily cells  1+       METABOLIC PANEL, COMPREHENSIVE    Collection Time: 11/04/20  1:35 PM   Result Value Ref Range    Sodium 131 (L) 136 - 145 mmol/L    Potassium 6.0 (H) 3.5 - 5.1 mmol/L    Chloride 102 97 - 108 mmol/L    CO2 28 21 - 32 mmol/L    Anion gap 1 (L) 5 - 15 mmol/L    Glucose 102 (H) 65 - 100 mg/dL    BUN 8 6 - 20 mg/dL    Creatinine 0.54 (L) 0.70 - 1.30 mg/dL    BUN/Creatinine ratio 15 12 - 20      GFR est AA >60 >60 ml/min/1.73m2    GFR est non-AA >60 >60 ml/min/1.73m2    Calcium 8.7 8.5 - 10.1 mg/dL    Bilirubin, total 1.3 (H) 0.2 - 1.0 mg/dL    AST (SGOT) 101 (H) 15 - 37 U/L    ALT (SGPT) 29 12 - 78 U/L    Alk. phosphatase 50 45 - 117 U/L    Protein, total 6.8 6.4 - 8.2 g/dL    Albumin 2.2 (L) 3.5 - 5.0 g/dL    Globulin 4.6 (H) 2.0 - 4.0 g/dL    A-G Ratio 0.5 (L) 1.1 - 2.2     VANCOMYCIN, TROUGH    Collection Time: 11/04/20  1:35 PM   Result Value Ref Range    Vancomycin,trough 8.5 5.0 - 10.0 ug/mL           Assessment and plan:     Squamous cell carcinoma of Esophagus:  CT abdomen,pelvis negative for metastatic disease except enlarged prostate. S/p  stent placement due to obstruction. S/p PEG tube . PET scan as out pt. Discussed about role of chemotherapy and radiation. D/w GI. Adriana Son Pt advised to quit smoking and alcohol. Radiation oncology consult noted.   -Patient to start chemotherapy on outpatient basis.   -Patient was started on tube feeding but it is now stopped because of concern of free air in the peritoneum.     Weight loss:due to malignancy.     SVT:management for primary team and cardiology.     H/o prostate ca:S/p radiation. Check PSA.     Free air in the peritoneum: Surgery and GI is following patient patient is started on empiric antibiotics. -I have discussed oncologic recommendations with patient and his son.   Case was also discussed with patient's nurse. This dictation was done by [employer], [employer] voice recognition software. Often unanticipated grammatical, syntax, phones and other interpretive errors are inadvertently transcribed. Please excuse errors that have escaped final proofreading.        Signed By: Mary Doshi MD     November 4, 2020

## 2020-11-05 NOTE — PROGRESS NOTES
Bedside shift change report given to Jalen Olvera RN (oncoming nurse) by Deven Alvarenga RN (offgoing nurse). Report included the following information SBAR.

## 2020-11-05 NOTE — PROGRESS NOTES
OT eval order received and acknowledged. OT eval attempted at 1434 however pt ff floor. Will continue to follow patient and attempt OT eval at a later time. Thank you.

## 2020-11-05 NOTE — PROCEDURES
Interventional Radiology Post Procedure Note    11/5/2020    Indications: large pneumoperitoneum    Procedure(s): percutaneous drainage catheter placement    Preliminary Findings (full report to follow): large pneumoperitoneum    Fluoro Time: < 1 minute    Contrast: none     Implants: 8.5F MPD catheter    Complications: None    Plan: catheter to low wall suction     Follow Up: PRN

## 2020-11-05 NOTE — PROGRESS NOTES
CM attempted to meet with patient in room to discuss DCP, however patient off the floor at this time. CM to discuss with patient PT/OT recc of [facility] at a later time. If patient is to return home he will need [facility] and a company set up in order to provide tube feeds. If patient would like to continue with SNF, CM to obtain choice.

## 2020-11-05 NOTE — CONSULTS
[id number] [facility] SURGERY PROGRESS NOTE          Chief Complaint: None    History of Present Illness:    Mr. Ellie Crow is a 76y.o. year old * male admitted with esophageal cancer. Initially underwent PEG tube placement followed by esophageal stent placement. Has free air & hence consulted. CT scan with contrast through PEG tube shows no extravasation of contrast through PEG tube site or small bowel. Has some abdominal pain with distension. No fever or chills. No BM or flatus. But overall he feels lot better. No nausea or vomiting. He was placed on tube feeds by GI. Since he had invcreased residual tube feeds have been stopped. Also on TPN. He feels lot better. Past Medical History:   Past Medical History:   Diagnosis Date    Hypertension        Past Surgical History: Right inguinal hernia repair with mesh. Allergy:No Known Allergies    Social History:  reports that he has been smoking. He has never used smokeless tobacco.     Family History:History reviewed. No pertinent family history.      Current Medications:  Current Facility-Administered Medications:     albuterol-ipratropium (DUO-NEB) 2.5 MG-0.5 MG/3 ML, 3 mL, Nebulization, Q6H RT, Sujata Murray PA-C, 3 mL at 11/04/20 1435    amino acid 4.25 % dextrose 5 % with electrolytes (CLINIMIX E) infusion, , IntraVENous, CONTINUOUS, Sujata Murray PA-C    fat emulsion 20% (LIPOSYN, INTRAlipid) infusion 250 mL, 250 mL, IntraVENous, CONTINUOUS, Sujata Murray PA-C    dilTIAZem (CARDIZEM) 125 mg/125 mL (1 mg/mL) dextrose 5% infusion, 5 mg/hr, IntraVENous, CONTINUOUS, Sujata Murray PA-C, Last Rate: 5 mL/hr at 11/04/20 1917, 5 mg/hr at 11/04/20 1917    piperacillin-tazobactam (ZOSYN) 3.375 g in 0.9% sodium chloride (MBP/ADV) 100 mL MBP, 3.375 g, IntraVENous, Q8H, Karmen Coyle MD    amino acid 4.25 % dextrose 5 % with electrolytes (CLINIMIX E) infusion, , IntraVENous, CONTINUOUS, Sujata Murray PA-C, Last Rate: 83.8 mL/hr at 11/03/20 2149    labetaloL (NORMODYNE;TRANDATE) injection 20 mg, 20 mg, IntraVENous, Q4H PRN, Karmen Coyle MD, 20 mg at 11/03/20 2101    metoprolol (LOPRESSOR) injection 2.5 mg, 2.5 mg, IntraVENous, Q6H PRN, Dank Doll PA-C, 2.5 mg at 10/31/20 1240    metoprolol tartrate (LOPRESSOR) tablet 25 mg, 25 mg, Oral, Q12H, Dank Doll PA-C, Stopped at 11/04/20 0900    thiamine mononitrate (B-1) tablet 100 mg, 100 mg, Oral, DAILY, Khushi Moreno MD, Stopped at 05/92/46 1890    folic acid (FOLVITE) tablet 1 mg, 1 mg, Oral, DAILY, Khushi Moreno MD, Stopped at 11/04/20 0900    multivitamin (ONE A DAY) tablet 1 Tab, 1 Tab, Oral, DAILY, Khushi Moreno MD, Stopped at 11/04/20 0900    famotidine (PEPCID) tablet 20 mg, 20 mg, Oral, BID, Khushi Moreno MD, Stopped at 11/04/20 0900    dextrose 5% - 0.9% NaCl with KCl 20 mEq/L infusion, 75 mL/hr, IntraVENous, CONTINUOUS, Dank Doll PA-C, Last Rate: 75 mL/hr at 11/04/20 1918, 75 mL/hr at 11/04/20 1918    HYDROmorphone (DILAUDID) injection 0.5 mg, 0.5 mg, IntraVENous, Q4H PRN, Murali Doll PA-C, 0.5 mg at 11/03/20 2343    sodium chloride (NS) flush 5-40 mL, 5-40 mL, IntraVENous, Q8H, Zuri Coyle MD, Stopped at 11/03/20 2200    sodium chloride (NS) flush 5-40 mL, 5-40 mL, IntraVENous, PRN, Cindy Shultz MD, 10 mL at 11/02/20 1111    acetaminophen (TYLENOL) tablet 650 mg, 650 mg, Oral, Q6H PRN, 650 mg at 11/01/20 2137 **OR** acetaminophen (TYLENOL) suppository 650 mg, 650 mg, Rectal, Q6H PRN, Karmen Coyle MD    promethazine (PHENERGAN) tablet 12.5 mg, 12.5 mg, Oral, Q6H PRN **OR** ondansetron (ZOFRAN) injection 4 mg, 4 mg, IntraVENous, Q6H PRN, Cindy Shultz MD, 4 mg at 11/02/20 1111     Immunization History: There is no immunization history on file for this patient.    Complete    Review of Systems:     Constitutional:  no fever,  no chills,  no sweats, No weakness, No fatigue, No decreased activity. Eye: No recent visual problem, No icterus, No discharge, No double vision. Ear/Nose/Mouth/Throat: No decreased hearing, No ear pain, No nasal congestion, No sore throat. Respiratory: No shortness of breath, No cough, No sputum production, No hemoptysis, No wheezing, No cyanosis. Cardiovascular: No chest pain, No palpitations, No bradycardia, No tachycardia, No peripheral edema, No syncope. Gastrointestinal: No nausea,  No vomiting, No diarrhea, No constipation, No heartburn,   abdominal pain & distension. Genitourinary: No dysuria, No hematuria, No change in urine stream, No urethral discharge, No lesions. Hematology/Lymphatics: No bruising tendency, No bleeding tendency, No petechiae, No swollen lymph glands. Endocrine: No excessive thirst, No polyuria, No cold intolerance, No heat intolerance, No excessive hunger. Immunologic: Not immunocompromised, No recurrent fevers, No recurrent infections. Musculoskeletal: No back pain, No neck pain, No joint pain, No muscle pain, No claudication, No decreased range of motion, No trauma. Integumentary: No rash, No pruritus, No abrasions. Neurologic: Alert and oriented X4, No abnormal balance, No headache, No confusion, No numbness, No tingling. Psychiatric: No anxiety, No depression, No frandy. Physical Exam:     Vitals & Measurements:     Wt Readings from Last 3 Encounters:   11/03/20 65.9 kg (145 lb 4.5 oz)     Temp Readings from Last 3 Encounters:   11/04/20 98.8 °F (37.1 °C)     BP Readings from Last 3 Encounters:   11/04/20 (!) 175/85     Pulse Readings from Last 3 Encounters:   11/04/20 74      Ht Readings from Last 3 Encounters:   11/03/20 6' 1\" (1.854 m)          General: well appearing, no acute distress  Head: Normal  Face: Nornal  HEENT: atraumatic, PERRLA, moist mucosa, normal pharynx, normal tonsils and adenoids, normal tongue, no fluid in sinuses  Neck: Trachea midline, no carotid bruit, no masses  Chest: Normal.  Respiratory: Normal chest wall expansion, CTA B, no r/r/w, no rubs  Cardiovascular: RRR, no m/r/g, Normal S1 and S2  Abdomen: Soft, mildly tender without guarding or rebound, distended, normal bowel sounds in all quadrants, no hepatosplenomegaly, no tympany. Incision scar: right inguinal area. Genitourinary: No inguinal hernia, normal external gentalia, Testis & scrotum normal, no renal angle tenderness  Rectal: deferred  Musculoskeletal: normal ROM in upper and lower extremities, No joint swelling. Integumentary: Warm, dry, and pink, with no rash, purpura, or petechia  Heme/Lymph: No lymphadenopathy, no bruises  Neurological:Cranial Nerves II-XII grossly intact, no gross motor or sensory deficit  Psychiatric: Cooperative with normal mood, affect, and cognition      Laboratory Values:   Recent Results (from the past 24 hour(s))   CBC WITH AUTOMATED DIFF    Collection Time: 11/04/20  7:45 AM   Result Value Ref Range    WBC 10.2 4.1 - 11.1 K/uL    RBC 3.82 (L) 4.10 - 5.70 M/uL    HGB 11.8 (L) 12.1 - 17.0 g/dL    HCT 36.3 (L) 36.6 - 50.3 %    MCV 95.0 80.0 - 99.0 FL    MCH 30.9 26.0 - 34.0 PG    MCHC 32.5 30.0 - 36.5 g/dL    RDW 13.5 11.5 - 14.5 %    PLATELET 208 020 - 655 K/uL    NRBC 0.0 0  WBC    ABSOLUTE NRBC 0.00 0.00 - 0.01 K/uL    NEUTROPHILS 71 32 - 75 %    LYMPHOCYTES 13 12 - 49 %    MONOCYTES 13 5 - 13 %    EOSINOPHILS 2 0 - 7 %    BASOPHILS 0 0 - 1 %    IMMATURE GRANULOCYTES 1 (H) 0.0 - 0.5 %    ABS. NEUTROPHILS 7.3 1.8 - 8.0 K/UL    ABS. LYMPHOCYTES 1.3 0.8 - 3.5 K/UL    ABS. MONOCYTES 1.3 (H) 0.0 - 1.0 K/UL    ABS. EOSINOPHILS 0.2 0.0 - 0.4 K/UL    ABS. BASOPHILS 0.0 0.0 - 0.1 K/UL    ABS. IMM.  GRANS. 0.1 (H) 0.00 - 0.04 K/UL    DF AUTOMATED      RBC COMMENTS [place] cells  1+       METABOLIC PANEL, COMPREHENSIVE    Collection Time: 11/04/20  1:35 PM   Result Value Ref Range    Sodium 131 (L) 136 - 145 mmol/L    Potassium 6.0 (H) 3.5 - 5.1 mmol/L    Chloride 102 97 - 108 mmol/L    CO2 28 21 - 32 mmol/L    Anion gap 1 (L) 5 - 15 mmol/L    Glucose 102 (H) 65 - 100 mg/dL    BUN 8 6 - 20 mg/dL    Creatinine 0.54 (L) 0.70 - 1.30 mg/dL    BUN/Creatinine ratio 15 12 - 20      GFR est AA >60 >60 ml/min/1.73m2    GFR est non-AA >60 >60 ml/min/1.73m2    Calcium 8.7 8.5 - 10.1 mg/dL    Bilirubin, total 1.3 (H) 0.2 - 1.0 mg/dL    AST (SGOT) 101 (H) 15 - 37 U/L    ALT (SGPT) 29 12 - 78 U/L    Alk. phosphatase 50 45 - 117 U/L    Protein, total 6.8 6.4 - 8.2 g/dL    Albumin 2.2 (L) 3.5 - 5.0 g/dL    Globulin 4.6 (H) 2.0 - 4.0 g/dL    A-G Ratio 0.5 (L) 1.1 - 2.2     VANCOMYCIN, TROUGH    Collection Time: 11/04/20  1:35 PM   Result Value Ref Range    Vancomycin,trough 8.5 5.0 - 10.0 ug/mL         XR CHEST PORT   Final Result      CT ABD PELV W CONT   Final Result   IMPRESSION: (+) Free intraperitoneal air. Interval placement of G-tube with its   tip projected into stomach lumen. Exact source for free air undetermined. Correlate to recent surgery. Unable to   exclude bowel perforation as possible cause. Moderate volume dependent pleural effusions with associated lower lobe lung   compressive atelectasis. Report called to 4W. Spoke with patient's nurse. XR CHEST PORT   Final Result   IMPRESSION: Probable right perihilar atelectasis. .. Pneumoperitoneum again   noted. Esophageal stent. XR CHEST SNGL V   Final Result   Impression:      Interval placement of a proximal to mid esophageal stent. Free air in the imaged upper abdomen. Mild atelectasis at the lung bases. Findings discussed with Sharonda Bermudez on 11/1/2020 at 1125. XR FLUOROSCOPY UNDER 60 MINUTES   Final Result      CT ABD PELV W CONT   Final Result   Impression:   1. No specific evidence of abdominopelvic metastatic disease. 2.  Prostatomegaly. Wall thickening of the urinary bladder may be due to chronic   outlet obstruction and/or cystitis. 3.  Borderline wall thickening of the stomach. May be related to   underdistention, but correlate for gastritis. 4.  Nonobstructing right renal calculus. 5.  See full report for detailed findings. See recently reported chest CT for   supradiaphragmatic findings. CT CHEST W CONT   Final Result   Impression: Enhancing mass of the mid thoracic esophagus measuring proximally 6   x 4 x 3 cm causing dilatation esophagus and obstruction of the upper esophagus   which is fluid filled and patulous. 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 I called Dr. Lyric Blackman with this result and recommendation   for upper endoscopy at 3:15 PM.      Emphysema. Coronary artery calcifications. Right kidney stone. Please see full report. CT NECK SOFT TISSUE W CONT   Final Result      XR CHEST SNGL V   Final Result   Impression: Unremarkable chest x-ray, except for degenerative change of the   spine. Assessment:  Problem List Items Addressed This Visit        Other    Esophageal mass - Primary      Other Visit Diagnoses     Hypokalemia        Weight loss        Dehydration           Free air after EGD for placement of esophageal stent by GI. I believe that the free air is secondary to leak through PEG tube site while performing EGD & esophageal stent placement by GI. CT scan shows no obvious extravasation of PO contrast.  However will closely monitor him with antibiotics and change in clinical /physical findings may necessitate an exploration. Plan:    1. Admission  2. Diet: NPO  3. Do not increase in the tube feed rates. Check for residual.   4. IV fluids  5. SCD  6. IS  7. Pain medications  8. Antibiotics  9. Nausea medication  10. Christiansen  11. G tube to gravity  12. Labs & repeat CT scan of abdomen and pelvi swith PO contrast  12. Thank you for the consultation & allowing me to participate in the care of this patient.

## 2020-11-05 NOTE — PROGRESS NOTES
Hospitalist Progress Note    Subjective:   Daily Progress Note: 11/5/2020 8:40 AM    Hospital Course:  Patient admitted on October 26, 2020 with difficulty swallowing and generalized weakness and 30 pound weight loss. Patient has been unable to swallow solid foods. CT of the chest revealed an obstructing esophageal mass. EGD performed which revealed the same with biopsy taken. Oncology consultation. Patient will need PEG tube placement and will be set up for a stent placement. Considering patient's need for chemotherapy and radiation if biopsy is consistent with a malignant process patient will require PEG tube placement anyways. EGD with balloon dilatation of the esophagus and stent placement. PEG placed. Cardiac consultation. Recurrent SVT requiring adenosine and ultimately metoprolol for rate control. Patient transferred to telemetry for Cardizem drip. Free air on chest x-ray most likely postprocedural.  Will discontinue PEG tube feedings and placed intermittent suction per gastroenterology. Patient with no abdominal pain and abdomen is soft. Worsening abdominal pain with persistent pneumoperitoneum. Also describes right lower quadrant abdominal pain. CT with p.o. and IV contrast showed free air in the peritoneum however could be related to PEG tube placement. .  Surgical consultation, Dr. Ned Byrnes. Has started Zosyn and vancomycin empirically. He has not tolerating PEG tube feedings. Therefore we will hold. We will follow up with general surgery and GI. Culture remains negative. Have stopped vancoymcin. Subjective: Patient says his breathing is a little better today. Says his abdominal pain comes and goes but feels it is not as hard as yesterday.      Current Facility-Administered Medications   Medication Dose Route Frequency    amino acid 4.25 % dextrose 5 % with electrolytes (CLINIMIX E) infusion   IntraVENous CONTINUOUS    fat emulsion 20% (LIPOSYN, INTRAlipid) infusion 250 mL  250 mL IntraVENous CONTINUOUS    furosemide (LASIX) injection 40 mg  40 mg IntraVENous Q12H    amino acid 4.25 % dextrose 5 % with electrolytes (CLINIMIX E) infusion   IntraVENous CONTINUOUS    piperacillin-tazobactam (ZOSYN) 3.375 g in 0.9% sodium chloride (MBP/ADV) 100 mL MBP  3.375 g IntraVENous Q8H    dilTIAZem (CARDIZEM) 125 mg/125 mL (1 mg/mL) dextrose 5% infusion  5 mg/hr IntraVENous CONTINUOUS    labetaloL (NORMODYNE;TRANDATE) 20 mg/4 mL (5 mg/mL) injection 20 mg  20 mg IntraVENous Q4H PRN    metoprolol (LOPRESSOR) injection 2.5 mg  2.5 mg IntraVENous Q6H PRN    metoprolol tartrate (LOPRESSOR) tablet 25 mg  25 mg Oral Q12H    thiamine mononitrate (B-1) tablet 100 mg  100 mg Oral DAILY    folic acid (FOLVITE) tablet 1 mg  1 mg Oral DAILY    multivitamin (ONE A DAY) tablet 1 Tab  1 Tab Oral DAILY    famotidine (PEPCID) tablet 20 mg  20 mg Oral BID    HYDROmorphone (DILAUDID) injection 0.5 mg  0.5 mg IntraVENous Q4H PRN    sodium chloride (NS) flush 5-40 mL  5-40 mL IntraVENous Q8H    sodium chloride (NS) flush 5-40 mL  5-40 mL IntraVENous PRN    acetaminophen (TYLENOL) tablet 650 mg  650 mg Oral Q6H PRN    Or    acetaminophen (TYLENOL) suppository 650 mg  650 mg Rectal Q6H PRN    promethazine (PHENERGAN) tablet 12.5 mg  12.5 mg Oral Q6H PRN    Or    ondansetron (ZOFRAN) injection 4 mg  4 mg IntraVENous Q6H PRN        Review of Systems  Constitutional: No fevers, No chills, No sweats, ++ fatigue, ++ Weakness  Eyes: No redness  Ears, nose, mouth, throat, and face: No nasal congestion, No sore throat, No voice change  Respiratory: + Shortness of Breath, No cough, No wheezing  Cardiovascular: No chest pain, No palpitations, No extremity edema  Gastrointestinal: No nausea, No vomiting, No diarrhea, + abdominal pain  Genitourinary: No frequency, No dysuria, No hematuria  Integument/breast: No skin lesion(s)   Neurological: No Confusion, No headaches, No dizziness      Objective:     Visit Vitals  BP (!) 178/87 (BP 1 Location: Right arm, BP Patient Position: At rest)   Pulse 60   Temp 98.9 °F (37.2 °C)   Resp 20   Ht 6' 1\" (1.854 m)   Wt 66.1 kg (145 lb 11.6 oz)   SpO2 96%   BMI 19.23 kg/m²    O2 Flow Rate (L/min): 2 l/min O2 Device: Room air    Temp (24hrs), Av °F (37.2 °C), Min:98.3 °F (36.8 °C), Max:99.4 °F (37.4 °C)      No intake/output data recorded.  1901 -  0700  In: -   Out: 3225 [Urine:2825]    PHYSICAL EXAM:  Constitutional: No acute distress  Skin: Extremities and face reveal no rashes. HEENT: Sclerae anicteric. Extra-occular muscles are intact. No oral ulcers. The neck is supple and no masses. Cardiovascular: Regular rate and rhythm. Respiratory: nonlabored, diminished  GI: Abdomen distended, soft, and nontender. hypoatice active bowel sounds. Musculoskeletal: No pitting edema of the lower legs. Able to move all ext  Neurological:  Patient is alert and oriented. Cranial nerves II-XII grossly intact  Psychiatric: Mood appears appropriate       Data Review    Recent Results (from the past 24 hour(s))   METABOLIC PANEL, COMPREHENSIVE    Collection Time: 20  1:35 PM   Result Value Ref Range    Sodium 131 (L) 136 - 145 mmol/L    Potassium 6.0 (H) 3.5 - 5.1 mmol/L    Chloride 102 97 - 108 mmol/L    CO2 28 21 - 32 mmol/L    Anion gap 1 (L) 5 - 15 mmol/L    Glucose 102 (H) 65 - 100 mg/dL    BUN 8 6 - 20 mg/dL    Creatinine 0.54 (L) 0.70 - 1.30 mg/dL    BUN/Creatinine ratio 15 12 - 20      GFR est AA >60 >60 ml/min/1.73m2    GFR est non-AA >60 >60 ml/min/1.73m2    Calcium 8.7 8.5 - 10.1 mg/dL    Bilirubin, total 1.3 (H) 0.2 - 1.0 mg/dL    AST (SGOT) 101 (H) 15 - 37 U/L    ALT (SGPT) 29 12 - 78 U/L    Alk.  phosphatase 50 45 - 117 U/L    Protein, total 6.8 6.4 - 8.2 g/dL    Albumin 2.2 (L) 3.5 - 5.0 g/dL    Globulin 4.6 (H) 2.0 - 4.0 g/dL    A-G Ratio 0.5 (L) 1.1 - 2.2     VANCOMYCIN, TROUGH    Collection Time: 20  1:35 PM   Result Value Ref Range    Vancomycin,trough 8.5 5.0 - 10.0 ug/mL   MAGNESIUM    Collection Time: 11/04/20  9:21 PM   Result Value Ref Range    Magnesium 1.8 1.6 - 2.4 mg/dL   METABOLIC PANEL, BASIC    Collection Time: 11/04/20  9:21 PM   Result Value Ref Range    Sodium 137 136 - 145 mmol/L    Potassium 3.4 (L) 3.5 - 5.1 mmol/L    Chloride 104 97 - 108 mmol/L    CO2 27 21 - 32 mmol/L    Anion gap 6 5 - 15 mmol/L    Glucose 111 (H) 65 - 100 mg/dL    BUN 8 6 - 20 mg/dL    Creatinine 0.51 (L) 0.70 - 1.30 mg/dL    BUN/Creatinine ratio 16 12 - 20      GFR est AA >60 >60 ml/min/1.73m2    GFR est non-AA >60 >60 ml/min/1.73m2    Calcium 8.7 8.5 - 10.1 mg/dL   GLUCOSE, POC    Collection Time: 11/04/20 10:48 PM   Result Value Ref Range    Glucose (POC) 96 65 - 100 mg/dL    Performed by SARAH BETH BRUMFIELD    CBC WITH AUTOMATED DIFF    Collection Time: 11/05/20  6:37 AM   Result Value Ref Range    WBC 8.3 4.1 - 11.1 K/uL    RBC 3.49 (L) 4.10 - 5.70 M/uL    HGB 11.0 (L) 12.1 - 17.0 g/dL    HCT 32.9 (L) 36.6 - 50.3 %    MCV 94.3 80.0 - 99.0 FL    MCH 31.5 26.0 - 34.0 PG    MCHC 33.4 30.0 - 36.5 g/dL    RDW 12.6 11.5 - 14.5 %    PLATELET 908 450 - 069 K/uL    MPV 12.4 8.9 - 12.9 FL    NEUTROPHILS 63 32 - 75 %    LYMPHOCYTES 14 12 - 49 %    MONOCYTES 19 (H) 5 - 13 %    EOSINOPHILS 3 0 - 7 %    BASOPHILS 1 0 - 1 %    IMMATURE GRANULOCYTES 0 0.0 - 0.5 %    ABS. NEUTROPHILS 5.2 1.8 - 8.0 K/UL    ABS. LYMPHOCYTES 1.2 0.8 - 3.5 K/UL    ABS. MONOCYTES 1.6 (H) 0.0 - 1.0 K/UL    ABS. EOSINOPHILS 0.3 0.0 - 0.4 K/UL    ABS. BASOPHILS 0.1 0.0 - 0.1 K/UL    ABS. IMM. GRANS. 0.0 0.00 - 0.04 K/UL    DF AUTOMATED     GLUCOSE, POC    Collection Time: 11/05/20  7:16 AM   Result Value Ref Range    Glucose (POC) 120 (H) 65 - 100 mg/dL    Performed by Sandrine Marcelo        Radiology review: CT abdomen and pelvis on 11/5/2020    Assessment:   1. Esophageal mass  2. Hypokalemia  3. Hypertension  4. Protein malnutrition, moderate status post PEG tube  5. Supraventricular tachycardia  6. Pneumoperitoneum      Plan:    1. EGD performed with complete obstruction of the esophagus. Repeat EGD on 10/30/2020 with EGD and balloon dilation of the esophagus status post stent placement. CT of the chest revealed a mild thoracic esophageal mass measuring 6 x 4 x 3 cm. Oncology consulted. Repeat CT abdomen and pelvis on 11/5/2020 shows persistent large volume of pneumoperitoneum, moderate bilateral pleural effusions. 2.  Electrolyte panel pending for today. We will continue to monitor. Reordered PPN  3. As patient is not tolerating oral intake. On IV dilt. Blood pressure stable. 4.  Patient is status post PEG tube placement. CT of the abdomen pelvis has been reviewed. Stop Peg tube feedings at this time. Give IV lasix due to pleural effusions. General surgery consulted. 5.  Patient had episode of supraventricular tachycardia. Cardiology consulted. Was on IV diltiazem   6. Repeat CT of the and pelvis showed pneumoperitoneum with free air fluid. Discussed with GI and they think it is due to PEG tube. General surgery consult. Procalcitonin within normal limits. Is started on Zosyn. Cultures negative. Therefore stopped vancomycin. 7.  CBC BMP in the a.m. CODE STATUS full     DVT prophylaxis: loveonx  Ulcer prophylaxis: pepcid    Care Plan discussed with: Patient/Family, Nurse and     Total time spent with patient: 34 minutes.

## 2020-11-05 NOTE — PROGRESS NOTES
Comprehensive Nutrition Assessment    Type and Reason for Visit: Reassess(interim)    Nutrition Recommendations/Plan:   Continue Rec'd PPN order until MD clears  This provides 1180kcal, 85g pro - inadequate to meet needs    As medically able Reinitiate Jevity 1.5 at 20mL/hr  Advance by 10mL q4h to goal rate of 50mL/hr, continuous  Flush with 120mL H20 q4h. Goal feeds provide 1800kcal, 77g pro, 1632mL fluid (Meeting 100% EENs)      Nursing to document TF rate, flushes, GRV, and GI s/s in I/Os    Monitor BM    Nutrition Assessment:  Admitted for difficulty swallowing and generalized weakness. CT chest indicated neoplasm (10/26). CT neck shows degenerative change throughout  spine. Dx with Esophageal mass. S/p EGD confirmed esophageal mass with obstruction - biopsy showed squamous cell carcinoma. Onc consulted. MD notes indicate pt reports being very hungry but unable to eat as food comes right back up 2/2 GERD and dysphagia likely 2/2 esophageal cancer. Noted pt was on PPN at 40ml/hr starting 10/30. S/p PEG placement and EGD with balloon dilatation of the esophagus and stent placement (10/30). Noted TF initiated 10/31, pt diet advanced to Full liquids, however diet d/c'd and pt back to TF on 11/01 at rate of 50ml/hr. Rate decreased to 20ml/hr on 11/03 2/2 abd distention. During previous assessment noted pt on Jevity 1.5 continuous feed, observed at 20ml/hr, with manual water flushes q4hrs, PPN, and D5. Today noted, TF d/c'd d/t pt intolerance 2/2 free air in abd and GRV >100 ml per RN per MD order 11/04. Pt currently only on PPN at 85ml/hr plus 250ml lipid daily- provide 1180kcal, 85g pro - inadequate to meet needs. DI spoke to RN about restarting TF, RN reported pt to remain NPO until MD clears. Labs: H/H 11/32.9, Na 135, Cr 0.46, Glu . Meds: B-1,  diltiazem, pepcid, folvite, dilaudid, labetalol, lopressor, MVI, zosyn.        Malnutrition Assessment:  Malnutrition Status:  Severe malnutrition    Context: Chronic illness     Findings of the 6 clinical characteristics of malnutrition:   Energy Intake:  1 - 75% or less of est energy req for 7 or more days  Weight Loss:  7.00 - Greater than 7.5% over 3 months(Per MD notes/chart review - 15.9kg wt loss over past few months >10% BW)     Body Fat Loss:  Unable to assess,     Muscle Mass Loss:  Unable to assess,    Fluid Accumulation:  Unable to assess,        Estimated Daily Nutrient Needs:  Energy (kcal): 1845kcal (28kcal/kg); Weight Used for Energy Requirements: Current  Protein (g): 79g (1.2g/kg); Weight Used for Protein Requirements: Current  Fluid (ml/day): 1650ml (25ml/kg); Weight Used for Fluid Requirements: Current  Needs based on cancer and wt gain    Nutrition Related Findings:  NFPE finding severe fat and muscle losses per previous assessment. Pt is edentulous. Swallowing difficulty 2/2 complete obstruction of esophagus per PA note (10/30). No N/V/C/D per RN. Last BM 11/05 per pt. Noted Dysphagia per PA note (10/29). Noted abd distention improved.  No edema      Wounds:    None       Current Nutrition Therapies:  DIET NPO  DIET TUBE FEEDING Jevity 1.5 Updated per Dr. Yonis Titus request.  amino acid 4.25 % dextrose 5 % with electrolytes (CLINIMIX E) infusion  amino acid 4.25 % dextrose 5 % with electrolytes (CLINIMIX E) infusion  Current Parenteral Nutrition Orders:  · Type and Formula: PPN   · Lipids: 250ml, Daily  · Duration: Continuous  · Rate/Volume: 60ml/hr  · Current PN Order Provides: PPN + Lipds provide 1180kcal, 85g pro  · Goal PN Orders Provides: PPN + Lipds provide 1180kcal, 85g pro      Anthropometric Measures:  · Height:  6' 1\" (185.4 cm)  · Current Body Wt:  64.5 kg (142 lb 3.2 oz)(11/05)   · Admission Body Wt:  141 lb 1.5 oz    · Usual Body Wt:  79.4 kg (175 lb)(per pt ~ 2 months ago)     · Ideal Body Wt:  184 lbs:  77.3 %   · BMI Category:  Underweight (BMI less than 22) age over 72     Wt hx: 64.5kg (11/05), 65.9kg (11/03), 65.4kg (11/02),  64.6kg (10/31), 64kg(10/26)  Per H&P, pt reports 35lb wt loss (15.9kg) over past few months - noted possible 19.9%BW loss during this time - severe in nature. Unable to obtain timeframe. Noted recent wt stability. Nutrition Diagnosis:   · Inadequate oral intake related to cognitive or neurological impairment(esophageal cancer) as evidenced by nutrition support-parenteral nutrition      Nutrition Interventions:   Food and/or Nutrient Delivery: Continue parenteral nutrition  Nutrition Education and Counseling: No recommendations at this time  Coordination of Nutrition Care: Continue to monitor while inpatient    Goals:  Intake >75% EEN > 5 days, (not progressing)  BM every 1-2 days, (progressing)  wt gain +/-0.5kg/wk   (not progressing)    Nutrition Monitoring and Evaluation:   Behavioral-Environmental Outcomes: None identified  Food/Nutrient Intake Outcomes: Enteral nutrition intake/tolerance  Physical Signs/Symptoms Outcomes: Weight, Constipation    Discharge Planning:     Too soon to determine     Electronically signed by Pascual Goodwin on 11/5/2020 at 12:25 PM    Contact:

## 2020-11-05 NOTE — PROGRESS NOTES
Paged on call MD d/t concern for elevated potassium. 2045  Received orders from Humaira NP for stat k+ and magnesium labs and 5mg IV reglan. 2100  Order for diltiazem gtt put in per telemetry note for when [facility] RN called to notify telemetry.

## 2020-11-05 NOTE — PROGRESS NOTES
Problem: Mobility Impaired (Adult and Pediatric)  Goal: *Acute Goals and Plan of Care (Insert Text)  Description:   I with HEP x 7 days  SBA with bed mob x7 days  SBA with all transfers x7 days  Amb 100-125ft with LRAD and SBAX1 x 7 days  Outcome: Not Met    PHYSICAL THERAPY EVALUATION  Patient: Broderick Uriostegui (76 y.o. male)  Date: 11/5/2020  Primary Diagnosis: Esophageal mass [K22.8]  Procedure(s) (LRB):  PERCUTANEOUS ENDOSCOPIC GASTROSTOMY TUBE INSERTION (N/A)  ESOPHAGEAL DILATION (N/A)  ENDOSCOPY WITH PROSTHESIS OR STENT PLACEMENT (N/A) 6 Days Post-Op   Precautions: fall    ASSESSMENT  77yo M admitted to  hospital with new dx of esophageal mass, presents to PT with decreased bed mob, transfers, LE strength, gt, activity tolerance, and overall functional mobility. PMH listed below. Pt lives in 1 story boarding/rooming home with GF and has 5 steps to enter home with rails. PTA pt states he was I with ADLs and amb without AD PTA. Currently, pt is CGA with bed mob and transfers. Pt was able to amb approx 4ft at bedside with UE support on RW and CGAx1. Distance limited due to pt being hooked up to 2 IV lines on 2 different IV poles. Pt fatigued post activity. Pt continues to have elevated BP at times, was elevated this morning per nurse, but pt was given medication for BP and nursing cleared PT to work with pt. No symptoms reported during activity during eval other than fatigue. Pt may benefit from skilled PT to address his functional deficits. Recommend HHPT upon d/c at this time. PLAN :  Recommendations and Planned Interventions: bed mobility training, transfer training, gait training, therapeutic exercises, patient and family training/education, and therapeutic activities      Frequency/Duration: Patient will be followed by physical therapy:  5 times a week to address goals.     Recommendation for discharge: (in order for the patient to meet his/her long term goals)  HHPT SUBJECTIVE:   Patient supine in bed upon PT arrival, agreeable to work with PT    OBJECTIVE DATA SUMMARY:   HISTORY:    Past Medical History:   Diagnosis Date    Hypertension    History reviewed. No pertinent surgical history. Personal factors and/or comorbidities impacting plan of care:     Home Situation  Home Environment: Other (comment)(rooming house, has 1 room in 1 story house)  # Steps to Enter: 5  Rails to Enter: Yes  Hand Rails : Bilateral  One/Two Story Residence: One story  Living Alone: No  Support Systems: Spouse/Significant Other/Partner(pt states he lives with his GF)  Patient Expects to be Discharged to[de-identified] (back to rooming house)  Current DME Used/Available at Home: None      EXAMINATION/PRESENTATION/DECISION MAKING:   Critical Behavior:  Neurologic State: Alert  Orientation Level: Oriented X4  Cognition: Appropriate safety awareness, Follows commands     Hearing: Auditory  Auditory Impairment: None    Range Of Motion:  AROM: Generally decreased, functional B LE  Strength:    Strength: Generally decreased, functional B LE    Tone & Sensation:     Sensation: Intact(to LT B LE)      Functional Mobility:  Bed Mobility:     Supine to Sit: Contact guard assistance  Sit to Supine: Contact guard assistance     Transfers:  Sit to Stand: Contact guard assistance  Stand to Sit: Contact guard assistance  Stand Pivot Transfers: Contact guard assistance       Balance:   Sitting: Intact  Standing: With support  Ambulation/Gait Training:  Distance (ft): 4 Feet (ft)(gt distance limited due to pt hooked up to 2 IV poles)  Assistive Device: Walker, rolling  Ambulation - Level of Assistance: Contact guard assistance      Functional Measure:    [employer] AM-PAC 6 Clicks         Basic Mobility Inpatient Short Form  How much difficulty does the patient currently have. .. Unable A Lot A Little None   1. Turning over in bed (including adjusting bedclothes, sheets and blankets)?    [] 1   [] 2   [x] 3 [] 4   2. Sitting down on and standing up from a chair with arms ( e.g., wheelchair, bedside commode, etc.)   [] 1   [] 2   [x] 3   [] 4   3. Moving from lying on back to sitting on the side of the bed? [] 1   [] 2   [x] 3   [] 4          How much help from another person does the patient currently need. .. Total A Lot A Little None   4. Moving to and from a bed to a chair (including a wheelchair)? [] 1   [] 2   [x] 3   [] 4   5. Need to walk in hospital room? [] 1   [] 2   [x] 3   [] 4   6. Climbing 3-5 steps with a railing? [] 1   [] 2   [x] 3   [] 4   © 2007, Trustees of [address] [place] [address] [address] [id number], under license to [employer]. All rights reserved     Score:  Initial: 18 Most Recent: X (Date: -- )   Interpretation of Tool:  Represents activities that are increasingly more difficult (i.e. Bed mobility, Transfers, Gait). Score 24 23 22-20 19-15 14-10 9-7 6   Modifier CH CI CJ CK CL CM CN          Physical Therapy Evaluation Charge Determination   History Examination Presentation Decision-Making   MEDIUM  Complexity : 1-2 comorbidities / personal factors will impact the outcome/ POC  MEDIUM Complexity : 3 Standardized tests and measures addressing body structure, function, activity limitation and / or participation in recreation  LOW Complexity : Stable, uncomplicated  Other Functional Measure [facility] 6 MED      Based on the above components, the patient evaluation is determined to be of the following complexity level: LOW     Pain Rating:  No c/o pain during session today    Activity Tolerance:   Good  Please refer to the flowsheet for vital signs taken during this treatment. After treatment patient left in no apparent distress:   Supine in bed and Call bell within reach    COMMUNICATION/EDUCATION:   The patients plan of care was discussed with: Case management. Patient/family have participated as able in goal setting and plan of care.     Thank you for this referral.  Mable Cue   Time Calculation: 21 mins

## 2020-11-06 LAB
ANION GAP SERPL CALC-SCNC: 6 MMOL/L (ref 5–15)
BASOPHILS # BLD: 0 K/UL (ref 0–0.1)
BASOPHILS NFR BLD: 0 % (ref 0–1)
BUN SERPL-MCNC: 12 MG/DL (ref 6–20)
BUN/CREAT SERPL: 21 (ref 12–20)
CA-I BLD-MCNC: 8.9 MG/DL (ref 8.5–10.1)
CHLORIDE SERPL-SCNC: 99 MMOL/L (ref 97–108)
CO2 SERPL-SCNC: 30 MMOL/L (ref 21–32)
CREAT SERPL-MCNC: 0.57 MG/DL (ref 0.7–1.3)
DIFFERENTIAL METHOD BLD: ABNORMAL
EOSINOPHIL # BLD: 0.4 K/UL (ref 0–0.4)
EOSINOPHIL NFR BLD: 5 % (ref 0–7)
ERYTHROCYTE [DISTWIDTH] IN BLOOD BY AUTOMATED COUNT: 12.5 % (ref 11.5–14.5)
GLUCOSE BLD STRIP.AUTO-MCNC: 114 MG/DL (ref 65–100)
GLUCOSE BLD STRIP.AUTO-MCNC: 118 MG/DL (ref 65–100)
GLUCOSE BLD STRIP.AUTO-MCNC: 135 MG/DL (ref 65–100)
GLUCOSE SERPL-MCNC: 115 MG/DL (ref 65–100)
HCT VFR BLD AUTO: 37.6 % (ref 36.6–50.3)
HGB BLD-MCNC: 12.8 G/DL (ref 12.1–17)
IMM GRANULOCYTES # BLD AUTO: 0 K/UL (ref 0–0.04)
IMM GRANULOCYTES NFR BLD AUTO: 0 % (ref 0–0.5)
LYMPHOCYTES # BLD: 1.5 K/UL (ref 0.8–3.5)
LYMPHOCYTES NFR BLD: 17 % (ref 12–49)
MCH RBC QN AUTO: 31.8 PG (ref 26–34)
MCHC RBC AUTO-ENTMCNC: 34 G/DL (ref 30–36.5)
MCV RBC AUTO: 93.5 FL (ref 80–99)
MONOCYTES # BLD: 1.5 K/UL (ref 0–1)
MONOCYTES NFR BLD: 18 % (ref 5–13)
NEUTS SEG # BLD: 5.1 K/UL (ref 1.8–8)
NEUTS SEG NFR BLD: 60 % (ref 32–75)
PERFORMED BY, TECHID: ABNORMAL
PLATELET # BLD AUTO: 195 K/UL (ref 150–400)
PMV BLD AUTO: 12.5 FL (ref 8.9–12.9)
POTASSIUM SERPL-SCNC: 3.2 MMOL/L (ref 3.5–5.1)
RBC # BLD AUTO: 4.02 M/UL (ref 4.1–5.7)
SODIUM SERPL-SCNC: 135 MMOL/L (ref 136–145)
WBC # BLD AUTO: 8.5 K/UL (ref 4.1–11.1)

## 2020-11-06 PROCEDURE — 74011000258 HC RX REV CODE- 258: Performed by: PHYSICIAN ASSISTANT

## 2020-11-06 PROCEDURE — 74011000250 HC RX REV CODE- 250: Performed by: PHYSICIAN ASSISTANT

## 2020-11-06 PROCEDURE — 0W9G30Z DRAINAGE OF PERITONEAL CAVITY WITH DRAINAGE DEVICE, PERCUTANEOUS APPROACH: ICD-10-PCS | Performed by: RADIOLOGY

## 2020-11-06 PROCEDURE — 74011250636 HC RX REV CODE- 250/636: Performed by: INTERNAL MEDICINE

## 2020-11-06 PROCEDURE — 82962 GLUCOSE BLOOD TEST: CPT

## 2020-11-06 PROCEDURE — 80048 BASIC METABOLIC PNL TOTAL CA: CPT

## 2020-11-06 PROCEDURE — 85025 COMPLETE CBC W/AUTO DIFF WBC: CPT

## 2020-11-06 PROCEDURE — 74011000258 HC RX REV CODE- 258: Performed by: INTERNAL MEDICINE

## 2020-11-06 PROCEDURE — 74011000250 HC RX REV CODE- 250: Performed by: INTERNAL MEDICINE

## 2020-11-06 PROCEDURE — 36415 COLL VENOUS BLD VENIPUNCTURE: CPT

## 2020-11-06 PROCEDURE — 65270000029 HC RM PRIVATE

## 2020-11-06 PROCEDURE — 97535 SELF CARE MNGMENT TRAINING: CPT

## 2020-11-06 PROCEDURE — 97165 OT EVAL LOW COMPLEX 30 MIN: CPT

## 2020-11-06 PROCEDURE — 74011250636 HC RX REV CODE- 250/636: Performed by: PHYSICIAN ASSISTANT

## 2020-11-06 RX ADMIN — HYDROMORPHONE HYDROCHLORIDE 0.5 MG: 1 INJECTION, SOLUTION INTRAMUSCULAR; INTRAVENOUS; SUBCUTANEOUS at 22:35

## 2020-11-06 RX ADMIN — PIPERACILLIN SODIUM AND TAZOBACTAM SODIUM 3.38 G: 3; .375 INJECTION, POWDER, LYOPHILIZED, FOR SOLUTION INTRAVENOUS at 19:56

## 2020-11-06 RX ADMIN — PIPERACILLIN SODIUM AND TAZOBACTAM SODIUM 3.38 G: 3; .375 INJECTION, POWDER, LYOPHILIZED, FOR SOLUTION INTRAVENOUS at 13:28

## 2020-11-06 RX ADMIN — PIPERACILLIN SODIUM AND TAZOBACTAM SODIUM 3.38 G: 3; .375 INJECTION, POWDER, LYOPHILIZED, FOR SOLUTION INTRAVENOUS at 02:30

## 2020-11-06 RX ADMIN — Medication 5 MG/HR: at 22:36

## 2020-11-06 RX ADMIN — I.V. FAT EMULSION 250 ML: 20 EMULSION INTRAVENOUS at 22:36

## 2020-11-06 RX ADMIN — Medication 10 ML: at 05:19

## 2020-11-06 RX ADMIN — Medication 10 ML: at 22:38

## 2020-11-06 RX ADMIN — ASCORBIC ACID, VITAMIN A PALMITATE, CHOLECALCIFEROL, THIAMINE HYDROCHLORIDE, RIBOFLAVIN-5 PHOSPHATE SODIUM, PYRIDOXINE HYDROCHLORIDE, NIACINAMIDE, DEXPANTHENOL, ALPHA-TOCOPHEROL ACETATE, VITAMIN K1, FOLIC ACID, BIOTIN, CYANOCOBALAMIN: 200; 3300; 200; 6; 3.6; 6; 40; 15; 10; 150; 600; 60; 5 INJECTION, SOLUTION INTRAVENOUS at 22:36

## 2020-11-06 NOTE — PROGRESS NOTES
Problem: Patient Education: Go to Patient Education Activity  Goal: Patient/Family Education  Outcome: Progressing Towards Goal     Problem: Falls - Risk of  Goal: *Absence of Falls  Description: Document Gertrudis Molina Fall Risk and appropriate interventions in the flowsheet.   Outcome: Progressing Towards Goal  Note: Fall Risk Interventions:  Mobility Interventions: Bed/chair exit alarm    Mentation Interventions: Bed/chair exit alarm    Medication Interventions: Teach patient to arise slowly    Elimination Interventions: Bed/chair exit alarm              Problem: Patient Education: Go to Patient Education Activity  Goal: Patient/Family Education  Outcome: Progressing Towards Goal

## 2020-11-06 NOTE — PROGRESS NOTES
CM met with patient and son in room to discuss DCP. At this time patient is recc for [facility] opposed to SNF, CM discussed this with patient. Patient stated \"I don't feel like I'm that bad off\" and that he would like to return home. Patient lives with his partner, Juan Daniel Torres, and he stated that she would be able to assist in care. CM asked to contact Ms. Juan Daniel Torres but he states that her phone is out of service till the 18th of this month. CM explained that it would be important for her to be involved with DCP and with the PEG feeding teachings. Patient's son, Marguerite Richmond, states he is able to assist and would like to be taught, however he does work during the days. Patient gave no preference for [facility] or DME agency. Referrals sent via alessandro. CM continues to follow.

## 2020-11-06 NOTE — ROUTINE PROCESS
Bedside shift change report given to Gt Hernandez RN (oncoming nurse) by Mai Kelley RN (offgoing nurse). Report included the following information SBAR.

## 2020-11-06 NOTE — PROGRESS NOTES
Hospitalist Progress Note    Subjective:   Daily Progress Note: 11/6/2020 8:40 AM    Hospital Course:  Patient admitted on October 26, 2020 with difficulty swallowing and generalized weakness and 30 pound weight loss. Patient has been unable to swallow solid foods. CT of the chest revealed an obstructing esophageal mass. EGD performed which revealed the same with biopsy taken. Oncology consultation. Patient will need PEG tube placement and will be set up for a stent placement. Considering patient's need for chemotherapy and radiation if biopsy is consistent with a malignant process patient will require PEG tube placement anyways. EGD with balloon dilatation of the esophagus and stent placement. PEG placed. Cardiac consultation. Recurrent SVT requiring adenosine and ultimately metoprolol for rate control. Patient transferred to telemetry for Cardizem drip. Free air on chest x-ray most likely postprocedural.  Will discontinue PEG tube feedings and placed intermittent suction per gastroenterology. Patient with no abdominal pain and abdomen is soft. Worsening abdominal pain with persistent pneumoperitoneum. Also describes right lower quadrant abdominal pain. CT with p.o. and IV contrast showed free air in the peritoneum however could be related to PEG tube placement. .  Surgical consultation, Dr. Arturo Adams. Has started Zosyn and vancomycin empirically. He has not tolerating PEG tube feedings. Therefore we will hold. We will follow up with general surgery and GI. Culture remains negative. Have stopped vancoymcin. IR consulted and placed a percutaneous drainage cath on 11/5/2020. Placed on low suction. Subjective: Pateint says his abdomen is not as hard as yesterday. Less pain.  No SOB>    Current Facility-Administered Medications   Medication Dose Route Frequency    amino acid 4.25 % dextrose 5 % with electrolytes (CLINIMIX E) infusion   IntraVENous CONTINUOUS    fat emulsion 20% (LIPOSYN, INTRAlipid) infusion 250 mL  250 mL IntraVENous CONTINUOUS    amino acid 4.25 % dextrose 5 % with electrolytes (CLINIMIX E) infusion  2,000 mL/day IntraVENous CONTINUOUS    fat emulsion 20% (LIPOSYN, INTRAlipid) infusion 250 mL  250 mL IntraVENous ONCE    piperacillin-tazobactam (ZOSYN) 3.375 g in 0.9% sodium chloride (MBP/ADV) 100 mL MBP  3.375 g IntraVENous Q8H    dilTIAZem (CARDIZEM) 125 mg/125 mL (1 mg/mL) dextrose 5% infusion  5 mg/hr IntraVENous CONTINUOUS    labetaloL (NORMODYNE;TRANDATE) 20 mg/4 mL (5 mg/mL) injection 20 mg  20 mg IntraVENous Q4H PRN    metoprolol (LOPRESSOR) injection 2.5 mg  2.5 mg IntraVENous Q6H PRN    metoprolol tartrate (LOPRESSOR) tablet 25 mg  25 mg Oral Q12H    thiamine mononitrate (B-1) tablet 100 mg  100 mg Oral DAILY    folic acid (FOLVITE) tablet 1 mg  1 mg Oral DAILY    multivitamin (ONE A DAY) tablet 1 Tab  1 Tab Oral DAILY    famotidine (PEPCID) tablet 20 mg  20 mg Oral BID    HYDROmorphone (DILAUDID) injection 0.5 mg  0.5 mg IntraVENous Q4H PRN    sodium chloride (NS) flush 5-40 mL  5-40 mL IntraVENous Q8H    sodium chloride (NS) flush 5-40 mL  5-40 mL IntraVENous PRN    acetaminophen (TYLENOL) tablet 650 mg  650 mg Oral Q6H PRN    Or    acetaminophen (TYLENOL) suppository 650 mg  650 mg Rectal Q6H PRN    promethazine (PHENERGAN) tablet 12.5 mg  12.5 mg Oral Q6H PRN    Or    ondansetron (ZOFRAN) injection 4 mg  4 mg IntraVENous Q6H PRN        Review of Systems  Constitutional: No fevers, No chills, No sweats, ++ fatigue, ++ Weakness  Eyes: No redness  Ears, nose, mouth, throat, and face: No nasal congestion, No sore throat, No voice change  Respiratory: + Shortness of Breath, No cough, No wheezing  Cardiovascular: No chest pain, No palpitations, No extremity edema  Gastrointestinal: No nausea, No vomiting, No diarrhea, No abdominal pain  Genitourinary: No frequency, No dysuria, No hematuria  Integument/breast: No skin lesion(s)   Neurological: No Confusion, No headaches, No dizziness      Objective:     Visit Vitals  BP (!) 140/58 (BP 1 Location: Left leg, BP Patient Position: At rest;Lying right side; Head of bed elevated (Comment degrees))   Pulse 68   Temp 99.2 °F (37.3 °C)   Resp 18   Ht 6' 1\" (1.854 m)   Wt 62.8 kg (138 lb 7.2 oz)   SpO2 97%   BMI 18.27 kg/m²    O2 Flow Rate (L/min): 2 l/min O2 Device: Room air    Temp (24hrs), Av.2 °F (37.3 °C), Min:98.8 °F (37.1 °C), Max:99.7 °F (37.6 °C)      No intake/output data recorded.  1901 -  0700  In: -   Out: 4650 [Urine:3375]    PHYSICAL EXAM:  Constitutional: No acute distress  Skin: Extremities and face reveal no rashes. HEENT: Sclerae anicteric. Extra-occular muscles are intact. No oral ulcers. The neck is supple and no masses. Cardiovascular: Regular rate and rhythm. Respiratory: nonlabored, diminished  GI: Abdomen nondistended, soft, and nontender. hypoatice active bowel sounds. Musculoskeletal: No pitting edema of the lower legs. Able to move all ext  Neurological:  Patient is alert and oriented. Cranial nerves II-XII grossly intact  Psychiatric: Mood appears appropriate       Data Review    No results found for this or any previous visit (from the past 24 hour(s)). - labs pending for today. Reviewed labs from 2020    Radiology review: CT abdomen and pelvis on 2020    Assessment:   1. Esophageal mass  2. Hypokalemia  3. Hypertension  4. Protein malnutrition, moderate status post PEG tube  5. Supraventricular tachycardia  6. Pneumoperitoneum s/p percutaneous drain #1      Plan:    1. EGD performed with complete obstruction of the esophagus. Repeat EGD on 10/30/2020 with EGD and balloon dilation of the esophagus status post stent placement. CT of the chest revealed a mild thoracic esophageal mass measuring 6 x 4 x 3 cm. Oncology consulted. Repeat CT abdomen and pelvis on 2020 shows persistent large volume of pneumoperitoneum, moderate bilateral pleural effusions.  S/p percutaneous drain #1 per IR. 2.  Electrolyte panel pending for today. We will continue to monitor. Reordered PPN  3. As patient is not tolerating oral intake. On IV dilt. Blood pressure stable. 4.  Patient is status post PEG tube placement. CT of the abdomen pelvis has been reviewed. Holding Peg tube feedings at this time. Given one IV lasix due to pleural effusions. General surgery consulted. 5.  Patient had episode of supraventricular tachycardia. Cardiology consulted. Was on IV diltiazem   6. Repeat CT of the and pelvis showed pneumoperitoneum with free air fluid. Discussed with GI and they think it is due to PEG tube. General surgery consult. Procalcitonin within normal limits. Is started on Zosyn. Cultures negative. Therefore stopped vancomycin. 7.  CBC BMP in the a.m. CODE STATUS full     DVT prophylaxis: loveonx  Ulcer prophylaxis: pepcid    Care Plan discussed with: Patient/Family, Nurse and     Total time spent with patient: 33 minutes.

## 2020-11-06 NOTE — PROGRESS NOTES
Progress Note      11/6/2020 7:08 AM  NAME: Brennen Marin   MRN:  [id number]   Admit Diagnosis: Esophageal mass [K22.8]      Problem List:   1.  Incomplete database secondary to the patient being a poor historian  2. Werner Cano presents for evaluation of dysphagia generalized weakness  3.  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  Status post percutaneous endoscopic gastrotomy (PEG) tube insertion  5.  Status post esophageal dilation and stent placement  6.  Status post cholecystectomy  7. Percutaneous drainage of large pneumoperitoneum  8.  Previous transient ischemia attack (TIA)   9.  Grade II (moderate) diastolic dysfunction, with pseudonormalization  10.  Paroxysmal supraventricular tachycardia (PSVT) on admission    11.  Hypertension  12.  Nicotine dependence  13.  Chronic obstructive pulmonary disease (emphysema)  14.  History of prostate adenocarcinoma (status post radiation therapy)  15.  Status post cholecystectomy  16.  Hypomagnesia       Subjective: The patient is seen and examined in room 489. There are no acute cardiovascular events reported overnight. Currently, he denies any cardiovascular complaints. Telemetry shows sinus rhythm with occasional premature ventricular complex. The patient is unaware.     Medications Personally Reviewed:    Current Facility-Administered Medications   Medication Dose Route Frequency    amino acid 4.25 % dextrose 5 % with electrolytes (CLINIMIX E) infusion  2,000 mL/day IntraVENous CONTINUOUS    fat emulsion 20% (LIPOSYN, INTRAlipid) infusion 250 mL  250 mL IntraVENous ONCE    piperacillin-tazobactam (ZOSYN) 3.375 g in 0.9% sodium chloride (MBP/ADV) 100 mL MBP  3.375 g IntraVENous Q8H    dilTIAZem (CARDIZEM) 125 mg/125 mL (1 mg/mL) dextrose 5% infusion  5 mg/hr IntraVENous CONTINUOUS    labetaloL (NORMODYNE;TRANDATE) 20 mg/4 mL (5 mg/mL) injection 20 mg  20 mg IntraVENous Q4H PRN    metoprolol (LOPRESSOR) injection 2.5 mg  2.5 mg IntraVENous Q6H PRN    metoprolol tartrate (LOPRESSOR) tablet 25 mg  25 mg Oral Q12H    thiamine mononitrate (B-1) tablet 100 mg  100 mg Oral DAILY    folic acid (FOLVITE) tablet 1 mg  1 mg Oral DAILY    multivitamin (ONE A DAY) tablet 1 Tab  1 Tab Oral DAILY    famotidine (PEPCID) tablet 20 mg  20 mg Oral BID    HYDROmorphone (DILAUDID) injection 0.5 mg  0.5 mg IntraVENous Q4H PRN    sodium chloride (NS) flush 5-40 mL  5-40 mL IntraVENous Q8H    sodium chloride (NS) flush 5-40 mL  5-40 mL IntraVENous PRN    acetaminophen (TYLENOL) tablet 650 mg  650 mg Oral Q6H PRN    Or    acetaminophen (TYLENOL) suppository 650 mg  650 mg Rectal Q6H PRN    promethazine (PHENERGAN) tablet 12.5 mg  12.5 mg Oral Q6H PRN    Or    ondansetron (ZOFRAN) injection 4 mg  4 mg IntraVENous Q6H PRN           Objective:      Physical Exam:  Last 24hrs VS reviewed since prior progress note. Most recent are:    Visit Vitals  BP (!) 140/58 (BP 1 Location: Left leg, BP Patient Position: At rest;Lying right side; Head of bed elevated (Comment degrees))   Pulse 68   Temp 99.2 °F (37.3 °C)   Resp 18   Ht 6' 1\" (1.854 m)   Wt 62.8 kg (138 lb 7.2 oz)   SpO2 97%   BMI 18.27 kg/m²       Intake/Output Summary (Last 24 hours) at 11/6/2020 0708  Last data filed at 11/6/2020 0351  Gross per 24 hour   Intake    Output 2475 ml   Net -2475 ml        General Appearance: Well developed, well nourished, alert & oriented x 3, no acute distress. Chest: Lungs clear to auscultation bilaterally. Left chest percutaneous drainage. Cardiovascular: JVP is not elevated, PMI is not displaced, normal intensity S1 and S2, without S3. No ectopic beats  Abdomen: Soft, non-tender, bowel sounds are active. Extremities: No edema bilaterally.     Data Review  Telemetry: Sinus rhythm occasional ventricular ectopy  EKG:   []  No new EKG for review    Lab Data Personally Reviewed:    Recent Labs     11/05/20  0637 11/04/20  0745   WBC 8.3 10.2   HGB 11.0* 11.8*   HCT 32.9* 36.3*    270     No results for input(s): INR, PTP, APTT, INREXT in the last 72 hours. Recent Labs     11/05/20  0637 11/04/20 2121 11/04/20  1335   * 137 131*   K 3.5 3.4* 6.0*    104 102   CO2 27 27 28   BUN 8 8 8   CREA 0.46* 0.51* 0.54*   * 111* 102*   CA 8.8 8.7 8.7   MG  --  1.8  --      No results for input(s): CPK, CKNDX, TROIQ in the last 72 hours. No lab exists for component: CPKMB  No results found for: CHOL, CHOLX, CHLST, CHOLV, HDL, HDLP, LDL, LDLC, DLDLP, TGLX, TRIGL, TRIGP, CHHD, CHHDX    Recent Labs     11/04/20  1335   AP 50   TP 6.8   ALB 2.2*   GLOB 4.6*     No results for input(s): PH, PCO2, PO2 in the last 72 hours. Assessment/Plan:   1. Continue telemetry monitor  2. Continue to monitor serum electrolytes, renal function  3.   Continue current cardiovascular medications including metoprolol       Caron Erazo MD

## 2020-11-06 NOTE — CONSULTS
[facility] SURGERY PROGRESS NOTE          Chief Complaint: None    History of Present Illness:    Mr. Maci Rosenbaum is a 76y.o. year old * male admitted with esophageal cancer. Initially underwent PEG tube placement followed by esophageal stent placement. Has free air & hence consulted. Repeat CT scan with contrast through PEG tube shows no extravasation of contrast through PEG tube site or small bowel/colon & has reached rectum. No abdominal pain or distension. No fever or chills. Passing flatus. Overall he feels lot better. IR placed a drain to evacuate the free air and connected to closed suction system. Past Medical History:   Past Medical History:   Diagnosis Date    Hypertension        Past Surgical History: Right inguinal hernia repair with mesh. Allergy:No Known Allergies    Social History:  reports that he has been smoking. He has never used smokeless tobacco.     Family History:History reviewed. No pertinent family history.      Current Medications:  Current Facility-Administered Medications:     amino acid 4.25 % dextrose 5 % with electrolytes (CLINIMIX E) infusion, 2,000 mL/day, IntraVENous, CONTINUOUS, Sujata Murray PA-C, Last Rate: 83.3 mL/hr at 11/05/20 2207    fat emulsion 20% (LIPOSYN, INTRAlipid) infusion 250 mL, 250 mL, IntraVENous, ONCE, Sujata Murray PA-C, 250 mL at 11/05/20 2207    piperacillin-tazobactam (ZOSYN) 3.375 g in 0.9% sodium chloride (MBP/ADV) 100 mL MBP, 3.375 g, IntraVENous, Q8H, Shonda Coyle MD, Last Rate: 200 mL/hr at 11/05/20 1922, 3.375 g at 11/05/20 1922    dilTIAZem (CARDIZEM) 125 mg/125 mL (1 mg/mL) dextrose 5% infusion, 5 mg/hr, IntraVENous, CONTINUOUS, Elias Khoury MD, Last Rate: 5 mL/hr at 11/05/20 1922, 5 mg/hr at 11/05/20 1922    labetaloL (NORMODYNE;TRANDATE) 20 mg/4 mL (5 mg/mL) injection 20 mg, 20 mg, IntraVENous, Q4H PRN, Tika Rothman MD, 20 mg at 11/05/20 0901    metoprolol (LOPRESSOR) injection 2.5 mg, 2.5 mg, IntraVENous, Q6H PRN, Ariel Liu PA-C, 2.5 mg at 10/31/20 1240    metoprolol tartrate (LOPRESSOR) tablet 25 mg, 25 mg, Oral, Q12H, Annabel Doll PA-C, Stopped at 11/04/20 0900    thiamine mononitrate (B-1) tablet 100 mg, 100 mg, Oral, DAILY, Scarlett Camarillo MD, Stopped at 26/37/27 3369    folic acid (FOLVITE) tablet 1 mg, 1 mg, Oral, DAILY, Scarlett Camarillo MD, Stopped at 11/04/20 0900    multivitamin (ONE A DAY) tablet 1 Tab, 1 Tab, Oral, DAILY, Scarlett Camarillo MD, Stopped at 11/04/20 0900    famotidine (PEPCID) tablet 20 mg, 20 mg, Oral, BID, Scarlett Camarillo MD, Stopped at 11/04/20 0900    HYDROmorphone (DILAUDID) injection 0.5 mg, 0.5 mg, IntraVENous, Q4H PRN, Murali Doll PA-C, 0.5 mg at 11/05/20 0544    sodium chloride (NS) flush 5-40 mL, 5-40 mL, IntraVENous, Q8H, Karmen Coyle MD, 10 mL at 11/05/20 2217    sodium chloride (NS) flush 5-40 mL, 5-40 mL, IntraVENous, PRN, Sher Doty MD, 10 mL at 11/02/20 1111    acetaminophen (TYLENOL) tablet 650 mg, 650 mg, Oral, Q6H PRN, 650 mg at 11/01/20 2137 **OR** acetaminophen (TYLENOL) suppository 650 mg, 650 mg, Rectal, Q6H PRN, Karmen Coyle MD    promethazine (PHENERGAN) tablet 12.5 mg, 12.5 mg, Oral, Q6H PRN **OR** ondansetron (ZOFRAN) injection 4 mg, 4 mg, IntraVENous, Q6H PRN, Sher Doty MD, 4 mg at 11/02/20 1111     Immunization History: There is no immunization history on file for this patient. Complete    Review of Systems:     Constitutional:  no fever,  no chills,  no sweats, No weakness, No fatigue, No decreased activity. Eye: No recent visual problem, No icterus, No discharge, No double vision. Ear/Nose/Mouth/Throat: No decreased hearing, No ear pain, No nasal congestion, No sore throat. Respiratory: No shortness of breath, No cough, No sputum production, No hemoptysis, No wheezing, No cyanosis.   Cardiovascular: No chest pain, No palpitations, No bradycardia, No tachycardia, No peripheral edema, No syncope. Gastrointestinal: No nausea,  No vomiting, No diarrhea, No constipation, No heartburn,   abdominal pain & distension. Genitourinary: No dysuria, No hematuria, No change in urine stream, No urethral discharge, No lesions. Hematology/Lymphatics: No bruising tendency, No bleeding tendency, No petechiae, No swollen lymph glands. Endocrine: No excessive thirst, No polyuria, No cold intolerance, No heat intolerance, No excessive hunger. Immunologic: Not immunocompromised, No recurrent fevers, No recurrent infections. Musculoskeletal: No back pain, No neck pain, No joint pain, No muscle pain, No claudication, No decreased range of motion, No trauma. Integumentary: No rash, No pruritus, No abrasions. Neurologic: Alert and oriented X4, No abnormal balance, No headache, No confusion, No numbness, No tingling. Psychiatric: No anxiety, No depression, No frandy. Physical Exam:     Vitals & Measurements: Wt Readings from Last 3 Encounters:   11/05/20 66.1 kg (145 lb 11.6 oz)     Temp Readings from Last 3 Encounters:   11/05/20 99.7 °F (37.6 °C)     BP Readings from Last 3 Encounters:   11/05/20 (!) 164/85     Pulse Readings from Last 3 Encounters:   11/05/20 71      Ht Readings from Last 3 Encounters:   11/05/20 6' 1\" (1.854 m)          General: well appearing, no acute distress  Head: Normal  Face: Nornal  HEENT: atraumatic, PERRLA, moist mucosa, normal pharynx, normal tonsils and adenoids, normal tongue, no fluid in sinuses  Neck: Trachea midline, no carotid bruit, no masses  Chest: Normal.  Respiratory: Normal chest wall expansion, CTA B, no r/r/w, no rubs  Cardiovascular: RRR, no m/r/g, Normal S1 and S2  Abdomen: Soft, mildly tender without guarding or rebound, distended, normal bowel sounds in all quadrants, no hepatosplenomegaly, no tympany. Incision scar: right inguinal area.   Genitourinary: No inguinal hernia, normal external gentalia, Testis & scrotum normal, no renal angle tenderness  Rectal: deferred  Musculoskeletal: normal ROM in upper and lower extremities, No joint swelling. Integumentary: Warm, dry, and pink, with no rash, purpura, or petechia  Heme/Lymph: No lymphadenopathy, no bruises  Neurological:Cranial Nerves II-XII grossly intact, no gross motor or sensory deficit  Psychiatric: Cooperative with normal mood, affect, and cognition      Laboratory Values:   Recent Results (from the past 24 hour(s))   GLUCOSE, POC    Collection Time: 11/04/20 10:48 PM   Result Value Ref Range    Glucose (POC) 96 65 - 100 mg/dL    Performed by [employer]    METABOLIC PANEL, BASIC    Collection Time: 11/05/20  6:37 AM   Result Value Ref Range    Sodium 135 (L) 136 - 145 mmol/L    Potassium 3.5 3.5 - 5.1 mmol/L    Chloride 103 97 - 108 mmol/L    CO2 27 21 - 32 mmol/L    Anion gap 5 5 - 15 mmol/L    Glucose 116 (H) 65 - 100 mg/dL    BUN 8 6 - 20 mg/dL    Creatinine 0.46 (L) 0.70 - 1.30 mg/dL    BUN/Creatinine ratio 17 12 - 20      GFR est AA >60 >60 ml/min/1.73m2    GFR est non-AA >60 >60 ml/min/1.73m2    Calcium 8.8 8.5 - 10.1 mg/dL   CBC WITH AUTOMATED DIFF    Collection Time: 11/05/20  6:37 AM   Result Value Ref Range    WBC 8.3 4.1 - 11.1 K/uL    RBC 3.49 (L) 4.10 - 5.70 M/uL    HGB 11.0 (L) 12.1 - 17.0 g/dL    HCT 32.9 (L) 36.6 - 50.3 %    MCV 94.3 80.0 - 99.0 FL    MCH 31.5 26.0 - 34.0 PG    MCHC 33.4 30.0 - 36.5 g/dL    RDW 12.6 11.5 - 14.5 %    PLATELET 493 065 - 625 K/uL    MPV 12.4 8.9 - 12.9 FL    NEUTROPHILS 63 32 - 75 %    LYMPHOCYTES 14 12 - 49 %    MONOCYTES 19 (H) 5 - 13 %    EOSINOPHILS 3 0 - 7 %    BASOPHILS 1 0 - 1 %    IMMATURE GRANULOCYTES 0 0.0 - 0.5 %    ABS. NEUTROPHILS 5.2 1.8 - 8.0 K/UL    ABS. LYMPHOCYTES 1.2 0.8 - 3.5 K/UL    ABS. MONOCYTES 1.6 (H) 0.0 - 1.0 K/UL    ABS. EOSINOPHILS 0.3 0.0 - 0.4 K/UL    ABS. BASOPHILS 0.1 0.0 - 0.1 K/UL    ABS. IMM.  GRANS. 0.0 0.00 - 0.04 K/UL    DF AUTOMATED     GLUCOSE, POC    Collection Time: 11/05/20  7:16 AM   Result Value Ref Range    Glucose (POC) 120 (H) 65 - 100 mg/dL    Performed by Yogi Howard          CT ABD PELV W CONT   Final Result   Impression:   1. Persistent large volume of pneumoperitoneum. Hollow viscus perforation is   not excluded, but there is no extraluminal contrast material. This alternatively   could be due to leakage of the air around the gastrostomy tube insertion site. 2.  Moderate bilateral pleural effusions. 3.  Cholelithiasis. Nonobstructing right renal calculus. 4.  See full report for detailed findings. XR CHEST PORT   Final Result      CT ABD PELV W CONT   Final Result   IMPRESSION: (+) Free intraperitoneal air. Interval placement of G-tube with its   tip projected into stomach lumen. Exact source for free air undetermined. Correlate to recent surgery. Unable to   exclude bowel perforation as possible cause. Moderate volume dependent pleural effusions with associated lower lobe lung   compressive atelectasis. Report called to 4W. Spoke with patient's nurse. XR CHEST PORT   Final Result   IMPRESSION: Probable right perihilar atelectasis. .. Pneumoperitoneum again   noted. Esophageal stent. XR CHEST SNGL V   Final Result   Impression:      Interval placement of a proximal to mid esophageal stent. Free air in the imaged upper abdomen. Mild atelectasis at the lung bases. Findings discussed with Dinorah Waller on 11/1/2020 at 1125. XR FLUOROSCOPY UNDER 60 MINUTES   Final Result      CT ABD PELV W CONT   Final Result   Impression:   1. No specific evidence of abdominopelvic metastatic disease. 2.  Prostatomegaly. Wall thickening of the urinary bladder may be due to chronic   outlet obstruction and/or cystitis. 3.  Borderline wall thickening of the stomach. May be related to   underdistention, but correlate for gastritis. 4.  Nonobstructing right renal calculus.    5.  See full report for detailed findings. See recently reported chest CT for   supradiaphragmatic findings. CT CHEST W CONT   Final Result   Impression: Enhancing mass of the mid thoracic esophagus measuring proximally 6   x 4 x 3 cm causing dilatation esophagus and obstruction of the upper esophagus   which is fluid filled and patulous. 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 I called Dr. David Locke with this result and recommendation   for upper endoscopy at 3:15 PM.      Emphysema. Coronary artery calcifications. Right kidney stone. Please see full report. CT NECK SOFT TISSUE W CONT   Final Result      XR CHEST SNGL V   Final Result   Impression: Unremarkable chest x-ray, except for degenerative change of the   spine. IR DRAIN PROCEDURE W CATH    (Results Pending)       Assessment:  Problem List Items Addressed This Visit        Other    Esophageal mass - Primary      Other Visit Diagnoses     Hypokalemia        Weight loss        Dehydration           Free air after EGD for placement of esophageal stent by GI. I believe that the free air is secondary to leak through PEG tube site while performing EGD & esophageal stent placement by GI. CT scan shows no obvious extravasation of PO contrast.  However will closely monitor him with antibiotics and change in clinical /physical findings may necessitate an exploration. Plan:    1. Admission  2. Diet: Clears  3. PEG tube to gravity. 4. TPN  5. SCD  6. IS  7. Pain medications  8. Antibiotics  9. Nausea medication  10. Percutaneous drain. 11. Thank you for the consultation & allowing me to participate in the care of this patient.

## 2020-11-06 NOTE — PROGRESS NOTES
Problem: Self Care Deficits Care Plan (Adult)  Goal: *Acute Goals and Plan of Care (Insert Text)  Description: 1. Patient will be Mod I for toilet transfers, hygiene, using LRAD  2. Patient will don socks, Mod I level  3. Patient will don/doff gown, SBA  4. Patient will complete grooming tasks at sink, SBA/set up, using LRAD  Outcome: Not Met   OCCUPATIONAL THERAPY EVALUATION  Patient: [facility] (19 y.o. male)  Date: 11/6/2020  Primary Diagnosis: Esophageal mass [K22.8]  Procedure(s) (LRB):  PERCUTANEOUS ENDOSCOPIC GASTROSTOMY TUBE INSERTION (N/A)  ESOPHAGEAL DILATION (N/A)  ENDOSCOPY WITH PROSTHESIS OR STENT PLACEMENT (N/A) 7 Days Post-Op   Precautions: fall      ASSESSMENT  Based on the objective data described below, the patient presents with limitations/impairments that decrease his independence with AdL and mobility tasks. Limitations include:  generalized weakness, decreased activity tolerance, decreased balance. Son present in room during evaluation. Patient received sitting in chair at bedside in NAD. Over mobility  limited due to 2 IV's on 2 separate poles, drainage tube  and collection container. Current Level of Function Impacting Discharge (ADLs/self-care): Min A with lower body dressing; NPO feeding; toileting--urinal use; simulated bathing, seated: SBA/supervision; grooming: simulated, seated in chair, SBA/set up. Min A/CGA required for functional mobility tasks (sit/stand transfers)    Functional Outcome Measure: The patient scored 20/24 on the Am-Pac outcome measure which is indicative of  need for assistance  with ADL and mobility tasks at Paul to sBA/supervision level. Other factors to consider for discharge: bathroom is located down the suárez. Level of assist available. Entry steps into residence     Patient will benefit from skilled therapy intervention to address the above noted impairments.        PLAN :  Recommendations and Planned Interventions: self care training, functional mobility training, therapeutic exercise, therapeutic activities, endurance activities, patient education, home safety training, and family training/education    Frequency/Duration: Patient will be followed by occupational therapy 5 times a week to address goals. Recommendation for discharge: (in order for the patient to meet his/her long term goals)  HH OT ; however, if patient is limited with mobility distance he may benefit from brief SNF services    This discharge recommendation:  Has been made in collaboration with the attending provider and/or case management    IF patient discharges home will need the following DME: AE: long handled bathing, AE: long handled dressing, and bedside commode       SUBJECTIVE:   Patient stated once I get home, It will be different.  states patient re: mobility and independence with daily activities    OBJECTIVE DATA SUMMARY:   HISTORY:   Past Medical History:   Diagnosis Date    Hypertension      Past Surgical History:   Procedure Laterality Date    IR DRAIN PROCEDURE W CATH  11/5/2020       Expanded or extensive additional review of patient history:     Home Situation  Home Environment: Other (comment)(boarding house)  # Steps to Enter: 5  Rails to Enter: Yes  Hand Rails : Bilateral  One/Two Story Residence: One story  Living Alone: No  Support Systems: Spouse/Significant Other/Partner  Patient Expects to be Discharged to[de-identified] (back to rooming house)  Current DME Used/Available at Home: None  Tub or Shower Type: Shower    PLOF: Pt independent for ADLS/IADLS,  independent with mobility prior to admission. Patient resides with significant other in a boarding/rooming house. Patient states the bathroom is \"down the suárez\"    Hand dominance: Right    EXAMINATION OF PERFORMANCE DEFICITS:  Cognitive/Behavioral Status:  Neurologic State: Alert  Orientation Level: Oriented X4  Cognition: Appropriate for age attention/concentration; Follows commands             Skin: not formally assessed    Edema: none noted in UE's    Hearing: Auditory  Auditory Impairment: None    Vision/Perceptual:       Appeared intact  Glasses at home  Corrective Lenses: Reading glasses    Range of Motion: Nimisha UE:  WFL    Strength:  NIMISHA UE;  WFL, grossly 3/5    Coordination:     Fine Motor Skills-Upper: Left Intact; Right Intact    Gross Motor Skills-Upper: Left Intact; Right Intact    Tone & Sensation: Nimisha UE:  Tone: normal  Sensation: appears intact  Balance:  Sitting: Intact  Standing: Intact; With support    Functional Mobility and Transfers for ADLs:  Bed Mobility:   Not tested:  patient received sitting in chair at bedside    Transfers:  Sit to Stand: Contact guard assistance  Stand to Sit: Contact guard assistance    ADL Assessment:  Feeding: (NPO)  Patient did not tolerate peg tube       Bathing: Stand-by assistance;Setup     Simulated. Patient states he already had a bath  Seated in chair    Lower Body Dressing: Minimum assistance  Min A with donning socks    Toileting: Setup(urinal)        ADL Intervention and task modifications:       Grooming  Position Performed: Seated in chair  Washing Face: Modified independent    Upper Body Bathing  Position Performed: Seated in chair    Lower Body Bathing  Bathing Assistance: Supervision;Stand-by assistance(simulated)  Position Performed: Seated in chair  Lower Body : Set-up; Supervision(simulated)  Position Performed: Seated in chair         Lower Body Dressing Assistance  Socks: Minimum assistance  Leg Crossed Method Used: Yes  Position Performed: Seated in chair    Toileting  Bladder Hygiene: (using urinal)    Functional Measure:      [facility] AM-PACTM \"6 Clicks\"                                                       Daily Activity Inpatient Short Form  How much help from another person does the patient currently need. .. Total; A Lot A Little None   1. Putting on and taking off regular lower body clothing? []  1 []  2 [x]  3 []  4   2.   Bathing (including washing, rinsing, drying)? []  1 []  2 [x]  3 []  4   3. Toileting, which includes using toilet, bedpan or urinal? [] 1 []  2 [x]  3 []  4   4. Putting on and taking off regular upper body clothing? []  1 []  2 [x]  3 []  4   5. Taking care of personal grooming such as brushing teeth? []  1 []  2 []  3 [x]  4   6. Eating meals? []  1 []  2 []  3 [x]  4   © , Trustees of [address] [place] [address] [address] [address], under license to [employer]. All rights reserved     Score:      Interpretation of Tool:  Represents clinically-significant functional categories (i.e. Activities of daily living). Percentage of Impairment CH    0%   CI    1-19% CJ    20-39% CK    40-59% CL    60-79% CM    80-99% CN     100%   [facility]  Score 6-24 24 23 20-22 15-19 10-14 7-9 6        Occupational Therapy Evaluation Charge Determination   History Examination Decision-Making   LOW Complexity : Brief history review  LOW Complexity : 1-3 performance deficits relating to physical, cognitive , or psychosocial skils that result in activity limitations and / or participation restrictions  MEDIUM Complexity : Patient may present with comorbidities that affect occupational performnce. Miniml to moderate modification of tasks or assistance (eg, physical or verbal ) with assesment(s) is necessary to enable patient to complete evaluation       Based on the above components, the patient evaluation is determined to be of the following complexity level: LOW   Pain Ratin/10    Activity Tolerance:   Fair  Please refer to the flowsheet for vital signs taken during this treatment. After treatment patient left in no apparent distress:    Sitting in chair, Call bell within reach, and Caregiver / family present    COMMUNICATION/EDUCATION:   The patients plan of care was discussed with: Case management. Patient/family agree to work toward stated goals and plan of care. This patients plan of care is appropriate for delegation to [facility].     Thank you for this referral.  Keyonna Benavides,OtR/L  Time Calculation: 28 mins

## 2020-11-06 NOTE — PROGRESS NOTES
PT tx attempted at 1410 however OT in room at that time. Will continue to follow patient and attempt PT at a later time. Thank you.

## 2020-11-07 ENCOUNTER — APPOINTMENT (OUTPATIENT)
Dept: GENERAL RADIOLOGY | Age: 75
DRG: 375 | End: 2020-11-07
Attending: SURGERY
Payer: MEDICARE

## 2020-11-07 LAB
ANION GAP SERPL CALC-SCNC: 4 MMOL/L (ref 5–15)
BASOPHILS # BLD: 0.1 K/UL (ref 0–0.1)
BASOPHILS NFR BLD: 1 % (ref 0–1)
BUN SERPL-MCNC: 17 MG/DL (ref 6–20)
BUN/CREAT SERPL: 26 (ref 12–20)
CA-I BLD-MCNC: 9 MG/DL (ref 8.5–10.1)
CHLORIDE SERPL-SCNC: 101 MMOL/L (ref 97–108)
CO2 SERPL-SCNC: 30 MMOL/L (ref 21–32)
CREAT SERPL-MCNC: 0.65 MG/DL (ref 0.7–1.3)
DIFFERENTIAL METHOD BLD: ABNORMAL
EOSINOPHIL # BLD: 0.6 K/UL (ref 0–0.4)
EOSINOPHIL NFR BLD: 7 % (ref 0–7)
ERYTHROCYTE [DISTWIDTH] IN BLOOD BY AUTOMATED COUNT: 12.7 % (ref 11.5–14.5)
GLUCOSE BLD STRIP.AUTO-MCNC: 105 MG/DL (ref 65–100)
GLUCOSE BLD STRIP.AUTO-MCNC: 113 MG/DL (ref 65–100)
GLUCOSE BLD STRIP.AUTO-MCNC: 121 MG/DL (ref 65–100)
GLUCOSE BLD STRIP.AUTO-MCNC: 125 MG/DL (ref 65–100)
GLUCOSE SERPL-MCNC: 111 MG/DL (ref 65–100)
HCT VFR BLD AUTO: 36.7 % (ref 36.6–50.3)
HGB BLD-MCNC: 12.1 G/DL (ref 12.1–17)
IMM GRANULOCYTES # BLD AUTO: 0.1 K/UL (ref 0–0.04)
IMM GRANULOCYTES NFR BLD AUTO: 1 % (ref 0–0.5)
LYMPHOCYTES # BLD: 1.7 K/UL (ref 0.8–3.5)
LYMPHOCYTES NFR BLD: 18 % (ref 12–49)
MCH RBC QN AUTO: 31.3 PG (ref 26–34)
MCHC RBC AUTO-ENTMCNC: 33 G/DL (ref 30–36.5)
MCV RBC AUTO: 94.8 FL (ref 80–99)
MONOCYTES # BLD: 1.6 K/UL (ref 0–1)
MONOCYTES NFR BLD: 16 % (ref 5–13)
NEUTS SEG # BLD: 5.6 K/UL (ref 1.8–8)
NEUTS SEG NFR BLD: 57 % (ref 32–75)
PERFORMED BY, TECHID: ABNORMAL
PLATELET # BLD AUTO: 196 K/UL (ref 150–400)
PMV BLD AUTO: 12 FL (ref 8.9–12.9)
POTASSIUM SERPL-SCNC: 3.3 MMOL/L (ref 3.5–5.1)
RBC # BLD AUTO: 3.87 M/UL (ref 4.1–5.7)
SODIUM SERPL-SCNC: 135 MMOL/L (ref 136–145)
WBC # BLD AUTO: 9.6 K/UL (ref 4.1–11.1)

## 2020-11-07 PROCEDURE — 74011250637 HC RX REV CODE- 250/637: Performed by: PHYSICIAN ASSISTANT

## 2020-11-07 PROCEDURE — 80048 BASIC METABOLIC PNL TOTAL CA: CPT

## 2020-11-07 PROCEDURE — 82962 GLUCOSE BLOOD TEST: CPT

## 2020-11-07 PROCEDURE — 74011000250 HC RX REV CODE- 250: Performed by: PHYSICIAN ASSISTANT

## 2020-11-07 PROCEDURE — 36415 COLL VENOUS BLD VENIPUNCTURE: CPT

## 2020-11-07 PROCEDURE — 74011250636 HC RX REV CODE- 250/636: Performed by: INTERNAL MEDICINE

## 2020-11-07 PROCEDURE — 85025 COMPLETE CBC W/AUTO DIFF WBC: CPT

## 2020-11-07 PROCEDURE — 74011000258 HC RX REV CODE- 258: Performed by: PHYSICIAN ASSISTANT

## 2020-11-07 PROCEDURE — 74011000258 HC RX REV CODE- 258: Performed by: INTERNAL MEDICINE

## 2020-11-07 PROCEDURE — 71045 X-RAY EXAM CHEST 1 VIEW: CPT

## 2020-11-07 PROCEDURE — 65270000029 HC RM PRIVATE

## 2020-11-07 PROCEDURE — 74011250637 HC RX REV CODE- 250/637: Performed by: HOSPITALIST

## 2020-11-07 PROCEDURE — 74018 RADEX ABDOMEN 1 VIEW: CPT

## 2020-11-07 RX ORDER — DILTIAZEM HYDROCHLORIDE 180 MG/1
180 CAPSULE, EXTENDED RELEASE ORAL DAILY
Status: DISCONTINUED | OUTPATIENT
Start: 2020-11-07 | End: 2020-11-11 | Stop reason: HOSPADM

## 2020-11-07 RX ADMIN — FAMOTIDINE 20 MG: 20 TABLET, FILM COATED ORAL at 09:00

## 2020-11-07 RX ADMIN — PIPERACILLIN SODIUM AND TAZOBACTAM SODIUM 3.38 G: 3; .375 INJECTION, POWDER, LYOPHILIZED, FOR SOLUTION INTRAVENOUS at 20:13

## 2020-11-07 RX ADMIN — FAMOTIDINE 20 MG: 20 TABLET, FILM COATED ORAL at 20:13

## 2020-11-07 RX ADMIN — Medication 10 ML: at 05:13

## 2020-11-07 RX ADMIN — ASCORBIC ACID, VITAMIN A PALMITATE, CHOLECALCIFEROL, THIAMINE HYDROCHLORIDE, RIBOFLAVIN-5 PHOSPHATE SODIUM, PYRIDOXINE HYDROCHLORIDE, NIACINAMIDE, DEXPANTHENOL, ALPHA-TOCOPHEROL ACETATE, VITAMIN K1, FOLIC ACID, BIOTIN, CYANOCOBALAMIN: 200; 3300; 200; 6; 3.6; 6; 40; 15; 10; 150; 600; 60; 5 INJECTION, SOLUTION INTRAVENOUS at 22:58

## 2020-11-07 RX ADMIN — MULTIVITAMIN TABLET 1 TABLET: TABLET at 09:00

## 2020-11-07 RX ADMIN — THIAMINE HCL TAB 100 MG 100 MG: 100 TAB at 09:00

## 2020-11-07 RX ADMIN — Medication 10 ML: at 23:16

## 2020-11-07 RX ADMIN — FOLIC ACID 1 MG: 1 TABLET ORAL at 09:00

## 2020-11-07 RX ADMIN — PIPERACILLIN SODIUM AND TAZOBACTAM SODIUM 3.38 G: 3; .375 INJECTION, POWDER, LYOPHILIZED, FOR SOLUTION INTRAVENOUS at 10:24

## 2020-11-07 RX ADMIN — METOPROLOL TARTRATE 25 MG: 25 TABLET, FILM COATED ORAL at 20:13

## 2020-11-07 RX ADMIN — METOPROLOL TARTRATE 25 MG: 25 TABLET, FILM COATED ORAL at 09:00

## 2020-11-07 RX ADMIN — PIPERACILLIN SODIUM AND TAZOBACTAM SODIUM 3.38 G: 3; .375 INJECTION, POWDER, LYOPHILIZED, FOR SOLUTION INTRAVENOUS at 03:00

## 2020-11-07 RX ADMIN — I.V. FAT EMULSION 250 ML: 20 EMULSION INTRAVENOUS at 23:15

## 2020-11-07 RX ADMIN — DILTIAZEM HYDROCHLORIDE 180 MG: 180 CAPSULE, EXTENDED RELEASE ORAL at 13:40

## 2020-11-07 NOTE — PROGRESS NOTES
Chief Complaint: None     History of Present Illness:     Mr. Contreras Fry is a 76y.o. year old * male admitted with esophageal cancer. Initially underwent PEG tube placement followed by esophageal stent placement. Has free air & hence consulted. Repeat CT scan with contrast through PEG tube shows no extravasation of contrast through PEG tube site or small bowel/colon & has reached rectum. No abdominal pain or distension. No fever or chills. Passing flatus. Overall he feels lot better. IR placed a drain to evacuate the free air and connected to closed suction system. No new issues today. Review of Systems:   Constitutional:  no fever, no chills,  no sweats, No weakness, No fatigue, No decreased activity. Respiratory: No shortness of breath, No cough, No sputum production, No hemoptysis, No wheezing, No cyanosis. Cardiovascular: No chest pain, No palpitations, No bradycardia, No tachycardia, No peripheral edema, No syncope. Gastrointestinal: No nausea, No vomiting, No diarrhea, No constipation, No heartburn, No abdominal pain. Genitourinary: No dysuria, No hematuria, No change in urine stream, No urethral discharge, No lesions. Hematology/Lymphatics: No bruising tendency, No bleeding tendency, No petechiae, No swollen lymph glands. Endocrine: No excessive thirst, No polyuria, No cold intolerance, No heat intolerance, No excessive hunger. Musculoskeletal: No back pain, No neck pain, No joint pain, No muscle pain, No claudication, No decreased range of motion, No trauma. Integumentary: No rash, No pruritus, No abrasions. Neurologic: Alert and oriented X4, No abnormal balance, No headache, No confusion, No numbness, No tingling. Psychiatric: No anxiety, No depression, No frandy. Physical Exam:     Vitals & Measurements:     Wt Readings from Last 3 Encounters:   11/07/20 62.5 kg (137 lb 12.6 oz)     Temp Readings from Last 3 Encounters:   11/07/20 98.8 °F (37.1 °C)     BP Readings from Last 3 Encounters:   11/07/20 (!) 156/80     Pulse Readings from Last 3 Encounters:   11/07/20 71      Ht Readings from Last 3 Encounters:   11/05/20 6' 1\" (1.854 m)      Date 11/06/20 0700 - 11/07/20 0659 11/07/20 0700 - 11/08/20 0659   Shift 2708-2287 8191-2154 24 Hour Total 6754-4116 6541-7977 24 Hour Total   INTAKE   Shift Total(mL/kg)         OUTPUT   Urine(mL/kg/hr) 100(0.1) 225(0.3) 325(0.2) 150  150     Urine Voided 100 225 325 150  150     Urine Occurrence(s) 1 x  1 x      Stool           Stool Occurrence(s) 1 x  1 x      Shift Total(mL/kg) 100(1.6) 225(3.6) 325(5.2) 150(2.4)  150(2.4)   NET -100 -225 -325 -150  -150   Weight (kg) 62.8 62.5 62.5 62.5 62.5 62.5       General: well appearing, no acute distress  Head: Normal  Face: Nornal  HEENT: atraumatic, PERRLA, moist mucosa, normal pharynx, normal tonsils and adenoids, normal tongue, no fluid in sinuses  Neck: Trachea midline, no carotid bruit, no masses  Chest: Normal.  Respiratory: normal chest wall expansion, CTA B, no r/r/w, no rubs  Cardiovascular: RRR, no m/r/g, Normal S1 and S2  Abdomen: Soft, non tender, non-distended, normal bowel sounds in all quadrants, no hepatosplenomegaly, no tympany. Incision scar:   Genitourinary: No inguinal hernia, normal external gentalia, Testis & scrotum normal, no renal angle tenderness  Rectal: deferred  Musculoskeletal: Normal ROM in upper and lower extremities, No joint swelling. Integumentary: Warm, dry, and pink, with no rash, purpura, or petechia  Heme/Lymph: No lymphadenopathy, no bruises  Neurological: Cranial Nerves II-XII grossly intact, No gross sensory or motor deficit.   Psychiatric: Cooperative with normal mood, affect, and cognition    Laboratory Values:   Recent Results (from the past 24 hour(s))   GLUCOSE, POC    Collection Time: 11/06/20  8:11 PM   Result Value Ref Range    Glucose (POC) 114 (H) 65 - 100 mg/dL    Performed by Bruce, BASIC    Collection Time: 11/07/20  7:40 AM Result Value Ref Range    Sodium 135 (L) 136 - 145 mmol/L    Potassium 3.3 (L) 3.5 - 5.1 mmol/L    Chloride 101 97 - 108 mmol/L    CO2 30 21 - 32 mmol/L    Anion gap 4 (L) 5 - 15 mmol/L    Glucose 111 (H) 65 - 100 mg/dL    BUN 17 6 - 20 mg/dL    Creatinine 0.65 (L) 0.70 - 1.30 mg/dL    BUN/Creatinine ratio 26 (H) 12 - 20      GFR est AA >60 >60 ml/min/1.73m2    GFR est non-AA >60 >60 ml/min/1.73m2    Calcium 9.0 8.5 - 10.1 mg/dL   CBC WITH AUTOMATED DIFF    Collection Time: 11/07/20  7:40 AM   Result Value Ref Range    WBC 9.6 4.1 - 11.1 K/uL    RBC 3.87 (L) 4.10 - 5.70 M/uL    HGB 12.1 12.1 - 17.0 g/dL    HCT 36.7 36.6 - 50.3 %    MCV 94.8 80.0 - 99.0 FL    MCH 31.3 26.0 - 34.0 PG    MCHC 33.0 30.0 - 36.5 g/dL    RDW 12.7 11.5 - 14.5 %    PLATELET 515 697 - 381 K/uL    MPV 12.0 8.9 - 12.9 FL    NEUTROPHILS 57 32 - 75 %    LYMPHOCYTES 18 12 - 49 %    MONOCYTES 16 (H) 5 - 13 %    EOSINOPHILS 7 0 - 7 %    BASOPHILS 1 0 - 1 %    IMMATURE GRANULOCYTES 1 (H) 0.0 - 0.5 %    ABS. NEUTROPHILS 5.6 1.8 - 8.0 K/UL    ABS. LYMPHOCYTES 1.7 0.8 - 3.5 K/UL    ABS. MONOCYTES 1.6 (H) 0.0 - 1.0 K/UL    ABS. EOSINOPHILS 0.6 (H) 0.0 - 0.4 K/UL    ABS. BASOPHILS 0.1 0.0 - 0.1 K/UL    ABS. IMM. GRANS. 0.1 (H) 0.00 - 0.04 K/UL    DF AUTOMATED     GLUCOSE, POC    Collection Time: 11/07/20  9:24 AM   Result Value Ref Range    Glucose (POC) 125 (H) 65 - 100 mg/dL    Performed by Merlin Parma          Ultrasound:    CT scan abdomen & pelvis:     CT scan of chest    CT scan of head    CT scan of neck    MRCP    HIDA scan    XR CHEST PORT   Final Result   IMPRESSION: Trace pneumoperitoneum, significantly improved. XR ABD (KUB)   Final Result   Findings/Impression:   There is incomplete imaging of the right upper abdomen laterally. Left upper   quadrant drainage catheter. Left upper abdominal gastrostomy tube. High   attenuation contrast within nondilated large bowel. Colonic diverticula.  No   significant gaseous distention of bowel is seen to suggest mechanical   obstruction or significant adynamic ileus. Arterial calcification present. IR DRAIN PROCEDURE W CATH   Final Result   IMPRESSION:    Successful fluoroscopy guided 8.5F percutaneous drainage catheter placement into   the pneumoperitoneum. Plan:   No need to flush the catheter. Continue low wall suction. CT ABD PELV W CONT   Final Result   Impression:   1. Persistent large volume of pneumoperitoneum. Hollow viscus perforation is   not excluded, but there is no extraluminal contrast material. This alternatively   could be due to leakage of the air around the gastrostomy tube insertion site. 2.  Moderate bilateral pleural effusions. 3.  Cholelithiasis. Nonobstructing right renal calculus. 4.  See full report for detailed findings. XR CHEST PORT   Final Result      CT ABD PELV W CONT   Final Result   IMPRESSION: (+) Free intraperitoneal air. Interval placement of G-tube with its   tip projected into stomach lumen. Exact source for free air undetermined. Correlate to recent surgery. Unable to   exclude bowel perforation as possible cause. Moderate volume dependent pleural effusions with associated lower lobe lung   compressive atelectasis. Report called to 4W. Spoke with patient's nurse. XR CHEST PORT   Final Result   IMPRESSION: Probable right perihilar atelectasis. .. Pneumoperitoneum again   noted. Esophageal stent. XR CHEST SNGL V   Final Result   Impression:      Interval placement of a proximal to mid esophageal stent. Free air in the imaged upper abdomen. Mild atelectasis at the lung bases. Findings discussed with Arva Jeans on 11/1/2020 at 1125. XR FLUOROSCOPY UNDER 60 MINUTES   Final Result      CT ABD PELV W CONT   Final Result   Impression:   1. No specific evidence of abdominopelvic metastatic disease. 2.  Prostatomegaly.  Wall thickening of the urinary bladder may be due to chronic   outlet obstruction and/or cystitis. 3.  Borderline wall thickening of the stomach. May be related to   underdistention, but correlate for gastritis. 4.  Nonobstructing right renal calculus. 5.  See full report for detailed findings. See recently reported chest CT for   supradiaphragmatic findings. CT CHEST W CONT   Final Result   Impression: Enhancing mass of the mid thoracic esophagus measuring proximally 6   x 4 x 3 cm causing dilatation esophagus and obstruction of the upper esophagus   which is fluid filled and patulous. 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 I called Dr. Vides Congress with this result and recommendation   for upper endoscopy at 3:15 PM.      Emphysema. Coronary artery calcifications. Right kidney stone. Please see full report. CT NECK SOFT TISSUE W CONT   Final Result      XR CHEST SNGL V   Final Result   Impression: Unremarkable chest x-ray, except for degenerative change of the   spine. Assessment:  Problem List Items Addressed This Visit        Other    Esophageal mass - Primary      Other Visit Diagnoses     Hypokalemia        Weight loss        Dehydration               Free air after EGD for placement of esophageal stent by GI. S/P Percutaneous pigtail catheter placement by IR.     I believe that the free air is secondary to leak through PEG tube site while performing EGD & esophageal stent placement by GI. CT scan shows no obvious extravasation of PO contrast.  However will closely monitor him with antibiotics and change in clinical /physical findings may necessitate an exploration.     Plan:     1. Admission  2. Diet: Clears  3. PEG tube to gravity. 4. TPN  5. SCD  6. IS  7. Pain medications  8. Antibiotics  9. Nausea medication  10. Continue Percutaneous drain. 11. Clamp PEG tube.   12.Thank you for the consultation & allowing me to participate in the care of this patient.

## 2020-11-07 NOTE — PROGRESS NOTES
AGREE WITH AM ASSESSMENT, PT IS ALERT/ ORIENTED, DENIES PAIN, 0/10, TPN INFUSING AT 83.3 MLS/HR DSG TO ABD CDI.

## 2020-11-07 NOTE — PROGRESS NOTES
Hematology and Oncology Progress Note    Patient: Cade Romero MRN: [id number]  SSN: [id number]    YOB: 1945  Age: 76 y.o. Sex: male      Admit Date: 10/26/2020    LOS: 11 days     Chief Complaint: Patient is feeling tired    Subjective:     Patient does have abdominal discomfort. He is also feeling tired. Objective:     Vitals:    11/06/20 0458 11/06/20 0814 11/06/20 1132 11/06/20 1450   BP: (!) 140/58 (!) 159/85 (!) 144/77 (!) 142/77   Pulse: 68 73 72 73   Resp: 18 20 20 20   Temp: 99.2 °F (37.3 °C) 98.5 °F (36.9 °C) 98.6 °F (37 °C) 98.5 °F (36.9 °C)   SpO2: 97% 96% 97% 97%   Weight:       Height:          Physical Exam:   Constitutional: Elderly -American male looks chronically ill.  Not in any acute distress or pain.  Eyes: Sclerae anicteric. Conjunctivae no pallor.  Has bitemporal wasting. ENMT: Oral mucosa is moist, no thrush, mucositis, or petechiae. Neck: No adenopathy. Cardiovascular: Normal sinus rhythm. Abdomen: Abdomen is distended and has some tenderness.  Patient has G-tube placed. No hepatosplenomegaly. Extremities: No edema. Skin: No petechiae; no skin rash. Neurologic: Alert/oriented x 3.     Lab/Data Review:    Recent Results (from the past 24 hour(s))   GLUCOSE, POC    Collection Time: 11/06/20  8:19 AM   Result Value Ref Range    Glucose (POC) 135 (H) 65 - 100 mg/dL    Performed by Scotland Keys    METABOLIC PANEL, BASIC    Collection Time: 11/06/20  9:20 AM   Result Value Ref Range    Sodium 135 (L) 136 - 145 mmol/L    Potassium 3.2 (L) 3.5 - 5.1 mmol/L    Chloride 99 97 - 108 mmol/L    CO2 30 21 - 32 mmol/L    Anion gap 6 5 - 15 mmol/L    Glucose 115 (H) 65 - 100 mg/dL    BUN 12 6 - 20 mg/dL    Creatinine 0.57 (L) 0.70 - 1.30 mg/dL    BUN/Creatinine ratio 21 (H) 12 - 20      GFR est AA >60 >60 ml/min/1.73m2    GFR est non-AA >60 >60 ml/min/1.73m2    Calcium 8.9 8.5 - 10.1 mg/dL   CBC WITH AUTOMATED DIFF    Collection Time: 11/06/20  9:20 AM   Result Value Ref Range    WBC 8.5 4.1 - 11.1 K/uL    RBC 4.02 (L) 4.10 - 5.70 M/uL    HGB 12.8 12.1 - 17.0 g/dL    HCT 37.6 36.6 - 50.3 %    MCV 93.5 80.0 - 99.0 FL    MCH 31.8 26.0 - 34.0 PG    MCHC 34.0 30.0 - 36.5 g/dL    RDW 12.5 11.5 - 14.5 %    PLATELET 516 930 - 533 K/uL    MPV 12.5 8.9 - 12.9 FL    NEUTROPHILS 60 32 - 75 %    LYMPHOCYTES 17 12 - 49 %    MONOCYTES 18 (H) 5 - 13 %    EOSINOPHILS 5 0 - 7 %    BASOPHILS 0 0 - 1 %    IMMATURE GRANULOCYTES 0 0.0 - 0.5 %    ABS. NEUTROPHILS 5.1 1.8 - 8.0 K/UL    ABS. LYMPHOCYTES 1.5 0.8 - 3.5 K/UL    ABS. MONOCYTES 1.5 (H) 0.0 - 1.0 K/UL    ABS. EOSINOPHILS 0.4 0.0 - 0.4 K/UL    ABS. BASOPHILS 0.0 0.0 - 0.1 K/UL    ABS. IMM. GRANS. 0.0 0.00 - 0.04 K/UL    DF AUTOMATED     GLUCOSE, POC    Collection Time: 11/06/20 11:32 AM   Result Value Ref Range    Glucose (POC) 118 (H) 65 - 100 mg/dL    Performed by Dre Gautam    GLUCOSE, POC    Collection Time: 11/06/20  8:11 PM   Result Value Ref Range    Glucose (POC) 114 (H) 65 - 100 mg/dL    Performed by CHRISTINA FORD            Assessment and plan:     Squamous cell carcinoma of Esophagus:  CT abdomen,pelvis negative for metastatic disease except enlarged prostate. S/p  stent placement due to obstruction. S/p PEG tube . PET scan as out pt. Discussed about role of chemotherapy and radiation. D/w GI. Ronold Kanner Pt advised to quit smoking and alcohol. Radiation oncology consult noted.   -Patient to start chemotherapy on outpatient basis.      Weight loss:due to malignancy.     SVT:management for primary team and cardiology.     H/o prostate ca:S/p radiation. Check PSA.     Free air in the peritoneum: Surgery and GI is following patient patient is started on empiric antibiotics.    -I will sign off the case. Patient to follow-up with Dr. Karthikeyan Huntley after discharge. This dictation was done by dragon, computer voice recognition software.   Often unanticipated grammatical, syntax, phones and other interpretive errors are inadvertently transcribed. Please excuse errors that have escaped final proofreading.        Signed By: Stefano Mcnair MD     November 6, 2020

## 2020-11-07 NOTE — PROGRESS NOTES
Progress Note      11/7/2020 7:08 AM  NAME: Claudette Hassan   MRN:  [id number]   Admit Diagnosis: Esophageal mass [K22.8]      Problem List:   1.  Incomplete database secondary to the patient being a poor historian  2. Alexandrea Redman presents for evaluation of dysphagia generalized weakness  3.  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  Status post percutaneous endoscopic gastrotomy (PEG) tube insertion  5.  Status post esophageal dilation and stent placement  6.  Status post cholecystectomy  7. Percutaneous drainage of large pneumoperitoneum  8.  Previous transient ischemia attack (TIA)   9.  Grade II (moderate) diastolic dysfunction, with pseudonormalization  10.  Paroxysmal supraventricular tachycardia (PSVT) on admission    11.  Hypertension  12.  Nicotine dependence  13.  Chronic obstructive pulmonary disease (emphysema)  14.  History of prostate adenocarcinoma (status post radiation therapy)  15.  Status post cholecystectomy  16.  Hypomagnesia       Subjective:   Patient lethargic.   No acute events overnight    Medications Personally Reviewed:    Current Facility-Administered Medications   Medication Dose Route Frequency    amino acid 4.25 % dextrose 5 % with electrolytes (CLINIMIX E) infusion  2,000 mL/day IntraVENous CONTINUOUS    fat emulsion 20% (LIPOSYN, INTRAlipid) infusion 250 mL  250 mL IntraVENous ONCE    piperacillin-tazobactam (ZOSYN) 3.375 g in 0.9% sodium chloride (MBP/ADV) 100 mL MBP  3.375 g IntraVENous Q8H    dilTIAZem (CARDIZEM) 125 mg/125 mL (1 mg/mL) dextrose 5% infusion  5 mg/hr IntraVENous CONTINUOUS    labetaloL (NORMODYNE;TRANDATE) 20 mg/4 mL (5 mg/mL) injection 20 mg  20 mg IntraVENous Q4H PRN    metoprolol (LOPRESSOR) injection 2.5 mg  2.5 mg IntraVENous Q6H PRN    metoprolol tartrate (LOPRESSOR) tablet 25 mg  25 mg Oral Q12H    thiamine mononitrate (B-1) tablet 100 mg  100 mg Oral DAILY    folic acid (FOLVITE) tablet 1 mg  1 mg Oral DAILY    multivitamin (ONE A DAY) tablet 1 Tab  1 Tab Oral DAILY    famotidine (PEPCID) tablet 20 mg  20 mg Oral BID    HYDROmorphone (DILAUDID) injection 0.5 mg  0.5 mg IntraVENous Q4H PRN    sodium chloride (NS) flush 5-40 mL  5-40 mL IntraVENous Q8H    sodium chloride (NS) flush 5-40 mL  5-40 mL IntraVENous PRN    acetaminophen (TYLENOL) tablet 650 mg  650 mg Oral Q6H PRN    Or    acetaminophen (TYLENOL) suppository 650 mg  650 mg Rectal Q6H PRN    promethazine (PHENERGAN) tablet 12.5 mg  12.5 mg Oral Q6H PRN    Or    ondansetron (ZOFRAN) injection 4 mg  4 mg IntraVENous Q6H PRN           Objective:      Physical Exam:  Last 24hrs VS reviewed since prior progress note. Most recent are:    Visit Vitals  /67 (BP 1 Location: Right arm, BP Patient Position: At rest;Lying left side; Head of bed elevated (Comment degrees))   Pulse 66   Temp 97.7 °F (36.5 °C)   Resp 18   Ht 6' 1\" (1.854 m)   Wt 62.5 kg (137 lb 12.6 oz)   SpO2 97%   BMI 18.18 kg/m²       Intake/Output Summary (Last 24 hours) at 11/7/2020 1129  Last data filed at 11/7/2020 0313  Gross per 24 hour   Intake    Output 325 ml   Net -325 ml        General Appearance: Well developed, well nourished, alert & oriented x 3, no acute distress. Lethargic   chest: Lungs clear to  auscultation bilaterally. Left chest percutaneous drainage. Cardiovascular: JVP is not elevated, PMI is not displaced, normal intensity S1 and S2, without S3. No ectopic beats  Abdomen: Soft, non-tender, bowel sounds are active. Extremities: No edema bilaterally. Data Review  Telemetry: Sinus rhythm occasional ventricular ectopy  EKG:   []  No new EKG for review    Lab Data Personally Reviewed:    Recent Labs     11/06/20  0920 11/05/20  0637   WBC 8.5 8.3   HGB 12.8 11.0*   HCT 37.6 32.9*    183     No results for input(s): INR, PTP, APTT, INREXT, INREXT in the last 72 hours.    Recent Labs     11/06/20  0920 11/05/20  0637 11/04/20  2121   * 135* 137   K 3.2* 3.5 3.4*   CL 99 103 104   CO2 30 27 27   BUN 12 8 8   CREA 0.57* 0.46* 0.51*   * 116* 111*   CA 8.9 8.8 8.7   MG  --   --  1.8     No results for input(s): CPK, CKNDX, TROIQ in the last 72 hours. No lab exists for component: CPKMB  No results found for: CHOL, CHOLX, CHLST, CHOLV, HDL, HDLP, LDL, LDLC, DLDLP, TGLX, TRIGL, TRIGP, CHHD, CHHDX    Recent Labs     11/04/20  1335   AP 50   TP 6.8   ALB 2.2*   GLOB 4.6*     No results for input(s): PH, PCO2, PO2 in the last 72 hours. Assessment/Plan:   No labs today. His creatinine yesterday 0.57. Potassium 3.2. Replete potassium. Albumin is 2.2. There is no accurate ins and outs. He is currently on metoprolol. We will stop Cardizem. Continue medical management.        Rob Gaines MD

## 2020-11-07 NOTE — PROGRESS NOTES
Chief Complaint: None     History of Present Illness:     Mr. Brayan Pfeiffer is a 76 y. o. year old male admitted with esophageal cancer.  Initially underwent PEG tube placement followed by esophageal stent placement. Has free air & hence consulted. Repeat CT scan with contrast through PEG tube shows no extravasation of contrast through PEG tube site or small bowel/colon & has reached rectum. No abdominal pain or distension. No fever or chills. Passing flatus & had bowel movement. He feels great. .   IR placed a drain to evacuate the free air and connected to closed suction system. No new issues today. Review of Systems:   Constitutional:  no fever, no chills,  no sweats, No weakness, No fatigue, No decreased activity. Respiratory: No shortness of breath, No cough, No sputum production, No hemoptysis, No wheezing, No cyanosis. Cardiovascular: No chest pain, No palpitations, No bradycardia, No tachycardia, No peripheral edema, No syncope. Gastrointestinal: No nausea, No vomiting, No diarrhea, No constipation, No heartburn, No abdominal pain. Genitourinary: No dysuria, No hematuria, No change in urine stream, No urethral discharge, No lesions. Hematology/Lymphatics: No bruising tendency, No bleeding tendency, No petechiae, No swollen lymph glands. Endocrine: No excessive thirst, No polyuria, No cold intolerance, No heat intolerance, No excessive hunger. Musculoskeletal: No back pain, No neck pain, No joint pain, No muscle pain, No claudication, No decreased range of motion, No trauma. Integumentary: No rash, No pruritus, No abrasions. Neurologic: Alert and oriented X4, No abnormal balance, No headache, No confusion, No numbness, No tingling. Psychiatric: No anxiety, No depression, No frandy. Physical Exam:     Vitals & Measurements:     Wt Readings from Last 3 Encounters:   11/07/20 62.5 kg (137 lb 12.6 oz)     Temp Readings from Last 3 Encounters:   11/07/20 98.8 °F (37.1 °C)     BP Readings from Last 3 Encounters:   11/07/20 (!) 156/80     Pulse Readings from Last 3 Encounters:   11/07/20 71      Ht Readings from Last 3 Encounters:   11/05/20 6' 1\" (1.854 m)      Date 11/06/20 0700 - 11/07/20 0659 11/07/20 0700 - 11/08/20 0659   Shift 5558-0024 2452-8206 24 Hour Total 0075-2374 8820-7285 24 Hour Total   INTAKE   Shift Total(mL/kg)         OUTPUT   Urine(mL/kg/hr) 100(0.1) 225(0.3) 325(0.2) 150  150     Urine Voided 100 225 325 150  150     Urine Occurrence(s) 1 x  1 x      Stool           Stool Occurrence(s) 1 x  1 x      Shift Total(mL/kg) 100(1.6) 225(3.6) 325(5.2) 150(2.4)  150(2.4)   NET -100 -225 -325 -150  -150   Weight (kg) 62.8 62.5 62.5 62.5 62.5 62.5       General: well appearing, no acute distress  Head: Normal  Face: Nornal  HEENT: atraumatic, PERRLA, moist mucosa, normal pharynx, normal tonsils and adenoids, normal tongue, no fluid in sinuses  Neck: Trachea midline, no carotid bruit, no masses  Chest: Normal.  Respiratory: normal chest wall expansion, CTA B, no r/r/w, no rubs  Cardiovascular: RRR, no m/r/g, Normal S1 and S2  Abdomen: Soft, non tender, non-distended, normal bowel sounds in all quadrants, no hepatosplenomegaly, no tympany. Incision scar:   Genitourinary: No inguinal hernia, normal external gentalia, Testis & scrotum normal, no renal angle tenderness  Rectal: deferred  Musculoskeletal: Normal ROM in upper and lower extremities, No joint swelling. Integumentary: Warm, dry, and pink, with no rash, purpura, or petechia  Heme/Lymph: No lymphadenopathy, no bruises  Neurological: Cranial Nerves II-XII grossly intact, No gross sensory or motor deficit.   Psychiatric: Cooperative with normal mood, affect, and cognition    Laboratory Values:   Recent Results (from the past 24 hour(s))   GLUCOSE, POC    Collection Time: 11/06/20  8:11 PM   Result Value Ref Range    Glucose (POC) 114 (H) 65 - 100 mg/dL    Performed by Bruce, BASIC    Collection Time: 11/07/20  7:40 AM   Result Value Ref Range    Sodium 135 (L) 136 - 145 mmol/L    Potassium 3.3 (L) 3.5 - 5.1 mmol/L    Chloride 101 97 - 108 mmol/L    CO2 30 21 - 32 mmol/L    Anion gap 4 (L) 5 - 15 mmol/L    Glucose 111 (H) 65 - 100 mg/dL    BUN 17 6 - 20 mg/dL    Creatinine 0.65 (L) 0.70 - 1.30 mg/dL    BUN/Creatinine ratio 26 (H) 12 - 20      GFR est AA >60 >60 ml/min/1.73m2    GFR est non-AA >60 >60 ml/min/1.73m2    Calcium 9.0 8.5 - 10.1 mg/dL   CBC WITH AUTOMATED DIFF    Collection Time: 11/07/20  7:40 AM   Result Value Ref Range    WBC 9.6 4.1 - 11.1 K/uL    RBC 3.87 (L) 4.10 - 5.70 M/uL    HGB 12.1 12.1 - 17.0 g/dL    HCT 36.7 36.6 - 50.3 %    MCV 94.8 80.0 - 99.0 FL    MCH 31.3 26.0 - 34.0 PG    MCHC 33.0 30.0 - 36.5 g/dL    RDW 12.7 11.5 - 14.5 %    PLATELET 711 481 - 822 K/uL    MPV 12.0 8.9 - 12.9 FL    NEUTROPHILS 57 32 - 75 %    LYMPHOCYTES 18 12 - 49 %    MONOCYTES 16 (H) 5 - 13 %    EOSINOPHILS 7 0 - 7 %    BASOPHILS 1 0 - 1 %    IMMATURE GRANULOCYTES 1 (H) 0.0 - 0.5 %    ABS. NEUTROPHILS 5.6 1.8 - 8.0 K/UL    ABS. LYMPHOCYTES 1.7 0.8 - 3.5 K/UL    ABS. MONOCYTES 1.6 (H) 0.0 - 1.0 K/UL    ABS. EOSINOPHILS 0.6 (H) 0.0 - 0.4 K/UL    ABS. BASOPHILS 0.1 0.0 - 0.1 K/UL    ABS. IMM. GRANS. 0.1 (H) 0.00 - 0.04 K/UL    DF AUTOMATED     GLUCOSE, POC    Collection Time: 11/07/20  9:24 AM   Result Value Ref Range    Glucose (POC) 125 (H) 65 - 100 mg/dL    Performed by [place] [facility]          XR CHEST PORT   Final Result   IMPRESSION: Trace pneumoperitoneum, significantly improved. XR ABD (KUB)   Final Result   Findings/Impression:   There is incomplete imaging of the right upper abdomen laterally. Left upper   quadrant drainage catheter. Left upper abdominal gastrostomy tube. High   attenuation contrast within nondilated large bowel. Colonic diverticula. No   significant gaseous distention of bowel is seen to suggest mechanical   obstruction or significant adynamic ileus. Arterial calcification present.       IR DRAIN PROCEDURE W CATH   Final Result   IMPRESSION:    Successful fluoroscopy guided 8.5F percutaneous drainage catheter placement into   the pneumoperitoneum. Plan:   No need to flush the catheter. Continue low wall suction. CT ABD PELV W CONT   Final Result   Impression:   1. Persistent large volume of pneumoperitoneum. Hollow viscus perforation is   not excluded, but there is no extraluminal contrast material. This alternatively   could be due to leakage of the air around the gastrostomy tube insertion site. 2.  Moderate bilateral pleural effusions. 3.  Cholelithiasis. Nonobstructing right renal calculus. 4.  See full report for detailed findings. XR CHEST PORT   Final Result      CT ABD PELV W CONT   Final Result   IMPRESSION: (+) Free intraperitoneal air. Interval placement of G-tube with its   tip projected into stomach lumen. Exact source for free air undetermined. Correlate to recent surgery. Unable to   exclude bowel perforation as possible cause. Moderate volume dependent pleural effusions with associated lower lobe lung   compressive atelectasis. Report called to 4W. Spoke with patient's nurse. XR CHEST PORT   Final Result   IMPRESSION: Probable right perihilar atelectasis. .. Pneumoperitoneum again   noted. Esophageal stent. XR CHEST SNGL V   Final Result   Impression:      Interval placement of a proximal to mid esophageal stent. Free air in the imaged upper abdomen. Mild atelectasis at the lung bases. Findings discussed with Vikki Alves on 11/1/2020 at 1125. XR FLUOROSCOPY UNDER 60 MINUTES   Final Result      CT ABD PELV W CONT   Final Result   Impression:   1. No specific evidence of abdominopelvic metastatic disease. 2.  Prostatomegaly. Wall thickening of the urinary bladder may be due to chronic   outlet obstruction and/or cystitis. 3.  Borderline wall thickening of the stomach.  May be related to   underdistention, but correlate for gastritis. 4.  Nonobstructing right renal calculus. 5.  See full report for detailed findings. See recently reported chest CT for   supradiaphragmatic findings. CT CHEST W CONT   Final Result   Impression: Enhancing mass of the mid thoracic esophagus measuring proximally 6   x 4 x 3 cm causing dilatation esophagus and obstruction of the upper esophagus   which is fluid filled and patulous. 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 I called Dr. Ruthie Irving with this result and recommendation   for upper endoscopy at 3:15 PM.      Emphysema. Coronary artery calcifications. Right kidney stone. Please see full report. CT NECK SOFT TISSUE W CONT   Final Result      XR CHEST SNGL V   Final Result   Impression: Unremarkable chest x-ray, except for degenerative change of the   spine. Assessment:  Problem List Items Addressed This Visit        Other    Esophageal mass - Primary      Other Visit Diagnoses     Hypokalemia        Weight loss        Dehydration               Free air after EGD for placement of esophageal stent by GI.     S/P Percutaneous pigtail catheter placement by IR.     I believe that the free air is secondary to leak through PEG tube site while performing EGD & esophageal stent placement by GI. CT scan shows no obvious extravasation of PO contrast.  However will closely monitor him with antibiotics and change in clinical /physical findings may necessitate an exploration.     Plan:     1. Admission  2. Diet: Clears  3. Start PEG tube feed. 4. Continue TPN  5. SCD  6. IS  7. Pain medications  8. Antibiotics  9. Nausea medication  10. Clamp Percutaneous drain.     Thank you for the consultation & allowing me to participate in the care of this patient.

## 2020-11-07 NOTE — PROGRESS NOTES
Hospitalist Progress Note    Subjective:   Daily Progress Note: 11/7/2020 8:40 AM    Hospital Course:  Patient admitted on October 26, 2020 with difficulty swallowing and generalized weakness and 30 pound weight loss. Patient has been unable to swallow solid foods. CT of the chest revealed an obstructing esophageal mass. EGD performed which revealed the same with biopsy taken. Oncology consultation. Patient will need PEG tube placement and will be set up for a stent placement. Considering patient's need for chemotherapy and radiation if biopsy is consistent with a malignant process patient will require PEG tube placement anyways. EGD with balloon dilatation of the esophagus and stent placement. PEG placed. Cardiac consultation. Recurrent SVT requiring adenosine and ultimately metoprolol for rate control. Patient transferred to telemetry for Cardizem drip. Free air on chest x-ray most likely postprocedural.  Will discontinue PEG tube feedings and placed intermittent suction per gastroenterology. Patient with no abdominal pain and abdomen is soft. Worsening abdominal pain with persistent pneumoperitoneum. Also describes right lower quadrant abdominal pain. CT with p.o. and IV contrast showed free air in the peritoneum however could be related to PEG tube placement. .  Surgical consultation, Dr. Kenney Jeffers. Has started Zosyn and vancomycin empirically. He has not tolerating PEG tube feedings. Therefore we will hold. We will follow up with general surgery and GI. Culture remains negative. Have stopped vancoymcin. IR consulted and placed a percutaneous drainage cath on 11/5/2020. Placed on low suction. Subjective: Pateint denies any chest pain, abdominal pain, or SOB. Says he is hungry.     Current Facility-Administered Medications   Medication Dose Route Frequency    amino acid 4.25 % dextrose 5 % with electrolytes (CLINIMIX E) infusion   IntraVENous CONTINUOUS    fat emulsion 20% (LIPOSYN, INTRAlipid) infusion 250 mL  250 mL IntraVENous CONTINUOUS    amino acid 4.25 % dextrose 5 % with electrolytes (CLINIMIX E) infusion  2,000 mL/day IntraVENous CONTINUOUS    fat emulsion 20% (LIPOSYN, INTRAlipid) infusion 250 mL  250 mL IntraVENous ONCE    piperacillin-tazobactam (ZOSYN) 3.375 g in 0.9% sodium chloride (MBP/ADV) 100 mL MBP  3.375 g IntraVENous Q8H    labetaloL (NORMODYNE;TRANDATE) 20 mg/4 mL (5 mg/mL) injection 20 mg  20 mg IntraVENous Q4H PRN    metoprolol (LOPRESSOR) injection 2.5 mg  2.5 mg IntraVENous Q6H PRN    metoprolol tartrate (LOPRESSOR) tablet 25 mg  25 mg Oral Q12H    thiamine mononitrate (B-1) tablet 100 mg  100 mg Oral DAILY    folic acid (FOLVITE) tablet 1 mg  1 mg Oral DAILY    multivitamin (ONE A DAY) tablet 1 Tab  1 Tab Oral DAILY    famotidine (PEPCID) tablet 20 mg  20 mg Oral BID    HYDROmorphone (DILAUDID) injection 0.5 mg  0.5 mg IntraVENous Q4H PRN    sodium chloride (NS) flush 5-40 mL  5-40 mL IntraVENous Q8H    sodium chloride (NS) flush 5-40 mL  5-40 mL IntraVENous PRN    acetaminophen (TYLENOL) tablet 650 mg  650 mg Oral Q6H PRN    Or    acetaminophen (TYLENOL) suppository 650 mg  650 mg Rectal Q6H PRN    promethazine (PHENERGAN) tablet 12.5 mg  12.5 mg Oral Q6H PRN    Or    ondansetron (ZOFRAN) injection 4 mg  4 mg IntraVENous Q6H PRN        Review of Systems  Constitutional: No fevers, No chills, No sweats, ++ fatigue, ++ Weakness  Eyes: No redness  Ears, nose, mouth, throat, and face: No nasal congestion, No sore throat, No voice change  Respiratory:No Shortness of Breath, No cough, No wheezing  Cardiovascular: No chest pain, No palpitations, No extremity edema  Gastrointestinal: No nausea, No vomiting, No diarrhea, No abdominal pain  Genitourinary: No frequency, No dysuria, No hematuria  Integument/breast: No skin lesion(s)   Neurological: No Confusion, No headaches, No dizziness      Objective:     Visit Vitals  /67 (BP 1 Location: Right arm, BP Patient Position: At rest;Lying left side; Head of bed elevated (Comment degrees))   Pulse 66   Temp 97.7 °F (36.5 °C)   Resp 18   Ht 6' 1\" (1.854 m)   Wt 62.5 kg (137 lb 12.6 oz)   SpO2 97%   BMI 18.18 kg/m²    O2 Flow Rate (L/min): 2 l/min O2 Device: Room air    Temp (24hrs), Av.6 °F (37 °C), Min:97.7 °F (36.5 °C), Max:99.8 °F (37.7 °C)      No intake/output data recorded.  1901 -  0700  In: -   Out: 2400 [Urine:1525]    PHYSICAL EXAM:  Constitutional: No acute distress  Skin: Extremities and face reveal no rashes. HEENT: Sclerae anicteric. Extra-occular muscles are intact. No oral ulcers. Cardiovascular: Regular rate and rhythm. Respiratory: nonlabored, diminished  GI: Abdomen nondistended, soft, and nontender. hypoatice active bowel sounds. Musculoskeletal: No pitting edema of the lower legs. Able to move all ext  Neurological:  Patient is alert and oriented.  Cranial nerves II-XII grossly intact  Psychiatric: Mood appears appropriate       Data Review    Recent Results (from the past 24 hour(s))   GLUCOSE, POC    Collection Time: 20  8:19 AM   Result Value Ref Range    Glucose (POC) 135 (H) 65 - 100 mg/dL    Performed by Karen Mancia    METABOLIC PANEL, BASIC    Collection Time: 20  9:20 AM   Result Value Ref Range    Sodium 135 (L) 136 - 145 mmol/L    Potassium 3.2 (L) 3.5 - 5.1 mmol/L    Chloride 99 97 - 108 mmol/L    CO2 30 21 - 32 mmol/L    Anion gap 6 5 - 15 mmol/L    Glucose 115 (H) 65 - 100 mg/dL    BUN 12 6 - 20 mg/dL    Creatinine 0.57 (L) 0.70 - 1.30 mg/dL    BUN/Creatinine ratio 21 (H) 12 - 20      GFR est AA >60 >60 ml/min/1.73m2    GFR est non-AA >60 >60 ml/min/1.73m2    Calcium 8.9 8.5 - 10.1 mg/dL   CBC WITH AUTOMATED DIFF    Collection Time: 20  9:20 AM   Result Value Ref Range    WBC 8.5 4.1 - 11.1 K/uL    RBC 4.02 (L) 4.10 - 5.70 M/uL    HGB 12.8 12.1 - 17.0 g/dL    HCT 37.6 36.6 - 50.3 %    MCV 93.5 80.0 - 99.0 FL    MCH 31.8 26.0 - 34.0 PG    MCHC 34.0 30.0 - 36.5 g/dL    RDW 12.5 11.5 - 14.5 %    PLATELET 889 864 - 682 K/uL    MPV 12.5 8.9 - 12.9 FL    NEUTROPHILS 60 32 - 75 %    LYMPHOCYTES 17 12 - 49 %    MONOCYTES 18 (H) 5 - 13 %    EOSINOPHILS 5 0 - 7 %    BASOPHILS 0 0 - 1 %    IMMATURE GRANULOCYTES 0 0.0 - 0.5 %    ABS. NEUTROPHILS 5.1 1.8 - 8.0 K/UL    ABS. LYMPHOCYTES 1.5 0.8 - 3.5 K/UL    ABS. MONOCYTES 1.5 (H) 0.0 - 1.0 K/UL    ABS. EOSINOPHILS 0.4 0.0 - 0.4 K/UL    ABS. BASOPHILS 0.0 0.0 - 0.1 K/UL    ABS. IMM. GRANS. 0.0 0.00 - 0.04 K/UL    DF AUTOMATED     GLUCOSE, POC    Collection Time: 11/06/20 11:32 AM   Result Value Ref Range    Glucose (POC) 118 (H) 65 - 100 mg/dL    Performed by Jesisca Marvin    GLUCOSE, POC    Collection Time: 11/06/20  8:11 PM   Result Value Ref Range    Glucose (POC) 114 (H) 65 - 100 mg/dL    Performed by Rica Magic     - labs pending for today. Reviewed labs from 11/5/2020    Radiology review: CT abdomen and pelvis on 11/5/2020    Assessment:   1. Esophageal mass  2. Hypokalemia  3. Hypertension  4. Protein malnutrition, moderate status post PEG tube  5. Supraventricular tachycardia  6. Pneumoperitoneum s/p percutaneous drain #2    Plan:    1. EGD performed with complete obstruction of the esophagus. Repeat EGD on 10/30/2020 with EGD and balloon dilation of the esophagus status post stent placement. CT of the chest revealed a mild thoracic esophageal mass measuring 6 x 4 x 3 cm. Oncology consulted. Repeat CT abdomen and pelvis on 11/5/2020 shows persistent large volume of pneumoperitoneum, moderate bilateral pleural effusions. S/p percutaneous drain #2 per IR. 2.  Patient had episode of supraventricular tachycardia. Cardiology consulted. Was on IV diltiazem but stopped. Currently NPO. Has labetalol PRN  3. As patient is not tolerating oral intake. On IV dilt. Blood pressure stable. 4.  Patient is status post PEG tube placement. CT of the abdomen pelvis has been reviewed.  Holding Peg tube feedings at this time. Given one IV lasix due to pleural effusions. General surgery consulted. Waiting for general surgery to clear for PEG tube feedings. Reordered PPN. 5.  Repeat CT of the and pelvis showed pneumoperitoneum with free air fluid. Discussed with GI and they think it is due to PEG tube. General surgery consult. Procalcitonin within normal limits. Is on Zosyn. Cultures negative. Therefore stopped vancomycin. 6.  CBC BMP in the a.m. CODE STATUS full     DVT prophylaxis: loveonx  Ulcer prophylaxis: pepcid    Care Plan discussed with: Patient/Family, Nurse and     Total time spent with patient: 32 minutes.

## 2020-11-08 LAB
ANION GAP SERPL CALC-SCNC: 5 MMOL/L (ref 5–15)
BACTERIA SPEC CULT: NORMAL
BASOPHILS # BLD: 0.1 K/UL (ref 0–0.1)
BASOPHILS NFR BLD: 0 % (ref 0–1)
BUN SERPL-MCNC: 16 MG/DL (ref 6–20)
BUN/CREAT SERPL: 22 (ref 12–20)
CA-I BLD-MCNC: 9.4 MG/DL (ref 8.5–10.1)
CHLORIDE SERPL-SCNC: 104 MMOL/L (ref 97–108)
CO2 SERPL-SCNC: 27 MMOL/L (ref 21–32)
CREAT SERPL-MCNC: 0.73 MG/DL (ref 0.7–1.3)
DIFFERENTIAL METHOD BLD: ABNORMAL
EOSINOPHIL # BLD: 0.7 K/UL (ref 0–0.4)
EOSINOPHIL NFR BLD: 6 % (ref 0–7)
ERYTHROCYTE [DISTWIDTH] IN BLOOD BY AUTOMATED COUNT: 12.8 % (ref 11.5–14.5)
GLUCOSE BLD STRIP.AUTO-MCNC: 106 MG/DL (ref 65–100)
GLUCOSE BLD STRIP.AUTO-MCNC: 121 MG/DL (ref 65–100)
GLUCOSE BLD STRIP.AUTO-MCNC: 124 MG/DL (ref 65–100)
GLUCOSE BLD STRIP.AUTO-MCNC: 132 MG/DL (ref 65–100)
GLUCOSE SERPL-MCNC: 109 MG/DL (ref 65–100)
HCT VFR BLD AUTO: 39 % (ref 36.6–50.3)
HGB BLD-MCNC: 13 G/DL (ref 12.1–17)
IMM GRANULOCYTES # BLD AUTO: 0.1 K/UL (ref 0–0.04)
IMM GRANULOCYTES NFR BLD AUTO: 1 % (ref 0–0.5)
LYMPHOCYTES # BLD: 2.3 K/UL (ref 0.8–3.5)
LYMPHOCYTES NFR BLD: 20 % (ref 12–49)
MCH RBC QN AUTO: 32 PG (ref 26–34)
MCHC RBC AUTO-ENTMCNC: 33.3 G/DL (ref 30–36.5)
MCV RBC AUTO: 96.1 FL (ref 80–99)
MONOCYTES # BLD: 1.6 K/UL (ref 0–1)
MONOCYTES NFR BLD: 14 % (ref 5–13)
NEUTS SEG # BLD: 6.8 K/UL (ref 1.8–8)
NEUTS SEG NFR BLD: 59 % (ref 32–75)
PERFORMED BY, TECHID: ABNORMAL
PLATELET # BLD AUTO: 215 K/UL (ref 150–400)
PMV BLD AUTO: 12.4 FL (ref 8.9–12.9)
POTASSIUM SERPL-SCNC: 3.5 MMOL/L (ref 3.5–5.1)
RBC # BLD AUTO: 4.06 M/UL (ref 4.1–5.7)
SODIUM SERPL-SCNC: 136 MMOL/L (ref 136–145)
SPECIAL REQUESTS,SREQ: NORMAL
WBC # BLD AUTO: 11.4 K/UL (ref 4.1–11.1)

## 2020-11-08 PROCEDURE — 74011250636 HC RX REV CODE- 250/636: Performed by: PHYSICIAN ASSISTANT

## 2020-11-08 PROCEDURE — 74011250637 HC RX REV CODE- 250/637: Performed by: PHYSICIAN ASSISTANT

## 2020-11-08 PROCEDURE — 74011250636 HC RX REV CODE- 250/636: Performed by: INTERNAL MEDICINE

## 2020-11-08 PROCEDURE — 74011000258 HC RX REV CODE- 258: Performed by: INTERNAL MEDICINE

## 2020-11-08 PROCEDURE — 36415 COLL VENOUS BLD VENIPUNCTURE: CPT

## 2020-11-08 PROCEDURE — 80048 BASIC METABOLIC PNL TOTAL CA: CPT

## 2020-11-08 PROCEDURE — 85025 COMPLETE CBC W/AUTO DIFF WBC: CPT

## 2020-11-08 PROCEDURE — 97110 THERAPEUTIC EXERCISES: CPT

## 2020-11-08 PROCEDURE — 65270000029 HC RM PRIVATE

## 2020-11-08 PROCEDURE — 74011250637 HC RX REV CODE- 250/637: Performed by: HOSPITALIST

## 2020-11-08 PROCEDURE — 74011250637 HC RX REV CODE- 250/637: Performed by: INTERNAL MEDICINE

## 2020-11-08 PROCEDURE — 82962 GLUCOSE BLOOD TEST: CPT

## 2020-11-08 RX ORDER — FUROSEMIDE 10 MG/ML
40 INJECTION INTRAMUSCULAR; INTRAVENOUS ONCE
Status: COMPLETED | OUTPATIENT
Start: 2020-11-08 | End: 2020-11-08

## 2020-11-08 RX ADMIN — PIPERACILLIN SODIUM AND TAZOBACTAM SODIUM 3.38 G: 3; .375 INJECTION, POWDER, LYOPHILIZED, FOR SOLUTION INTRAVENOUS at 21:39

## 2020-11-08 RX ADMIN — METOPROLOL TARTRATE 25 MG: 25 TABLET, FILM COATED ORAL at 21:39

## 2020-11-08 RX ADMIN — FOLIC ACID 1 MG: 1 TABLET ORAL at 09:35

## 2020-11-08 RX ADMIN — Medication 10 ML: at 21:40

## 2020-11-08 RX ADMIN — PIPERACILLIN SODIUM AND TAZOBACTAM SODIUM 3.38 G: 3; .375 INJECTION, POWDER, LYOPHILIZED, FOR SOLUTION INTRAVENOUS at 09:36

## 2020-11-08 RX ADMIN — Medication 10 ML: at 05:04

## 2020-11-08 RX ADMIN — DILTIAZEM HYDROCHLORIDE 180 MG: 180 CAPSULE, EXTENDED RELEASE ORAL at 09:35

## 2020-11-08 RX ADMIN — FAMOTIDINE 20 MG: 20 TABLET, FILM COATED ORAL at 09:00

## 2020-11-08 RX ADMIN — PIPERACILLIN SODIUM AND TAZOBACTAM SODIUM 3.38 G: 3; .375 INJECTION, POWDER, LYOPHILIZED, FOR SOLUTION INTRAVENOUS at 02:52

## 2020-11-08 RX ADMIN — FAMOTIDINE 20 MG: 20 TABLET, FILM COATED ORAL at 21:39

## 2020-11-08 RX ADMIN — Medication 10 ML: at 14:00

## 2020-11-08 RX ADMIN — PIPERACILLIN SODIUM AND TAZOBACTAM SODIUM 3.38 G: 3; .375 INJECTION, POWDER, LYOPHILIZED, FOR SOLUTION INTRAVENOUS at 11:00

## 2020-11-08 RX ADMIN — METOPROLOL TARTRATE 25 MG: 25 TABLET, FILM COATED ORAL at 09:35

## 2020-11-08 RX ADMIN — HYDROMORPHONE HYDROCHLORIDE 0.5 MG: 1 INJECTION, SOLUTION INTRAMUSCULAR; INTRAVENOUS; SUBCUTANEOUS at 02:47

## 2020-11-08 RX ADMIN — FUROSEMIDE 40 MG: 10 INJECTION, SOLUTION INTRAMUSCULAR; INTRAVENOUS at 13:54

## 2020-11-08 RX ADMIN — THIAMINE HCL TAB 100 MG 100 MG: 100 TAB at 09:35

## 2020-11-08 RX ADMIN — MULTIVITAMIN TABLET 1 TABLET: TABLET at 09:35

## 2020-11-08 RX ADMIN — ACETAMINOPHEN 650 MG: 325 TABLET, FILM COATED ORAL at 21:42

## 2020-11-08 NOTE — PROGRESS NOTES
Progress Note      11/8/2020 7:08 AM  NAME: Cookie Villeal   MRN:  [id number]   Admit Diagnosis: Esophageal mass [K22.8]      Problem List:   1.  Incomplete database secondary to the patient being a poor historian  2. Wai Gage presents for evaluation of dysphagia generalized weakness  3.  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  Status post percutaneous endoscopic gastrotomy (PEG) tube insertion  5.  Status post esophageal dilation and stent placement  6.  Status post cholecystectomy  7. Percutaneous drainage of large pneumoperitoneum  8.  Previous transient ischemia attack (TIA)   9.  Grade II (moderate) diastolic dysfunction, with pseudonormalization  10.  Paroxysmal supraventricular tachycardia (PSVT) on admission    11.  Hypertension  12.  Nicotine dependence  13.  Chronic obstructive pulmonary disease (emphysema)  14.  History of prostate adenocarcinoma (status post radiation therapy)  15.  Status post cholecystectomy  16.  Hypomagnesia       Subjective:   More awake. Sitting at the edge of of the bed. Very conversant today.     Medications Personally Reviewed:    Current Facility-Administered Medications   Medication Dose Route Frequency    dilTIAZem ER (DILACOR XR) capsule 180 mg  180 mg Oral DAILY    piperacillin-tazobactam (ZOSYN) 3.375 g in 0.9% sodium chloride (MBP/ADV) 100 mL MBP  3.375 g IntraVENous Q8H    labetaloL (NORMODYNE;TRANDATE) 20 mg/4 mL (5 mg/mL) injection 20 mg  20 mg IntraVENous Q4H PRN    metoprolol (LOPRESSOR) injection 2.5 mg  2.5 mg IntraVENous Q6H PRN    metoprolol tartrate (LOPRESSOR) tablet 25 mg  25 mg Oral Q12H    thiamine mononitrate (B-1) tablet 100 mg  100 mg Oral DAILY    folic acid (FOLVITE) tablet 1 mg  1 mg Oral DAILY    multivitamin (ONE A DAY) tablet 1 Tab  1 Tab Oral DAILY    famotidine (PEPCID) tablet 20 mg  20 mg Oral BID    HYDROmorphone (DILAUDID) injection 0.5 mg  0.5 mg IntraVENous Q4H PRN    sodium chloride (NS) flush 5-40 mL  5-40 mL IntraVENous Q8H    sodium chloride (NS) flush 5-40 mL  5-40 mL IntraVENous PRN    acetaminophen (TYLENOL) tablet 650 mg  650 mg Oral Q6H PRN    Or    acetaminophen (TYLENOL) suppository 650 mg  650 mg Rectal Q6H PRN    promethazine (PHENERGAN) tablet 12.5 mg  12.5 mg Oral Q6H PRN    Or    ondansetron (ZOFRAN) injection 4 mg  4 mg IntraVENous Q6H PRN           Objective:      Physical Exam:  Last 24hrs VS reviewed since prior progress note. Most recent are:    Visit Vitals  BP (!) 149/76 (BP 1 Location: Right arm, BP Patient Position: At rest;Supine; Head of bed elevated (Comment degrees))   Pulse 71   Temp 98.6 °F (37 °C)   Resp 20   Ht 6' 1\" (1.854 m)   Wt 62.5 kg (137 lb 12.6 oz)   SpO2 98%   BMI 18.18 kg/m²       Intake/Output Summary (Last 24 hours) at 11/8/2020 1715  Last data filed at 11/8/2020 1507  Gross per 24 hour   Intake 780 ml   Output 875 ml   Net -95 ml        General Appearance: Well developed, well nourished, alert & oriented x 3, no acute distress. Lethargic   chest: Lungs clear to  auscultation bilaterally. Left chest percutaneous drainage. Cardiovascular: JVP is not elevated, PMI is not displaced, normal intensity S1 and S2, without S3. No ectopic beats  Abdomen: Soft, non-tender, bowel sounds are active. Extremities: No edema bilaterally. Data Review  Telemetry: Sinus rhythm occasional ventricular ectopy  EKG:   []  No new EKG for review    Lab Data Personally Reviewed:    Recent Labs     11/08/20  0711 11/07/20  0740   WBC 11.4* 9.6   HGB 13.0 12.1   HCT 39.0 36.7    196     No results for input(s): INR, PTP, APTT, INREXT, INREXT in the last 72 hours. Recent Labs     11/08/20  0711 11/07/20  0740 11/06/20  0920    135* 135*   K 3.5 3.3* 3.2*    101 99   CO2 27 30 30   BUN 16 17 12   CREA 0.73 0.65* 0.57*   * 111* 115*   CA 9.4 9.0 8.9     No results for input(s): CPK, CKNDX, TROIQ in the last 72 hours.     No lab exists for component: CPKMB  No results found for: CHOL, CHOLX, CHLST, CHOLV, HDL, HDLP, LDL, LDLC, DLDLP, TGLX, TRIGL, TRIGP, CHHD, CHHDX    No results for input(s): AP, TBIL, TP, ALB, GLOB, GGT, AML, LPSE in the last 72 hours. No lab exists for component: SGOT, GPT, AMYP, HLPSE  No results for input(s): PH, PCO2, PO2 in the last 72 hours. Assessment/Plan:   Currently in normal sinus rhythm with occasional PVC. He is off Cardizem drip. Blood pressure is okay. He is currently on oral Cardizem 180 mg daily. Continue metoprolol 25 mg twice a day. Dr. Lencho Jackson will resume care in the morning. He was counseled regarding the importance of smoking cessation.        Gurmeet Zhu MD

## 2020-11-08 NOTE — PROGRESS NOTES
Problem: Mobility Impaired (Adult and Pediatric)  Goal: *Acute Goals and Plan of Care (Insert Text)  Description: I with HEP x 7 days  SBA with bed mob x7 days  SBA with all transfers x7 days  Amb 100-125ft with LRAD and SBAX1 x 7 days  Outcome: Progressing Towards Goal   PHYSICAL THERAPY TREATMENT  Patient: Cookie Villela (76 y.o. male)  Date: 11/8/2020  Diagnosis: Esophageal mass [K22.8]   <principal problem not specified>  Procedure(s) (LRB):  PERCUTANEOUS ENDOSCOPIC GASTROSTOMY TUBE INSERTION (N/A)  ESOPHAGEAL DILATION (N/A)  ENDOSCOPY WITH PROSTHESIS OR STENT PLACEMENT (N/A) 9 Days Post-Op  Precautions:    Chart, physical therapy assessment, plan of care and goals were reviewed. ASSESSMENT  Patient continues with skilled PT services and is progressing towards goals. Upon entry into room, patient sitting EOB eating lunch by mouth. Patient also had tube feed running and NPO order on white board so therapist confirmed with nurse that patient was supposed to be eating by mouth in which case nurse said \"yes. \"Patient performed several LE exercises seated before therapist noticed that his tube feeding had come disconnected and was leaking all over the bed. Therapist found nurse who then cleaned patient up and fixed tube feeding, therapy session discontinued at this point. He will continue to benefit from skilled PT services to work on gait training, LE strengthening and balance training to decrease assistance from caregivers and return to PLOF. Current Level of Function Impacting Discharge (mobility/balance): decreased mobility and balance     Other factors to consider for discharge: see above          PLAN :  Patient continues to benefit from skilled intervention to address the above impairments. Continue treatment per established plan of care. to address goals.     Recommendation for discharge: (in order for the patient to meet his/her long term goals)  Physical therapy at least 2 days/week in the home This discharge recommendation:  Has been made in collaboration with the attending provider and/or case management    IF patient discharges home will need the following DME: to be determined (TBD)       SUBJECTIVE:   Patient stated i'm happy to be eating food for the first time since I've been in here.     OBJECTIVE DATA SUMMARY:   Critical Behavior:  Neurologic State: Alert  Orientation Level: Oriented X4  Cognition: Appropriate decision making, Follows commands     Functional Mobility Training:    Balance:  Sitting: Intact; Without support  Ambulation/Gait Training:  No transfers or gait performed as patient was on tube feeding upon therapist entry into room but already sitting EOB so performed a few LE exercises and then noticed that tube feeding had come loose. Therapeutic Exercises:   LAQ, marching, AP bilaterally for 2 x 10  Pain Rating:  No pain reported     Activity Tolerance:   Good  Please refer to the flowsheet for vital signs taken during this treatment. After treatment patient left in no apparent distress:   Call bell within reach and sitting EOB with nurse present in room     COMMUNICATION/COLLABORATION:   The patients plan of care was discussed with: Physical therapist and Registered nurse.      Guillermo Osborne   Time Calculation: 15 mins

## 2020-11-08 NOTE — PROGRESS NOTES
Hospitalist Progress Note    Subjective:   Daily Progress Note: 11/8/2020 8:40 AM    Hospital Course:  Patient admitted on October 26, 2020 with difficulty swallowing and generalized weakness and 30 pound weight loss. Patient has been unable to swallow solid foods. CT of the chest revealed an obstructing esophageal mass. EGD performed which revealed the same with biopsy taken. Oncology consultation. Patient will need PEG tube placement and will be set up for a stent placement. Considering patient's need for chemotherapy and radiation if biopsy is consistent with a malignant process patient will require PEG tube placement anyways. EGD with balloon dilatation of the esophagus and stent placement. PEG placed. Cardiac consultation. Recurrent SVT requiring adenosine and ultimately metoprolol for rate control. Patient transferred to telemetry for Cardizem drip. Free air on chest x-ray most likely postprocedural.  Will discontinue PEG tube feedings and placed intermittent suction per gastroenterology. Patient with no abdominal pain and abdomen is soft. Worsening abdominal pain with persistent pneumoperitoneum. Also describes right lower quadrant abdominal pain. CT with p.o. and IV contrast showed free air in the peritoneum however could be related to PEG tube placement. .  Surgical consultation, Dr. Howard Roa. Has started Zosyn and vancomycin empirically. He has not tolerating PEG tube feedings. Therefore we will hold. We will follow up with general surgery and GI. Culture remains negative. Have stopped vancoymcin. IR consulted and placed a percutaneous drainage cath on 11/5/2020. It was clamped on 11/7/2020. On PPN. Subjective: Pateint denies any chest pain, abdominal pain, or SOB. Says he is hungry.     Current Facility-Administered Medications   Medication Dose Route Frequency    amino acid 4.25 % dextrose 5 % with electrolytes (CLINIMIX E) infusion   IntraVENous CONTINUOUS    fat emulsion 20% (LIPOSYN, INTRAlipid) infusion 250 mL  250 mL IntraVENous CONTINUOUS    fat emulsion 20% (LIPOSYN, INTRAlipid) infusion 250 mL  250 mL IntraVENous CONTINUOUS    amino acid 4.25 % dextrose 5 % with electrolytes (CLINIMIX E) infusion   IntraVENous CONTINUOUS    dilTIAZem ER (DILACOR XR) capsule 180 mg  180 mg Oral DAILY    piperacillin-tazobactam (ZOSYN) 3.375 g in 0.9% sodium chloride (MBP/ADV) 100 mL MBP  3.375 g IntraVENous Q8H    labetaloL (NORMODYNE;TRANDATE) 20 mg/4 mL (5 mg/mL) injection 20 mg  20 mg IntraVENous Q4H PRN    metoprolol (LOPRESSOR) injection 2.5 mg  2.5 mg IntraVENous Q6H PRN    metoprolol tartrate (LOPRESSOR) tablet 25 mg  25 mg Oral Q12H    thiamine mononitrate (B-1) tablet 100 mg  100 mg Oral DAILY    folic acid (FOLVITE) tablet 1 mg  1 mg Oral DAILY    multivitamin (ONE A DAY) tablet 1 Tab  1 Tab Oral DAILY    famotidine (PEPCID) tablet 20 mg  20 mg Oral BID    HYDROmorphone (DILAUDID) injection 0.5 mg  0.5 mg IntraVENous Q4H PRN    sodium chloride (NS) flush 5-40 mL  5-40 mL IntraVENous Q8H    sodium chloride (NS) flush 5-40 mL  5-40 mL IntraVENous PRN    acetaminophen (TYLENOL) tablet 650 mg  650 mg Oral Q6H PRN    Or    acetaminophen (TYLENOL) suppository 650 mg  650 mg Rectal Q6H PRN    promethazine (PHENERGAN) tablet 12.5 mg  12.5 mg Oral Q6H PRN    Or    ondansetron (ZOFRAN) injection 4 mg  4 mg IntraVENous Q6H PRN        Review of Systems  Constitutional: No fevers, No chills, No sweats, ++ fatigue, ++ Weakness  Eyes: No redness  Ears, nose, mouth, throat, and face: No nasal congestion, No sore throat, No voice change  Respiratory:No Shortness of Breath, No cough, No wheezing  Cardiovascular: No chest pain, No palpitations, No extremity edema  Gastrointestinal: No nausea, No vomiting, No diarrhea, No abdominal pain  Genitourinary: No frequency, No dysuria, No hematuria  Integument/breast: No skin lesion(s)   Neurological: No Confusion, No headaches, No dizziness      Objective:     Visit Vitals  BP (!) 174/77   Pulse 68   Temp 98.2 °F (36.8 °C)   Resp 20   Ht 6' 1\" (1.854 m)   Wt 62.5 kg (137 lb 12.6 oz)   SpO2 95%   BMI 18.18 kg/m²    O2 Flow Rate (L/min): 2 l/min O2 Device: Room air    Temp (24hrs), Av.5 °F (36.9 °C), Min:98.2 °F (36.8 °C), Max:98.8 °F (37.1 °C)      No intake/output data recorded.  1901 -  0700  In: -   Out: 375 [Urine:375]    PHYSICAL EXAM:  Constitutional: No acute distress  Skin: Extremities and face reveal no rashes. HEENT: Sclerae anicteric. Extra-occular muscles are intact. No oral ulcers. Cardiovascular: Regular rate and rhythm. Respiratory: nonlabored, crackles  GI: Abdomen nondistended, soft, and nontender. hypoatice active bowel sounds. Musculoskeletal: No pitting edema of the lower legs. Able to move all ext  Neurological:  Patient is alert and oriented. Cranial nerves II-XII grossly intact  Psychiatric: Mood appears appropriate       Data Review    Recent Results (from the past 24 hour(s))   GLUCOSE, POC    Collection Time: 20  9:24 AM   Result Value Ref Range    Glucose (POC) 125 (H) 65 - 100 mg/dL    Performed by Aston Rodriguez, POC    Collection Time: 20 12:51 PM   Result Value Ref Range    Glucose (POC) 113 (H) 65 - 100 mg/dL    Performed by Aston Rodriguez, POC    Collection Time: 20  3:33 PM   Result Value Ref Range    Glucose (POC) 105 (H) 65 - 100 mg/dL    Performed by Sanjuanita Bailey    GLUCOSE, POC    Collection Time: 20 10:05 PM   Result Value Ref Range    Glucose (POC) 121 (H) 65 - 100 mg/dL    Performed by Facundo Summers     - labs pending for today. Reviewed labs from 2020    Radiology review: CT abdomen and pelvis on 2020    Assessment:   1. Esophageal mass  2. Hypokalemia  3. Hypertension  4. Protein malnutrition, moderate status post PEG tube  5. Supraventricular tachycardia  6. Pneumoperitoneum s/p percutaneous drain #3    Plan:    1. EGD performed with complete obstruction of the esophagus. Repeat EGD on 10/30/2020 with EGD and balloon dilation of the esophagus status post stent placement. CT of the chest revealed a mild thoracic esophageal mass measuring 6 x 4 x 3 cm. Oncology consulted. Repeat CT abdomen and pelvis on 11/5/2020 shows persistent large volume of pneumoperitoneum, moderate bilateral pleural effusions. S/p percutaneous drain #3per IR. Clamped on 11/7/2020.   2.  Patient had episode of supraventricular tachycardia. Cardiology consulted. Was on IV diltiazem but stopped. . Has labetalol PRN  3.  BP elevated. On oral diltizem. Give a dose of IV lasix. 4.  Patient is status post PEG tube placement. CT of the abdomen pelvis has been reviewed. Holding Peg tube feedings at this time. We will discuss with surgery to see if we can start tube feedings today. Given one IV lasix due to pleural effusions. General surgery consulted. . Stop PPN as we are starting PEG tube feedings. 5.  Repeat CT of the and pelvis showed pneumoperitoneum with free air fluid. Discussed with GI and they think it is due to PEG tube. General surgery consult. Procalcitonin within normal limits. Is on Zosyn. Cultures negative. Therefore stopped vancomycin. Remains afebrile   6. CBC BMP in the a.m. CODE STATUS full     DVT prophylaxis: loveonx  Ulcer prophylaxis: pepcid    Care Plan discussed with: Patient/Family, Nurse and  and General Surgery     Total time spent with patient: 34 minutes.

## 2020-11-09 ENCOUNTER — APPOINTMENT (OUTPATIENT)
Dept: GENERAL RADIOLOGY | Age: 75
DRG: 375 | End: 2020-11-09
Attending: RADIOLOGY
Payer: MEDICARE

## 2020-11-09 LAB
ANION GAP SERPL CALC-SCNC: 5 MMOL/L (ref 5–15)
BASOPHILS # BLD: 0 K/UL (ref 0–0.1)
BASOPHILS NFR BLD: 0 % (ref 0–1)
BUN SERPL-MCNC: 12 MG/DL (ref 6–20)
BUN/CREAT SERPL: 20 (ref 12–20)
CA-I BLD-MCNC: 9.3 MG/DL (ref 8.5–10.1)
CHLORIDE SERPL-SCNC: 105 MMOL/L (ref 97–108)
CO2 SERPL-SCNC: 28 MMOL/L (ref 21–32)
CREAT SERPL-MCNC: 0.59 MG/DL (ref 0.7–1.3)
DIFFERENTIAL METHOD BLD: ABNORMAL
EOSINOPHIL # BLD: 0.3 K/UL (ref 0–0.4)
EOSINOPHIL NFR BLD: 3 % (ref 0–7)
ERYTHROCYTE [DISTWIDTH] IN BLOOD BY AUTOMATED COUNT: 12.9 % (ref 11.5–14.5)
GLUCOSE BLD STRIP.AUTO-MCNC: 103 MG/DL (ref 65–100)
GLUCOSE BLD STRIP.AUTO-MCNC: 105 MG/DL (ref 65–100)
GLUCOSE BLD STRIP.AUTO-MCNC: 137 MG/DL (ref 65–100)
GLUCOSE BLD STRIP.AUTO-MCNC: 97 MG/DL (ref 65–100)
GLUCOSE SERPL-MCNC: 98 MG/DL (ref 65–100)
HCT VFR BLD AUTO: 36.8 % (ref 36.6–50.3)
HGB BLD-MCNC: 12.1 G/DL (ref 12.1–17)
IMM GRANULOCYTES # BLD AUTO: 0.1 K/UL (ref 0–0.04)
IMM GRANULOCYTES NFR BLD AUTO: 1 % (ref 0–0.5)
LYMPHOCYTES # BLD: 1.9 K/UL (ref 0.8–3.5)
LYMPHOCYTES NFR BLD: 20 % (ref 12–49)
MCH RBC QN AUTO: 31.6 PG (ref 26–34)
MCHC RBC AUTO-ENTMCNC: 32.9 G/DL (ref 30–36.5)
MCV RBC AUTO: 96.1 FL (ref 80–99)
MONOCYTES # BLD: 1.2 K/UL (ref 0–1)
MONOCYTES NFR BLD: 13 % (ref 5–13)
NEUTS SEG # BLD: 6 K/UL (ref 1.8–8)
NEUTS SEG NFR BLD: 63 % (ref 32–75)
PERFORMED BY, TECHID: ABNORMAL
PERFORMED BY, TECHID: NORMAL
PLATELET # BLD AUTO: 212 K/UL (ref 150–400)
PMV BLD AUTO: 12 FL (ref 8.9–12.9)
POTASSIUM SERPL-SCNC: 3.4 MMOL/L (ref 3.5–5.1)
RBC # BLD AUTO: 3.83 M/UL (ref 4.1–5.7)
SODIUM SERPL-SCNC: 138 MMOL/L (ref 136–145)
WBC # BLD AUTO: 9.4 K/UL (ref 4.1–11.1)

## 2020-11-09 PROCEDURE — 74011250637 HC RX REV CODE- 250/637: Performed by: PHYSICIAN ASSISTANT

## 2020-11-09 PROCEDURE — 80048 BASIC METABOLIC PNL TOTAL CA: CPT

## 2020-11-09 PROCEDURE — 74018 RADEX ABDOMEN 1 VIEW: CPT

## 2020-11-09 PROCEDURE — 97110 THERAPEUTIC EXERCISES: CPT

## 2020-11-09 PROCEDURE — 36415 COLL VENOUS BLD VENIPUNCTURE: CPT

## 2020-11-09 PROCEDURE — 74019 RADEX ABDOMEN 2 VIEWS: CPT

## 2020-11-09 PROCEDURE — 65270000029 HC RM PRIVATE

## 2020-11-09 PROCEDURE — 97116 GAIT TRAINING THERAPY: CPT

## 2020-11-09 PROCEDURE — 85025 COMPLETE CBC W/AUTO DIFF WBC: CPT

## 2020-11-09 PROCEDURE — 74011000258 HC RX REV CODE- 258: Performed by: INTERNAL MEDICINE

## 2020-11-09 PROCEDURE — 74011250637 HC RX REV CODE- 250/637: Performed by: HOSPITALIST

## 2020-11-09 PROCEDURE — 97530 THERAPEUTIC ACTIVITIES: CPT

## 2020-11-09 PROCEDURE — 82962 GLUCOSE BLOOD TEST: CPT

## 2020-11-09 PROCEDURE — 74011250636 HC RX REV CODE- 250/636: Performed by: INTERNAL MEDICINE

## 2020-11-09 RX ADMIN — PIPERACILLIN SODIUM AND TAZOBACTAM SODIUM 3.38 G: 3; .375 INJECTION, POWDER, LYOPHILIZED, FOR SOLUTION INTRAVENOUS at 05:26

## 2020-11-09 RX ADMIN — Medication 10 ML: at 20:33

## 2020-11-09 RX ADMIN — Medication 10 ML: at 14:00

## 2020-11-09 RX ADMIN — METOPROLOL TARTRATE 25 MG: 25 TABLET, FILM COATED ORAL at 20:29

## 2020-11-09 RX ADMIN — FOLIC ACID 1 MG: 1 TABLET ORAL at 09:25

## 2020-11-09 RX ADMIN — MULTIVITAMIN TABLET 1 TABLET: TABLET at 09:00

## 2020-11-09 RX ADMIN — FAMOTIDINE 20 MG: 20 TABLET, FILM COATED ORAL at 20:29

## 2020-11-09 RX ADMIN — THIAMINE HCL TAB 100 MG 100 MG: 100 TAB at 09:25

## 2020-11-09 RX ADMIN — FAMOTIDINE 20 MG: 20 TABLET, FILM COATED ORAL at 09:25

## 2020-11-09 RX ADMIN — METOPROLOL TARTRATE 25 MG: 25 TABLET, FILM COATED ORAL at 09:25

## 2020-11-09 RX ADMIN — DILTIAZEM HYDROCHLORIDE 180 MG: 180 CAPSULE, EXTENDED RELEASE ORAL at 09:25

## 2020-11-09 RX ADMIN — PIPERACILLIN SODIUM AND TAZOBACTAM SODIUM 3.38 G: 3; .375 INJECTION, POWDER, LYOPHILIZED, FOR SOLUTION INTRAVENOUS at 20:28

## 2020-11-09 RX ADMIN — PIPERACILLIN SODIUM AND TAZOBACTAM SODIUM 3.38 G: 3; .375 INJECTION, POWDER, LYOPHILIZED, FOR SOLUTION INTRAVENOUS at 15:29

## 2020-11-09 NOTE — PROGRESS NOTES
Bedside and Verbal shift change report given to William Lam RN (oncoming nurse) by Tess Ward RN* (offgoing nurse).  Report included the following information SBAR, Procedure Summary, Intake/Output, MAR, Accordion, Recent Results, Med Rec Status and Cardiac Rhythm SR.

## 2020-11-09 NOTE — PROGRESS NOTES
Chief Complaint: None     History of Present Illness:     Mr. Brayan Pfeiffer is a 76 y. o. year old male admitted with esophageal cancer.  Initially underwent PEG tube placement followed by esophageal stent placement. Has free air & hence consulted. Repeat CT scan with contrast through PEG tube shows no extravasation of contrast through PEG tube site or small bowel/colon & has reached rectum. No abdominal pain or distension. No fever or chills. Passing flatus & had bowel movement. He feels great. IR placed a drain to evacuate the free air and connected to closed suction system. No new complaints today. Passing flatus & had BM. Tolerating clears. Xray today showed no free air.       Review of Systems:   Constitutional:  no fever, no chills,  no sweats, No weakness, No fatigue, No decreased activity. Respiratory: No shortness of breath, No cough, No sputum production, No hemoptysis, No wheezing, No cyanosis. Cardiovascular: No chest pain, No palpitations, No bradycardia, No tachycardia, No peripheral edema, No syncope. Gastrointestinal: No nausea, No vomiting, No diarrhea, No constipation, No heartburn, No abdominal pain. Genitourinary: No dysuria, No hematuria, No change in urine stream, No urethral discharge, No lesions. Hematology/Lymphatics: No bruising tendency, No bleeding tendency, No petechiae, No swollen lymph glands. Endocrine: No excessive thirst, No polyuria, No cold intolerance, No heat intolerance, No excessive hunger. Musculoskeletal: No back pain, No neck pain, No joint pain, No muscle pain, No claudication, No decreased range of motion, No trauma. Integumentary: No rash, No pruritus, No abrasions. Neurologic: Alert and oriented X4, No abnormal balance, No headache, No confusion, No numbness, No tingling. Psychiatric: No anxiety, No depression, No frandy. Physical Exam:     Vitals & Measurements:     Wt Readings from Last 3 Encounters:   11/07/20 62.5 kg (137 lb 12.6 oz) Temp Readings from Last 3 Encounters:   11/09/20 97.9 °F (36.6 °C)     BP Readings from Last 3 Encounters:   11/09/20 (!) 145/86     Pulse Readings from Last 3 Encounters:   11/09/20 78      Ht Readings from Last 3 Encounters:   11/05/20 6' 1\" (1.854 m)      Date 11/08/20 0700 - 11/09/20 0659 11/09/20 0700 - 11/10/20 0659   Shift 5957-5948 6304-8191 24 Hour Total 9721-7353 8646-1045 24 Hour Total   INTAKE   P.O. 780  780        P. O. 780  780      Shift Total(mL/kg) 780(12.5)  780(12.5)      OUTPUT   Urine(mL/kg/hr) 875(1.2)  875(0.6)        Urine Voided 875  875      Shift Total(mL/kg) 875(14)  875(14)      NET -95  -95      Weight (kg) 62.5 62.5 62.5 62.5 62.5 62.5       General: well appearing, no acute distress  Head: Normal  Face: Nornal  HEENT: atraumatic, PERRLA, moist mucosa, normal pharynx, normal tonsils and adenoids, normal tongue, no fluid in sinuses  Neck: Trachea midline, no carotid bruit, no masses  Chest: Normal.  Respiratory: normal chest wall expansion, CTA B, no r/r/w, no rubs  Cardiovascular: RRR, no m/r/g, Normal S1 and S2  Abdomen: Soft, non tender, non-distended, normal bowel sounds in all quadrants, no hepatosplenomegaly, no tympany. Incision scar:   Genitourinary: No inguinal hernia, normal external gentalia, Testis & scrotum normal, no renal angle tenderness  Rectal: deferred  Musculoskeletal: Normal ROM in upper and lower extremities, No joint swelling. Integumentary: Warm, dry, and pink, with no rash, purpura, or petechia  Heme/Lymph: No lymphadenopathy, no bruises  Neurological: Cranial Nerves II-XII grossly intact, No gross sensory or motor deficit.   Psychiatric: Cooperative with normal mood, affect, and cognition    Laboratory Values:   Recent Results (from the past 24 hour(s))   GLUCOSE, POC    Collection Time: 11/08/20  8:38 PM   Result Value Ref Range    Glucose (POC) 124 (H) 65 - 100 mg/dL    Performed by [address] [place], [place] [address] [place] [address], POC    Collection Time: 11/09/20  5:27 AM Result Value Ref Range    Glucose (POC) 137 (H) 65 - 100 mg/dL    Performed by JAMAR German    Collection Time: 11/09/20  8:39 AM   Result Value Ref Range    Sodium 138 136 - 145 mmol/L    Potassium 3.4 (L) 3.5 - 5.1 mmol/L    Chloride 105 97 - 108 mmol/L    CO2 28 21 - 32 mmol/L    Anion gap 5 5 - 15 mmol/L    Glucose 98 65 - 100 mg/dL    BUN 12 6 - 20 mg/dL    Creatinine 0.59 (L) 0.70 - 1.30 mg/dL    BUN/Creatinine ratio 20 12 - 20      GFR est AA >60 >60 ml/min/1.73m2    GFR est non-AA >60 >60 ml/min/1.73m2    Calcium 9.3 8.5 - 10.1 mg/dL   CBC WITH AUTOMATED DIFF    Collection Time: 11/09/20  8:39 AM   Result Value Ref Range    WBC 9.4 4.1 - 11.1 K/uL    RBC 3.83 (L) 4.10 - 5.70 M/uL    HGB 12.1 12.1 - 17.0 g/dL    HCT 36.8 36.6 - 50.3 %    MCV 96.1 80.0 - 99.0 FL    MCH 31.6 26.0 - 34.0 PG    MCHC 32.9 30.0 - 36.5 g/dL    RDW 12.9 11.5 - 14.5 %    PLATELET 566 574 - 371 K/uL    MPV 12.0 8.9 - 12.9 FL    NEUTROPHILS 63 32 - 75 %    LYMPHOCYTES 20 12 - 49 %    MONOCYTES 13 5 - 13 %    EOSINOPHILS 3 0 - 7 %    BASOPHILS 0 0 - 1 %    IMMATURE GRANULOCYTES 1 (H) 0.0 - 0.5 %    ABS. NEUTROPHILS 6.0 1.8 - 8.0 K/UL    ABS. LYMPHOCYTES 1.9 0.8 - 3.5 K/UL    ABS. MONOCYTES 1.2 (H) 0.0 - 1.0 K/UL    ABS. EOSINOPHILS 0.3 0.0 - 0.4 K/UL    ABS. BASOPHILS 0.0 0.0 - 0.1 K/UL    ABS. IMM. GRANS. 0.1 (H) 0.00 - 0.04 K/UL    DF AUTOMATED     GLUCOSE, POC    Collection Time: 11/09/20  8:39 AM   Result Value Ref Range    Glucose (POC) 97 65 - 100 mg/dL    Performed by Darian Cain    GLUCOSE, POC    Collection Time: 11/09/20 12:30 PM   Result Value Ref Range    Glucose (POC) 105 (H) 65 - 100 mg/dL    Performed by ALBINO OLIVERA            XR ABD FLAT/ ERECT   Final Result   Impression: Nonobstructive bowel gas pattern. XR CHEST PORT   Final Result   IMPRESSION: Trace pneumoperitoneum, significantly improved.       XR ABD (KUB)   Final Result   Findings/Impression:   There is incomplete imaging of the right upper abdomen laterally. Left upper   quadrant drainage catheter. Left upper abdominal gastrostomy tube. High   attenuation contrast within nondilated large bowel. Colonic diverticula. No   significant gaseous distention of bowel is seen to suggest mechanical   obstruction or significant adynamic ileus. Arterial calcification present. IR DRAIN PROCEDURE W CATH   Final Result   IMPRESSION:    Successful fluoroscopy guided 8.5F percutaneous drainage catheter placement into   the pneumoperitoneum. Plan:   No need to flush the catheter. Continue low wall suction. CT ABD PELV W CONT   Final Result   Impression:   1. Persistent large volume of pneumoperitoneum. Hollow viscus perforation is   not excluded, but there is no extraluminal contrast material. This alternatively   could be due to leakage of the air around the gastrostomy tube insertion site. 2.  Moderate bilateral pleural effusions. 3.  Cholelithiasis. Nonobstructing right renal calculus. 4.  See full report for detailed findings. XR CHEST PORT   Final Result      CT ABD PELV W CONT   Final Result   IMPRESSION: (+) Free intraperitoneal air. Interval placement of G-tube with its   tip projected into stomach lumen. Exact source for free air undetermined. Correlate to recent surgery. Unable to   exclude bowel perforation as possible cause. Moderate volume dependent pleural effusions with associated lower lobe lung   compressive atelectasis. Report called to 4W. Spoke with patient's nurse. XR CHEST PORT   Final Result   IMPRESSION: Probable right perihilar atelectasis. .. Pneumoperitoneum again   noted. Esophageal stent. XR CHEST SNGL V   Final Result   Impression:      Interval placement of a proximal to mid esophageal stent. Free air in the imaged upper abdomen. Mild atelectasis at the lung bases. Findings discussed with Kristina Oswald on 11/1/2020 at 1125.       XR FLUOROSCOPY UNDER 60 MINUTES   Final Result      CT ABD PELV W CONT   Final Result   Impression:   1. No specific evidence of abdominopelvic metastatic disease. 2.  Prostatomegaly. Wall thickening of the urinary bladder may be due to chronic   outlet obstruction and/or cystitis. 3.  Borderline wall thickening of the stomach. May be related to   underdistention, but correlate for gastritis. 4.  Nonobstructing right renal calculus. 5.  See full report for detailed findings. See recently reported chest CT for   supradiaphragmatic findings. CT CHEST W CONT   Final Result   Impression: Enhancing mass of the mid thoracic esophagus measuring proximally 6   x 4 x 3 cm causing dilatation esophagus and obstruction of the upper esophagus   which is fluid filled and patulous. 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 I called Dr. Mariela Amaya with this result and recommendation   for upper endoscopy at 3:15 PM.      Emphysema. Coronary artery calcifications. Right kidney stone. Please see full report. CT NECK SOFT TISSUE W CONT   Final Result      XR CHEST SNGL V   Final Result   Impression: Unremarkable chest x-ray, except for degenerative change of the   spine. XR ABD (KUB)    (Results Pending)       ECG    Assessment:  Problem List Items Addressed This Visit        Other    Esophageal mass - Primary      Other Visit Diagnoses     Hypokalemia        Weight loss        Dehydration               Free air after EGD for placement of esophageal stent by GI.     S/P Percutaneous pigtail catheter placement by IR.     I believe that the free air is secondary to leak through PEG tube site while performing EGD & esophageal stent placement by GI. CT scan shows no obvious extravasation of PO contrast.  However will closely monitor him with antibiotics and change in clinical /physical findings may necessitate an exploration.     Plan:     1. Admission  2. Diet: Clears  3.  Start PEG tube feed.  4. Wean off TPN  5. SCD  6. IS  7. Pain medications  8. Antibiotics  9. Nausea medication  10.  D/C Percutaneous drain by IR.     Thank you for the consultation & allowing me to participate in the care of this patient.

## 2020-11-09 NOTE — PROGRESS NOTES
OCCUPATIONAL THERAPY TREATMENT  Patient: Jess Guido (27 y.o. male)  Date: 11/9/2020  Diagnosis: Esophageal mass [K22.8]   <principal problem not specified>  Procedure(s) (LRB):  PERCUTANEOUS ENDOSCOPIC GASTROSTOMY TUBE INSERTION (N/A)  ESOPHAGEAL DILATION (N/A)  ENDOSCOPY WITH PROSTHESIS OR STENT PLACEMENT (N/A) 10 Days Post-Op  Precautions: Fall   Chart, occupational therapy assessment, plan of care, and goals were reviewed. ASSESSMENT  Patient continues with skilled OT services and is progressing towards goals. Pt received semi supine in bed upon arrival, agreeable to working with OT at this time. Bed mobility completed with SBA, pt noted to have wet chux, assisted with changing out of cleans and donned clean gown with pt requiring min A. Sit><stand completed with CGA and use of RW with cues for hand placement and safety to/from EOB and chair. Pt setup with remainder of meal tray and left resting comfortably with chair alarm in place. Overall pt with deficits in generalized strength/AROM, functional activity tolerance, dynamic sitting balance, static/dynamic standing balance and decreased safety awareness impacting overall performance of ADLs and functional transfers/mobility. Pt would benefit from continued skilled OT services to address impairments and improve functional IND and safety with self cares and functional mobility. OT recommending HHOT at d/c once medically appropriate. Other factors to consider for discharge: Family support, DME         PLAN :  Patient continues to benefit from skilled intervention to address the above impairments. Continue treatment per established plan of care. to address goals. Recommend with staff: Encourage participation in EOB ADLs with staff present, up for meals.     Recommend next OT session: toilet transfer    Recommendation for discharge: (in order for the patient to meet his/her long term goals)  HHOT    This discharge recommendation:  Has been made in collaboration with the attending provider and/or case management       SUBJECTIVE:   Patient stated i'd like to sit up in the chair    OBJECTIVE DATA SUMMARY:   Cognitive/Behavioral Status:  Neurologic State: Alert     Cognition: Follows commands    Functional Mobility and Transfers for ADLs:  Bed Mobility:  Supine to Sit: Stand-by assistance  Scooting: Stand-by assistance    Transfers:  Sit to Stand: Contact guard assistance     Bed to Chair: Contact guard assistance    Balance:  Sitting: Intact  Standing: Intact; With support    ADL Intervention:  Feeding  Feeding Assistance: Set-up    [address]: Minimum  assistance    Therapeutic Exercises:   Pt would benefit from UE HEP to improve overall UE AROM/strength and can be further educated in next treatment session. Pain:  Pt reports some pain at chest with exertion, he reports he made RN aware prior to OT arrival.    Activity Tolerance:   Good    After treatment patient left in no apparent distress:   Sitting in chair, Call bell within reach, and Bed / chair alarm activated    COMMUNICATION/COLLABORATION:   The patients plan of care was discussed with: Registered nurseRich Buck  Time Calculation: 19 mins   Problem: Self Care Deficits Care Plan (Adult)  Goal: *Acute Goals and Plan of Care (Insert Text)  Description: 1. Patient will be Mod I for toilet transfers, hygiene, using LRAD  2. Patient will don socks, Mod I level  3. Patient will don/doff gown, SBA  4.  Patient will complete grooming tasks at sink, SBA/set up, using LRAD  Outcome: Progressing Towards Goal

## 2020-11-09 NOTE — PROGRESS NOTES
Problem: Mobility Impaired (Adult and Pediatric)  Goal: *Acute Goals and Plan of Care (Insert Text)  Description: I with HEP x 7 days  SBA with bed mob x7 days  SBA with all transfers x7 days  Amb 100-125ft with LRAD and SBAX1 x 7 days  Outcome: Progressing Towards Goal   PHYSICAL THERAPY TREATMENT  Patient: Soni Hendricks (76 y.o. male)  Date: 11/9/2020  Diagnosis: Esophageal mass [K22.8]   <principal problem not specified>  Procedure(s) (LRB):  PERCUTANEOUS ENDOSCOPIC GASTROSTOMY TUBE INSERTION (N/A)  ESOPHAGEAL DILATION (N/A)  ENDOSCOPY WITH PROSTHESIS OR STENT PLACEMENT (N/A) 10 Days Post-Op  Precautions:    Chart, physical therapy assessment, plan of care and goals were reviewed. ASSESSMENT  Patient continues with skilled PT services and is progressing towards goals. Pt. With improvement noted in mobility today. Pt. Required less assistance  and increased gt. Distance and endurance. Pt. Needed 1 standing rest break due to fatigue. Gt. Steady with RW and no LOB noted. Pt. Performed LE exercises as well. Pt. Required vcs for walker management. Pt. Had a tendency to  walker. Current Level of Function Impacting Discharge (mobility/balance): safety    Other factors to consider for discharge: Assist at home. PLAN :  Patient continues to benefit from skilled intervention to address the above impairments. Continue treatment per established plan of care. to address goals. Recommendation for discharge: (in order for the patient to meet his/her long term goals)  Physical therapy at least 2 days/week in the home     This discharge recommendation:  Has been made in collaboration with the attending provider and/or case management    IF patient discharges home will need the following DME: HHPT with assist at home       SUBJECTIVE:   Patient finishing breakfast upon entering. Pt. Agreed to sit in chair. Pt. Is not NPO. Pt. Agreed to use RW and will need one at home.     OBJECTIVE DATA SUMMARY: Critical Behavior:  Neurologic State: Alert  Orientation Level: Oriented X4  Cognition: Appropriate decision making, Follows commands     Functional Mobility Training:  Bed Mobility:         Pt. Sitting on EOB upon entering. Pt. Stated he sat up by himself. Transfers:  Sit to Stand: Contact guard assistance  Stand to Sit: Contact guard assistance        Bed to Chair: Contact guard assistance                    Balance:  Sitting: Intact  Standing: Intact; With support  Ambulation/Gait Training:  Distance (ft): 69 Feet (ft)  Assistive Device: Walker, rolling  Ambulation - Level of Assistance: Contact guard assistance                       Speed/Sayra: Slow              Therapeutic Exercises: Therapeutic Exercises:       EXERCISE   Sets   Reps   Active Active Assist   Passive Self ROM   Comments   Ankle Pumps  15 [x] [] [] []    Quad Sets/Glut Sets   [] [] [] []    Hamstring Sets   [] [] [] []    Short Arc Quads   [] [] [] []    Heel Slides   [] [] [] []    Straight Leg Raises   [] [] [] []    Hip abd/add  15 [x] [] [] []    Long Arc Quads  15 [x] [] [] []    Marching  15 [x] [] [] []       [] [] [] []         Pain Ratin/10      Activity Tolerance:   Fair and requires rest breaks  Please refer to the flowsheet for vital signs taken during this treatment. After treatment patient left in no apparent distress:   Sitting in chair, Call bell within reach, and RN notified about RX and in chair. COMMUNICATION/COLLABORATION:   The patients plan of care was discussed with: Registered nurse.      Julito Fowler   Time Calculation: 38 mins

## 2020-11-09 NOTE — PROGRESS NOTES
Will clamp the catheter for now and upright KUB in 2 hours @ 1100. If no sign of intraperitoneal air, we will remove the catheter.    - Please call [phone] with any questions. - Thanks for involving [facility] in patient's care.

## 2020-11-09 NOTE — CONSULTS
[facility] BRIEF INTERIM SUMMARY    NAME:  Umu Nixon   :     MRN:  [id number]     Date/Time:  2020 1:34 PM    Interim Hospital Summary: 76 y.o. w/ newly found obstructive esophageal mass, s/p ERCP esophageal stenting and PEG placement, was found to have large pneumoperitoneum, s/p percutaneous drainage catheter placement on 2020. - Upright KUB after the tube clamped since Saturday showed significant free air  - Will hold off tube removal for now  --- Continue low-wall suction for now  --- Turn off suction @ 6 AM   --- Repeat upright KUB around 11 AM for possible removal    - Please call [phone] with any questions. - Thanks for involving [facility] in patient's care.     Yuko Ho MD PhD

## 2020-11-09 NOTE — PROGRESS NOTES
Chief Complaint: None     History of Present Illness:     Mr. Brayan Pfeiffer is a 76 y. o. year old male admitted with esophageal cancer.  Initially underwent PEG tube placement followed by esophageal stent placement. Has free air & hence consulted. Repeat CT scan with contrast through PEG tube shows no extravasation of contrast through PEG tube site or small bowel/colon & has reached rectum. No abdominal pain or distension. No fever or chills. Passing flatus & had bowel movement. He feels great. IR placed a drain to evacuate the free air and connected to closed suction system. No new complaints today.       Review of Systems:   Constitutional:  no fever, no chills,  no sweats, No weakness, No fatigue, No decreased activity. Respiratory: No shortness of breath, No cough, No sputum production, No hemoptysis, No wheezing, No cyanosis. Cardiovascular: No chest pain, No palpitations, No bradycardia, No tachycardia, No peripheral edema, No syncope. Gastrointestinal: No nausea, No vomiting, No diarrhea, No constipation, No heartburn, No abdominal pain. Genitourinary: No dysuria, No hematuria, No change in urine stream, No urethral discharge, No lesions. Hematology/Lymphatics: No bruising tendency, No bleeding tendency, No petechiae, No swollen lymph glands. Endocrine: No excessive thirst, No polyuria, No cold intolerance, No heat intolerance, No excessive hunger. Musculoskeletal: No back pain, No neck pain, No joint pain, No muscle pain, No claudication, No decreased range of motion, No trauma. Integumentary: No rash, No pruritus, No abrasions. Neurologic: Alert and oriented X4, No abnormal balance, No headache, No confusion, No numbness, No tingling. Psychiatric: No anxiety, No depression, No frandy. Physical Exam:     Vitals & Measurements:     Wt Readings from Last 3 Encounters:   11/07/20 62.5 kg (137 lb 12.6 oz)     Temp Readings from Last 3 Encounters:   11/08/20 99.1 °F (37.3 °C)     BP Readings from Last 3 Encounters:   11/08/20 (!) 159/85     Pulse Readings from Last 3 Encounters:   11/08/20 73      Ht Readings from Last 3 Encounters:   11/05/20 6' 1\" (1.854 m)      Date 11/08/20 0700 - 11/09/20 0659 11/09/20 0700 - 11/10/20 0659   Shift 9977-4479 6893-8438 24 Hour Total 2835-9881 1787-2632 24 Hour Total   INTAKE   P.O. 780  780        P. O. 780  780      Shift Total(mL/kg) 780(12.5)  780(12.5)      OUTPUT   Urine(mL/kg/hr) 875(1.2)  875        Urine Voided 875  875      Shift Total(mL/kg) 875(14)  875(14)      NET -95  -95      Weight (kg) 62.5 62.5 62.5 62.5 62.5 62.5       General: well appearing, no acute distress  Head: Normal  Face: Nornal  HEENT: atraumatic, PERRLA, moist mucosa, normal pharynx, normal tonsils and adenoids, normal tongue, no fluid in sinuses  Neck: Trachea midline, no carotid bruit, no masses  Chest: Normal.  Respiratory: normal chest wall expansion, CTA B, no r/r/w, no rubs  Cardiovascular: RRR, no m/r/g, Normal S1 and S2  Abdomen: Soft, non tender, non-distended, normal bowel sounds in all quadrants, no hepatosplenomegaly, no tympany. Incision scar:   Genitourinary: No inguinal hernia, normal external gentalia, Testis & scrotum normal, no renal angle tenderness  Rectal: deferred  Musculoskeletal: Normal ROM in upper and lower extremities, No joint swelling. Integumentary: Warm, dry, and pink, with no rash, purpura, or petechia  Heme/Lymph: No lymphadenopathy, no bruises  Neurological: Cranial Nerves II-XII grossly intact, No gross sensory or motor deficit.   Psychiatric: Cooperative with normal mood, affect, and cognition    Laboratory Values:   Recent Results (from the past 24 hour(s))   METABOLIC PANEL, BASIC    Collection Time: 11/08/20  7:11 AM   Result Value Ref Range    Sodium 136 136 - 145 mmol/L    Potassium 3.5 3.5 - 5.1 mmol/L    Chloride 104 97 - 108 mmol/L    CO2 27 21 - 32 mmol/L    Anion gap 5 5 - 15 mmol/L    Glucose 109 (H) 65 - 100 mg/dL    BUN 16 6 - 20 mg/dL Creatinine 0.73 0.70 - 1.30 mg/dL    BUN/Creatinine ratio 22 (H) 12 - 20      GFR est AA >60 >60 ml/min/1.73m2    GFR est non-AA >60 >60 ml/min/1.73m2    Calcium 9.4 8.5 - 10.1 mg/dL   CBC WITH AUTOMATED DIFF    Collection Time: 11/08/20  7:11 AM   Result Value Ref Range    WBC 11.4 (H) 4.1 - 11.1 K/uL    RBC 4.06 (L) 4.10 - 5.70 M/uL    HGB 13.0 12.1 - 17.0 g/dL    HCT 39.0 36.6 - 50.3 %    MCV 96.1 80.0 - 99.0 FL    MCH 32.0 26.0 - 34.0 PG    MCHC 33.3 30.0 - 36.5 g/dL    RDW 12.8 11.5 - 14.5 %    PLATELET 446 665 - 016 K/uL    MPV 12.4 8.9 - 12.9 FL    NEUTROPHILS 59 32 - 75 %    LYMPHOCYTES 20 12 - 49 %    MONOCYTES 14 (H) 5 - 13 %    EOSINOPHILS 6 0 - 7 %    BASOPHILS 0 0 - 1 %    IMMATURE GRANULOCYTES 1 (H) 0.0 - 0.5 %    ABS. NEUTROPHILS 6.8 1.8 - 8.0 K/UL    ABS. LYMPHOCYTES 2.3 0.8 - 3.5 K/UL    ABS. MONOCYTES 1.6 (H) 0.0 - 1.0 K/UL    ABS. EOSINOPHILS 0.7 (H) 0.0 - 0.4 K/UL    ABS. BASOPHILS 0.1 0.0 - 0.1 K/UL    ABS. IMM. GRANS. 0.1 (H) 0.00 - 0.04 K/UL    DF AUTOMATED     GLUCOSE, POC    Collection Time: 11/08/20  8:01 AM   Result Value Ref Range    Glucose (POC) 121 (H) 65 - 100 mg/dL    Performed by Denisse Okeefe, POC    Collection Time: 11/08/20 11:03 AM   Result Value Ref Range    Glucose (POC) 132 (H) 65 - 100 mg/dL    Performed by Denisse Okeefe, POC    Collection Time: 11/08/20  4:35 PM   Result Value Ref Range    Glucose (POC) 106 (H) 65 - 100 mg/dL    Performed by Dontrell Caprice    GLUCOSE, POC    Collection Time: 11/08/20  8:38 PM   Result Value Ref Range    Glucose (POC) 124 (H) 65 - 100 mg/dL    Performed by OVI SERRANO          Ultrasound:    CT scan abdomen & pelvis:     CT scan of chest    CT scan of head    CT scan of neck    MRCP    HIDA scan    XR CHEST PORT   Final Result   IMPRESSION: Trace pneumoperitoneum, significantly improved. XR ABD (KUB)   Final Result   Findings/Impression:   There is incomplete imaging of the right upper abdomen laterally.  Left upper   quadrant drainage catheter. Left upper abdominal gastrostomy tube. High   attenuation contrast within nondilated large bowel. Colonic diverticula. No   significant gaseous distention of bowel is seen to suggest mechanical   obstruction or significant adynamic ileus. Arterial calcification present. IR DRAIN PROCEDURE W CATH   Final Result   IMPRESSION:    Successful fluoroscopy guided 8.5F percutaneous drainage catheter placement into   the pneumoperitoneum. Plan:   No need to flush the catheter. Continue low wall suction. CT ABD PELV W CONT   Final Result   Impression:   1. Persistent large volume of pneumoperitoneum. Hollow viscus perforation is   not excluded, but there is no extraluminal contrast material. This alternatively   could be due to leakage of the air around the gastrostomy tube insertion site. 2.  Moderate bilateral pleural effusions. 3.  Cholelithiasis. Nonobstructing right renal calculus. 4.  See full report for detailed findings. XR CHEST PORT   Final Result      CT ABD PELV W CONT   Final Result   IMPRESSION: (+) Free intraperitoneal air. Interval placement of G-tube with its   tip projected into stomach lumen. Exact source for free air undetermined. Correlate to recent surgery. Unable to   exclude bowel perforation as possible cause. Moderate volume dependent pleural effusions with associated lower lobe lung   compressive atelectasis. Report called to 4W. Spoke with patient's nurse. XR CHEST PORT   Final Result   IMPRESSION: Probable right perihilar atelectasis. .. Pneumoperitoneum again   noted. Esophageal stent. XR CHEST SNGL V   Final Result   Impression:      Interval placement of a proximal to mid esophageal stent. Free air in the imaged upper abdomen. Mild atelectasis at the lung bases. Findings discussed with Fabi Cardoso on 11/1/2020 at 1125.       XR FLUOROSCOPY UNDER 60 MINUTES   Final Result      CT ABD PELV W CONT   Final Result   Impression:   1. No specific evidence of abdominopelvic metastatic disease. 2.  Prostatomegaly. Wall thickening of the urinary bladder may be due to chronic   outlet obstruction and/or cystitis. 3.  Borderline wall thickening of the stomach. May be related to   underdistention, but correlate for gastritis. 4.  Nonobstructing right renal calculus. 5.  See full report for detailed findings. See recently reported chest CT for   supradiaphragmatic findings. CT CHEST W CONT   Final Result   Impression: Enhancing mass of the mid thoracic esophagus measuring proximally 6   x 4 x 3 cm causing dilatation esophagus and obstruction of the upper esophagus   which is fluid filled and patulous. 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 I called Dr. Norberto Villar with this result and recommendation   for upper endoscopy at 3:15 PM.      Emphysema. Coronary artery calcifications. Right kidney stone. Please see full report. CT NECK SOFT TISSUE W CONT   Final Result      XR CHEST SNGL V   Final Result   Impression: Unremarkable chest x-ray, except for degenerative change of the   spine. ECG    Assessment:  Problem List Items Addressed This Visit        Other    Esophageal mass - Primary      Other Visit Diagnoses     Hypokalemia        Weight loss        Dehydration               Free air after EGD for placement of esophageal stent by GI.     S/P Percutaneous pigtail catheter placement by IR.     I believe that the free air is secondary to leak through PEG tube site while performing EGD & esophageal stent placement by GI. CT scan shows no obvious extravasation of PO contrast.  However will closely monitor him with antibiotics and change in clinical /physical findings may necessitate an exploration.     Plan:     1. Admission  2. Diet: Clears  3. Start PEG tube feed. 4. Continue TPN  5. SCD  6. IS  7.  Pain medications  8. Antibiotics  9. Nausea medication  10.  Clamp Percutaneous drain & d/c in am by IR.     Thank you for the consultation & allowing me to participate in the care of this patient.

## 2020-11-09 NOTE — PROGRESS NOTES
TF reccs have been faxed to [address] in order for TF to be set-up. CM has spoke with patient in room to discuss DCP. Patient notified that [facility] has been set-up with [facility]. Patient reported that he did not have a walker at home however patient has been working with PT with a walker, patient had previously given no preference for DME agencies, referral sent to [facility] for a walker as [address] was not able to provide a walker. Order for walker received from attending and faxed via alessandro - to be delivered to hospital prior to d/c.      CM contacted son, Francine Moore [phone], via phone to update on DCP. Mr. Francine Moore stated he was off of work tomorrow 11/10/2020 and would be available at anytime for the TF teaching. CM asked if he could speak with patient's long term partner, Nishant Laureano, in order for her to be present, he stated he would bring her to the hospital at that time in order for her to also be taught on the TF. CM contacted Ruma Francois [phone] and requested she reach out the Mr. Francine Moore to schedule a time for teaching. CM continues to follow.

## 2020-11-09 NOTE — PROGRESS NOTES
Progress Note      11/9/2020 7:22 AM  NAME: Jaguar Guerrero   MRN:  [id number]   Admit Diagnosis: Esophageal mass [K22.8]      Problem List:   1.  Incomplete database secondary to the patient being a poor historian  2. Alyssia Alicea presents for evaluation of dysphagia generalized weakness  3.  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  Status post percutaneous endoscopic gastrotomy (PEG) tube insertion  5.  Status post esophageal dilation and stent placement  6.  Status post cholecystectomy  7. Percutaneous drainage of large pneumoperitoneum  8.  Previous transient ischemia attack (TIA)   9.  Grade II (moderate) diastolic dysfunction, with pseudonormalization  10.  Paroxysmal supraventricular tachycardia (PSVT) on admission     11.  Hypertension  12.  Nicotine dependence  13.  Chronic obstructive pulmonary disease (emphysema)  14.  History of prostate adenocarcinoma (status post radiation therapy)  15.  Status post cholecystectomy  16.  Hypomagnesia       Subjective: The patient is seen and examined in room 489. There were no acute cardiovascular events reported overnight. Currently, he denies any cardiovascular complaints.     Medications Personally Reviewed:    Current Facility-Administered Medications   Medication Dose Route Frequency    dilTIAZem ER (DILACOR XR) capsule 180 mg  180 mg Oral DAILY    piperacillin-tazobactam (ZOSYN) 3.375 g in 0.9% sodium chloride (MBP/ADV) 100 mL MBP  3.375 g IntraVENous Q8H    labetaloL (NORMODYNE;TRANDATE) 20 mg/4 mL (5 mg/mL) injection 20 mg  20 mg IntraVENous Q4H PRN    metoprolol (LOPRESSOR) injection 2.5 mg  2.5 mg IntraVENous Q6H PRN    metoprolol tartrate (LOPRESSOR) tablet 25 mg  25 mg Oral Q12H    thiamine mononitrate (B-1) tablet 100 mg  100 mg Oral DAILY    folic acid (FOLVITE) tablet 1 mg  1 mg Oral DAILY    multivitamin (ONE A DAY) tablet 1 Tab  1 Tab Oral DAILY    famotidine (PEPCID) tablet 20 mg  20 mg Oral BID    HYDROmorphone (DILAUDID) injection 0.5 mg  0.5 mg IntraVENous Q4H PRN    sodium chloride (NS) flush 5-40 mL  5-40 mL IntraVENous Q8H    sodium chloride (NS) flush 5-40 mL  5-40 mL IntraVENous PRN    acetaminophen (TYLENOL) tablet 650 mg  650 mg Oral Q6H PRN    Or    acetaminophen (TYLENOL) suppository 650 mg  650 mg Rectal Q6H PRN    promethazine (PHENERGAN) tablet 12.5 mg  12.5 mg Oral Q6H PRN    Or    ondansetron (ZOFRAN) injection 4 mg  4 mg IntraVENous Q6H PRN           Objective:      Physical Exam:  Last 24hrs VS reviewed since prior progress note. Most recent are:    Visit Vitals  BP (!) 164/79   Pulse (!) 105   Temp 98.7 °F (37.1 °C)   Resp 20   Ht 6' 1\" (1.854 m)   Wt 62.5 kg (137 lb 12.6 oz)   SpO2 98%   BMI 18.18 kg/m²       Intake/Output Summary (Last 24 hours) at 11/9/2020 7749  Last data filed at 11/8/2020 1507  Gross per 24 hour   Intake 780 ml   Output 875 ml   Net -95 ml        General Appearance: Well developed, well nourished, alert & oriented x 3, no acute distress. Chest: Lungs clear to auscultation bilaterally. Cardiovascular: JVP is not elevated, PMI is not displaced, normal intensity S1 and S2, without S3. Abdomen: Soft, non-tender, bowel sounds are active. Extremities: No edema bilaterally. Data Review    Telemetry: Sinus rhythm without ventricular ectopy    EKG:   []  No new EKG for review    Lab Data Personally Reviewed:    Recent Labs     11/08/20  0711 11/07/20  0740   WBC 11.4* 9.6   HGB 13.0 12.1   HCT 39.0 36.7    196     No results for input(s): INR, PTP, APTT, INREXT in the last 72 hours. Recent Labs     11/08/20  0711 11/07/20  0740 11/06/20  0920    135* 135*   K 3.5 3.3* 3.2*    101 99   CO2 27 30 30   BUN 16 17 12   CREA 0.73 0.65* 0.57*   * 111* 115*   CA 9.4 9.0 8.9     No results for input(s): CPK, CKNDX, TROIQ in the last 72 hours.     No lab exists for component: CPKMB  No results found for: CHOL, CHOLX, CHLST, CHOLV, HDL, HDLP, LDL, LDLC, DLDLP, TGLX, TRIGL, TRIGP, CHHD, CHHDX    No results for input(s): AP, TBIL, TP, ALB, GLOB, GGT, AML, LPSE in the last 72 hours. No lab exists for component: SGOT, GPT, AMYP, HLPSE  No results for input(s): PH, PCO2, PO2 in the last 72 hours. Assessment/Plan:   1. Continue telemetry monitoring  2. Continue to monitor serum electrolytes, and renal function  3.   Continue current cardiovascular medications including diltiazem, and metoprolol     Martin Vaca MD

## 2020-11-09 NOTE — PROGRESS NOTES
Hospitalist Progress Note    Subjective:   Daily Progress Note: 11/9/2020 8:40 AM    Hospital Course:  Patient admitted on October 26, 2020 with difficulty swallowing and generalized weakness and 30 pound weight loss. Patient has been unable to swallow solid foods. CT of the chest revealed an obstructing esophageal mass. EGD performed which revealed the same with biopsy taken. Oncology consultation. Patient will need PEG tube placement and will be set up for a stent placement. Considering patient's need for chemotherapy and radiation if biopsy is consistent with a malignant process patient will require PEG tube placement anyways. EGD with balloon dilatation of the esophagus and stent placement. PEG placed. Cardiac consultation. Recurrent SVT requiring adenosine and ultimately metoprolol for rate control. Patient transferred to telemetry for Cardizem drip. Free air on chest x-ray most likely postprocedural.  Will discontinue PEG tube feedings and placed intermittent suction per gastroenterology. Patient with no abdominal pain and abdomen is soft. Worsening abdominal pain with persistent pneumoperitoneum. Also describes right lower quadrant abdominal pain. CT with p.o. and IV contrast showed free air in the peritoneum however could be related to PEG tube placement. .  Surgical consultation, Dr. Juan Tyson. Has started Zosyn and vancomycin empirically. He has not tolerating PEG tube feedings. Therefore we will hold. We will follow up with general surgery and GI. Culture remains negative. Have stopped vancoymcin. IR consulted and placed a percutaneous drainage cath on 11/5/2020. It was clamped on 11/7/2020. Stopped PPN     Subjective: Pateint denies any chest pain, abdominal pain, or SOB. Says he is hungry. Tolerating PEG Tube feedings.  We will consult diet ation and try bolus feedings    Current Facility-Administered Medications   Medication Dose Route Frequency    dilTIAZem ER (DILACOR XR) capsule 180 mg  180 mg Oral DAILY    piperacillin-tazobactam (ZOSYN) 3.375 g in 0.9% sodium chloride (MBP/ADV) 100 mL MBP  3.375 g IntraVENous Q8H    labetaloL (NORMODYNE;TRANDATE) 20 mg/4 mL (5 mg/mL) injection 20 mg  20 mg IntraVENous Q4H PRN    metoprolol (LOPRESSOR) injection 2.5 mg  2.5 mg IntraVENous Q6H PRN    metoprolol tartrate (LOPRESSOR) tablet 25 mg  25 mg Oral Q12H    thiamine mononitrate (B-1) tablet 100 mg  100 mg Oral DAILY    folic acid (FOLVITE) tablet 1 mg  1 mg Oral DAILY    multivitamin (ONE A DAY) tablet 1 Tab  1 Tab Oral DAILY    famotidine (PEPCID) tablet 20 mg  20 mg Oral BID    HYDROmorphone (DILAUDID) injection 0.5 mg  0.5 mg IntraVENous Q4H PRN    sodium chloride (NS) flush 5-40 mL  5-40 mL IntraVENous Q8H    sodium chloride (NS) flush 5-40 mL  5-40 mL IntraVENous PRN    acetaminophen (TYLENOL) tablet 650 mg  650 mg Oral Q6H PRN    Or    acetaminophen (TYLENOL) suppository 650 mg  650 mg Rectal Q6H PRN    promethazine (PHENERGAN) tablet 12.5 mg  12.5 mg Oral Q6H PRN    Or    ondansetron (ZOFRAN) injection 4 mg  4 mg IntraVENous Q6H PRN        Review of Systems  Constitutional: No fevers, No chills, No sweats, ++ fatigue, ++ Weakness  Eyes: No redness  Ears, nose, mouth, throat, and face: No nasal congestion, No sore throat, No voice change  Respiratory:No Shortness of Breath, No cough, No wheezing  Cardiovascular: No chest pain, No palpitations, No extremity edema  Gastrointestinal: No nausea, No vomiting, No diarrhea, No abdominal pain  Genitourinary: No frequency, No dysuria, No hematuria  Integument/breast: No skin lesion(s)   Neurological: No Confusion, No headaches, No dizziness      Objective:     Visit Vitals  /79 (BP 1 Location: Right arm, BP Patient Position: At rest)   Pulse 78   Temp 98.7 °F (37.1 °C)   Resp 20   Ht 6' 1\" (1.854 m)   Wt 62.5 kg (137 lb 12.6 oz)   SpO2 98%   BMI 18.18 kg/m²    O2 Flow Rate (L/min): 2 l/min O2 Device: Room air    Temp (24hrs), Av.9 °F (37.2 °C), Min:98.6 °F (37 °C), Max:99.3 °F (37.4 °C)      No intake/output data recorded. 11/07 1901 - 11/09 0700  In: 80 [P.O.:780]  Out: 875 [Urine:875]    PHYSICAL EXAM:  Constitutional: No acute distress  Skin: Extremities and face reveal no rashes. HEENT: Sclerae anicteric. Extra-occular muscles are intact. No oral ulcers. Cardiovascular: Regular rate and rhythm. Respiratory: nonlabored, crackles  GI: Abdomen nondistended, soft, and nontender. hypoatice active bowel sounds. Musculoskeletal: No pitting edema of the lower legs. Able to move all ext  Neurological:  Patient is alert and oriented. Cranial nerves II-XII grossly intact  Psychiatric: Mood appears appropriate       Data Review    Recent Results (from the past 24 hour(s))   GLUCOSE, POC    Collection Time: 11/08/20 11:03 AM   Result Value Ref Range    Glucose (POC) 132 (H) 65 - 100 mg/dL    Performed by Amira Walton, POC    Collection Time: 11/08/20  4:35 PM   Result Value Ref Range    Glucose (POC) 106 (H) 65 - 100 mg/dL    Performed by Brian Hamilton    GLUCOSE, POC    Collection Time: 11/08/20  8:38 PM   Result Value Ref Range    Glucose (POC) 124 (H) 65 - 100 mg/dL    Performed by [address] [place] [address], POC    Collection Time: 11/09/20  5:27 AM   Result Value Ref Range    Glucose (POC) 137 (H) 65 - 100 mg/dL    Performed by [address] [place] [address], POC    Collection Time: 11/09/20  8:39 AM   Result Value Ref Range    Glucose (POC) 97 65 - 100 mg/dL    Performed by Amira [facility]     - labs pending for today. Reviewed labs from 11/5/2020    Radiology review: CT abdomen and pelvis on 11/5/2020    Assessment:   1. Esophageal mass  2. Hypokalemia  3. Hypertension  4. Protein malnutrition, moderate status post PEG tube  5. Supraventricular tachycardia  6. Pneumoperitoneum s/p percutaneous drain #4    Plan:    1. EGD performed with complete obstruction of the esophagus.   Repeat EGD on 10/30/2020 with EGD and balloon dilation of the esophagus status post stent placement. CT of the chest revealed a mild thoracic esophageal mass measuring 6 x 4 x 3 cm. Oncology consulted. Repeat CT abdomen and pelvis on 11/5/2020 shows persistent large volume of pneumoperitoneum, moderate bilateral pleural effusions. S/p percutaneous drain # 4 per IR. Clamped on 11/7/2020. IR consulted to remove today. 2.  Patient had episode of supraventricular tachycardia. Cardiology consulted. Was on IV diltiazem but stopped. Has labetalol PRN  3.  BP stable. On oral diltizem. Given a dose of IV lasix. 4.  Patient is status post PEG tube placement. CT of the abdomen pelvis has been reviewed. Given one IV lasix due to pleural effusions. General surgery consulted. I believe patient would benefit from bolus tube feedings instead of continuous as he is active. Tube feedings are patients full source of nutrition and oral intake is for pleasure only. 5.  Repeat CT of the and pelvis showed pneumoperitoneum with free air fluid. Discussed with GI and they think it is due to PEG tube. General surgery consult. Procalcitonin within normal limits. Is on Zosyn. Cultures negative. Therefore stopped vancomycin. Remains afebrile   6. CBC BMP in the a.m. CODE STATUS full     DVT prophylaxis: loveonx  Ulcer prophylaxis: pepcid    Care Plan discussed with: Patient/Family, Nurse and  and General Surgery     Total time spent with patient: 33 minutes.

## 2020-11-09 NOTE — PROGRESS NOTES
Spiritual Care Assessment/Progress Note  [facility]      NAME: Tex Samano      MRN: [id number]  AGE: 76 y.o.  SEX: male  Lutheran Affiliation: Rastafari   Language: English     11/9/2020     Total Time (in minutes): 7     Spiritual Assessment begun in [address] through conversation with:         [x]Patient        [] Family    [] Friend(s)        Reason for Consult: Initial/Spiritual assessment, patient floor     Spiritual beliefs: (Please include comment if needed)     [x] Identifies with a alberto tradition:         [] Supported by a alberto community:            [] Claims no spiritual orientation:           [] Seeking spiritual identity:                [] Adheres to an individual form of spirituality:           [] Not able to assess:                           Identified resources for coping:      [] Prayer                               [] Music                  [] Guided Imagery     [x] Family/friends                 [] Pet visits     [] Devotional reading                         [] Unknown     [] Other:                                             Interventions offered during this visit: (See comments for more details)    Patient Interventions: Affirmation of emotions/emotional suffering, Affirmation of alberto, Bridging, Coping skills reviewed/reinforced, Initial/Spiritual assessment, patient floor, Normalization of emotional/spiritual concerns, Lutheran beliefs/image of God discussed           Plan of Care:     [] Support spiritual and/or cultural needs    [] Support AMD and/or advance care planning process      [] Support grieving process   [] Coordinate Rites and/or Rituals    [] Coordination with community clergy   [] No spiritual needs identified at this time   [] Detailed Plan of Care below (See Comments)  [] Make referral to Music Therapy  [] Make referral to Pet Therapy     [] Make referral to Addiction services  [] Make referral to [facility]  [] Make referral to Spiritual Care Partner  [] No future visits requested        [x] Follow up visits as needed     Comments:  Visited patient in 4th cardiac telemetry unit during rounds. Only the patient was present during the visit. Patient shared about his family and his wanting to go back home. He stated he is doing well and has good relationship with God. He seemed ready to go home and stated he might go home tomorrow. I facilitated storytelling, provided empathic listening and presence. I advised of  availability. Chaplains will follow as able and/or needed. Chaplain Estrella Thao M.Div.    can be reached by calling the  at [facility]  [phone]

## 2020-11-09 NOTE — CONSULTS
Comprehensive Nutrition Assessment    Type and Reason for Visit: Reassess(Goal)     Nutrition Recommendations/Plan:   Continue Full Liquids diet for pleasure   Consider SLP eval for best consistency/texture    Adjust TF via PEG to bolus feeds, initiate TwoCal 50ml bolus q3hrs from 1828-8118 (4 feeds/day)   Advance by 50mL at each feed to goal of 240ml/feed   Flush with 240mL H20 after each feed   Provides 1920kcals, 80g protein, 1632ml fluid (meets ~100% EENs, 95% pro needs, and 84% fluid needs  *note, MINIMUM flushes per ASPEN/ESPEN guidelines is 30ml q4h to maintain tube integrity. *Pt able to take PO full liquids as well, will likely make up difference in fluid needs. If pt unable to meet nutritional needs via PO and/or EN, may consider TPN  Rec'd Standard TPN at 83ml/hr + 250ml 20% lipids 3x/week  At goal provides on average 1966kcals and 100g pro/d (meets 107% EENs and 132% pro needs)     Consider adding probiotic    Nursing to document TF rate, flushes, GRV, BMs, WEEKLY wts, and GI s/s      Nutrition Assessment:  Admitted for difficulty swallowing and generalized weakness. On admit, CT chest finding neoplasm, CT neck shows esophageal mass. (10/30) EGD confirmed eso mass with obstruction s/p balloon dilation with stent placement and PEG placement - Bx confirms cancer, PPN started at 40ml/hr same day despite access for TF available. Onc was following, now signed of (11/6). Per MD notes pt confirms hunger however regurgitates d/t GERD & dysphagia likely 2/2 esophageal cancer. (10/31) TF initiated of 50ml bolus with 10ml flushes q3hrs +D5 at 75ml/hr (total provides 906kcls, 26g protein-- grossly inadequate to meet needs) and diet advanced to Full liq with min intakes, (11/1) diet & TF d/c'd d/t pneumoperitoneum, PEG to suction, PPN at 83ml/hr +250ml lipids daily +D5 at 75ml/hr (Total 1493kcals, 85g pro- 81% EENs and 100% pro needs, inadequate).  (11/3) CT abd shows no extravasation of contrast from PEG site or small bowel, TF restarted however not adv past 20ml/hr and with NO FWF per Pete 2/2 concern over free air in abd, this plus PPN and D5 meeting pt needs however multiple sources of nutrition unnecessary and puts pt at higher risk of infection as well as tube clogging. (11/4) TF held for concerns of intolerance d/t GRV of 120ml, D5 at 75ml/hr and PPN continues at max rate, still inadequate to meet EENs. (11/5) IR placed pigtail drain for pneumoperitoneum, to wall suction. (11/7) Drain clamped, Basker ok'd for clear liquids. (11/8) TF restarted at 10ml/hr, PPN and D5 d/c'd. Today, IR unable to pull drain as KUB showing significant free air s/p clamping, will re-assess tomorrow. No documentation of TF since re-initiated, RD observed TF running at 10ml/hr with 10ml FWF q4h. Pt also with excellent intakes of 100% of full liquids on B and L trays, unsure if plans to advance diet or continue full liquids for pleasure upon d/c d/t esophageal mass obstruction, discussed with RN to order SLP consult. Otherwise, pt stated he is feeling much better, no pain, and good appetites. disused with pt plans to adjust to bolus regimen, pt understanding. Will likely tolerate well considering ability to tolerate 100% full liquids PO. Labs: H/H wnl, BG , , TBili 1.3. Meds: Diltiazem, pepcid, B9, dilaudid, MVI, antiemetics, zosyn, B1    Malnutrition Assessment:  Malnutrition Status:  Severe malnutrition    Context:  Chronic illness     Findings of the 6 clinical characteristics of malnutrition:   Energy Intake:  7 - 75% or less est energy requirements for 1 month or longer(inadequate PN/EN/PO)  Weight Loss:  7 - Greater than 5% over 1 month     Body Fat Loss:  7 - Severe body fat loss, Triceps   Muscle Mass Loss:  7 - Severe muscle mass loss, Calf (gastrocnemius)  Fluid Accumulation:  No significant fluid accumulation,      Estimated Daily Nutrient Needs:  Energy (kcal): 1935kcals (30kcals/kg);  Weight Used for Energy Requirements: Current  Protein (g): 84g (1.3g/kg); Weight Used for Protein Requirements: Current  Fluid (ml/day): 1935ml; Method Used for Fluid Requirements: 1 ml/kcal   Needs for cancer (negative for mets) and wt gain    Nutrition Related Findings:  NFPE finding severe fat and muscle losses per previous assessment. No edema. Pt is edentulous. Dysphagia 2/2 complete obstruction of esophagus, now s/p dilation and stent. PEG in place, TF infusing, abd now soft s/p drain of pneumoperitoneum. No N/V per pt, does state BMs are liquid. RD discussed probiotics, pt stated he does not want yogurt, RD to discuss with NP to add probiotic. Last BM 11/08. Wounds:    None       Current Nutrition Therapies:  DIET TUBE FEEDING Jevity 1.5 Updated per Dr. Huerta Goes request.  DIET FULL LIQUID  Current Tube Feeding (TF) Orders:   · Feeding Route: PEJ  · Formula: Jevity 1.5  · Schedule:Continuous    · Regimen: 10ml/hr  · Water Flushes: 10ml q4hr  · Current TF & Flush Orders Provides: 360kcals, 15g protein, and 422ml fluid/day--meeting 19% EENs, 18% Protein, and 22% fluid needs  · Goal TF & Flush Orders Provides: Rec'd bolus feeds of TwoCal at goal of 240ml feeds q3h x12hrs (4 feeds/day), flush with 240ml fluid after each feed; providing 1920kcals, 80g protein, 1632ml fluid    Current Parenteral Nutrition Orders:  · Goal PN Orders Provides: PPN d/c'd      Anthropometric Measures:  · Height:  6' 1\" (185.4 cm)  · Current Body Wt:  64.5 kg (142 lb 3.2 oz)(11/05)   · Admission Body Wt:  141 lb 1.5 oz    · Usual Body Wt:  79.4 kg (175 lb)(per pt ~ 2 months ago)     · Ideal Body Wt:  184 lbs:  77.3 %   · BMI Category:  Underweight (BMI less than 22) age over 72     Wt hx: 64.5kg (11/05), 65.9kg (11/03), 65.4kg (11/02),  64.6kg (10/31), 64kg(10/26)  Per H&P, pt reports 35lb wt loss (15.9kg) over past few months - noted possible 19.9%BW loss during this time - severe in nature.  Unable to obtain timeframe. Noted recent wt stability.     Nutrition Diagnosis:   · Inadequate oral intake related to swallowing difficulty(2/2 esophageal cancer with obstructing mass) as evidenced by nutrition support-enteral nutrition   · Inadequate oral intake related to cognitive or neurological impairment, swallowing difficulty(esophageal cancer) as evidenced by nutrition support-parenteral nutrition (Goal adjusted, see above)      Nutrition Interventions:   Food and/or Nutrient Delivery: Modify tube feeding, Discontinue parenteral nutrition, Continue current diet(Continue full liquids, adjsut per SLP recs; adjust TF to bolus feeds)  Nutrition Education and Counseling: No recommendations at this time, Education not indicated  Coordination of Nutrition Care: Continue to monitor while inpatient, Speech therapy, Swallow evaluation    Goals:  Intake >75% EEN > 5 days, (Goal not met, adjusted)   Intake >75% EEN via PO and TF in > 5 days  BM every 1-2 days, (Goal met)  wt gain +/-0.5kg/wk   (Goal not met)       Nutrition Monitoring and Evaluation:   Behavioral-Environmental Outcomes: None identified  Food/Nutrient Intake Outcomes: Food and nutrient intake, Enteral nutrition intake/tolerance  Physical Signs/Symptoms Outcomes: Weight, Constipation, GI status, Biochemical data    Discharge Planning:    Enteral nutrition     Electronically signed by Rosalee Aguirre RD on 11/9/2020 at 10:45 AM    Contact: Ext [phone]

## 2020-11-10 ENCOUNTER — APPOINTMENT (OUTPATIENT)
Dept: GENERAL RADIOLOGY | Age: 75
DRG: 375 | End: 2020-11-10
Attending: RADIOLOGY
Payer: MEDICARE

## 2020-11-10 LAB
ANION GAP SERPL CALC-SCNC: 2 MMOL/L (ref 5–15)
BASOPHILS # BLD: 0.1 K/UL (ref 0–0.1)
BASOPHILS NFR BLD: 1 % (ref 0–1)
BUN SERPL-MCNC: 11 MG/DL (ref 6–20)
BUN/CREAT SERPL: 16 (ref 12–20)
CA-I BLD-MCNC: 9 MG/DL (ref 8.5–10.1)
CHLORIDE SERPL-SCNC: 107 MMOL/L (ref 97–108)
CO2 SERPL-SCNC: 29 MMOL/L (ref 21–32)
CREAT SERPL-MCNC: 0.68 MG/DL (ref 0.7–1.3)
DIFFERENTIAL METHOD BLD: ABNORMAL
EOSINOPHIL # BLD: 0.2 K/UL (ref 0–0.4)
EOSINOPHIL NFR BLD: 3 % (ref 0–7)
ERYTHROCYTE [DISTWIDTH] IN BLOOD BY AUTOMATED COUNT: 13.2 % (ref 11.5–14.5)
GLUCOSE BLD STRIP.AUTO-MCNC: 100 MG/DL (ref 65–100)
GLUCOSE BLD STRIP.AUTO-MCNC: 101 MG/DL (ref 65–100)
GLUCOSE BLD STRIP.AUTO-MCNC: 104 MG/DL (ref 65–100)
GLUCOSE BLD STRIP.AUTO-MCNC: 105 MG/DL (ref 65–100)
GLUCOSE SERPL-MCNC: 109 MG/DL (ref 65–100)
HCT VFR BLD AUTO: 33.7 % (ref 36.6–50.3)
HGB BLD-MCNC: 10.9 G/DL (ref 12.1–17)
IMM GRANULOCYTES # BLD AUTO: 0.1 K/UL (ref 0–0.04)
IMM GRANULOCYTES NFR BLD AUTO: 1 % (ref 0–0.5)
LYMPHOCYTES # BLD: 1.5 K/UL (ref 0.8–3.5)
LYMPHOCYTES NFR BLD: 16 % (ref 12–49)
MCH RBC QN AUTO: 31.5 PG (ref 26–34)
MCHC RBC AUTO-ENTMCNC: 32.3 G/DL (ref 30–36.5)
MCV RBC AUTO: 97.4 FL (ref 80–99)
MONOCYTES # BLD: 1.1 K/UL (ref 0–1)
MONOCYTES NFR BLD: 13 % (ref 5–13)
NEUTS SEG # BLD: 6 K/UL (ref 1.8–8)
NEUTS SEG NFR BLD: 66 % (ref 32–75)
PERFORMED BY, TECHID: ABNORMAL
PERFORMED BY, TECHID: NORMAL
PLATELET # BLD AUTO: 200 K/UL (ref 150–400)
PMV BLD AUTO: 12.1 FL (ref 8.9–12.9)
POTASSIUM SERPL-SCNC: 3.5 MMOL/L (ref 3.5–5.1)
RBC # BLD AUTO: 3.46 M/UL (ref 4.1–5.7)
SODIUM SERPL-SCNC: 138 MMOL/L (ref 136–145)
WBC # BLD AUTO: 8.9 K/UL (ref 4.1–11.1)

## 2020-11-10 PROCEDURE — 80048 BASIC METABOLIC PNL TOTAL CA: CPT

## 2020-11-10 PROCEDURE — 36415 COLL VENOUS BLD VENIPUNCTURE: CPT

## 2020-11-10 PROCEDURE — 82962 GLUCOSE BLOOD TEST: CPT

## 2020-11-10 PROCEDURE — 94762 N-INVAS EAR/PLS OXIMTRY CONT: CPT

## 2020-11-10 PROCEDURE — 74011250636 HC RX REV CODE- 250/636: Performed by: INTERNAL MEDICINE

## 2020-11-10 PROCEDURE — 65270000029 HC RM PRIVATE

## 2020-11-10 PROCEDURE — 74011250637 HC RX REV CODE- 250/637: Performed by: HOSPITALIST

## 2020-11-10 PROCEDURE — 74011250637 HC RX REV CODE- 250/637: Performed by: PHYSICIAN ASSISTANT

## 2020-11-10 PROCEDURE — 85025 COMPLETE CBC W/AUTO DIFF WBC: CPT

## 2020-11-10 PROCEDURE — 74011250636 HC RX REV CODE- 250/636: Performed by: PHYSICIAN ASSISTANT

## 2020-11-10 PROCEDURE — 74011000258 HC RX REV CODE- 258: Performed by: INTERNAL MEDICINE

## 2020-11-10 PROCEDURE — 74019 RADEX ABDOMEN 2 VIEWS: CPT

## 2020-11-10 RX ADMIN — PIPERACILLIN SODIUM AND TAZOBACTAM SODIUM 3.38 G: 3; .375 INJECTION, POWDER, LYOPHILIZED, FOR SOLUTION INTRAVENOUS at 02:36

## 2020-11-10 RX ADMIN — FAMOTIDINE 20 MG: 20 TABLET, FILM COATED ORAL at 10:07

## 2020-11-10 RX ADMIN — PIPERACILLIN SODIUM AND TAZOBACTAM SODIUM 3.38 G: 3; .375 INJECTION, POWDER, LYOPHILIZED, FOR SOLUTION INTRAVENOUS at 21:33

## 2020-11-10 RX ADMIN — FAMOTIDINE 20 MG: 20 TABLET, FILM COATED ORAL at 21:37

## 2020-11-10 RX ADMIN — Medication 5 ML: at 22:00

## 2020-11-10 RX ADMIN — FOLIC ACID 1 MG: 1 TABLET ORAL at 10:07

## 2020-11-10 RX ADMIN — DILTIAZEM HYDROCHLORIDE 180 MG: 180 CAPSULE, EXTENDED RELEASE ORAL at 10:07

## 2020-11-10 RX ADMIN — HYDROMORPHONE HYDROCHLORIDE 0.5 MG: 1 INJECTION, SOLUTION INTRAMUSCULAR; INTRAVENOUS; SUBCUTANEOUS at 23:43

## 2020-11-10 RX ADMIN — HYDROMORPHONE HYDROCHLORIDE 0.5 MG: 1 INJECTION, SOLUTION INTRAMUSCULAR; INTRAVENOUS; SUBCUTANEOUS at 03:27

## 2020-11-10 RX ADMIN — MULTIVITAMIN TABLET 1 TABLET: TABLET at 10:07

## 2020-11-10 RX ADMIN — METOPROLOL TARTRATE 25 MG: 25 TABLET, FILM COATED ORAL at 21:37

## 2020-11-10 RX ADMIN — THIAMINE HCL TAB 100 MG 100 MG: 100 TAB at 10:08

## 2020-11-10 RX ADMIN — PIPERACILLIN SODIUM AND TAZOBACTAM SODIUM 3.38 G: 3; .375 INJECTION, POWDER, LYOPHILIZED, FOR SOLUTION INTRAVENOUS at 10:08

## 2020-11-10 RX ADMIN — METOPROLOL TARTRATE 25 MG: 25 TABLET, FILM COATED ORAL at 10:07

## 2020-11-10 NOTE — PROGRESS NOTES
Attempted to see pt for PT treatment. Pt declined treatment stating he had just gotten back from getting his drain taken out and did not feel up for therapy today. Pt stated he would try again with therapy tomorrow. Will attempt treatment again next visit.

## 2020-11-10 NOTE — PROGRESS NOTES
Bedside and Verbal shift change report given to Ling Carrion RN (oncoming nurse) by Cyndi Gates RN (offgoing nurse). Report included the following information SBAR, Kardex, Intake/Output, MAR, Accordion, Recent Results, Med Rec Status and Cardiac Rhythm SR.     4679: Spoke to Dr Mariann Gabriel PA, regarding IR request to hook the abd drain back to suction wall. Per Dr Bradley Monroy, do not hook the drain back to suction wall, stated he will reach out to IR for drain removal.     1209: Drain removed at IR, verbal report received from [facility]. 1215: Patient returned back to the floor, site clean dry and intact.

## 2020-11-10 NOTE — PROGRESS NOTES
Progress Note      11/10/2020 7:15 AM  NAME: Tex Samano   MRN:  [id number]   Admit Diagnosis: Esophageal mass [K22.8]      Problem List:   1.  Incomplete database secondary to the patient being a poor historian  2. Alex Tavia presents for evaluation of dysphagia generalized weakness  3.  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  Status post percutaneous endoscopic gastrotomy (PEG) tube insertion  5.  Status post esophageal dilation and stent placement  6.  Status post cholecystectomy  7.  Percutaneous drainage of large pneumoperitoneum  8.  Previous transient ischemia attack (TIA)   9.  Grade II (moderate) diastolic dysfunction, with pseudonormalization  10.  Paroxysmal supraventricular tachycardia (PSVT) on admission     11.  Hypertension  12.  Nicotine dependence  13.  Chronic obstructive pulmonary disease (emphysema)  14.  History of prostate adenocarcinoma (status post radiation therapy)  15.  Status post cholecystectomy  16.  Hypomagnesia       Subjective: The patient is seen and examined in room 489. There were no acute cardiovascular events reported overnight. Currently, he denies any cardiovascular complaints.     He remains essentially unchanged    Medications Personally Reviewed:    Current Facility-Administered Medications   Medication Dose Route Frequency    dilTIAZem ER (DILACOR XR) capsule 180 mg  180 mg Oral DAILY    piperacillin-tazobactam (ZOSYN) 3.375 g in 0.9% sodium chloride (MBP/ADV) 100 mL MBP  3.375 g IntraVENous Q8H    labetaloL (NORMODYNE;TRANDATE) 20 mg/4 mL (5 mg/mL) injection 20 mg  20 mg IntraVENous Q4H PRN    metoprolol (LOPRESSOR) injection 2.5 mg  2.5 mg IntraVENous Q6H PRN    metoprolol tartrate (LOPRESSOR) tablet 25 mg  25 mg Oral Q12H    thiamine mononitrate (B-1) tablet 100 mg  100 mg Oral DAILY    folic acid (FOLVITE) tablet 1 mg  1 mg Oral DAILY    multivitamin (ONE A DAY) tablet 1 Tab  1 Tab Oral DAILY    famotidine (PEPCID) tablet 20 mg  20 mg Oral BID    HYDROmorphone (DILAUDID) injection 0.5 mg  0.5 mg IntraVENous Q4H PRN    sodium chloride (NS) flush 5-40 mL  5-40 mL IntraVENous Q8H    sodium chloride (NS) flush 5-40 mL  5-40 mL IntraVENous PRN    acetaminophen (TYLENOL) tablet 650 mg  650 mg Oral Q6H PRN    Or    acetaminophen (TYLENOL) suppository 650 mg  650 mg Rectal Q6H PRN    promethazine (PHENERGAN) tablet 12.5 mg  12.5 mg Oral Q6H PRN    Or    ondansetron (ZOFRAN) injection 4 mg  4 mg IntraVENous Q6H PRN           Objective:      Physical Exam:  Last 24hrs VS reviewed since prior progress note. Most recent are:    Visit Vitals  BP (!) 143/69   Pulse 79   Temp 98.6 °F (37 °C)   Resp 20   Ht 6' 1\" (1.854 m)   Wt 63 kg (138 lb 14.2 oz)   SpO2 99%   BMI 18.32 kg/m²       Intake/Output Summary (Last 24 hours) at 11/10/2020 0715  Last data filed at 11/10/2020 0330  Gross per 24 hour   Intake    Output 700 ml   Net -700 ml      Physical exam: Essentially unchanged    Data Review    Telemetry: Sinus rhythm without ventricular ectopy    EKG:   []  No new EKG for review    Lab Data Personally Reviewed:    Recent Labs     11/09/20  0839 11/08/20  0711   WBC 9.4 11.4*   HGB 12.1 13.0   HCT 36.8 39.0    215     No results for input(s): INR, PTP, APTT, INREXT in the last 72 hours. Recent Labs     11/09/20  0839 11/08/20  0711 11/07/20  0740    136 135*   K 3.4* 3.5 3.3*    104 101   CO2 28 27 30   BUN 12 16 17   CREA 0.59* 0.73 0.65*   GLU 98 109* 111*   CA 9.3 9.4 9.0     No results for input(s): CPK, CKNDX, TROIQ in the last 72 hours. No lab exists for component: CPKMB  No results found for: CHOL, CHOLX, CHLST, CHOLV, HDL, HDLP, LDL, LDLC, DLDLP, TGLX, TRIGL, TRIGP, CHHD, CHHDX    No results for input(s): AP, TBIL, TP, ALB, GLOB, GGT, AML, LPSE in the last 72 hours.     No lab exists for component: SGOT, GPT, AMYP, HLPSE  No results for input(s): PH, PCO2, PO2 in the last 72 hours.        Assessment/Plan:   1. Continue telemetry monitoring  2. Continue to monitor serum electrolytes, and renal function  3.   Continue current cardiovascular medications including diltiazem, and metoprolol     Juni Levine MD

## 2020-11-10 NOTE — PROGRESS NOTES
Patient brought to IR suite. Abdominal dressing removed and catheter cleaned with chloraprep. The drainage catheter was cut and removed in its entirety. Gauze and tegaderm placed over the site. Patient tolerated well. Patient transported back to room. Report called to the nurse.

## 2020-11-10 NOTE — PROGRESS NOTES
Hospitalist Progress Note    Subjective:   Daily Progress Note: 11/10/2020 8:40 AM    Hospital Course:  Patient admitted on October 26, 2020 with difficulty swallowing and generalized weakness and 30 pound weight loss. Patient has been unable to swallow solid foods. CT of the chest revealed an obstructing esophageal mass. EGD performed which revealed the same with biopsy taken. Oncology consultation. Patient will need PEG tube placement and will be set up for a stent placement. Considering patient's need for chemotherapy and radiation if biopsy is consistent with a malignant process patient will require PEG tube placement anyways. EGD with balloon dilatation of the esophagus and stent placement. PEG placed. Cardiac consultation. Recurrent SVT requiring adenosine and ultimately metoprolol for rate control. Patient transferred to telemetry for Cardizem drip. Free air on chest x-ray most likely postprocedural.  Will discontinue PEG tube feedings and placed intermittent suction per gastroenterology. Patient with no abdominal pain and abdomen is soft. Worsening abdominal pain with persistent pneumoperitoneum. Also describes right lower quadrant abdominal pain. CT with p.o. and IV contrast showed free air in the peritoneum however could be related to PEG tube placement. .  Surgical consultation, Dr. Cal Blackburn. Has started Zosyn and vancomycin empirically. He has not tolerating PEG tube feedings. Therefore we will hold. We will follow up with general surgery and GI. Culture remains negative. Have stopped vancoymcin. IR consulted and placed a percutaneous drainage cath on 11/5/2020. It was clamped on 11/7/2020. Stopped PPN. Patient scheduled for repeat KUB and possible drain removal on November 10, 2020    Subjective: Patient denies chest pain or shortness of breath. Still with mild pressure-like sensation in the upper throat region from his stent placement.   Current Facility-Administered Medications Medication Dose Route Frequency    dilTIAZem ER (DILACOR XR) capsule 180 mg  180 mg Oral DAILY    piperacillin-tazobactam (ZOSYN) 3.375 g in 0.9% sodium chloride (MBP/ADV) 100 mL MBP  3.375 g IntraVENous Q8H    labetaloL (NORMODYNE;TRANDATE) 20 mg/4 mL (5 mg/mL) injection 20 mg  20 mg IntraVENous Q4H PRN    metoprolol (LOPRESSOR) injection 2.5 mg  2.5 mg IntraVENous Q6H PRN    metoprolol tartrate (LOPRESSOR) tablet 25 mg  25 mg Oral Q12H    thiamine mononitrate (B-1) tablet 100 mg  100 mg Oral DAILY    folic acid (FOLVITE) tablet 1 mg  1 mg Oral DAILY    multivitamin (ONE A DAY) tablet 1 Tab  1 Tab Oral DAILY    famotidine (PEPCID) tablet 20 mg  20 mg Oral BID    HYDROmorphone (DILAUDID) injection 0.5 mg  0.5 mg IntraVENous Q4H PRN    sodium chloride (NS) flush 5-40 mL  5-40 mL IntraVENous Q8H    sodium chloride (NS) flush 5-40 mL  5-40 mL IntraVENous PRN    acetaminophen (TYLENOL) tablet 650 mg  650 mg Oral Q6H PRN    Or    acetaminophen (TYLENOL) suppository 650 mg  650 mg Rectal Q6H PRN    promethazine (PHENERGAN) tablet 12.5 mg  12.5 mg Oral Q6H PRN    Or    ondansetron (ZOFRAN) injection 4 mg  4 mg IntraVENous Q6H PRN        Review of Systems  Constitutional: No fevers, No chills, No sweats, + fatigue, ++ Weakness  Eyes: No redness  Ears, nose, mouth, throat, and face: No nasal congestion, No sore throat, No voice change  Respiratory:No Shortness of Breath, No cough, No wheezing  Cardiovascular: No chest pain, No palpitations, No extremity edema  Gastrointestinal: No nausea, No vomiting, No diarrhea, No abdominal pain  Genitourinary: No frequency, No dysuria, No hematuria  Integument/breast: No skin lesion(s)   Neurological: No Confusion, No headaches, No dizziness      Objective:     Visit Vitals  /62 (BP 1 Location: Right arm, BP Patient Position: At rest)   Pulse 61   Temp 98.7 °F (37.1 °C)   Resp 20   Ht 6' 1\" (1.854 m)   Wt 63 kg (138 lb 14.2 oz)   SpO2 98%   BMI 18.32 kg/m²    O2 Flow Rate (L/min): 2 l/min O2 Device: Room air    Temp (24hrs), Av.5 °F (36.9 °C), Min:97.9 °F (36.6 °C), Max:98.8 °F (37.1 °C)      No intake/output data recorded.  1901 - 11/10 0700  In: -   Out: 700 [Urine:700]    PHYSICAL EXAM:  Constitutional: No acute distress  Skin: Extremities and face reveal no rashes. HEENT: Sclerae anicteric. Extra-occular muscles are intact. No oral ulcers. Cardiovascular: Regular rate and rhythm. Respiratory: nonlabored, crackles  GI: Abdomen nondistended, soft, and nontender. hypoatice active bowel sounds. Musculoskeletal: No pitting edema of the lower legs. Able to move all ext  Neurological:  Patient is alert and oriented. Cranial nerves II-XII grossly intact  Psychiatric: Mood appears appropriate       Data Review    Recent Results (from the past 24 hour(s))   GLUCOSE, POC    Collection Time: 20 12:30 PM   Result Value Ref Range    Glucose (POC) 105 (H) 65 - 100 mg/dL    Performed by [place]    GLUCOSE, POC    Collection Time: 20  4:15 PM   Result Value Ref Range    Glucose (POC) 103 (H) 65 - 100 mg/dL    Performed by [address] [place] [address], POC    Collection Time: 11/10/20  7:28 AM   Result Value Ref Range    Glucose (POC) 104 (H) 65 - 100 mg/dL    Performed by [place]     - labs pending for today. Reviewed labs from 2020    Radiology review: CT abdomen and pelvis on 2020    Assessment:   1. Esophageal mass  2. Hypokalemia  3. Hypertension  4. Protein malnutrition, moderate status post PEG tube  5. Supraventricular tachycardia  6. Pneumoperitoneum s/p percutaneous drain #4    Plan:     1. Esophageal mass  EGD performed with complete obstruction of the esophagus.   Repeat EGD on 2020 with EGD and balloon dilatation of the esophagus stent placement and PEG placement  Biopsy consistent with, squamous cell carcinoma  Oncology consultation  Continue IV Protonix  CT of the chest reveals a mid thoracic esophageal mass measuring 6 x 4 x 3 cm causing dilatation of the esophagus and complete obstruction of the upper esophagus. PPN discontinued  Continue to encourage PO intake     2. Hypokalemia  Replace as needed     3.  Essential hypertension  Diltiazem      4. Protein malnutrition, moderate  30 to 40 pound weight loss  Tube feedings per bolus feeds    5. Supraventricular tachycardia  resolved     6. Pneumoperitoneum   Discussed case with gastroenterology  IR drain for suction of free air. No obvious perforation present. Hopefully removed today as KUB shows minimal free air and has been clamped for 4 days. Continue to monitor for signs of perforation  Patient now with febrile illness and right lower quadrant abdominal pain. Surgical consultation appreciated    DVT prophylaxis: Lovenox  Ulcer prophylaxis: IV Protonix     FEN:  Continue IV fluids  Advance as tolerated  Replace electrolytes as needed  Tube feeding when appropriate     CODE STATUS: Full code     Care Plan discussed with: Patient/Family      Discussed case with patient's son Nasima Leo, phone number [phone]    CODE STATUS full     DVT prophylaxis: loveonx  Ulcer prophylaxis: pepcid    Care Plan discussed with: Patient/Family, Nurse and  and General Surgery     Total time spent with patient: 37 minutes.

## 2020-11-11 VITALS
OXYGEN SATURATION: 98 % | WEIGHT: 138.89 LBS | BODY MASS INDEX: 18.41 KG/M2 | DIASTOLIC BLOOD PRESSURE: 74 MMHG | HEIGHT: 73 IN | RESPIRATION RATE: 20 BRPM | TEMPERATURE: 98 F | HEART RATE: 70 BPM | SYSTOLIC BLOOD PRESSURE: 126 MMHG

## 2020-11-11 PROCEDURE — 74011000258 HC RX REV CODE- 258: Performed by: INTERNAL MEDICINE

## 2020-11-11 PROCEDURE — 74011250637 HC RX REV CODE- 250/637: Performed by: HOSPITALIST

## 2020-11-11 PROCEDURE — 74011250637 HC RX REV CODE- 250/637: Performed by: PHYSICIAN ASSISTANT

## 2020-11-11 PROCEDURE — 74011250636 HC RX REV CODE- 250/636: Performed by: INTERNAL MEDICINE

## 2020-11-11 PROCEDURE — 74011250637 HC RX REV CODE- 250/637: Performed by: INTERNAL MEDICINE

## 2020-11-11 PROCEDURE — 97530 THERAPEUTIC ACTIVITIES: CPT

## 2020-11-11 RX ORDER — FOLIC ACID 1 MG/1
1 TABLET ORAL DAILY
Qty: 30 TAB | Refills: 1 | Status: SHIPPED | OUTPATIENT
Start: 2020-11-12

## 2020-11-11 RX ORDER — DILTIAZEM HYDROCHLORIDE 180 MG/1
180 CAPSULE, EXTENDED RELEASE ORAL DAILY
Qty: 30 CAP | Refills: 1 | Status: SHIPPED | OUTPATIENT
Start: 2020-11-12 | End: 2020-12-22

## 2020-11-11 RX ORDER — ASPIRIN 325 MG/1
100 TABLET, FILM COATED ORAL DAILY
Qty: 30 TAB | Refills: 1 | Status: SHIPPED | OUTPATIENT
Start: 2020-11-12

## 2020-11-11 RX ORDER — METOPROLOL TARTRATE 25 MG/1
25 TABLET, FILM COATED ORAL EVERY 12 HOURS
Qty: 30 TAB | Refills: 1 | Status: SHIPPED | OUTPATIENT
Start: 2020-11-11 | End: 2020-12-08

## 2020-11-11 RX ORDER — FAMOTIDINE 20 MG/1
20 TABLET, FILM COATED ORAL 2 TIMES DAILY
Qty: 30 TAB | Refills: 1 | Status: SHIPPED | OUTPATIENT
Start: 2020-11-11

## 2020-11-11 RX ADMIN — FAMOTIDINE 20 MG: 20 TABLET, FILM COATED ORAL at 08:31

## 2020-11-11 RX ADMIN — ACETAMINOPHEN 650 MG: 325 TABLET, FILM COATED ORAL at 08:38

## 2020-11-11 RX ADMIN — Medication 10 ML: at 14:00

## 2020-11-11 RX ADMIN — MULTIVITAMIN TABLET 1 TABLET: TABLET at 08:30

## 2020-11-11 RX ADMIN — DILTIAZEM HYDROCHLORIDE 180 MG: 180 CAPSULE, EXTENDED RELEASE ORAL at 08:30

## 2020-11-11 RX ADMIN — FOLIC ACID 1 MG: 1 TABLET ORAL at 08:30

## 2020-11-11 RX ADMIN — METOPROLOL TARTRATE 25 MG: 25 TABLET, FILM COATED ORAL at 08:31

## 2020-11-11 RX ADMIN — Medication 10 ML: at 06:00

## 2020-11-11 RX ADMIN — THIAMINE HCL TAB 100 MG 100 MG: 100 TAB at 08:30

## 2020-11-11 RX ADMIN — PIPERACILLIN SODIUM AND TAZOBACTAM SODIUM 3.38 G: 3; .375 INJECTION, POWDER, LYOPHILIZED, FOR SOLUTION INTRAVENOUS at 03:24

## 2020-11-11 NOTE — PROGRESS NOTES
Peripheral IV and midline removed, catheter tips intact. Telemetry box removed and returned. Discussed discharge instructions, new medications, side effects of medications, follow-up appointments, tube feeding, when to call the HCP, and when to dial 9-1-1. Patient verbalized understanding. Per patient's son Mariah Desi), did not have any further questions at this time. Patient was sent home with tube feeding, prescriptions, and walker. Waiting for patient's son at this time.

## 2020-11-11 NOTE — PROGRESS NOTES
Pt. Refusing therapy session today saying he is being discharged and I got a lot of things to do. Will follow up as time allows.

## 2020-11-11 NOTE — PROGRESS NOTES
Chief Complaint: None     History of Present Illness:     Mr. Brayan Pfeiffer is a 76 y. o. year old male admitted with esophageal cancer.  Initially underwent PEG tube placement followed by esophageal stent placement. Has free air & hence consulted. Repeat CT scan with contrast through PEG tube shows no extravasation of contrast through PEG tube site or small bowel/colon & has reached rectum. No abdominal pain or distension. No fever or chills. Passing flatus & had bowel movement. He feels great. IR placed a drain to evacuate the free air and connected to closed suction system. No new complaints today. Passing flatus & had BM. Tolerating clears. Xray today showed no free air. Percutaneous drain removed by IR.       Review of Systems:   Constitutional:  no fever, no chills,  no sweats, No weakness, No fatigue, No decreased activity. Respiratory: No shortness of breath, No cough, No sputum production, No hemoptysis, No wheezing, No cyanosis. Cardiovascular: No chest pain, No palpitations, No bradycardia, No tachycardia, No peripheral edema, No syncope. Gastrointestinal: No nausea, No vomiting, No diarrhea, No constipation, No heartburn, No abdominal pain. Genitourinary: No dysuria, No hematuria, No change in urine stream, No urethral discharge, No lesions. Hematology/Lymphatics: No bruising tendency, No bleeding tendency, No petechiae, No swollen lymph glands. Endocrine: No excessive thirst, No polyuria, No cold intolerance, No heat intolerance, No excessive hunger. Musculoskeletal: No back pain, No neck pain, No joint pain, No muscle pain, No claudication, No decreased range of motion, No trauma. Integumentary: No rash, No pruritus, No abrasions. Neurologic: Alert and oriented X4, No abnormal balance, No headache, No confusion, No numbness, No tingling. Psychiatric: No anxiety, No depression, No frandy. Physical Exam:     Vitals & Measurements:     Wt Readings from Last 3 Encounters: 11/10/20 63 kg (138 lb 14.2 oz)     Temp Readings from Last 3 Encounters:   11/10/20 97.9 °F (36.6 °C)     BP Readings from Last 3 Encounters:   11/10/20 (!) 150/78     Pulse Readings from Last 3 Encounters:   11/10/20 73      Ht Readings from Last 3 Encounters:   11/05/20 6' 1\" (1.854 m)      Date 11/09/20 1900 - 11/10/20 0659 11/10/20 0700 - 11/11/20 0659   Shift 8159-2176 24 Hour Total 0326-9416 9511-9549 24 Hour Total   INTAKE   Shift Total(mL/kg)        OUTPUT   Urine(mL/kg/hr) 700 700        Urine Voided 700 700      Shift Total(mL/kg) 700(11.1) 700(11.1)      NET -700 -700      Weight (kg) 63 63 63 63 63       General: well appearing, no acute distress  Head: Normal  Face: Nornal  HEENT: atraumatic, PERRLA, moist mucosa, normal pharynx, normal tonsils and adenoids, normal tongue, no fluid in sinuses  Neck: Trachea midline, no carotid bruit, no masses  Chest: Normal.  Respiratory: normal chest wall expansion, CTA B, no r/r/w, no rubs  Cardiovascular: RRR, no m/r/g, Normal S1 and S2  Abdomen: Soft, non tender, non-distended, normal bowel sounds in all quadrants, no hepatosplenomegaly, no tympany. PEG tube in place. Genitourinary: No inguinal hernia, normal external gentalia, Testis & scrotum normal, no renal angle tenderness  Rectal: deferred  Musculoskeletal: Normal ROM in upper and lower extremities, No joint swelling. Integumentary: Warm, dry, and pink, with no rash, purpura, or petechia  Heme/Lymph: No lymphadenopathy, no bruises  Neurological: Cranial Nerves II-XII grossly intact, No gross sensory or motor deficit.   Psychiatric: Cooperative with normal mood, affect, and cognition    Laboratory Values:   Recent Results (from the past 24 hour(s))   GLUCOSE, POC    Collection Time: 11/10/20  7:28 AM   Result Value Ref Range    Glucose (POC) 104 (H) 65 - 100 mg/dL    Performed by [address], BASIC    Collection Time: 11/10/20 11:29 AM   Result Value Ref Range    Sodium 138 136 - 145 mmol/L    Potassium 3.5 3.5 - 5.1 mmol/L    Chloride 107 97 - 108 mmol/L    CO2 29 21 - 32 mmol/L    Anion gap 2 (L) 5 - 15 mmol/L    Glucose 109 (H) 65 - 100 mg/dL    BUN 11 6 - 20 mg/dL    Creatinine 0.68 (L) 0.70 - 1.30 mg/dL    BUN/Creatinine ratio 16 12 - 20      GFR est AA >60 >60 ml/min/1.73m2    GFR est non-AA >60 >60 ml/min/1.73m2    Calcium 9.0 8.5 - 10.1 mg/dL   CBC WITH AUTOMATED DIFF    Collection Time: 11/10/20 11:29 AM   Result Value Ref Range    WBC 8.9 4.1 - 11.1 K/uL    RBC 3.46 (L) 4.10 - 5.70 M/uL    HGB 10.9 (L) 12.1 - 17.0 g/dL    HCT 33.7 (L) 36.6 - 50.3 %    MCV 97.4 80.0 - 99.0 FL    MCH 31.5 26.0 - 34.0 PG    MCHC 32.3 30.0 - 36.5 g/dL    RDW 13.2 11.5 - 14.5 %    PLATELET 838 345 - 013 K/uL    MPV 12.1 8.9 - 12.9 FL    NEUTROPHILS 66 32 - 75 %    LYMPHOCYTES 16 12 - 49 %    MONOCYTES 13 5 - 13 %    EOSINOPHILS 3 0 - 7 %    BASOPHILS 1 0 - 1 %    IMMATURE GRANULOCYTES 1 (H) 0.0 - 0.5 %    ABS. NEUTROPHILS 6.0 1.8 - 8.0 K/UL    ABS. LYMPHOCYTES 1.5 0.8 - 3.5 K/UL    ABS. MONOCYTES 1.1 (H) 0.0 - 1.0 K/UL    ABS. EOSINOPHILS 0.2 0.0 - 0.4 K/UL    ABS. BASOPHILS 0.1 0.0 - 0.1 K/UL    ABS. IMM. GRANS. 0.1 (H) 0.00 - 0.04 K/UL    DF AUTOMATED     GLUCOSE, POC    Collection Time: 11/10/20 12:37 PM   Result Value Ref Range    Glucose (POC) 101 (H) 65 - 100 mg/dL    Performed by [facility]    GLUCOSE, POC    Collection Time: 11/10/20  5:18 PM   Result Value Ref Range    Glucose (POC) 100 65 - 100 mg/dL    Performed by [facility]    GLUCOSE, POC    Collection Time: 11/10/20  9:02 PM   Result Value Ref Range    Glucose (POC) 105 (H) 65 - 100 mg/dL    Performed by [facility]            XR ABD FLAT/ ERECT   Final Result      XR ABD FLAT/ ERECT   Final Result   Impression: Nonobstructive bowel gas pattern. XR CHEST PORT   Final Result   IMPRESSION: Trace pneumoperitoneum, significantly improved.       XR ABD (KUB)   Final Result   Findings/Impression:   There is incomplete imaging of the right upper abdomen laterally. Left upper   quadrant drainage catheter. Left upper abdominal gastrostomy tube. High   attenuation contrast within nondilated large bowel. Colonic diverticula. No   significant gaseous distention of bowel is seen to suggest mechanical   obstruction or significant adynamic ileus. Arterial calcification present. IR DRAIN PROCEDURE W CATH   Final Result   IMPRESSION:    Successful fluoroscopy guided 8.5F percutaneous drainage catheter placement into   the pneumoperitoneum. Plan:   No need to flush the catheter. Continue low wall suction. CT ABD PELV W CONT   Final Result   Impression:   1. Persistent large volume of pneumoperitoneum. Hollow viscus perforation is   not excluded, but there is no extraluminal contrast material. This alternatively   could be due to leakage of the air around the gastrostomy tube insertion site. 2.  Moderate bilateral pleural effusions. 3.  Cholelithiasis. Nonobstructing right renal calculus. 4.  See full report for detailed findings. XR CHEST PORT   Final Result      CT ABD PELV W CONT   Final Result   IMPRESSION: (+) Free intraperitoneal air. Interval placement of G-tube with its   tip projected into stomach lumen. Exact source for free air undetermined. Correlate to recent surgery. Unable to   exclude bowel perforation as possible cause. Moderate volume dependent pleural effusions with associated lower lobe lung   compressive atelectasis. Report called to 4W. Spoke with patient's nurse. XR CHEST PORT   Final Result   IMPRESSION: Probable right perihilar atelectasis. .. Pneumoperitoneum again   noted. Esophageal stent. XR CHEST SNGL V   Final Result   Impression:      Interval placement of a proximal to mid esophageal stent. Free air in the imaged upper abdomen. Mild atelectasis at the lung bases. Findings discussed with Eugenie Low on 11/1/2020 at 1125.       XR FLUOROSCOPY UNDER 60 MINUTES   Final Result      CT ABD PELV W CONT   Final Result   Impression:   1. No specific evidence of abdominopelvic metastatic disease. 2.  Prostatomegaly. Wall thickening of the urinary bladder may be due to chronic   outlet obstruction and/or cystitis. 3.  Borderline wall thickening of the stomach. May be related to   underdistention, but correlate for gastritis. 4.  Nonobstructing right renal calculus. 5.  See full report for detailed findings. See recently reported chest CT for   supradiaphragmatic findings. CT CHEST W CONT   Final Result   Impression: Enhancing mass of the mid thoracic esophagus measuring proximally 6   x 4 x 3 cm causing dilatation esophagus and obstruction of the upper esophagus   which is fluid filled and patulous. 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 I called Dr. Kimmie Canales with this result and recommendation   for upper endoscopy at 3:15 PM.      Emphysema. Coronary artery calcifications. Right kidney stone. Please see full report. CT NECK SOFT TISSUE W CONT   Final Result      XR CHEST SNGL V   Final Result   Impression: Unremarkable chest x-ray, except for degenerative change of the   spine. Assessment:  Problem List Items Addressed This Visit        Other    Esophageal mass - Primary      Other Visit Diagnoses     Hypokalemia        Weight loss        Dehydration               Free air after EGD for placement of esophageal stent by GI.     S/P Percutaneous pigtail catheter placement by IR.     I believe that the free air is secondary to leak through PEG tube site while performing EGD & esophageal stent placement by GI. CT scan shows no obvious extravasation of PO contrast.  However will closely monitor him with antibiotics and change in clinical /physical findings may necessitate an exploration.     Plan:     1. Admission  2. Diet: Clears  3. Start PEG tube feed. 4. Wean off TPN  5. SCD  6. IS  7.  Pain medications  8. Antibiotics  9. Nausea medication  10.  D/C Percutaneous drain by IR - done today.     Thank you for the consultation & allowing me to participate in the care of this patient.

## 2020-11-11 NOTE — PROGRESS NOTES
Problem: Self Care Deficits Care Plan (Adult)  Goal: *Acute Goals and Plan of Care (Insert Text)  Description: 1. Patient will be Mod I for toilet transfers, hygiene, using LRAD  2. Patient will don socks, Mod I level  3. Patient will don/doff gown, SBA  4. Patient will complete grooming tasks at sink, SBA/set up, using LRAD  Outcome: Progressing Towards Goal   OCCUPATIONAL THERAPY TREATMENT  Patient: Adelaide Farias (15 y.o. male)  Date: 11/11/2020  Diagnosis: Esophageal mass [K22.8]   <principal problem not specified>  Procedure(s) (LRB):  PERCUTANEOUS ENDOSCOPIC GASTROSTOMY TUBE INSERTION (N/A)  ESOPHAGEAL DILATION (N/A)  ENDOSCOPY WITH PROSTHESIS OR STENT PLACEMENT (N/A) 12 Days Post-Op  Precautions:    Chart, occupational therapy assessment, plan of care, and goals were reviewed. ASSESSMENT  Patient continues with skilled OT services and is progressing towards goals. Pt. Received sitting at EOB and agreeable to therapy session. Pt. Performed functional mobility and transfers with CGA w/RW. With initial sit-> stand pt completed buckling pants and adjusting belt IND while standing up-right, no LOB noted. Pt completed grooming at sink with SBA. Pt declined toilet transfers at this time. Pt declined sitting in chair at this time pt stating \"he was getting ready to discharge\". Pt. Performed seated UE therex while seated at EOB 3 x 10 reps to increase endurance and strength. PLAN :  Patient continues to benefit from skilled intervention to address the above impairments. Continue treatment per established plan of care. to address goals.   Recommendation for discharge: (in order for the patient to meet his/her long term goals)  Occupational therapy at least 2 days/week in the home     This discharge recommendation:  Has been made in collaboration with the attending provider and/or case management    IF patient discharges home will need the following DME: to be determined       SUBJECTIVE:   Patient stated I want to get back to bed.     OBJECTIVE DATA SUMMARY:   Cognitive/Behavioral Status:  Neurologic State: Alert  Orientation Level: Oriented X4  Cognition: Appropriate for age attention/concentration      Functional Mobility and Transfers for ADLs:    Transfers:  Sit to Stand: Stand-by assistance  Functional Transfers  Bathroom Mobility: Contact guard assistance       Balance:  Sitting: Intact  Standing: Intact; Without support    ADL Intervention:    Grooming  Grooming Assistance: Stand-by assistance  Position Performed: Standing  Washing Hands: Independent      Therapeutic Exercises:   1 set of 10 reps shoulder flex/ex  1 set of 10 elbow flex/ ex  1 set of 10 wrist flex/ ex  Pt educated to continue to perform therex throughout the day to increase strength. Pain:  0/0 pain reported    Activity Tolerance:   Good  Please refer to the flowsheet for vital signs taken during this treatment. After treatment patient left in no apparent distress:   Call bell within reach sitting at EOB. COMMUNICATION/COLLABORATION:   The patients plan of care was discussed with: Registered nurse.      Rollene Day  Time Calculation: 15 mins

## 2020-11-11 NOTE — PROGRESS NOTES
Discussed in-person with patient's son Juanis Gutierrez) regarding tube feedings and medications. Carla Yeung verbalized understanding, no further questions at this time. Patient left the floor via wheelchair at 36 with all belongings.

## 2020-11-11 NOTE — PROGRESS NOTES
Comprehensive Nutrition Assessment    Type and Reason for Visit: Reassess(interim)    Nutrition Recommendations/Plan:   Continue Full Liquids diet for pleasure              Consider SLP eval for best consistency/texture     Continue TF via PEG, bolus feeds of TwoCal 150ml bolus q3hrs from 8AM-8PM (x12hrs, 4 feeds/day)              Advance by 50mL at each feed to goal of 240ml/feed              Flush with 240mL H20 after each feed   Provides 1920kcals, 80g protein, 1632ml fluid (meets ~100% EENs, 95% pro needs, and 84% fluid needs  *Pt able to take PO full liquids as well, will likely make up difference in fluid needs.      Consider adding probiotic  Please add PRN vs scheduled daily bowel regimen, pt with hx constipation     Nursing to document TF rate, flushes, GRV, BMs, WEEKLY wts, and GI s/s     Nutrition Assessment:  Admitted for difficulty swallowing and generalized weakness. On admit, CT chest finding neoplasm, CT neck shows esophageal mass. (10/30) EGD confirmed eso mass with obstruction s/p balloon dilation with stent placement and PEG placement - (10/30) Bx confirms cancer, PPN started at 40ml/hr same day despite access for TF available. Onc was following, now signed off (11/6). Per MD notes pt confirms hunger however regurgitates d/t GERD & dysphagia likely 2/2 esophageal cancer. (10/31) TF initiated of 50ml bolus with 10ml flushes q3hrs+D5 at 75ml/hr (total provides 906kcals, 26g protein-- grossly inadequate to meet needs) and diet advanced to Full liq with min intakes, (11/1) diet & TF d/c'd d/t pneumoperitoneum, PEG to suction, PPN at 83ml/hr +250ml lipids daily +D5 at 75ml/hr (Total 1493kcals, 85g pro- 81% EENs and 100% pro needs, inadequate).  (11/3) CT abd shows no extravasation of contrast from PEG site or small bowel, TF restarted however not adv past 20ml/hr and with NO FWF per Pete 2/2 concern over free air in abd, this plus PPN and D5 meeting pt needs however multiple sources of nutrition unnecessary and puts pt at higher risk of infection as well as tube clogging. (11/4) TF held for concerns of intolerance d/t GRV of 120ml, D5 at 75ml/hr and PPN continues at max rate, still inadequate to meet EENs. (11/5) IR placed pigtail drain for pneumoperitoneum, to wall suction. (11/7) Drain clamped, Basker ok'd for clear liquids however pt started on Full Liq. (11/8) TF restarted at 10ml/hr, PPN and D5 d/c'd. (11/10) No free-air per X-ray, drain removed by IR. No documentation of TF since re-initiation, (10/9) RD observed TF running at 10ml/hr with 10ml FWF q4h, pt also with excellent intakes of 100% of full liquids on B and L trays. RD adjusted to bolus regimen at this time, however today appears that boluses just started at 50ml this AM. Pt endorsed consuming 100% soup and 2 orange juices at B, about 480ml fluid volume. RD discussed with RN to advance to 150ml bolus at L, then to goal by afternoon feed. Considering pt good PO intakes pt will likely tolerate feed volume well. RD also discussed this with pt and clarified d/c TF orders to both CM and NP, noted plan for boluses q3hr x12hrs, 4 feeds per day. Pt endorses no issues with distention, feels much better today and wants to go home. Labs: BG wnl, Cr 0.68. Meds: Diltiazem, pepcid, B9, dilaudid, MVI, antiemetics, B1    Malnutrition Assessment:  Malnutrition Status:  Severe malnutrition    Context:  Chronic illness       Estimated Daily Nutrient Needs:  Energy (kcal): 1935kcals (30kcals/kg); Weight Used for Energy Requirements: Current  Protein (g): 84g (1.3g/kg); Weight Used for Protein Requirements: Current  Fluid (ml/day): 1935ml; Method Used for Fluid Requirements: 1 ml/kcal   Needs for cancer (negative for mets) and wt gain    Nutrition Related Findings:  NFPE finding severe fat and muscle losses per previous assessment. No edema. Pt is edentulous. Dysphagia 2/2 complete obstruction of esophagus, now s/p dilation and stent.  PEG in place, abd now soft s/p drain of pneumoperitoneum. No N/V per pt. Last BM 11/08, no new documented. Wounds:    None       Current Nutrition Therapies:  DIET FULL LIQUID  DIET TUBE FEEDING TwoCal HN TwoCal Give 100% of TF regardless of how much PO pt takes  Current Tube Feeding (TF) Orders:   · Feeding Route: PEG  · Formula: TwoCal  · Schedule: Bolus    · Regimen: 150ml bolus q3hr 8AM-8PM (x12hrs, 4 feeds/day)  · Additives/Modulars:    · Water Flushes: 240ml after each feed  · Current TF & Flush Orders Provides: 1200kcals, 50g pro, 1380ml fluid; meets 62% EENs, 60% protein needs, and 71% fluid needs-- inadequate  · Goal TF & Flush Orders Provides: Rec'd bolus feeds of TwoCal at goal of 240ml feeds q3h 8AM-8PM (x12hrs, 4 feeds/day), flush with 240ml fluid after each feed; providing 1920kcals, 80g protein, 1632ml fluid      Anthropometric Measures:  · Height:  6' 0.99\" (185.4 cm)  · Current Body Wt:  63 kg (138 lb 14.2 oz)(11/10)   · Admission Body Wt:  141 lb 1.5 oz    · Usual Body Wt:  79.4 kg (175 lb)(per pt ~ 2 months ago)     · Ideal Body Wt:  184 lbs:  75.5 %   · BMI Category:  Underweight (BMI less than 22) age over 72     Wt hx: 64.5kg (11/05), 65.9kg (11/03), 65.4kg (11/02),  64.6kg (10/31), 64kg(10/26)  Per H&P, pt reports 35lb wt loss (15.9kg) over past few months - noted possible 19.9%BW loss during this time - severe in nature.  Unable to obtain timeframe. Noted recent wt stability inpatient    Nutrition Diagnosis:   · Inadequate oral intake related to swallowing difficulty(2/2 esophageal cancer with obstructing mass) as evidenced by nutrition support-enteral nutrition      Nutrition Interventions:   Food and/or Nutrient Delivery: Modify tube feeding, Continue current diet(Adv TF to goal)  Nutrition Education and Counseling: Education completed(per RN and CM, Normal completed TF teaching to pt son, Jennifer Abimbola)  Coordination of Nutrition Care: Continue to monitor while inpatient, Speech therapy    Goals:  Intake >75% EEN via PO and TF in > 5 days  (Progressing)  BM every 1-2 days, (not Progressing)  wt gain +/-0.5kg/wk   (not Progressing)       Nutrition Monitoring and Evaluation:   Behavioral-Environmental Outcomes: None identified  Food/Nutrient Intake Outcomes: Food and nutrient intake, Enteral nutrition intake/tolerance  Physical Signs/Symptoms Outcomes: Weight, Chewing or swallowing, Constipation, GI status    Discharge Planning:    Enteral nutrition     Electronically signed by uSsannah Mcgee RD on 11/11/2020 at 12:21 PM    Contact: Ext [phone]

## 2020-11-11 NOTE — DISCHARGE SUMMARY
Admit date: 10/26/2020   Admitting Provider: Mela Barton MD    Discharge date: 11/11/2020  Discharging Provider: Edwardo Gonzalez PA-C      * Admission Diagnoses: Esophageal mass [K22.8]    * Discharge Diagnoses:    Hospital Problems as of 11/11/2020 Date Reviewed: 10/27/2020          Codes Class Noted - Resolved POA    Esophageal mass ICD-10-CM: K22.8  ICD-9-CM: 530.89  10/26/2020 - Present Unknown              * Hospital Course:   Patient admitted on October 26, 2020 with difficulty swallowing and generalized weakness and 30 pound weight loss.  Patient has been unable to swallow solid foods.  CT of the chest revealed an obstructing esophageal mass.  EGD performed which revealed the same with biopsy taken.  Oncology consultation. Michael Sweeney underwent PEG tube placement and esophageal stent placement.  EGD with balloon dilatation of the esophagus and stent placement.  PEG placed. Cardiac consultation. Recurrent SVT requiring adenosine and ultimately metoprolol for rate control.  Patient transferred to telemetry for Cardizem drip.  Free air on chest x-ray most likely postprocedural.  Will discontinue PEG tube feedings and placed intermittent suction per gastroenterology.  Patient with no abdominal pain and abdomen is soft.  Worsening abdominal pain with persistent pneumoperitoneum.  Also describes right lower quadrant abdominal pain.  CT with p.o. and IV contrast showed free air in the peritoneum however could be related to PEG tube placement. .  Surgical consultation, Dr. Beverley Vasquez started Zosyn and vancomycin empirically. He has not tolerating PEG tube feedings. Therefore held. Culture remains negative. Have stopped vancoymcin. IR consulted and placed a percutaneous drainage cath on 11/5/2020. It was clamped on 11/7/2020 and removed with no free air present 11/10. Nereyda Coates Stopped PPN.     Patient tolerating tube feeds and will be discharged home for outpatient chemotherapy and radiation as previously scheduled. Tube feeds remain as tolerated although goal will be 150-240ml every 3 hours for 12-hour periods. SonSergei has been trained in bolus feeds and questions were answered to satisfaction. * Procedures:   Procedure(s):  PERCUTANEOUS ENDOSCOPIC GASTROSTOMY TUBE INSERTION  ESOPHAGEAL DILATION  ENDOSCOPY WITH PROSTHESIS OR [address],[facility] [place] [address]  Cardiology and IR Orders (From admission to next 24h)     Start     Ordered    11/05/20 1230  IR DRAIN PROCEDURE W CATH  [[id number]]  ONE TIME      11/05/20 1225                Consults:   Cardiology, oncology, gastroenterology and surgery    Significant Diagnostic Studies: As discussed in hospital course    Discharge Exam:  Visit Vitals  BP (!) 149/79   Pulse 62   Temp 98 °F (36.7 °C)   Resp 20   Ht 6' 0.99\" (1.854 m)   Wt 63 kg (138 lb 14.2 oz)   SpO2 98%   BMI 18.33 kg/m²     PHYSICAL EXAM:  Constitutional: Alert in no acute distress   HEENT: Sclerae anicteric, The neck is supple. Cardiovascular: Regular rate and rhythm. No murmurs, gallops, or rubs. Robert Kins Respiratory: Clear breath sounds with no wheezes, rales, or rhonchi. GI: Abdomen nondistended, soft, and nontender. Normal active bowel sounds. Rectal: Deferred   Musculoskeletal: No pitting edema of the lower legs. Extremities have good range of motion. Neurological:  Patient is alert and oriented. Cranial nerves II-XII intact  Psychiatric: Mood appears appropriate with judgement intact. Lymphatic: No cervical or supraclavicular adenopathy. Skin: No rashes or breakdown of the skin      * Discharge Condition: stable  * Disposition: Home with home health    Discharge Medications:  Current Discharge Medication List      START taking these medications    Details   dilTIAZem ER (DILACOR XR) 180 mg capsule Take 1 Cap by mouth daily. Qty: 30 Cap, Refills: 1      famotidine (PEPCID) 20 mg tablet Take 1 Tab by mouth two (2) times a day.   Qty: 30 Tab, Refills: 1      folic acid (FOLVITE) 1 mg tablet Take 1 Tab by mouth daily. Qty: 30 Tab, Refills: 1      metoprolol tartrate (LOPRESSOR) 25 mg tablet Take 1 Tab by mouth every twelve (12) hours. Qty: 30 Tab, Refills: 1      thiamine mononitrate (B-1) 100 mg tablet Take 1 Tab by mouth daily. Qty: 30 Tab, Refills: 1             * Follow-up Care/Patient Instructions: Activity: Activity as tolerated  Diet: Resume tube feeding and p.o. as tolerated  Wound Care: None needed    Follow-up Information     Follow up With Specialties Details Why Contact Info    None    None ([phone]) Patient stated that they have no PCP        Patient will follow up with cardiology, Dr. Parvin Carter in 2 weeks  Dr. Scott Crews in 1 week for chemotherapy and Dr. Monik Gongora for radiation oncology as previously scheduled.     Discharge summary greater than 35 minutes spent with the patient performing discharge instructions, medication review and physical exam    Signed:  Sarah Giraldo PA-C  11/11/2020  2:05 PM

## 2020-11-11 NOTE — PROGRESS NOTES
Progress Note      11/11/2020 7:50 AM  NAME: Jim Hi   MRN:  [id number]   Admit Diagnosis: Esophageal mass [K22.8]      Problem List:   1.  Incomplete database secondary to the patient being a poor historian  2. Michael Sweeney presents for evaluation of dysphagia generalized weakness  3.  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  Status post percutaneous endoscopic gastrotomy (PEG) tube insertion  5.  Status post esophageal dilation and stent placement  6.  Status post cholecystectomy  7.  Percutaneous drainage of large pneumoperitoneum  8.  Previous transient ischemia attack (TIA)   9.  Grade II (moderate) diastolic dysfunction, with pseudonormalization  10.  Paroxysmal supraventricular tachycardia (PSVT) on admission     11.  Hypertension  12.  Nicotine dependence  13.  Chronic obstructive pulmonary disease (emphysema)  14.  History of prostate adenocarcinoma (status post radiation therapy)  15.  Status post cholecystectomy  16.  Hypomagnesia       Subjective: The patient is seen and examined in room 489. There were no acute cardiovascular events reported overnight. The patient remains essentially unchanged. His telemetry reveals sinus without ventricular ectopy.     Medications Personally Reviewed:    Current Facility-Administered Medications   Medication Dose Route Frequency    dilTIAZem ER (DILACOR XR) capsule 180 mg  180 mg Oral DAILY    piperacillin-tazobactam (ZOSYN) 3.375 g in 0.9% sodium chloride (MBP/ADV) 100 mL MBP  3.375 g IntraVENous Q8H    labetaloL (NORMODYNE;TRANDATE) 20 mg/4 mL (5 mg/mL) injection 20 mg  20 mg IntraVENous Q4H PRN    metoprolol (LOPRESSOR) injection 2.5 mg  2.5 mg IntraVENous Q6H PRN    metoprolol tartrate (LOPRESSOR) tablet 25 mg  25 mg Oral Q12H    thiamine mononitrate (B-1) tablet 100 mg  100 mg Oral DAILY    folic acid (FOLVITE) tablet 1 mg  1 mg Oral DAILY    multivitamin (ONE A DAY) tablet 1 Tab  1 Tab Oral DAILY    famotidine (PEPCID) tablet 20 mg  20 mg Oral BID    HYDROmorphone (DILAUDID) injection 0.5 mg  0.5 mg IntraVENous Q4H PRN    sodium chloride (NS) flush 5-40 mL  5-40 mL IntraVENous Q8H    sodium chloride (NS) flush 5-40 mL  5-40 mL IntraVENous PRN    acetaminophen (TYLENOL) tablet 650 mg  650 mg Oral Q6H PRN    Or    acetaminophen (TYLENOL) suppository 650 mg  650 mg Rectal Q6H PRN    promethazine (PHENERGAN) tablet 12.5 mg  12.5 mg Oral Q6H PRN    Or    ondansetron (ZOFRAN) injection 4 mg  4 mg IntraVENous Q6H PRN           Objective:      Physical Exam:  Last 24hrs VS reviewed since prior progress note. Most recent are:    Visit Vitals  BP (!) 160/71   Pulse 69   Temp 98.6 °F (37 °C)   Resp 20   Ht 6' 1\" (1.854 m)   Wt 63 kg (138 lb 14.2 oz)   SpO2 99%   BMI 18.32 kg/m²       Intake/Output Summary (Last 24 hours) at 11/11/2020 0750  Last data filed at 11/11/2020 0327  Gross per 24 hour   Intake    Output 400 ml   Net -400 ml        Physical exam: Essentially unchanged. Data Review  Telemetry: Sinus rhythm without ventricular ectopy  EKG:   [x]  No new EKG for review    Lab Data Personally Reviewed:    Recent Labs     11/10/20  1129 11/09/20  0839   WBC 8.9 9.4   HGB 10.9* 12.1   HCT 33.7* 36.8    212     No results for input(s): INR, PTP, APTT, INREXT in the last 72 hours. Recent Labs     11/10/20  1129 11/09/20  0839    138   K 3.5 3.4*    105   CO2 29 28   BUN 11 12   CREA 0.68* 0.59*   * 98   CA 9.0 9.3     No results for input(s): CPK, CKNDX, TROIQ in the last 72 hours. No lab exists for component: CPKMB  No results found for: CHOL, CHOLX, CHLST, CHOLV, HDL, HDLP, LDL, LDLC, DLDLP, TGLX, TRIGL, TRIGP, CHHD, CHHDX    No results for input(s): AP, TBIL, TP, ALB, GLOB, GGT, AML, LPSE in the last 72 hours. No lab exists for component: SGOT, GPT, AMYP, HLPSE  No results for input(s): PH, PCO2, PO2 in the last 72 hours. Assessment/Plan:   1.   Continue telemetry monitoring while hospitalized  2. Continue to monitor serum electrolytes, renal function  3. Continue to monitor hemoglobin/hematocrit  4. Continue current cardiovascular medications including potassium, and metoprolol  5. Will sign off, but remain available as needed    6.   The patient is to follow-up in our office within 1 to 2 weeks after discharge     Juni Levine MD

## 2020-11-11 NOTE — PROGRESS NOTES
Patient, patient's son, and girlfriend have received TF teaching on 11/10/2020, patient has been accepted by [address] [place] [address] for [facility], and [facility] for TF supplies. Patient's walker has been delivered to patient's room. CM met with patient to deliver IMM, patient signed, placed on chart. Patient reports his son is picking him up and transporting him home. Nurse notified and Discharge checklist has been completed with RN Rodrick Theodore.

## 2020-11-16 ENCOUNTER — APPOINTMENT (OUTPATIENT)
Dept: GENERAL RADIOLOGY | Age: 75
End: 2020-11-16
Attending: EMERGENCY MEDICINE
Payer: MEDICARE

## 2020-11-16 ENCOUNTER — HOSPITAL ENCOUNTER (EMERGENCY)
Age: 75
Discharge: HOME | End: 2020-11-16
Attending: FAMILY MEDICINE
Payer: MEDICARE

## 2020-11-16 VITALS
WEIGHT: 140 LBS | OXYGEN SATURATION: 99 % | HEART RATE: 71 BPM | TEMPERATURE: 98.3 F | SYSTOLIC BLOOD PRESSURE: 161 MMHG | BODY MASS INDEX: 18.96 KG/M2 | RESPIRATION RATE: 16 BRPM | DIASTOLIC BLOOD PRESSURE: 76 MMHG | HEIGHT: 72 IN

## 2020-11-16 DIAGNOSIS — R07.9 CHEST PAIN, UNSPECIFIED TYPE: Primary | ICD-10-CM

## 2020-11-16 LAB
ALBUMIN SERPL-MCNC: 2.8 G/DL (ref 3.5–5)
ALBUMIN/GLOB SERPL: 0.5 {RATIO} (ref 1.1–2.2)
ALP SERPL-CCNC: 114 U/L (ref 45–117)
ALT SERPL-CCNC: 53 U/L (ref 12–78)
ANION GAP SERPL CALC-SCNC: 3 MMOL/L (ref 5–15)
AST SERPL W P-5'-P-CCNC: 70 U/L (ref 15–37)
BASOPHILS # BLD: 0 K/UL (ref 0–0.1)
BASOPHILS NFR BLD: 0 % (ref 0–1)
BILIRUB SERPL-MCNC: 0.6 MG/DL (ref 0.2–1)
BUN SERPL-MCNC: 11 MG/DL (ref 6–20)
BUN/CREAT SERPL: 19 (ref 12–20)
CA-I BLD-MCNC: 9.5 MG/DL (ref 8.5–10.1)
CHLORIDE SERPL-SCNC: 107 MMOL/L (ref 97–108)
CK SERPL-CCNC: 26 NG/ML (ref 39–308)
CO2 SERPL-SCNC: 32 MMOL/L (ref 21–32)
CREAT SERPL-MCNC: 0.59 MG/DL (ref 0.7–1.3)
DIFFERENTIAL METHOD BLD: ABNORMAL
EOSINOPHIL # BLD: 0.1 K/UL (ref 0–0.4)
EOSINOPHIL NFR BLD: 2 % (ref 0–7)
ERYTHROCYTE [DISTWIDTH] IN BLOOD BY AUTOMATED COUNT: 13.8 % (ref 11.5–14.5)
GLOBULIN SER CALC-MCNC: 5.4 G/DL (ref 2–4)
GLUCOSE SERPL-MCNC: 99 MG/DL (ref 65–100)
HCT VFR BLD AUTO: 35.7 % (ref 36.6–50.3)
HGB BLD-MCNC: 11.3 G/DL (ref 12.1–17)
IMM GRANULOCYTES # BLD AUTO: 0 K/UL (ref 0–0.04)
IMM GRANULOCYTES NFR BLD AUTO: 0 % (ref 0–0.5)
LYMPHOCYTES # BLD: 2.1 K/UL (ref 0.8–3.5)
LYMPHOCYTES NFR BLD: 24 % (ref 12–49)
MCH RBC QN AUTO: 31.5 PG (ref 26–34)
MCHC RBC AUTO-ENTMCNC: 31.7 G/DL (ref 30–36.5)
MCV RBC AUTO: 99.4 FL (ref 80–99)
MONOCYTES # BLD: 0.9 K/UL (ref 0–1)
MONOCYTES NFR BLD: 10 % (ref 5–13)
NEUTS SEG # BLD: 5.6 K/UL (ref 1.8–8)
NEUTS SEG NFR BLD: 64 % (ref 32–75)
PLATELET # BLD AUTO: 245 K/UL (ref 150–400)
PMV BLD AUTO: 11.7 FL (ref 8.9–12.9)
POTASSIUM SERPL-SCNC: 3.9 MMOL/L (ref 3.5–5.1)
PROT SERPL-MCNC: 8.2 G/DL (ref 6.4–8.2)
RBC # BLD AUTO: 3.59 M/UL (ref 4.1–5.7)
SODIUM SERPL-SCNC: 142 MMOL/L (ref 136–145)
TROPONIN I SERPL-MCNC: <0.05 NG/ML
WBC # BLD AUTO: 8.7 K/UL (ref 4.1–11.1)

## 2020-11-16 PROCEDURE — 99283 EMERGENCY DEPT VISIT LOW MDM: CPT

## 2020-11-16 PROCEDURE — 84484 ASSAY OF TROPONIN QUANT: CPT

## 2020-11-16 PROCEDURE — 71045 X-RAY EXAM CHEST 1 VIEW: CPT

## 2020-11-16 PROCEDURE — 36415 COLL VENOUS BLD VENIPUNCTURE: CPT

## 2020-11-16 PROCEDURE — 80053 COMPREHEN METABOLIC PANEL: CPT

## 2020-11-16 PROCEDURE — 93005 ELECTROCARDIOGRAM TRACING: CPT

## 2020-11-16 PROCEDURE — 85025 COMPLETE CBC W/AUTO DIFF WBC: CPT

## 2020-11-16 PROCEDURE — 82550 ASSAY OF CK (CPK): CPT

## 2020-11-16 NOTE — ED PROVIDER NOTES
EMERGENCY DEPARTMENT HISTORY AND PHYSICAL EXAM      Date: 11/16/2020  Patient Name: Cade Romero      History of Presenting Illness     Chief Complaint   Patient presents with    Chest Pain       History Provided By: Patient    HPI: Cade Romero, 76 y.o. male with a past medical history significant cancer and HTN,  presents to the ED with cc of chest pain. Onset was this morning. Described as a pressure-like pain on the left side is nonradiating. No alleviating or aggravating factors. He was resting at the time of onset. Pain has resolved since arriving to the ED but is concerned because he was recently released from the hospital for difficulty swallowing and PEG tube placement. He denies fever, nausea, vomiting, diaphoresis, dyspnea, abdominal pain, diarrhea, and all other systems are negative. There are no other complaints, changes, or physical findings at this time. PCP: Sharonda Boss MD    Current Outpatient Medications   Medication Sig Dispense Refill    dilTIAZem ER (DILACOR XR) 180 mg capsule Take 1 Cap by mouth daily. 30 Cap 1    famotidine (PEPCID) 20 mg tablet Take 1 Tab by mouth two (2) times a day. 30 Tab 1    folic acid (FOLVITE) 1 mg tablet Take 1 Tab by mouth daily. 30 Tab 1    metoprolol tartrate (LOPRESSOR) 25 mg tablet Take 1 Tab by mouth every twelve (12) hours. 30 Tab 1    thiamine mononitrate (B-1) 100 mg tablet Take 1 Tab by mouth daily. 30 Tab 1       Past History     Past Medical History:  Past Medical History:   Diagnosis Date    Gastrointestinal disorder     Hypertension        Past Surgical History:  Past Surgical History:   Procedure Laterality Date    HX GASTROSTOMY      IR DRAIN PROCEDURE W CATH  11/5/2020       Family History:  History reviewed. No pertinent family history.     Social History:  Social History     Tobacco Use    Smoking status: Current Some Day Smoker    Smokeless tobacco: Never Used   Substance Use Topics    Alcohol use: Not Currently    Drug use: Not on file       Allergies:  No Known Allergies      Review of Systems     Review of Systems   Constitutional: Negative for diaphoresis, fatigue and fever. HENT: Negative for congestion and sore throat. Respiratory: Negative for cough and shortness of breath. Cardiovascular: Positive for chest pain. Negative for palpitations and leg swelling. Gastrointestinal: Negative for abdominal pain, diarrhea, nausea and vomiting. Genitourinary: Negative for dysuria and flank pain. Musculoskeletal: Negative for arthralgias and myalgias. Skin: Negative for rash and wound. Neurological: Negative for dizziness, weakness, light-headedness and numbness. All other systems reviewed and are negative. Physical Exam     Physical Exam  Vitals signs and nursing note reviewed. Constitutional:       Appearance: He is well-developed. Neck:      Musculoskeletal: Normal range of motion and neck supple. Cardiovascular:      Rate and Rhythm: Normal rate and regular rhythm. Heart sounds: Normal heart sounds. Pulmonary:      Effort: Pulmonary effort is normal.      Breath sounds: Normal breath sounds. Abdominal:      General: Bowel sounds are normal.      Palpations: Abdomen is soft. Tenderness: There is no abdominal tenderness. Musculoskeletal: Normal range of motion. Right lower leg: No edema. Left lower leg: No edema. Skin:     General: Skin is warm. Capillary Refill: Capillary refill takes less than 2 seconds. Neurological:      General: No focal deficit present. Mental Status: He is alert and oriented to person, place, and time.    Psychiatric:         Mood and Affect: Mood normal.         Behavior: Behavior normal.         Diagnostic Study Results     Labs -     Recent Results (from the past 12 hour(s))   CBC WITH AUTOMATED DIFF    Collection Time: 11/16/20  3:30 PM   Result Value Ref Range    WBC 8.7 4.1 - 11.1 K/uL    RBC 3.59 (L) 4.10 - 5.70 M/uL HGB 11.3 (L) 12.1 - 17.0 g/dL    HCT 35.7 (L) 36.6 - 50.3 %    MCV 99.4 (H) 80.0 - 99.0 FL    MCH 31.5 26.0 - 34.0 PG    MCHC 31.7 30.0 - 36.5 g/dL    RDW 13.8 11.5 - 14.5 %    PLATELET 804 276 - 663 K/uL    MPV 11.7 8.9 - 12.9 FL    NEUTROPHILS 64 32 - 75 %    LYMPHOCYTES 24 12 - 49 %    MONOCYTES 10 5 - 13 %    EOSINOPHILS 2 0 - 7 %    BASOPHILS 0 0 - 1 %    IMMATURE GRANULOCYTES 0 0.0 - 0.5 %    ABS. NEUTROPHILS 5.6 1.8 - 8.0 K/UL    ABS. LYMPHOCYTES 2.1 0.8 - 3.5 K/UL    ABS. MONOCYTES 0.9 0.0 - 1.0 K/UL    ABS. EOSINOPHILS 0.1 0.0 - 0.4 K/UL    ABS. BASOPHILS 0.0 0.0 - 0.1 K/UL    ABS. IMM. GRANS. 0.0 0.00 - 0.04 K/UL    DF AUTOMATED     METABOLIC PANEL, COMPREHENSIVE    Collection Time: 11/16/20  3:30 PM   Result Value Ref Range    Sodium 142 136 - 145 mmol/L    Potassium 3.9 3.5 - 5.1 mmol/L    Chloride 107 97 - 108 mmol/L    CO2 32 21 - 32 mmol/L    Anion gap 3 (L) 5 - 15 mmol/L    Glucose 99 65 - 100 mg/dL    BUN 11 6 - 20 mg/dL    Creatinine 0.59 (L) 0.70 - 1.30 mg/dL    BUN/Creatinine ratio 19 12 - 20      GFR est AA >60 >60 ml/min/1.73m2    GFR est non-AA >60 >60 ml/min/1.73m2    Calcium 9.5 8.5 - 10.1 mg/dL    Bilirubin, total 0.6 0.2 - 1.0 mg/dL    AST (SGOT) 70 (H) 15 - 37 U/L    ALT (SGPT) 53 12 - 78 U/L    Alk. phosphatase 114 45 - 117 U/L    Protein, total 8.2 6.4 - 8.2 g/dL    Albumin 2.8 (L) 3.5 - 5.0 g/dL    Globulin 5.4 (H) 2.0 - 4.0 g/dL    A-G Ratio 0.5 (L) 1.1 - 2.2     CK W/ REFLX CKMB    Collection Time: 11/16/20  3:30 PM   Result Value Ref Range    CK 26.0 (L) 39 - 308 ng/mL   TROPONIN I    Collection Time: 11/16/20  3:30 PM   Result Value Ref Range    Troponin-I, Qt. <0.05 <0.05 ng/mL       Radiologic Studies -   [unfilled]  CT Results  (Last 48 hours)    None        CXR Results  (Last 48 hours)               11/16/20 1345  XR CHEST PORT Final result    Narrative:  Chest single view. Comparison single view chest November 7, 2020       Unchanged appearance for lungs.  No interstitial or alveolar pulmonary edema. Cardiac and mediastinal structures unchanged. Upper/mid esophageal stent stable   position. Small right pleural effusion. No pneumothorax. Medical Decision Making and ED Course     Vital Signs-Reviewed the patient's vital signs. Patient Vitals for the past 12 hrs:   Temp Pulse Resp BP SpO2   11/16/20 1825 98.3 °F (36.8 °C) 71 16 (!) 161/76 99 %   11/16/20 1323 98.4 °F (36.9 °C) 76 20 133/72 99 %       EKG interpretation: (Preliminary)  Rhythm: normal sinus rhythm; and regular . Rate (approx.): 73; Axis: normal; ME interval: normal; QRS interval: normal ; ST/T wave: normal; Other findings: normal.      Records Reviewed: Nursing Notes and Old Medical Records      Provider Notes (Medical Decision Making):     MDM  Number of Diagnoses or Management Options  Chest pain, unspecified type:   Diagnosis management comments: Suggested for patient to be admitted for overnight duration for ACS rule out. He declined. Explained to patient consequences risk of not being further monitored for any cardiac causes of the chest pain. He still declined admission. Patient state he has a follow-up appointment with his oncologist in 2 days. Suggest to keep appointment. Patient is stable with no marked toxicity or distress. No significant findings on physical exam. Results reviewed with the patient. All questions were answered and there are no apparent barriers to comprehension or communication. The patient verbalizes agreement with the diagnosis, treatment plan, and understanding of the follow-up instructions. The patient is appropriate for discharge; leaves the Emergency Department walking with a stable gait. Patient understands to return to the ED in 12-24 hours for any new or worsening symptoms or no  expected timely resolution of symptoms on mediations prescribed. ED Course:     - I am the first and primary provider for this patient.     - I reviewed the vital signs, available nursing notes, past medical history, past surgical history, family history and social history. Initial assessment performed. The patients presenting problems have been discussed, and they are in agreement with the care plan formulated and outlined with them. I have encouraged them to ask questions as they arise throughout their visit. Disposition     Disposition: Condition stable  DC- Adult Discharges: All of the diagnostic tests were reviewed and questions answered. Diagnosis, care plan and treatment options were discussed. The patient understands the instructions and will follow up as directed. The patients results have been reviewed with them. They have been counseled regarding their diagnosis. The patient verbally convey understanding and agreement of the signs, symptoms, diagnosis, treatment and prognosis and additionally agrees to follow up as recommended with their PCP in 24 - 48 hours. They also agree with the care-plan and convey that all of their questions have been answered. I have also put together some discharge instructions for them that include: 1) educational information regarding their diagnosis, 2) how to care for their diagnosis at home, as well a 3) list of reasons why they would want to return to the ED prior to their follow-up appointment, should their condition change. DISCHARGE PLAN:  1. Current Discharge Medication List      CONTINUE these medications which have NOT CHANGED    Details   dilTIAZem ER (DILACOR XR) 180 mg capsule Take 1 Cap by mouth daily. Qty: 30 Cap, Refills: 1      famotidine (PEPCID) 20 mg tablet Take 1 Tab by mouth two (2) times a day. Qty: 30 Tab, Refills: 1      folic acid (FOLVITE) 1 mg tablet Take 1 Tab by mouth daily. Qty: 30 Tab, Refills: 1      metoprolol tartrate (LOPRESSOR) 25 mg tablet Take 1 Tab by mouth every twelve (12) hours.   Qty: 30 Tab, Refills: 1      thiamine mononitrate (B-1) 100 mg tablet Take 1 Tab by mouth daily. Qty: 30 Tab, Refills: 1           2. Follow-up Information     Follow up With Specialties Details Why Javier Santos MD Cardiology, Internal Medicine Schedule an appointment as soon as possible for a visit in 3 days  [address] [place] [id number] [phone]          3. Return to ED if worse   4. Discharge Medication List as of 11/16/2020  6:21 PM          Diagnosis     Clinical Impression:   1. Chest pain, unspecified type        Attestations:    Mindy Camacho, DO    Please note that this dictation was completed with [employer], the computer voice recognition software. Quite often unanticipated grammatical, syntax, homophones, and other interpretive errors are inadvertently transcribed by the computer software. Please disregard these errors. Please excuse any errors that have escaped final proofreading. Thank you.

## 2020-11-16 NOTE — ED TRIAGE NOTES
Chest pain patient reports since discharge from hospital. Tylenol didn't help. Has a peg tube. Ambulatory.

## 2020-11-17 LAB
ATRIAL RATE: 73 BPM
CALCULATED P AXIS, ECG09: 87 DEGREES
CALCULATED R AXIS, ECG10: 63 DEGREES
CALCULATED T AXIS, ECG11: 72 DEGREES
DIAGNOSIS, 93000: NORMAL
P-R INTERVAL, ECG05: 134 MS
Q-T INTERVAL, ECG07: 396 MS
QRS DURATION, ECG06: 80 MS
QTC CALCULATION (BEZET), ECG08: 436 MS
VENTRICULAR RATE, ECG03: 73 BPM

## 2020-11-27 ENCOUNTER — TRANSCRIBE ORDER (OUTPATIENT)
Dept: SCHEDULING | Age: 75
End: 2020-11-27

## 2020-11-27 DIAGNOSIS — C15.4 MALIGNANT NEOPLASM OF THORACIC ESOPHAGUS (HCC): Primary | ICD-10-CM

## 2020-12-04 ENCOUNTER — HOSPITAL ENCOUNTER (OUTPATIENT)
Dept: RADIATION THERAPY | Age: 75
Discharge: HOME | End: 2020-12-04
Payer: MEDICARE

## 2020-12-04 PROCEDURE — 77290 THER RAD SIMULAJ FIELD CPLX: CPT

## 2020-12-06 ENCOUNTER — HOSPITAL ENCOUNTER (OUTPATIENT)
Dept: PREADMISSION TESTING | Age: 75
Discharge: HOME | End: 2020-12-06
Payer: MEDICARE

## 2020-12-06 LAB — SARS-COV-2, COV2: NORMAL

## 2020-12-06 PROCEDURE — 87635 SARS-COV-2 COVID-19 AMP PRB: CPT

## 2020-12-07 ENCOUNTER — HOSPITAL ENCOUNTER (OUTPATIENT)
Dept: PET IMAGING | Age: 75
Discharge: HOME | End: 2020-12-07
Attending: INTERNAL MEDICINE

## 2020-12-07 DIAGNOSIS — C15.4 MALIGNANT NEOPLASM OF THORACIC ESOPHAGUS (HCC): ICD-10-CM

## 2020-12-07 LAB — SARS-COV-2, COV2NT: NOT DETECTED

## 2020-12-08 ENCOUNTER — HOSPITAL ENCOUNTER (OUTPATIENT)
Dept: INTERVENTIONAL RADIOLOGY/VASCULAR | Age: 75
Discharge: HOME | End: 2020-12-08
Attending: INTERNAL MEDICINE
Payer: MEDICARE

## 2020-12-08 VITALS
HEIGHT: 73 IN | OXYGEN SATURATION: 98 % | HEART RATE: 74 BPM | DIASTOLIC BLOOD PRESSURE: 72 MMHG | SYSTOLIC BLOOD PRESSURE: 138 MMHG | BODY MASS INDEX: 18.55 KG/M2 | TEMPERATURE: 97.9 F | RESPIRATION RATE: 18 BRPM | WEIGHT: 140 LBS

## 2020-12-08 DIAGNOSIS — C15.9 ESOPHAGEAL CANCER (HCC): ICD-10-CM

## 2020-12-08 LAB
ANION GAP SERPL CALC-SCNC: 3 MMOL/L (ref 5–15)
BUN SERPL-MCNC: 14 MG/DL (ref 6–20)
BUN/CREAT SERPL: 24 (ref 12–20)
CA-I BLD-MCNC: 9.5 MG/DL (ref 8.5–10.1)
CHLORIDE SERPL-SCNC: 107 MMOL/L (ref 97–108)
CO2 SERPL-SCNC: 30 MMOL/L (ref 21–32)
CREAT SERPL-MCNC: 0.59 MG/DL (ref 0.7–1.3)
ERYTHROCYTE [DISTWIDTH] IN BLOOD BY AUTOMATED COUNT: 13.5 % (ref 11.5–14.5)
GLUCOSE SERPL-MCNC: 105 MG/DL (ref 65–100)
HCT VFR BLD AUTO: 36.2 % (ref 36.6–50.3)
HGB BLD-MCNC: 11.6 G/DL (ref 12.1–17)
INR PPP: 1.1 (ref 0.9–1.1)
MCH RBC QN AUTO: 31.7 PG (ref 26–34)
MCHC RBC AUTO-ENTMCNC: 32 G/DL (ref 30–36.5)
MCV RBC AUTO: 98.9 FL (ref 80–99)
PLATELET # BLD AUTO: 194 K/UL (ref 150–400)
PMV BLD AUTO: 12.7 FL (ref 8.9–12.9)
POTASSIUM SERPL-SCNC: 4 MMOL/L (ref 3.5–5.1)
PROTHROMBIN TIME: 14.5 SEC (ref 11.9–14.7)
RBC # BLD AUTO: 3.66 M/UL (ref 4.1–5.7)
SODIUM SERPL-SCNC: 140 MMOL/L (ref 136–145)
WBC # BLD AUTO: 8.5 K/UL (ref 4.1–11.1)

## 2020-12-08 PROCEDURE — 85610 PROTHROMBIN TIME: CPT

## 2020-12-08 PROCEDURE — C1788 PORT, INDWELLING, IMP: HCPCS

## 2020-12-08 PROCEDURE — 74011000258 HC RX REV CODE- 258: Performed by: RADIOLOGY

## 2020-12-08 PROCEDURE — 85027 COMPLETE CBC AUTOMATED: CPT

## 2020-12-08 PROCEDURE — 36415 COLL VENOUS BLD VENIPUNCTURE: CPT

## 2020-12-08 PROCEDURE — 76937 US GUIDE VASCULAR ACCESS: CPT

## 2020-12-08 PROCEDURE — 80048 BASIC METABOLIC PNL TOTAL CA: CPT

## 2020-12-08 PROCEDURE — 74011250636 HC RX REV CODE- 250/636: Performed by: RADIOLOGY

## 2020-12-08 PROCEDURE — 36561 INSERT TUNNELED CV CATH: CPT

## 2020-12-08 PROCEDURE — 77001 FLUOROGUIDE FOR VEIN DEVICE: CPT

## 2020-12-08 RX ORDER — MIDAZOLAM HYDROCHLORIDE 1 MG/ML
.5-2 INJECTION, SOLUTION INTRAMUSCULAR; INTRAVENOUS
Status: DISCONTINUED | OUTPATIENT
Start: 2020-12-09 | End: 2020-12-17 | Stop reason: HOSPADM

## 2020-12-08 RX ORDER — FENTANYL CITRATE 50 UG/ML
12.5-1 INJECTION, SOLUTION INTRAMUSCULAR; INTRAVENOUS
Status: DISCONTINUED | OUTPATIENT
Start: 2020-12-09 | End: 2020-12-17 | Stop reason: HOSPADM

## 2020-12-08 RX ORDER — TRAMADOL HYDROCHLORIDE 50 MG/1
50 TABLET ORAL
COMMUNITY

## 2020-12-08 RX ORDER — LIDOCAINE HYDROCHLORIDE AND EPINEPHRINE 10; 10 MG/ML; UG/ML
20 INJECTION, SOLUTION INFILTRATION; PERINEURAL ONCE
Status: ACTIVE | OUTPATIENT
Start: 2020-12-08 | End: 2020-12-08

## 2020-12-08 RX ORDER — HEPARIN SODIUM (PORCINE) LOCK FLUSH IV SOLN 100 UNIT/ML 100 UNIT/ML
500 SOLUTION INTRAVENOUS AS NEEDED
Status: DISCONTINUED | OUTPATIENT
Start: 2020-12-08 | End: 2020-12-17 | Stop reason: HOSPADM

## 2020-12-08 RX ADMIN — FENTANYL CITRATE 25 MCG: 50 INJECTION INTRAMUSCULAR; INTRAVENOUS at 11:46

## 2020-12-08 RX ADMIN — CEFAZOLIN SODIUM 1 G: 1 INJECTION, POWDER, FOR SOLUTION INTRAMUSCULAR; INTRAVENOUS at 11:39

## 2020-12-08 RX ADMIN — HEPARIN SODIUM (PORCINE) LOCK FLUSH IV SOLN 100 UNIT/ML 500 UNITS: 100 SOLUTION at 10:00

## 2020-12-08 RX ADMIN — FENTANYL CITRATE 25 MCG: 50 INJECTION INTRAMUSCULAR; INTRAVENOUS at 11:59

## 2020-12-08 NOTE — PROGRESS NOTES
IV removed-- tip intact. No redness swelling or bleeding noted. Pt in no acute distress and VSS. DC instructions reviewed with pt and son-- both verbalized understanding. Pt to be wheeled out to private vehicle.

## 2020-12-08 NOTE — DISCHARGE INSTRUCTIONS
Patient Education   Patient Education        Sedation for a Medical Procedure: Care Instructions  Your Care Instructions     For a minor procedure or surgery, you will get a sedative to help you relax. This drug will make you sleepy. It is usually given in a vein (by IV). It may be used with anesthesia. There are different types of anesthesia. You and your doctor or anesthesia specialist will work together to choose the best anesthesia for you. It is usually based on your health, the procedure, and your preference. · Local anesthesia is a shot given to numb a small part of the body. · Regional anesthesia is a shot that blocks pain to a larger area of the body. · General anesthesia affects the brain and the whole body. You get it through a small tube placed in a vein (IV). Or you may breathe it in. You are unconscious and will not feel pain. You may get monitored anesthesia care (MAC). This means that an anesthesia specialist will care for you during your surgery. He or she will make sure that you get only the level of anesthesia care you need to prevent pain for your specific case. If you had anesthesia, you may feel some pain and discomfort as it wears off. If you have pain, don't be afraid to say so. Pain medicine works better if you take it before the pain gets bad. Common side effects from sedation include:  · Feeling sleepy. (Your doctors and nurses will make sure you are not too sleepy to go home.)  · Nausea and vomiting. This usually does not last long. · Feeling tired. Follow-up care is a key part of your treatment and safety. Be sure to make and go to all appointments, and call your doctor if you are having problems. It's also a good idea to know your test results and keep a list of the medicines you take. How can you care for yourself at home? Activity    · Don't do anything for 24 hours that requires attention to detail.  This includes going to work, making important decisions, or signing any legal documents. It takes time for the medicine effects to completely wear off.     · For your safety, you should not drive or operate any machinery that could be dangerous until the medicine wears off and you can think clearly and react easily.     · When you get home, it is important to rest until the anesthesia has worn off. Some people will feel drowsy or dizzy for up to a few hours after leaving the hospital.     · Take your time and walk slowly. Sudden changes in position may also cause nausea.     · Rest when you feel tired. Getting enough sleep will help you recover. Diet    · You can eat your normal diet, unless your doctor gives you other instructions. If your stomach is upset, try clear liquids and bland, low-fat foods like plain toast or rice.     · Drink plenty of fluids (unless your doctor tells you not to).   · Don't drink alcohol for 24 hours. Medicines    · Be safe with medicines. Read and follow all instructions on the label. ? If the doctor gave you a prescription medicine for pain, take it as prescribed. ? If you are taking opioids for pain, it is very important to take them as prescribed. Opioids can easily be misused. Misuse can lead to opioid use disorder and even death. Because of this, it is best to get off them as soon as possible. As soon as you don't need them, talk to your doctor about how to safely stop taking them. Also talk with your doctor about how to safely store and get rid of opioids. ? If you are not taking a prescription pain medicine, ask your doctor if you can take an over-the-counter medicine.     · If you think your pain medicine is making you sick to your stomach, you can try these things. ? Take your medicine after meals (unless your doctor has told you not to). ? Ask your doctor for a different pain medicine. When should you call for help? Call 911 anytime you think you may need emergency care. For example, call if:    · You have severe trouble breathing.   · You passed out (lost consciousness). Call your doctor now or seek immediate medical care if:    · You have trouble breathing.     · You have ongoing or worsening nausea or vomiting.     · You have a fever.     · You have a new or worse headache.     · The medicine is not wearing off and you can't think clearly. Watch closely for changes in your health, and be sure to contact your doctor if:    · You do not get better as expected. Where can you learn more? Go to http://www.gray.com/  Enter G817 in the search box to learn more about \"Sedation for a Medical Procedure: Care Instructions. \"  Current as of: August 22, 2019               Content Version: 12.6  © 0916-6140 [employer]. Care instructions adapted under license by [employer] (which disclaims liability or warranty for this information). If you have questions about a medical condition or this instruction, always ask your healthcare professional. [place] [address] any warranty or liability for your use of this information. Implanted Port: What to Expect at [address] [place] [address] have had a procedure to implant a port. A port is a device placed, in most cases, under the skin of your chest below your collarbone. It is made of plastic, stainless steel, or titanium. It's about the size of a quarter, but thicker. It looks like a small bump under your skin. A thin, flexible tube called a catheter runs under the skin from the port into a large vein. With the port, you will be able to get medicines (such as chemotherapy) with more comfort. You also can get blood, nutrients, or other fluids. Blood can be taken through the port for tests. You will probably have some discomfort and bruising at the port site. This will go away in a few days. The port can be used right away. You may have the port for weeks, months, or longer.   Your port will need to be flushed out regularly to keep it open. A nurse or other health professional will do this for you. This care sheet gives you a general idea about how long it will take for you to recover. But each person recovers at a different pace. Follow the steps below to feel better as quickly as possible. How can you care for yourself at home? Activity    · Avoid arm and upper body movements that may pull on the catheter for the first few days. These movements include heavy weight lifting and vigorous use of your arms.     · You will probably need to take 1 day off from work and will be able to return to normal activities shortly after. This depends on the type of work you do, why you have the catheter, and how you feel.     · You probably will be able to take baths and swim. But you may need to avoid some activities. Talk to your doctor about any limits on your activity.     · Ask your doctor when you can drive again. Be careful when you pull your seat belt across your chest so it doesn't pull out the catheter. It's okay if the seat belt lays over the catheter. Medicines    · Your doctor will tell you if and when you can restart your medicines. He or she will also give you instructions about taking any new medicines.     · If you take aspirin or some other blood thinner, ask your doctor if and when to start taking it again. Make sure that you understand exactly what your doctor wants you to do.     · Be safe with medicines. Read and follow all instructions on the label. ? If the doctor gave you a prescription medicine for pain, take it as prescribed. ? If you are not taking a prescription pain medicine, ask your doctor if you can take an over-the-counter medicine. Incision care    · If you have a bandage, your doctor will tell you when you can remove it. After you remove the bandage, you may shower. Wash the area with soap and water and pat it dry. Don't use hydrogen peroxide or alcohol, which can slow healing.  You may cover the area with a gauze bandage if it weeps or rubs against clothing. Change the bandage every day.     · If you have strips of tape on the cut (incision) the doctor made, leave the tape on for a week or until it falls off. Other instructions    · Always carry the medical alert card that your doctor gives you. It contains information about your port. It will tell health care workers that you have a port in case you need emergency care.     · When you get dressed, be careful not to rub the port. Do not wear a bra or suspenders that irritate your skin near the port. Follow-up care is a key part of your treatment and safety. Be sure to make and go to all appointments, and call your doctor if you are having problems. It's also a good idea to know your test results and keep a list of the medicines you take. When should you call for help? Call your doctor now or seek immediate medical care if:    · You have signs of infection, such as:  ? Increased pain, swelling, warmth, or redness. ? Red streaks leading from the area. ? Pus draining from the area. ? A fever.     · You have pain or swelling in your neck or arm.     · You have trouble breathing. Watch closely for changes in your health, and be sure to contact your doctor if:    · You have any problems with your line or port. Where can you learn more? Go to http://www.gray.com/  Enter M256 in the search box to learn more about \"Implanted Port: What to Expect at Home. \"  Current as of: June 26, 2019               Content Version: 12.6  © 5115-3782 [employer]. Care instructions adapted under license by [employer] (which disclaims liability or warranty for this information). If you have questions about a medical condition or this instruction, always ask your healthcare professional. [address] any warranty or liability for your use of this information.

## 2020-12-12 ENCOUNTER — HOSPITAL ENCOUNTER (OUTPATIENT)
Dept: PET IMAGING | Age: 75
Discharge: HOME | End: 2020-12-12
Attending: INTERNAL MEDICINE
Payer: MEDICARE

## 2020-12-12 PROCEDURE — A9552 F18 FDG: HCPCS

## 2020-12-14 ENCOUNTER — HOSPITAL ENCOUNTER (OUTPATIENT)
Dept: RADIATION THERAPY | Age: 75
Discharge: HOME | End: 2020-12-14
Payer: MEDICARE

## 2020-12-14 PROCEDURE — 77338 DESIGN MLC DEVICE FOR IMRT: CPT

## 2020-12-14 PROCEDURE — 77301 RADIOTHERAPY DOSE PLAN IMRT: CPT

## 2020-12-14 PROCEDURE — 77300 RADIATION THERAPY DOSE PLAN: CPT

## 2020-12-16 ENCOUNTER — HOSPITAL ENCOUNTER (OUTPATIENT)
Dept: RADIATION THERAPY | Age: 75
Discharge: HOME | End: 2020-12-16
Payer: MEDICARE

## 2020-12-16 ENCOUNTER — HOSPITAL ENCOUNTER (OUTPATIENT)
Dept: LAB | Age: 75
Discharge: HOME | End: 2020-12-16

## 2020-12-16 PROCEDURE — 77386 HC IMRT TRMT DLVR COMPL: CPT

## 2020-12-17 ENCOUNTER — APPOINTMENT (OUTPATIENT)
Dept: GENERAL RADIOLOGY | Age: 75
DRG: 309 | End: 2020-12-17
Attending: EMERGENCY MEDICINE
Payer: MEDICARE

## 2020-12-17 ENCOUNTER — HOSPITAL ENCOUNTER (INPATIENT)
Age: 75
LOS: 4 days | Discharge: HOME | DRG: 309 | End: 2020-12-22
Attending: EMERGENCY MEDICINE | Admitting: INTERNAL MEDICINE
Payer: MEDICARE

## 2020-12-17 ENCOUNTER — APPOINTMENT (OUTPATIENT)
Dept: RADIATION THERAPY | Age: 75
End: 2020-12-17
Payer: MEDICARE

## 2020-12-17 DIAGNOSIS — I49.9 CARDIAC ARRHYTHMIA, UNSPECIFIED CARDIAC ARRHYTHMIA TYPE: Primary | ICD-10-CM

## 2020-12-17 PROBLEM — I47.1 PAROXYSMAL SVT (SUPRAVENTRICULAR TACHYCARDIA) (HCC): Status: ACTIVE | Noted: 2020-12-17

## 2020-12-17 LAB
ALBUMIN SERPL-MCNC: 2.3 G/DL (ref 3.5–5)
ALBUMIN/GLOB SERPL: 0.4 {RATIO} (ref 1.1–2.2)
ALP SERPL-CCNC: 86 U/L (ref 45–117)
ALT SERPL-CCNC: 74 U/L (ref 12–78)
ANION GAP SERPL CALC-SCNC: 7 MMOL/L (ref 5–15)
AST SERPL W P-5'-P-CCNC: 98 U/L (ref 15–37)
ATRIAL RATE: 84 BPM
BASOPHILS # BLD: 0 K/UL (ref 0–0.1)
BASOPHILS NFR BLD: 0 % (ref 0–1)
BILIRUB SERPL-MCNC: 0.7 MG/DL (ref 0.2–1)
BNP SERPL-MCNC: 837 PG/ML
BUN SERPL-MCNC: 12 MG/DL (ref 6–20)
BUN/CREAT SERPL: 26 (ref 12–20)
CA-I BLD-MCNC: 9.4 MG/DL (ref 8.5–10.1)
CALCULATED P AXIS, ECG09: 81 DEGREES
CALCULATED R AXIS, ECG10: 34 DEGREES
CALCULATED T AXIS, ECG11: 61 DEGREES
CHLORIDE SERPL-SCNC: 108 MMOL/L (ref 97–108)
CO2 SERPL-SCNC: 25 MMOL/L (ref 21–32)
CREAT SERPL-MCNC: 0.47 MG/DL (ref 0.7–1.3)
DIAGNOSIS, 93000: NORMAL
DIFFERENTIAL METHOD BLD: ABNORMAL
EOSINOPHIL # BLD: 0 K/UL (ref 0–0.4)
EOSINOPHIL NFR BLD: 0 % (ref 0–7)
ERYTHROCYTE [DISTWIDTH] IN BLOOD BY AUTOMATED COUNT: 13.5 % (ref 11.5–14.5)
GLOBULIN SER CALC-MCNC: 5.2 G/DL (ref 2–4)
GLUCOSE SERPL-MCNC: 103 MG/DL (ref 65–100)
HCT VFR BLD AUTO: 33.8 % (ref 36.6–50.3)
HGB BLD-MCNC: 11 G/DL (ref 12.1–17)
IMM GRANULOCYTES # BLD AUTO: 0 K/UL (ref 0–0.04)
IMM GRANULOCYTES NFR BLD AUTO: 0 % (ref 0–0.5)
LYMPHOCYTES # BLD: 1.6 K/UL (ref 0.8–3.5)
LYMPHOCYTES NFR BLD: 19 % (ref 12–49)
MAGNESIUM SERPL-MCNC: 1.8 MG/DL (ref 1.6–2.4)
MCH RBC QN AUTO: 32.3 PG (ref 26–34)
MCHC RBC AUTO-ENTMCNC: 32.5 G/DL (ref 30–36.5)
MCV RBC AUTO: 99.1 FL (ref 80–99)
MONOCYTES # BLD: 0.9 K/UL (ref 0–1)
MONOCYTES NFR BLD: 10 % (ref 5–13)
NEUTS SEG # BLD: 6.1 K/UL (ref 1.8–8)
NEUTS SEG NFR BLD: 71 % (ref 32–75)
P-R INTERVAL, ECG05: 128 MS
PLATELET # BLD AUTO: 186 K/UL (ref 150–400)
PMV BLD AUTO: 12.6 FL (ref 8.9–12.9)
POTASSIUM SERPL-SCNC: 3.7 MMOL/L (ref 3.5–5.1)
PROT SERPL-MCNC: 7.5 G/DL (ref 6.4–8.2)
Q-T INTERVAL, ECG07: 364 MS
QRS DURATION, ECG06: 80 MS
QTC CALCULATION (BEZET), ECG08: 430 MS
RBC # BLD AUTO: 3.41 M/UL (ref 4.1–5.7)
SODIUM SERPL-SCNC: 140 MMOL/L (ref 136–145)
TROPONIN I SERPL-MCNC: <0.05 NG/ML
VENTRICULAR RATE, ECG03: 84 BPM
WBC # BLD AUTO: 8.6 K/UL (ref 4.1–11.1)

## 2020-12-17 PROCEDURE — 99218 HC RM OBSERVATION: CPT

## 2020-12-17 PROCEDURE — 74011250637 HC RX REV CODE- 250/637: Performed by: INTERNAL MEDICINE

## 2020-12-17 PROCEDURE — 88360 TUMOR IMMUNOHISTOCHEM/MANUAL: CPT

## 2020-12-17 PROCEDURE — 84484 ASSAY OF TROPONIN QUANT: CPT

## 2020-12-17 PROCEDURE — 85025 COMPLETE CBC W/AUTO DIFF WBC: CPT

## 2020-12-17 PROCEDURE — 36415 COLL VENOUS BLD VENIPUNCTURE: CPT

## 2020-12-17 PROCEDURE — 80053 COMPREHEN METABOLIC PANEL: CPT

## 2020-12-17 PROCEDURE — 83880 ASSAY OF NATRIURETIC PEPTIDE: CPT

## 2020-12-17 PROCEDURE — 99284 EMERGENCY DEPT VISIT MOD MDM: CPT

## 2020-12-17 PROCEDURE — 71045 X-RAY EXAM CHEST 1 VIEW: CPT

## 2020-12-17 PROCEDURE — 83735 ASSAY OF MAGNESIUM: CPT

## 2020-12-17 PROCEDURE — 93005 ELECTROCARDIOGRAM TRACING: CPT

## 2020-12-17 RX ORDER — DILTIAZEM HYDROCHLORIDE 180 MG/1
180 CAPSULE, EXTENDED RELEASE ORAL DAILY
Status: CANCELLED | OUTPATIENT
Start: 2020-12-18

## 2020-12-17 RX ORDER — SODIUM CHLORIDE 0.9 % (FLUSH) 0.9 %
5-40 SYRINGE (ML) INJECTION AS NEEDED
Status: DISCONTINUED | OUTPATIENT
Start: 2020-12-17 | End: 2020-12-22 | Stop reason: HOSPADM

## 2020-12-17 RX ORDER — DILTIAZEM HCL/D5W 125 MG/125
5 PLASTIC BAG, INJECTION (ML) INTRAVENOUS
Status: DISCONTINUED | OUTPATIENT
Start: 2020-12-17 | End: 2020-12-18

## 2020-12-17 RX ORDER — POLYETHYLENE GLYCOL 3350 17 G/17G
17 POWDER, FOR SOLUTION ORAL DAILY PRN
Status: DISCONTINUED | OUTPATIENT
Start: 2020-12-17 | End: 2020-12-22 | Stop reason: HOSPADM

## 2020-12-17 RX ORDER — ACETAMINOPHEN 650 MG/1
650 SUPPOSITORY RECTAL
Status: DISCONTINUED | OUTPATIENT
Start: 2020-12-17 | End: 2020-12-22 | Stop reason: HOSPADM

## 2020-12-17 RX ORDER — ACETAMINOPHEN 325 MG/1
650 TABLET ORAL
Status: DISCONTINUED | OUTPATIENT
Start: 2020-12-17 | End: 2020-12-22 | Stop reason: HOSPADM

## 2020-12-17 RX ORDER — ONDANSETRON 2 MG/ML
4 INJECTION INTRAMUSCULAR; INTRAVENOUS
Status: DISCONTINUED | OUTPATIENT
Start: 2020-12-17 | End: 2020-12-22 | Stop reason: HOSPADM

## 2020-12-17 RX ORDER — ENOXAPARIN SODIUM 100 MG/ML
40 INJECTION SUBCUTANEOUS DAILY
Status: DISCONTINUED | OUTPATIENT
Start: 2020-12-18 | End: 2020-12-22 | Stop reason: HOSPADM

## 2020-12-17 RX ORDER — PROMETHAZINE HYDROCHLORIDE 25 MG/1
12.5 TABLET ORAL
Status: DISCONTINUED | OUTPATIENT
Start: 2020-12-17 | End: 2020-12-22 | Stop reason: HOSPADM

## 2020-12-17 RX ORDER — SODIUM CHLORIDE 0.9 % (FLUSH) 0.9 %
5-40 SYRINGE (ML) INJECTION EVERY 8 HOURS
Status: DISCONTINUED | OUTPATIENT
Start: 2020-12-17 | End: 2020-12-22 | Stop reason: HOSPADM

## 2020-12-17 RX ADMIN — ACETAMINOPHEN 650 MG: 325 TABLET, FILM COATED ORAL at 14:38

## 2020-12-17 RX ADMIN — Medication 10 ML: at 14:04

## 2020-12-17 RX ADMIN — Medication 10 ML: at 22:02

## 2020-12-17 NOTE — ED PROVIDER NOTES
EMERGENCY DEPARTMENT HISTORY AND PHYSICAL EXAM      Date: 12/17/2020  Patient Name: Jaya Blake    History of Presenting Illness     Chief Complaint   Patient presents with    Chest Pain       History Provided By: Patient    HPI: Jaya Blake, 76 y.o. male with a past medical history significant oral cancer presents to the ED with chief complaint of Chest Pain  . 51-year-old male with a history of oral cancer about to start radiation treatments follows that the oncology cancer Center at [facility].  Was initially seen in the [address] ER he was found to have palpitations. Heart rate in the 170s narrow complex tachycardia. He received a diltiazem bolus and then a drip. Heart rate did convert to the 80s he was normal sinus rhythm. Blood pressure is always normal.  Lab work unremarkable. Patient was transferred for further work-up. There are no other complaints, changes, or physical findings at this time. PCP: Vinicius Charles MD    Current Facility-Administered Medications   Medication Dose Route Frequency Provider Last Rate Last Admin    dilTIAZem (CARDIZEM) 125 mg/125 mL (1 mg/mL) dextrose 5% infusion  10 mg/hr IntraVENous TITRATE Tima Serrano MD         Current Outpatient Medications   Medication Sig Dispense Refill    traMADoL (ULTRAM) 50 mg tablet Take 50 mg by mouth every six (6) hours as needed for Pain.  dilTIAZem ER (DILACOR XR) 180 mg capsule Take 1 Cap by mouth daily. 30 Cap 1    famotidine (PEPCID) 20 mg tablet Take 1 Tab by mouth two (2) times a day. 30 Tab 1    folic acid (FOLVITE) 1 mg tablet Take 1 Tab by mouth daily. 30 Tab 1    thiamine mononitrate (B-1) 100 mg tablet Take 1 Tab by mouth daily.  30 Tab 1       Past History     Past Medical History:  Past Medical History:   Diagnosis Date    Anemia     Arthritis     Cancer [facility] [address]     esophageal    Dysphagia     Gastrointestinal disorder     History of radiation therapy     for prostate cancer  Hx of carcinoma in situ of prostate 2017    s/p radiation     Hypertension     pt states doesnt take medication. 1 year    Stroke [facility])     TIA 20 years ago    SVT [facility]supra[facility]ricular tachycardia) ([facility] [address]     Weight loss        Past Surgical History:  Past Surgical History:   Procedure Laterality Date    HX GASTROSTOMY      HX HEENT      lasik    HX HERNIA REPAIR      HX OTHER SURGICAL      PEG tube    IR DRAIN PROCEDURE W CATH  11/5/2020    IR INSERT TUNL CVC W PORT OVER 5 YEARS  12/8/2020    IR PLACE CVAD FLUORO GUIDE  12/8/2020       Family History:  History reviewed. No pertinent family history. Social History:  Social History     Tobacco Use    Smoking status: Current Every Day Smoker    Smokeless tobacco: Never Used    Tobacco comment: pt states he barely smokes 1 cig a day   Substance Use Topics    Alcohol use: Not Currently     Comment: pt stated he quit about 2-3 months ago    Drug use: Never       Allergies:  No Known Allergies      Review of Systems   Review of Systems   Constitutional: Negative. Negative for chills, fatigue and fever. HENT: Negative. Negative for congestion, ear pain, nosebleeds and sore throat. Eyes: Negative. Negative for pain, discharge and visual disturbance. Respiratory: Negative. Negative for cough, chest tightness and shortness of breath. Cardiovascular: Positive for palpitations. Negative for chest pain and leg swelling. Gastrointestinal: Negative. Negative for abdominal pain, blood in stool, constipation, diarrhea, nausea and vomiting. Endocrine: Negative. Genitourinary: Negative. Negative for difficulty urinating, dysuria and flank pain. Musculoskeletal: Negative. Negative for back pain and myalgias. Skin: Negative. Negative for rash and wound. Allergic/Immunologic: Negative. Neurological: Negative. Negative for dizziness, syncope, weakness, numbness and headaches. Hematological: Negative. Does not bruise/bleed easily. Psychiatric/Behavioral: Negative. Negative for agitation, confusion, hallucinations and suicidal ideas. All other systems reviewed and are negative. Physical Exam   Physical Exam  Vitals signs and nursing note reviewed. Constitutional:       General: He is not in acute distress. Appearance: He is normal weight. He is not ill-appearing. HENT:      Head: Normocephalic and atraumatic. Right Ear: External ear normal.      Left Ear: External ear normal.      Nose: Nose normal. No rhinorrhea. Mouth/Throat:      Mouth: Mucous membranes are moist.      Pharynx: Oropharynx is clear. Eyes:      Extraocular Movements: Extraocular movements intact. Conjunctiva/sclera: Conjunctivae normal.      Pupils: Pupils are equal, round, and reactive to light. Neck:      Musculoskeletal: Normal range of motion and neck supple. Cardiovascular:      Rate and Rhythm: Normal rate and regular rhythm. Pulses: Normal pulses. Heart sounds: Normal heart sounds. Pulmonary:      Effort: Pulmonary effort is normal. No respiratory distress. Breath sounds: Normal breath sounds. Abdominal:      General: Abdomen is flat. Bowel sounds are normal.      Palpations: Abdomen is soft. Musculoskeletal: Normal range of motion. General: No tenderness or deformity. Skin:     General: Skin is warm and dry. Capillary Refill: Capillary refill takes less than 2 seconds. Findings: No bruising, lesion or rash. Neurological:      General: No focal deficit present. Mental Status: He is alert and oriented to person, place, and time. Mental status is at baseline. Psychiatric:         Mood and Affect: Mood normal.         Behavior: Behavior normal.         Thought Content: Thought content normal.         Judgment: Judgment normal.         Diagnostic Study Results     Labs -   No results found for this or any previous visit (from the past 12 hour(s)).     Radiologic Studies -   XR CHEST SNGL V    (Results Pending)     CT Results  (Last 48 hours)    None        CXR Results  (Last 48 hours)    None          Medical Decision Making and ED Course   I am the first provider for this patient. I reviewed the vital signs, available nursing notes, past medical history, past surgical history, family history and social history. Vital Signs-Reviewed the patient's vital signs. No data found. EKG interpretation:         Records Reviewed: Previous Hospital chart. EMS run report      ED Course:   Initial assessment performed. The patients presenting problems have been discussed, and they are in agreement with the care plan formulated and outlined with them. I have encouraged them to ask questions as they arise throughout their visit. Orders Placed This Encounter    XR CHEST SNGL V     Standing Status:   Standing     Number of Occurrences:   1     Order Specific Question:   Transport     Answer:   BED [2]     Order Specific Question:   Reason for Exam     Answer:   sob    CBC WITH AUTOMATED DIFF     Standing Status:   Standing     Number of Occurrences:   1    METABOLIC PANEL, COMPREHENSIVE     Standing Status:   Standing     Number of Occurrences:   1    TROPONIN I     Standing Status:   Standing     Number of Occurrences:   1    BNP     Standing Status:   Standing     Number of Occurrences:   1    URINALYSIS W/ REFLEX CULTURE     Standing Status:   Standing     Number of Occurrences:   1    MAGNESIUM     Standing Status:   Standing     Number of Occurrences:   1    NOTIFY PROVIDER: SPECIFY Notify provider on pt's arrival to floor ONE TIME STAT     Standing Status:   Standing     Number of Occurrences:   1     Order Specific Question:   Please describe the test or procedure you would like to order. Answer:   Notify provider on pt's arrival to floor    dilTIAZem (CARDIZEM) 125 mg/125 mL (1 mg/mL) dextrose 5% infusion     Order Specific Question:   Titrate Infusion? Answer:    Yes Order Specific Question:   Initial Infusion Rate: Answer:   2.5 mg/hr     Order Specific Question:   Titrate Every 30 Minutes in Increments of     Answer:   2.5 mg/hr     Order Specific Question:   Goal of Therapy is: Answer:   HR  bpm     Order Specific Question:   Contact Physician for     Answer:   SBP less than 90 mmHg     Order Specific Question:   Contact Physician for     Answer:   Patient not achieving goal and is at max rate     Order Specific Question:   HOLD for     Answer:   HR less than 60 bpm    IP CONSULT TO HOSPITALIST     Standing Status:   Standing     Number of Occurrences:   1     Order Specific Question:   Reason for Consult: Answer:   TO ADMIT     Order Specific Question:   Did you call or speak to the consulting provider? Answer: Yes              CONSULTANTS:  Kaushik Bernard    Provider Notes (Medical Decision Making):   11year-old male presents with tachycardia improved with diltiazem presently still on a diltiazem drip. Differential includes dehydration, electrolyte abnormality. Thyroid disease. Versus conduction abnormality versus A. fib with RVR versus V. Tach. Plan for admission. Patient remains in normal sinus rhythm in the ER now. Procedures           CRITICAL CARE NOTE :  1:29 PM  Amount of Critical Care Time: 45 minutes    IMPENDING DETERIORATION -Airway, Respiratory and Cardiovascular  ASSOCIATED RISK FACTORS - Hypotension and Shock  MANAGEMENT- Bedside Assessment and Supervision of Care  INTERPRETATION -  Xrays and Blood Gases  INTERVENTIONS - hemodynamic mngmt  CASE REVIEW - Hospitalist, Medical Sub-Specialist and Nursing  TREATMENT RESPONSE -Improved  PERFORMED BY - Self    NOTES   :  I have spent critical care time involved in lab review, consultations with specialist, family decision- making, bedside attention and documentation. This time excludes time spent in any separate billed procedures.   During this entire length of time I was immediately available to the patient . Cirilo Flaherty MD                Disposition       Emergency Department Disposition:  Admitted      Diagnosis     Clinical Impression:   1. Cardiac arrhythmia, unspecified cardiac arrhythmia type        Attestations:    Cirilo Flaherty MD    Please note that this dictation was completed with [employer], the computer voice recognition software. Quite often unanticipated grammatical, syntax, homophones, and other interpretive errors are inadvertently transcribed by the computer software. Please disregard these errors. Please excuse any errors that have escaped final proofreading. Thank you.

## 2020-12-17 NOTE — Clinical Note
Patient Class[de-identified] OBSERVATION [104]   Type of Bed: Telemetry [19]   Reason for Observation: cardiac dysrrhy   Admitting Diagnosis: Paroxysmal SVT (supraventricular tachycardia) [facility]) [[id number]]   Admitting Physician: Larry Stafford [[id number]]   Attending Physician: Larry Stafford [[id number]]

## 2020-12-17 NOTE — ED TRIAGE NOTES
Transferred from [facility] ER, Afib admit. On diltiazem drip. Hx oral and throat ca.

## 2020-12-17 NOTE — H&P
History & Physical    Primary Care Provider: Florencia Tripp MD  Source of Information: Patient    History of Presenting Illness:   Camille Edmonds is a 76 y.o. male with history of stage IV esophageal carcinoma about to begin radiation therapy. Transferred from [facility] emergency department for evaluation of supraventricular tachycardia. Patient with known history of paroxysmal supraventricular tachycardia on oral Cardizem presented to the facility with complaints of mid substernal chest pain. Noted with heart rate over 170 bpm.  Started on a Cardizem drip and sent over to [facility] for further evaluation. Currently in sinus rhythm on Cardizem drip. No chest pain or shortness of breath. No recent fevers. Will be admitted for overnight evaluation       Review of Systems:  A comprehensive review of systems was negative except for that written in the History of Present Illness. Past Medical History:   Diagnosis Date    Anemia     Arthritis     Cancer ([facility] [address]     esophageal    Dysphagia     Gastrointestinal disorder     History of radiation therapy     for prostate cancer    Hx of carcinoma in situ of prostate 2017    s/p radiation     Hypertension     pt states doesnt take medication. 1 year    Stroke [facility])     TIA 20 years ago    SVT (supraventricular tachycardia) ([facility] [address]     Weight loss       Past Surgical History:   Procedure Laterality Date    HX GASTROSTOMY      HX HEENT      lasik    HX HERNIA REPAIR      HX OTHER SURGICAL      PEG tube    IR DRAIN PROCEDURE W CATH  11/5/2020    IR INSERT TUNL CVC W PORT OVER 5 YEARS  12/8/2020    IR PLACE CVAD FLUORO GUIDE  12/8/2020     Prior to Admission medications    Medication Sig Start Date End Date Taking? Authorizing Provider   traMADoL (ULTRAM) 50 mg tablet Take 50 mg by mouth every six (6) hours as needed for Pain.     Provider, Historical   dilTIAZem ER (DILACOR XR) 180 mg capsule Take 1 Cap by mouth daily. 11/12/20   Reasoner Mela Salazar PA-C   famotidine (PEPCID) 20 mg tablet Take 1 Tab by mouth two (2) times a day. 11/11/20   Lavon Mela Salazar PA-C   folic acid (FOLVITE) 1 mg tablet Take 1 Tab by mouth daily. 11/12/20   Lavon Mela Salazar PA-C   thiamine mononitrate (B-1) 100 mg tablet Take 1 Tab by mouth daily. 11/12/20   Reasoner Mela Salazar PA-C     No Known Allergies     Family history: Essential HTN     SOCIAL HISTORY:  Patient resides:  Independently    Assisted Living    SNF    With family care x      Smoking history:   None    Former    Chronic x     Alcohol history:   None    Social x   Chronic      Ambulates:   Independently x   w/cane    w/walker    w/wc    CODE STATUS:  DNR    Full x   Other      Objective:     Physical Exam:     There were no vitals taken for this visit. General:  Alert, cooperative, no distress, appears stated age. Head:  Normocephalic, without obvious abnormality, atraumatic. Eyes:  Conjunctivae/corneas clear. PERRL, EOMs intact. Nose: Nares normal. Septum midline. Mucosa normal. No drainage or sinus tenderness. Throat: Lips, mucosa, and tongue normal. Teeth and gums normal.   Neck: Supple, symmetrical, trachea midline, no adenopathy, thyroid: no enlargement/tenderness/nodules, no carotid bruit and no JVD. Back:   Symmetric, no curvature. ROM normal. No CVA tenderness. Lungs:   Clear to auscultation bilaterally. Chest wall:  No tenderness or deformity. Heart:  Regular rate and rhythm, S1, S2 normal, no murmur, click, rub or gallop. Abdomen:   Soft, non-tender. Bowel sounds normal. No masses,  No organomegaly. Extremities: Extremities normal, atraumatic, no cyanosis or edema. Pulses: 2+ and symmetric all extremities. Skin: Skin color, texture, turgor normal. No rashes or lesions   Neurologic: CNII-XII intact. No motor or sensory deficits. EKG:  normal sinus rhythm.       Data Review:     Recent Days:  No results for input(s): WBC, HGB, HCT, PLT, HGBEXT, HCTEXT, PLTEXT in the last 72 hours. No results for input(s): NA, K, CL, CO2, GLU, BUN, CREA, CA, MG, PHOS, ALB, TBIL, TBILI, ALT, INR, INREXT in the last 72 hours. No lab exists for component: SGOT  No results for input(s): PH, PCO2, PO2, HCO3, FIO2 in the last 72 hours. 24 Hour Results:  No results found for this or any previous visit (from the past 24 hour(s)).       Imaging:   XR CHEST SNGL V   Final Result          Assessment:     Paroxysmal supraventricular tachycardia  Stage IV esophageal cancer  Moderate protein calorie malnutrition  Prior history of tobacco use    Plan:     Admit to telemetry floor  Continue Cardizem at 5 mg/h  Goal will be to wean over once we begin oral Cardizem  Obtain cardiovascular evaluation  DVT GI prophylaxis  He is full code    Signed By: Alex Brown MD     December 17, 2020

## 2020-12-17 NOTE — DISCHARGE INSTRUCTIONS
Patient Education        Cardiac Arrhythmia: Care Instructions  Your Care Instructions     A cardiac arrhythmia is a change in the normal rhythm of the heart. Your heart may beat too fast or too slow or beat with an irregular or skipping rhythm. A change in the heart's rhythm may feel like a really strong heartbeat or a fluttering in your chest. A severe heart rhythm problem can keep the body from getting the blood it needs. This can result in shortness of breath, lightheadedness, and fainting. You may take medicine to treat your condition. Your doctor may recommend a pacemaker or recommend catheter ablation to destroy small parts of the heart that are causing a rhythm problem. Another possible treatment is an implantable cardioverter-defibrillator (ICD). An ICD is a device that gives the heart a shock to return the heart to a normal rhythm. Follow-up care is a key part of your treatment and safety. Be sure to make and go to all appointments, and call your doctor if you are having problems. It's also a good idea to know your test results and keep a list of the medicines you take. How can you care for yourself at home? General care    · Be safe with medicines. Take your medicines exactly as prescribed. Call your doctor if you think you are having a problem with your medicine. You will get more details on the specific medicines your doctor prescribes.     · If you received a pacemaker or an ICD, you will get a fact sheet about it.     · Wear medical alert jewelry that says you have an abnormal heart rhythm. You can buy this at most drugstores. Lifestyle changes    · Eat a heart-healthy diet.     · Stay at a healthy weight. Lose weight if you need to.     · Avoid nicotine, too much alcohol, and illegal drugs (meth, speed, and cocaine). Also, get enough sleep and do not overeat.     · Ask your doctor whether you can take over-the-counter medicines (such as decongestants).  These can make your heart beat fast.     · Talk to your doctor about any limits to activities, such as driving, or tasks where you use power tools or ladders. Activity    · Start light exercise if your doctor says you can. Even a small amount will help you get stronger, have more energy, and manage your stress.     · Get regular exercise. Try for 30 minutes on most days of the week. Ask your doctor what level of exercise is safe for you. If activity is not likely to cause health problems, you probably do not have limits on the type or level of activity that you can do. You may want to walk, swim, bike, or do other activities.     · When you exercise, watch for signs that your heart is working too hard. You are pushing too hard if you cannot talk while you exercise. If you become short of breath or dizzy or have chest pain, sit down and rest.     · Check your pulse daily. Place two fingers on the artery at the palm side of your wrist, in line with your thumb. If your heartbeat seems uneven, talk to your doctor. When should you call for help? Call 911 anytime you think you may need emergency care. For example, call if:    · You have symptoms of sudden heart failure. These may include:  ? Severe trouble breathing. ? A fast or irregular heartbeat. ? Coughing up pink, foamy mucus. ? You passed out.     · You have signs of a stroke. These include:  ? Sudden numbness, paralysis, or weakness in your face, arm, or leg, especially on only one side of your body. ? New problems with walking or balance. ? Sudden vision changes. ? Drooling or slurred speech. ? New problems speaking or understanding simple statements, or feeling confused. ? A sudden, severe headache that is different from past headaches. Call your doctor now or seek immediate medical care if:    · You have new or changed symptoms of heart failure, such as:  ? New or increased shortness of breath. ? New or worse swelling in your legs, ankles, or feet.   ? Sudden weight gain, such as more than 2 to 3 pounds in a day or 5 pounds in a week. (Your doctor may suggest a different range of weight gain.)  ? Feeling dizzy or lightheaded or like you may faint. ? Feeling so tired or weak that you cannot do your usual activities. ? Not sleeping well. Shortness of breath wakes you at night. You need extra pillows to prop yourself up to breathe easier. Watch closely for changes in your health, and be sure to contact your doctor if:    · You do not get better as expected. Where can you learn more? Go to http://www.gray.com/  Enter [id number] in the search box to learn more about \"Cardiac Arrhythmia: Care Instructions. \"  Current as of: December 16, 2019               Content Version: 12.6  © 8044-4204 [employer]. Care instructions adapted under license by [employer] (which disclaims liability or warranty for this information). If you have questions about a medical condition or this instruction, always ask your healthcare professional. [address] any warranty or liability for your use of this information.

## 2020-12-18 ENCOUNTER — APPOINTMENT (OUTPATIENT)
Dept: RADIATION THERAPY | Age: 75
End: 2020-12-18
Payer: MEDICARE

## 2020-12-18 LAB
ANION GAP SERPL CALC-SCNC: 6 MMOL/L (ref 5–15)
BUN SERPL-MCNC: 11 MG/DL (ref 6–20)
BUN/CREAT SERPL: 21 (ref 12–20)
CA-I BLD-MCNC: 9.5 MG/DL (ref 8.5–10.1)
CHLORIDE SERPL-SCNC: 108 MMOL/L (ref 97–108)
CO2 SERPL-SCNC: 26 MMOL/L (ref 21–32)
CREAT SERPL-MCNC: 0.53 MG/DL (ref 0.7–1.3)
ERYTHROCYTE [DISTWIDTH] IN BLOOD BY AUTOMATED COUNT: 13.5 % (ref 11.5–14.5)
GLUCOSE SERPL-MCNC: 106 MG/DL (ref 65–100)
HCT VFR BLD AUTO: 35.8 % (ref 36.6–50.3)
HGB BLD-MCNC: 11.6 G/DL (ref 12.1–17)
MCH RBC QN AUTO: 32.2 PG (ref 26–34)
MCHC RBC AUTO-ENTMCNC: 32.4 G/DL (ref 30–36.5)
MCV RBC AUTO: 99.4 FL (ref 80–99)
PLATELET # BLD AUTO: 214 K/UL (ref 150–400)
PMV BLD AUTO: 12.7 FL (ref 8.9–12.9)
POTASSIUM SERPL-SCNC: 3.8 MMOL/L (ref 3.5–5.1)
RBC # BLD AUTO: 3.6 M/UL (ref 4.1–5.7)
SODIUM SERPL-SCNC: 140 MMOL/L (ref 136–145)
WBC # BLD AUTO: 9 K/UL (ref 4.1–11.1)

## 2020-12-18 PROCEDURE — 74011250637 HC RX REV CODE- 250/637: Performed by: INTERNAL MEDICINE

## 2020-12-18 PROCEDURE — 99218 HC RM OBSERVATION: CPT

## 2020-12-18 PROCEDURE — 96366 THER/PROPH/DIAG IV INF ADDON: CPT

## 2020-12-18 PROCEDURE — 65270000029 HC RM PRIVATE

## 2020-12-18 PROCEDURE — 74011250636 HC RX REV CODE- 250/636: Performed by: INTERNAL MEDICINE

## 2020-12-18 PROCEDURE — 74011000250 HC RX REV CODE- 250: Performed by: EMERGENCY MEDICINE

## 2020-12-18 PROCEDURE — 96372 THER/PROPH/DIAG INJ SC/IM: CPT

## 2020-12-18 PROCEDURE — 85027 COMPLETE CBC AUTOMATED: CPT

## 2020-12-18 PROCEDURE — 80048 BASIC METABOLIC PNL TOTAL CA: CPT

## 2020-12-18 PROCEDURE — 74011000250 HC RX REV CODE- 250: Performed by: INTERNAL MEDICINE

## 2020-12-18 PROCEDURE — 96365 THER/PROPH/DIAG IV INF INIT: CPT

## 2020-12-18 PROCEDURE — 36415 COLL VENOUS BLD VENIPUNCTURE: CPT

## 2020-12-18 PROCEDURE — 74011250637 HC RX REV CODE- 250/637: Performed by: HOSPITALIST

## 2020-12-18 RX ORDER — DEXAMETHASONE 4 MG/1
TABLET ORAL EVERY 12 HOURS
COMMUNITY

## 2020-12-18 RX ORDER — METOPROLOL SUCCINATE 50 MG/1
TABLET, EXTENDED RELEASE ORAL DAILY
COMMUNITY
End: 2020-12-22

## 2020-12-18 RX ORDER — DILTIAZEM HCL/D5W 125 MG/125
5 PLASTIC BAG, INJECTION (ML) INTRAVENOUS CONTINUOUS
Status: DISCONTINUED | OUTPATIENT
Start: 2020-12-18 | End: 2020-12-19

## 2020-12-18 RX ORDER — ONDANSETRON 4 MG/1
4 TABLET, FILM COATED ORAL
COMMUNITY

## 2020-12-18 RX ORDER — BUTALBITAL, ACETAMINOPHEN AND CAFFEINE 50; 325; 40 MG/1; MG/1; MG/1
1 TABLET ORAL
Status: DISCONTINUED | OUTPATIENT
Start: 2020-12-18 | End: 2020-12-22 | Stop reason: HOSPADM

## 2020-12-18 RX ORDER — HYDROCODONE BITARTRATE AND ACETAMINOPHEN 5; 325 MG/1; MG/1
1 TABLET ORAL
COMMUNITY

## 2020-12-18 RX ORDER — METOPROLOL TARTRATE 25 MG/1
TABLET, FILM COATED ORAL 2 TIMES DAILY
COMMUNITY
End: 2021-01-03

## 2020-12-18 RX ADMIN — Medication 10 ML: at 23:27

## 2020-12-18 RX ADMIN — PROMETHAZINE HYDROCHLORIDE 12.5 MG: 25 TABLET ORAL at 21:30

## 2020-12-18 RX ADMIN — ENOXAPARIN SODIUM 40 MG: 40 INJECTION SUBCUTANEOUS at 10:07

## 2020-12-18 RX ADMIN — Medication 2.5 MG/HR: at 07:11

## 2020-12-18 RX ADMIN — Medication 10 ML: at 06:00

## 2020-12-18 RX ADMIN — Medication 2.5 MG/HR: at 14:08

## 2020-12-18 RX ADMIN — BUTALBITAL, ACETAMINOPHEN, AND CAFFEINE 1 TABLET: 50; 325; 40 TABLET ORAL at 21:29

## 2020-12-18 NOTE — PROGRESS NOTES
Comprehensive Nutrition Assessment    Type and Reason for Visit: Initial(Tube feed)    Nutrition Recommendations/Plan:   Advance to Full Liquids diet for pleasure     Initiate TF via PEG, bolus feeds of TwoCal 240ml bolus q3hrs from 8AM-8PM (x12hrs, 4 feeds/day)              Flush with 240mL H20 after each feed   Provides 1920kcals, 80g protein, 1632ml fluid (meets ~100% EENs, 95% pro needs, and 84% fluid needs  *Pt able to take PO full liquids as well, will likely make up difference in fluid needs.     Please add PRN vs scheduled daily bowel regimen, pt with hx constipation     Nursing to document TF rate, flushes, GRV, BMs, WEEKLY wts, and GI s/s     Nutrition Assessment:  Admitted for Afib, tachycardia. Work-up pending, ?d/c tomorrow per MD. Noted pt with new PEG, RD to leave TF recs similar to home regimen, noted pt also OK for liquids PO. Interviewed pt at bedside, stated he has not received TF since Wednesday d/t long wait in ED. RD discussed with RN and will be sending up TF at D, pt likely able to give himself bolus feeds. Labs: Cr 0.53, BG wnl, AST 98. Meds: Diltiazem, antiemetics, PRN miralax. Malnutrition Assessment:  Malnutrition Status:  Severe malnutrition    Context:  Chronic illness     Findings of the 6 clinical characteristics of malnutrition:   Energy Intake:  Mild decrease in energy intake (specify)(TF at goal x2 weeks)  Weight Loss:  7 - Greater than 5% over 1 month     Body Fat Loss:  (S) 7 - Severe body fat loss(From previous admit, needs new NFPE), Triceps   Muscle Mass Loss:  (S) 7 - Severe muscle mass loss(From previous admit, needs new NFPE), Calf (gastrocnemius)  Fluid Accumulation:  No significant fluid accumulation,      Estimated Daily Nutrient Needs:  Energy (kcal): 1950kcals (30kcals/kg); Weight Used for Energy Requirements: Current  Protein (g): 85g (1.3g/kg);  Weight Used for Protein Requirements: Current  Fluid (ml/day): 1950ml; Method Used for Fluid Requirements: 1 ml/kcal Needs for cancer (negative for mets) and wt gain    Nutrition Related Findings:  NFPE from last admit finding severe fat and muscle wasting, will repeat NFPE as appropriate. No edema at this time. Last BM Wednesday per pt, asking for prune juice and stool softener. +dyshagia, cleared for liquids per SLP at last admit nad pt stated he is tolerating well. PEG in place. Wounds:    None       Current Nutrition Therapies:  DIET CLEAR LIQUID  DIET TUBE FEEDING TwoCal HN TwoCal BOLUS  Current Tube Feeding (TF) Orders:   · Goal TF & Flush Orders Provides: Rec'd bolus feeds of TwoCal at goal of 240ml feeds q3h 8AM-8PM (x12hrs, 4 feeds/day), flush with 240ml fluid after each feed; providing 1920kcals, 80g protein, 1632ml fluid      Anthropometric Measures:  · Height:  6' 0.99\" (185.4 cm)  · Current Body Wt:  65 kg (143 lb 4.8 oz)(12/18)   · Admission Body Wt:  (none)    · Usual Body Wt:  79.4 kg (175 lb)(per pt in sept 2020)     · Ideal Body Wt:  184 lbs:  77.9 %   · BMI Category:  Underweight (BMI less than 22) age over 72    Wt hx: 65kg (12/18), 63kg (11/10), 64.5kg (11/05), 65.9kg (11/03), 65.4kg (11/02),  64.6kg (10/31), 64kg(10/26)  At last admit pt reported 35lb wt loss (15.9kg) x1-2 months (19.9%, severe). Wt stability at last admit, pt endorsed wt gain during interview however not yet noticeable per measured wts.     Nutrition Diagnosis:   · Inadequate oral intake related to swallowing difficulty(2/2 esophageal cancer with obstructing mass) as evidenced by nutrition support-enteral nutrition      Nutrition Interventions:   Food and/or Nutrient Delivery: Modify current diet, Start tube feeding(advance to full liquids, start TF)  Nutrition Education and Counseling: Education not indicated  Coordination of Nutrition Care: Continue to monitor while inpatient, Swallow evaluation    Goals:  Intake >75% EEN via PO and TF in > 5 days   BM every 1-2 days  wt gain +/-0.5kg/wk      Nutrition Monitoring and Evaluation: Behavioral-Environmental Outcomes: None identified  Food/Nutrient Intake Outcomes: Food and nutrient intake, Enteral nutrition intake/tolerance  Physical Signs/Symptoms Outcomes: Weight, Fluid status or edema, Nutrition focused physical findings, Chewing or swallowing, GI status    Discharge Planning:    Enteral nutrition     Electronically signed by Karey Sheppard RD on 12/18/2020 at 5:05 PM    Contact: Ext [phone]

## 2020-12-18 NOTE — ED NOTES
Rapid covid test obtained from [facility] with approval sticker, hand delivered to lab required information on label

## 2020-12-18 NOTE — PROGRESS NOTES
Hospitalist Progress Note         Gwendolyn Hernandez MD          Daily Progress Note: 12/18/2020      Subjective: The patient is seen for follow  up.  66-year-old gentleman admitted for paroxysmal supraventricular tachycardia. Denies chest pain or shortness of breath. No reported fevers or chills. Problem List:  Problem List as of 12/18/2020 Date Reviewed: 10/27/2020          Codes Class Noted - Resolved    Paroxysmal SVT (supraventricular tachycardia) (HCC) ICD-10-CM: I47.1  ICD-9-CM: 427.0  12/17/2020 - Present        Esophageal mass ICD-10-CM: K22.8  ICD-9-CM: 530.89  10/26/2020 - Present              Medications reviewed  Current Facility-Administered Medications   Medication Dose Route Frequency    dilTIAZem (CARDIZEM) 125 mg/125 mL (1 mg/mL) dextrose 5% infusion  5 mg/hr IntraVENous TITRATE    sodium chloride (NS) flush 5-40 mL  5-40 mL IntraVENous Q8H    sodium chloride (NS) flush 5-40 mL  5-40 mL IntraVENous PRN    acetaminophen (TYLENOL) tablet 650 mg  650 mg Oral Q6H PRN    Or    acetaminophen (TYLENOL) suppository 650 mg  650 mg Rectal Q6H PRN    polyethylene glycol (MIRALAX) packet 17 g  17 g Oral DAILY PRN    promethazine (PHENERGAN) tablet 12.5 mg  12.5 mg Oral Q6H PRN    Or    ondansetron (ZOFRAN) injection 4 mg  4 mg IntraVENous Q6H PRN    enoxaparin (LOVENOX) injection 40 mg  40 mg SubCUTAneous DAILY     Current Outpatient Medications   Medication Sig    traMADoL (ULTRAM) 50 mg tablet Take 50 mg by mouth every six (6) hours as needed for Pain.  dilTIAZem ER (DILACOR XR) 180 mg capsule Take 1 Cap by mouth daily.  famotidine (PEPCID) 20 mg tablet Take 1 Tab by mouth two (2) times a day.  folic acid (FOLVITE) 1 mg tablet Take 1 Tab by mouth daily.  thiamine mononitrate (B-1) 100 mg tablet Take 1 Tab by mouth daily. Review of Systems:   A comprehensive review of systems was negative except for that written in the HPI.     Objective: Physical Exam:     Visit Vitals  /60   Pulse (!) 102   Temp 99.1 °F (37.3 °C)   Resp 21   SpO2 100%      O2 Device: Room air    Temp (24hrs), Av.1 °F (37.3 °C), Min:99.1 °F (37.3 °C), Max:99.1 °F (37.3 °C)    No intake/output data recorded. No intake/output data recorded. General:  Alert, cooperative, no distress, appears stated age. Lungs:   Clear to auscultation bilaterally. Chest wall:  No tenderness or deformity. Heart:  Regular rate and rhythm, S1, S2 normal, no murmur, click, rub or gallop. Abdomen:   Soft, non-tender. Bowel sounds normal. No masses,  No organomegaly. Extremities: Extremities normal, atraumatic, no cyanosis or edema. Pulses: 2+ and symmetric all extremities. Skin: Skin color, texture, turgor normal. No rashes or lesions   Neurologic: CNII-XII intact. No gross sensory or motor deficits     Data Review:       Recent Days:  Recent Labs     20  0158 20  1500   WBC 9.0 8.6   HGB 11.6* 11.0*   HCT 35.8* 33.8*    186     Recent Labs     20  0158 20  1500    140   K 3.8 3.7    108   CO2 26 25   * 103*   BUN 11 12   CREA 0.53* 0.47*   CA 9.5 9.4   MG  --  1.8   ALB  --  2.3*   TBILI  --  0.7   ALT  --  74     No results for input(s): PH, PCO2, PO2, HCO3, FIO2 in the last 72 hours.     24 Hour Results:  Recent Results (from the past 24 hour(s))   EKG, 12 LEAD, INITIAL    Collection Time: 20  2:11 PM   Result Value Ref Range    Ventricular Rate 84 BPM    Atrial Rate 84 BPM    P-R Interval 128 ms    QRS Duration 80 ms    Q-T Interval 364 ms    QTC Calculation (Bezet) 430 ms    Calculated P Axis 81 degrees    Calculated R Axis 34 degrees    Calculated T Axis 61 degrees    Diagnosis       Sinus rhythm with Premature atrial complexes  Nonspecific ST abnormality  Abnormal ECG  No previous ECGs available  Confirmed by Roman Allen ([id number]) on 2020 3:55:24 PM     CBC WITH AUTOMATED DIFF    Collection Time: 20  3:00 PM   Result Value Ref Range    WBC 8.6 4.1 - 11.1 K/uL    RBC 3.41 (L) 4.10 - 5.70 M/uL    HGB 11.0 (L) 12.1 - 17.0 g/dL    HCT 33.8 (L) 36.6 - 50.3 %    MCV 99.1 (H) 80.0 - 99.0 FL    MCH 32.3 26.0 - 34.0 PG    MCHC 32.5 30.0 - 36.5 g/dL    RDW 13.5 11.5 - 14.5 %    PLATELET 939 344 - 262 K/uL    MPV 12.6 8.9 - 12.9 FL    NEUTROPHILS 71 32 - 75 %    LYMPHOCYTES 19 12 - 49 %    MONOCYTES 10 5 - 13 %    EOSINOPHILS 0 0 - 7 %    BASOPHILS 0 0 - 1 %    IMMATURE GRANULOCYTES 0 0.0 - 0.5 %    ABS. NEUTROPHILS 6.1 1.8 - 8.0 K/UL    ABS. LYMPHOCYTES 1.6 0.8 - 3.5 K/UL    ABS. MONOCYTES 0.9 0.0 - 1.0 K/UL    ABS. EOSINOPHILS 0.0 0.0 - 0.4 K/UL    ABS. BASOPHILS 0.0 0.0 - 0.1 K/UL    ABS. IMM. GRANS. 0.0 0.00 - 0.04 K/UL    DF AUTOMATED     METABOLIC PANEL, COMPREHENSIVE    Collection Time: 12/17/20  3:00 PM   Result Value Ref Range    Sodium 140 136 - 145 mmol/L    Potassium 3.7 3.5 - 5.1 mmol/L    Chloride 108 97 - 108 mmol/L    CO2 25 21 - 32 mmol/L    Anion gap 7 5 - 15 mmol/L    Glucose 103 (H) 65 - 100 mg/dL    BUN 12 6 - 20 mg/dL    Creatinine 0.47 (L) 0.70 - 1.30 mg/dL    BUN/Creatinine ratio 26 (H) 12 - 20      GFR est AA >60 >60 ml/min/1.73m2    GFR est non-AA >60 >60 ml/min/1.73m2    Calcium 9.4 8.5 - 10.1 mg/dL    Bilirubin, total 0.7 0.2 - 1.0 mg/dL    AST (SGOT) 98 (H) 15 - 37 U/L    ALT (SGPT) 74 12 - 78 U/L    Alk.  phosphatase 86 45 - 117 U/L    Protein, total 7.5 6.4 - 8.2 g/dL    Albumin 2.3 (L) 3.5 - 5.0 g/dL    Globulin 5.2 (H) 2.0 - 4.0 g/dL    A-G Ratio 0.4 (L) 1.1 - 2.2     TROPONIN I    Collection Time: 12/17/20  3:00 PM   Result Value Ref Range    Troponin-I, Qt. <0.05 <0.05 ng/mL   BNP    Collection Time: 12/17/20  3:00 PM   Result Value Ref Range    NT pro- (H) <450 pg/mL   MAGNESIUM    Collection Time: 12/17/20  3:00 PM   Result Value Ref Range    Magnesium 1.8 1.6 - 2.4 mg/dL   METABOLIC PANEL, BASIC    Collection Time: 12/18/20  1:58 AM   Result Value Ref Range    Sodium 140 136 - 145 mmol/L    Potassium 3.8 3.5 - 5.1 mmol/L    Chloride 108 97 - 108 mmol/L    CO2 26 21 - 32 mmol/L    Anion gap 6 5 - 15 mmol/L    Glucose 106 (H) 65 - 100 mg/dL    BUN 11 6 - 20 mg/dL    Creatinine 0.53 (L) 0.70 - 1.30 mg/dL    BUN/Creatinine ratio 21 (H) 12 - 20      GFR est AA >60 >60 ml/min/1.73m2    GFR est non-AA >60 >60 ml/min/1.73m2    Calcium 9.5 8.5 - 10.1 mg/dL   CBC W/O DIFF    Collection Time: 12/18/20  1:58 AM   Result Value Ref Range    WBC 9.0 4.1 - 11.1 K/uL    RBC 3.60 (L) 4.10 - 5.70 M/uL    HGB 11.6 (L) 12.1 - 17.0 g/dL    HCT 35.8 (L) 36.6 - 50.3 %    MCV 99.4 (H) 80.0 - 99.0 FL    MCH 32.2 26.0 - 34.0 PG    MCHC 32.4 30.0 - 36.5 g/dL    RDW 13.5 11.5 - 14.5 %    PLATELET 291 738 - 930 K/uL    MPV 12.7 8.9 - 12.9 FL           Assessment/     Paroxysmal supraventricular tachycardia  Stage IV esophageal cancer  Moderate protein calorie malnutrition  Prior history of tobacco use    Plan:    Continue Cardizem gtt. We'll wean off cardizem drip  in a.m. Anticipate discharge to home tomorrow  Care Plan discussed with: Patient/Family    Total time spent with patient: 30 minutes.     Hal Reyes MD

## 2020-12-18 NOTE — ED NOTES
TRANSFER - OUT REPORT:    Verbal report given to Vivi N Amalia Wright (name) on Kirstin Lam  being transferred to  (unit) for routine progression of care       Report consisted of patients Situation, Background, Assessment and   Recommendations(SBAR). Information from the following report(s) SBAR, ED Summary, [facility] and Recent Results was reviewed with the receiving nurse. Lines:   Venous Access Device Portacath 12/08/20 Upper chest (subclavicular area, right (Active)        Opportunity for questions and clarification was provided.       Patient transported with:   Monitor  Tech

## 2020-12-18 NOTE — CONSULTS
Consult    NAME: Brie Bustos   :     MRN:  [id number]     Date/Time:  2020 6:19 AM    Patient PCP: Francesco Shelby MD  ________________________________________________________________________     Assessment:   Primary cardiologist: [facility] Arlyn Garcia MD)    PROBLEM LIST:   1. Patient presents for evaluation of chest pain  2. Paroxysmal supraventricular tachycardia (PSVT) on admission  3. Grade II (moderate) diastolic dysfunction, with pseudonormalization  4. Previous transient ischemia attack (TIA)   5. Hypertension  6. Nicotine dependence  7. Chronic obstructive pulmonary disease (emphysema)  8. Recent hospitalization for dysphagia generalized weakness  9. 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 Status post percutaneous endoscopic gastrotomy (PEG) tube insertion    11. Status post esophageal dilation and stent placement  12. Status post cholecystectomy  13. Percutaneous drainage of large pneumoperitoneum  14. History of prostate adenocarcinoma (status post radiation therapy)  15. Anemia      [x]        High complexity decision making was performed        Subjective:   CHIEF COMPLAINT:     HISTORY OF PRESENT ILLNESS:     This 66-year-old -American male with no known coronary artery disease presents for evaluation of shortness of breath. The patient is well-known to me from prior hospitalizations, and office visits. He was seen in our office 1 month ago, and was without complaints. The patient was in his usual state of health until the day of admission. Recently began radiation therapy for stage IV esophageal varices. He was referred to the [facility] emergency department with chest discomfort. His evaluation revealed the patient to be in paroxysmal supraventricular tachycardia. He therefore is transferred to this facility.     In this emergency department the patient twelve-lead EKG, telemetry revealed the paroxysmal supraventricular tachycardia. Cardiac enzymes are unremarkable. He denies any chest pain, pressure or tightness. Further, he denies shortness of breath or dyspnea on exertion. Cardiology is consulted to assist in the evaluation and management. Past Medical History:   Diagnosis Date    Anemia     Arthritis     Cancer ([facility] [address]     esophageal    Dysphagia     Gastrointestinal disorder     History of radiation therapy     for prostate cancer    Hx of carcinoma in situ of prostate 2017    s/p radiation     Hypertension     pt states doesnt take medication. 1 year    Stroke [facility])     TIA 20 years ago    SVT (supraventricular tachycardia) ([facility] [address]     Weight loss       Past Surgical History:   Procedure Laterality Date    HX GASTROSTOMY      HX HEENT      lasik    HX HERNIA REPAIR      HX OTHER SURGICAL      PEG tube    IR DRAIN PROCEDURE W CATH  11/5/2020    IR INSERT TUNL CVC W PORT OVER 5 YEARS  12/8/2020    IR PLACE CVAD FLUORO GUIDE  12/8/2020     No Known Allergies   Meds:  See below  Social History     Tobacco Use    Smoking status: Current Every Day Smoker    Smokeless tobacco: Never Used    Tobacco comment: pt states he barely smokes 1 cig a day   Substance Use Topics    Alcohol use: Not Currently     Comment: pt stated he quit about 2-3 months ago      History reviewed. No pertinent family history.     REVIEW OF SYSTEMS:     []         Unable to obtain  ROS due to ---   [x]         Total of 12 systems reviewed as follows:    Constitutional: negative fever, negative chills, negative weight loss  Eyes:   negative visual changes  ENT:   negative sore throat, tongue or lip swelling  Respiratory:  negative cough, negative dyspnea  Cards:  negative for chest pain, palpitations, lower extremity edema  GI:   negative for nausea, vomiting, diarrhea, and abdominal pain  Genitourinary: negative for frequency, dysuria  Integument:  negative for rash   Hematologic:  negative for easy bruising and gum/nose bleeding  Musculoskel: negative for myalgias,  back pain  Neurological:  negative for headaches, dizziness, vertigo, weakness  Behavl/Psych: negative for feelings of anxiety, depression     Pertinent Positives include :    Objective:      Physical Exam:    Last 24hrs VS reviewed since prior progress note. Most recent are:    Visit Vitals  BP (!) 143/60   Pulse 90   Resp 20   SpO2 95%     No intake or output data in the 24 hours ending 12/18/20 0619     General Appearance: Well developed, well nourished, alert & oriented x 3, no acute distress. Ears/Nose/Mouth/Throat: Pupils equal and round, Hearing grossly normal.  Neck: Supple. JVP within normal limits. Carotids good upstrokes, with no bruit. Chest: Lungs clear to auscultation bilaterally. Cardiovascular: JVP is not elevated, PMI is not palpable, normal intensity S1 and S2, without S3  Abdomen: Soft, non-tender, bowel sounds are active. No organomegaly. Extremities: No edema bilaterally. Femoral pulses +2, Distal Pulses +1. Skin: Warm and dry. Neuro: CN II-XII grossly intact, Strength and sensation grossly intact. Data:      Telemetry:    EKG:  []  No new EKG for review  XR CHEST SNGL V   Final Result           Prior to Admission medications    Medication Sig Start Date End Date Taking? Authorizing Provider   traMADoL (ULTRAM) 50 mg tablet Take 50 mg by mouth every six (6) hours as needed for Pain. Provider, Historical   dilTIAZem ER (DILACOR XR) 180 mg capsule Take 1 Cap by mouth daily. 11/12/20   Reasoner, Merline Morton, PA-C   famotidine (PEPCID) 20 mg tablet Take 1 Tab by mouth two (2) times a day. 11/11/20   Reasoner, Merline Morton, PA-C   folic acid (FOLVITE) 1 mg tablet Take 1 Tab by mouth daily. 11/12/20   Reasoner, Merline Morton, PA-C   thiamine mononitrate (B-1) 100 mg tablet Take 1 Tab by mouth daily.  11/12/20   Reasoner, Merline Morton, PA-C       Recent Results (from the past 24 hour(s))   EKG, 12 LEAD, INITIAL    Collection Time: 12/17/20  2:11 PM   Result Value Ref Range    Ventricular Rate 84 BPM    Atrial Rate 84 BPM    P-R Interval 128 ms    QRS Duration 80 ms    Q-T Interval 364 ms    QTC Calculation (Bezet) 430 ms    Calculated P Axis 81 degrees    Calculated R Axis 34 degrees    Calculated T Axis 61 degrees    Diagnosis       Sinus rhythm with Premature atrial complexes  Nonspecific ST abnormality  Abnormal ECG  No previous ECGs available  Confirmed by Floresita Garza ([id number]) on 12/17/2020 3:55:24 PM     CBC WITH AUTOMATED DIFF    Collection Time: 12/17/20  3:00 PM   Result Value Ref Range    WBC 8.6 4.1 - 11.1 K/uL    RBC 3.41 (L) 4.10 - 5.70 M/uL    HGB 11.0 (L) 12.1 - 17.0 g/dL    HCT 33.8 (L) 36.6 - 50.3 %    MCV 99.1 (H) 80.0 - 99.0 FL    MCH 32.3 26.0 - 34.0 PG    MCHC 32.5 30.0 - 36.5 g/dL    RDW 13.5 11.5 - 14.5 %    PLATELET 634 613 - 225 K/uL    MPV 12.6 8.9 - 12.9 FL    NEUTROPHILS 71 32 - 75 %    LYMPHOCYTES 19 12 - 49 %    MONOCYTES 10 5 - 13 %    EOSINOPHILS 0 0 - 7 %    BASOPHILS 0 0 - 1 %    IMMATURE GRANULOCYTES 0 0.0 - 0.5 %    ABS. NEUTROPHILS 6.1 1.8 - 8.0 K/UL    ABS. LYMPHOCYTES 1.6 0.8 - 3.5 K/UL    ABS. MONOCYTES 0.9 0.0 - 1.0 K/UL    ABS. EOSINOPHILS 0.0 0.0 - 0.4 K/UL    ABS. BASOPHILS 0.0 0.0 - 0.1 K/UL    ABS. IMM. GRANS. 0.0 0.00 - 0.04 K/UL    DF AUTOMATED     METABOLIC PANEL, COMPREHENSIVE    Collection Time: 12/17/20  3:00 PM   Result Value Ref Range    Sodium 140 136 - 145 mmol/L    Potassium 3.7 3.5 - 5.1 mmol/L    Chloride 108 97 - 108 mmol/L    CO2 25 21 - 32 mmol/L    Anion gap 7 5 - 15 mmol/L    Glucose 103 (H) 65 - 100 mg/dL    BUN 12 6 - 20 mg/dL    Creatinine 0.47 (L) 0.70 - 1.30 mg/dL    BUN/Creatinine ratio 26 (H) 12 - 20      GFR est AA >60 >60 ml/min/1.73m2    GFR est non-AA >60 >60 ml/min/1.73m2    Calcium 9.4 8.5 - 10.1 mg/dL    Bilirubin, total 0.7 0.2 - 1.0 mg/dL    AST (SGOT) 98 (H) 15 - 37 U/L    ALT (SGPT) 74 12 - 78 U/L    Alk.  phosphatase 86 45 - 117 U/L    Protein, total 7.5 6.4 - 8.2 g/dL    Albumin 2.3 (L) 3.5 - 5.0 g/dL    Globulin 5.2 (H) 2.0 - 4.0 g/dL    A-G Ratio 0.4 (L) 1.1 - 2.2     TROPONIN I    Collection Time: 12/17/20  3:00 PM   Result Value Ref Range    Troponin-I, Qt. <0.05 <0.05 ng/mL   BNP    Collection Time: 12/17/20  3:00 PM   Result Value Ref Range    NT pro- (H) <450 pg/mL   MAGNESIUM    Collection Time: 12/17/20  3:00 PM   Result Value Ref Range    Magnesium 1.8 1.6 - 2.4 mg/dL   METABOLIC PANEL, BASIC    Collection Time: 12/18/20  1:58 AM   Result Value Ref Range    Sodium 140 136 - 145 mmol/L    Potassium 3.8 3.5 - 5.1 mmol/L    Chloride 108 97 - 108 mmol/L    CO2 26 21 - 32 mmol/L    Anion gap 6 5 - 15 mmol/L    Glucose 106 (H) 65 - 100 mg/dL    BUN 11 6 - 20 mg/dL    Creatinine 0.53 (L) 0.70 - 1.30 mg/dL    BUN/Creatinine ratio 21 (H) 12 - 20      GFR est AA >60 >60 ml/min/1.73m2    GFR est non-AA >60 >60 ml/min/1.73m2    Calcium 9.5 8.5 - 10.1 mg/dL   CBC W/O DIFF    Collection Time: 12/18/20  1:58 AM   Result Value Ref Range    WBC 9.0 4.1 - 11.1 K/uL    RBC 3.60 (L) 4.10 - 5.70 M/uL    HGB 11.6 (L) 12.1 - 17.0 g/dL    HCT 35.8 (L) 36.6 - 50.3 %    MCV 99.4 (H) 80.0 - 99.0 FL    MCH 32.2 26.0 - 34.0 PG    MCHC 32.4 30.0 - 36.5 g/dL    RDW 13.5 11.5 - 14.5 %    PLATELET 292 960 - 958 K/uL    MPV 12.7 8.9 - 12.9 FL           Plan:   1. Admit to telemetry monitoring  2. Conclude serial cardiac enzymes  3. Monitor serum electrolytes, renal function  4. Monitor hemoglobin/hematocrit  5. Monitor fluid balance, and daily weights    6.   Continue IV diltiazem for rate control       Monica Zuluaga MD

## 2020-12-18 NOTE — ED NOTES
Past Medical History:   Diagnosis Date    Anemia     Arthritis     Cancer ([facility] [address]     esophageal    Dysphagia     Gastrointestinal disorder     History of radiation therapy     for prostate cancer    Hx of carcinoma in situ of prostate 2017    s/p radiation     Hypertension     pt states doesnt take medication. 1 year    Stroke [facility])     TIA 20 years ago    SVT (supraventricular tachycardia) ([facility] [address]     Weight loss      Past Surgical History:   Procedure Laterality Date    HX GASTROSTOMY      HX HEENT      lasik    HX HERNIA REPAIR      HX OTHER SURGICAL      PEG tube    IR DRAIN PROCEDURE W CATH  11/5/2020    IR INSERT TUNL CVC W PORT OVER 5 YEARS  12/8/2020    IR PLACE CVAD FLUORO GUIDE  12/8/2020     Care assumed and bedside SBAR report endorsed on 76 y.o. male with history of stage IV esophageal carcinoma about to begin radiation therapy. Transferred from [facility] emergency department for evaluation of supraventricular tachycardia. Patient with known history of paroxysmal supraventricular tachycardia on oral Cardizem presented to the facility with complaints of mid substernal chest pain. Noted with heart rate over 170 bpm.  Started on a Cardizem drip and sent over to [facility] for further evaluation. Currently in sinus rhythm on Cardizem drip. No chest pain or shortness of breath. No recent fevers, denies chest pain or discomfort, alert and oriented x3, pain currently within manageable limits, IV patent, plan of care reinforced, bed in lowest position, side rails up x2, call bell within reach, will continue to monitor. 10:37 PM  OOB to bedside commode, no results. Denies SOB, VSS, back to bed without result.

## 2020-12-19 PROCEDURE — 74011250637 HC RX REV CODE- 250/637: Performed by: NURSE PRACTITIONER

## 2020-12-19 PROCEDURE — 74011250636 HC RX REV CODE- 250/636: Performed by: INTERNAL MEDICINE

## 2020-12-19 PROCEDURE — 74011250637 HC RX REV CODE- 250/637: Performed by: INTERNAL MEDICINE

## 2020-12-19 PROCEDURE — 65270000029 HC RM PRIVATE

## 2020-12-19 RX ORDER — ASPIRIN 325 MG/1
100 TABLET, FILM COATED ORAL DAILY
Status: DISCONTINUED | OUTPATIENT
Start: 2020-12-19 | End: 2020-12-22 | Stop reason: HOSPADM

## 2020-12-19 RX ORDER — DILTIAZEM HYDROCHLORIDE 30 MG/1
60 TABLET, FILM COATED ORAL
Status: DISCONTINUED | OUTPATIENT
Start: 2020-12-19 | End: 2020-12-21

## 2020-12-19 RX ORDER — FAMOTIDINE 20 MG/1
20 TABLET, FILM COATED ORAL 2 TIMES DAILY
Status: DISCONTINUED | OUTPATIENT
Start: 2020-12-19 | End: 2020-12-22 | Stop reason: HOSPADM

## 2020-12-19 RX ORDER — TRAMADOL HYDROCHLORIDE 50 MG/1
50 TABLET ORAL
Status: DISCONTINUED | OUTPATIENT
Start: 2020-12-19 | End: 2020-12-22 | Stop reason: HOSPADM

## 2020-12-19 RX ORDER — METOPROLOL SUCCINATE 25 MG/1
50 TABLET, EXTENDED RELEASE ORAL DAILY
Status: DISCONTINUED | OUTPATIENT
Start: 2020-12-19 | End: 2020-12-20

## 2020-12-19 RX ORDER — FOLIC ACID 1 MG/1
1 TABLET ORAL DAILY
Status: DISCONTINUED | OUTPATIENT
Start: 2020-12-19 | End: 2020-12-22 | Stop reason: HOSPADM

## 2020-12-19 RX ADMIN — TRAMADOL HYDROCHLORIDE 50 MG: 50 TABLET, FILM COATED ORAL at 23:16

## 2020-12-19 RX ADMIN — THIAMINE HCL TAB 100 MG 100 MG: 100 TAB at 11:00

## 2020-12-19 RX ADMIN — Medication 10 ML: at 14:00

## 2020-12-19 RX ADMIN — FOLIC ACID 1 MG: 1 TABLET ORAL at 11:00

## 2020-12-19 RX ADMIN — Medication 10 ML: at 05:41

## 2020-12-19 RX ADMIN — DILTIAZEM HYDROCHLORIDE 60 MG: 30 TABLET, FILM COATED ORAL at 11:00

## 2020-12-19 RX ADMIN — ENOXAPARIN SODIUM 40 MG: 40 INJECTION SUBCUTANEOUS at 08:54

## 2020-12-19 RX ADMIN — METOPROLOL SUCCINATE 50 MG: 25 TABLET, EXTENDED RELEASE ORAL at 11:00

## 2020-12-19 RX ADMIN — DILTIAZEM HYDROCHLORIDE 60 MG: 30 TABLET, FILM COATED ORAL at 15:07

## 2020-12-19 RX ADMIN — Medication 10 ML: at 23:05

## 2020-12-19 RX ADMIN — FAMOTIDINE 20 MG: 20 TABLET, FILM COATED ORAL at 23:02

## 2020-12-19 RX ADMIN — FAMOTIDINE 20 MG: 20 TABLET, FILM COATED ORAL at 11:00

## 2020-12-19 RX ADMIN — DILTIAZEM HYDROCHLORIDE 60 MG: 30 TABLET, FILM COATED ORAL at 23:03

## 2020-12-19 NOTE — PROGRESS NOTES
Bedside and Verbal shift change report given to Jyoti Lao RN (oncoming nurse) by Vernon Paris RN (offgoing nurse). Report included the following information SBAR, MAR, Recent Results and Cardiac Rhythm NSR.

## 2020-12-19 NOTE — PROGRESS NOTES
Progress Note      12/19/2020 6:59 AM  NAME: Micki Zaman   MRN:  [id number]   Admit Diagnosis: Paroxysmal SVT (supraventricular tachycardia) (HCC) [I47.1]  Paroxysmal SVT (supraventricular tachycardia) (HCC) [I47.1]      Problem List:   The patient presents for evaluation of chest pain  2. Paroxysmal supraventricular tachycardia (PSVT) on admission  3. Grade II (moderate) diastolic dysfunction, with pseudonormalization  4. Previous transient ischemia attack (TIA)   5. Hypertension  6. Nicotine dependence  7. Chronic obstructive pulmonary disease (emphysema)  8. Recent hospitalization for dysphagia generalized weakness  9. 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 Status post percutaneous endoscopic gastrotomy (PEG) tube insertion     11. Status post esophageal dilation and stent placement  12. Status post cholecystectomy  13. Percutaneous drainage of large pneumoperitoneum  14. History of prostate adenocarcinoma (status post radiation therapy)  15. Anemia       Subjective: The patient is seen and examined in room 473. There were no acute cardiovascular events reported overnight. The patient describes awareness of rapid heart rate approximately 2:00 this morning. A review of his telemetry reveals no abnormal events overnight. The patient remains on IV diltiazem with controlled ventricular rate and no paroxysmal supraventricular tachycardia. We will convert IV diltiazem to by mouth form. If patient remains stable consider discharge home within the next 24-48 hours.     Medications Personally Reviewed:    Current Facility-Administered Medications   Medication Dose Route Frequency    dilTIAZem (CARDIZEM) 125 mg/125 mL (1 mg/mL) dextrose 5% infusion  5 mg/hr IntraVENous CONTINUOUS    butalbital-acetaminophen-caffeine (FIORICET, ESGIC) -40 mg per tablet 1 Tab  1 Tab Oral Q6H PRN    sodium chloride (NS) flush 5-40 mL  5-40 mL IntraVENous Q8H    sodium chloride (NS) flush 5-40 mL  5-40 mL IntraVENous PRN    acetaminophen (TYLENOL) tablet 650 mg  650 mg Oral Q6H PRN    Or    acetaminophen (TYLENOL) suppository 650 mg  650 mg Rectal Q6H PRN    polyethylene glycol (MIRALAX) packet 17 g  17 g Oral DAILY PRN    promethazine (PHENERGAN) tablet 12.5 mg  12.5 mg Oral Q6H PRN    Or    ondansetron (ZOFRAN) injection 4 mg  4 mg IntraVENous Q6H PRN    enoxaparin (LOVENOX) injection 40 mg  40 mg SubCUTAneous DAILY           Objective:      Physical Exam:  Last 24hrs VS reviewed since prior progress note. Most recent are:    Visit Vitals  BP (!) 157/73   Pulse 86   Temp 98.2 °F (36.8 °C)   Resp 20   Ht 6' 0.99\" (1.854 m)   Wt 65 kg (143 lb 4.8 oz) Comment: RD obtained   SpO2 98%   BMI 18.91 kg/m²       Intake/Output Summary (Last 24 hours) at 12/19/2020 0659  Last data filed at 12/18/2020 1718  Gross per 24 hour   Intake    Output 150 ml   Net -150 ml        General Appearance: Well developed, well nourished, alert & oriented x 3, no acute distress. Chest: Lungs clear to auscultation bilaterally. Cardiovascular: JVP is not elevated, PMI is not displaced, normal intensity S1 and S2, without S3. Abdomen: Soft, non-tender, bowel sounds are active. Extremities: No edema bilaterally. Data Review    Telemetry: Sinus rhythm without ventricular ectopy    EKG:   []  No new EKG for review    Lab Data Personally Reviewed:    Recent Labs     12/18/20  0158 12/17/20  1500   WBC 9.0 8.6   HGB 11.6* 11.0*   HCT 35.8* 33.8*    186     No results for input(s): INR, PTP, APTT, INREXT in the last 72 hours.    Recent Labs     12/18/20  0158 12/17/20  1500    140   K 3.8 3.7    108   CO2 26 25   BUN 11 12   CREA 0.53* 0.47*   * 103*   CA 9.5 9.4   MG  --  1.8     Recent Labs     12/17/20  1500   TROIQ <0.05     No results found for: CHOL, CHOLX, CHLST, CHOLV, HDL, HDLP, LDL, LDLC, DLDLP, TGLX, TRIGL, TRIGP, CHHD, CHHDX    Recent Labs     12/17/20  1500 AP 86   TP 7.5   ALB 2.3*   GLOB 5.2*     No results for input(s): PH, PCO2, PO2 in the last 72 hours. Assessment/Plan:   1. Continue telemetry monitor  2. Continue to monitor serum electrolytes, and renal function  3. Continue current cardiovascular medications including enoxaparin, and metoprolol  4. Convert IV diltiazem to by mouth form  5.   Consider discharge home within the next 24-48 hours       Alaina Arenas MD

## 2020-12-20 LAB
BASOPHILS # BLD: 0 K/UL (ref 0–0.1)
BASOPHILS NFR BLD: 0 % (ref 0–1)
DIFFERENTIAL METHOD BLD: ABNORMAL
EOSINOPHIL # BLD: 0.1 K/UL (ref 0–0.4)
EOSINOPHIL NFR BLD: 1 % (ref 0–7)
ERYTHROCYTE [DISTWIDTH] IN BLOOD BY AUTOMATED COUNT: 13.2 % (ref 11.5–14.5)
HCT VFR BLD AUTO: 36.5 % (ref 36.6–50.3)
HGB BLD-MCNC: 11.9 G/DL (ref 12.1–17)
IMM GRANULOCYTES # BLD AUTO: 0 K/UL (ref 0–0.04)
IMM GRANULOCYTES NFR BLD AUTO: 0 % (ref 0–0.5)
LYMPHOCYTES # BLD: 1.6 K/UL (ref 0.8–3.5)
LYMPHOCYTES NFR BLD: 20 % (ref 12–49)
MCH RBC QN AUTO: 32.1 PG (ref 26–34)
MCHC RBC AUTO-ENTMCNC: 32.6 G/DL (ref 30–36.5)
MCV RBC AUTO: 98.4 FL (ref 80–99)
MONOCYTES # BLD: 0.9 K/UL (ref 0–1)
MONOCYTES NFR BLD: 11 % (ref 5–13)
NEUTS SEG # BLD: 5.7 K/UL (ref 1.8–8)
NEUTS SEG NFR BLD: 68 % (ref 32–75)
PLATELET # BLD AUTO: 219 K/UL (ref 150–400)
PMV BLD AUTO: 12.3 FL (ref 8.9–12.9)
RBC # BLD AUTO: 3.71 M/UL (ref 4.1–5.7)
WBC # BLD AUTO: 8.2 K/UL (ref 4.1–11.1)

## 2020-12-20 PROCEDURE — 74011250636 HC RX REV CODE- 250/636: Performed by: INTERNAL MEDICINE

## 2020-12-20 PROCEDURE — 74011250637 HC RX REV CODE- 250/637: Performed by: INTERNAL MEDICINE

## 2020-12-20 PROCEDURE — 36415 COLL VENOUS BLD VENIPUNCTURE: CPT

## 2020-12-20 PROCEDURE — 74011250637 HC RX REV CODE- 250/637: Performed by: NURSE PRACTITIONER

## 2020-12-20 PROCEDURE — 85025 COMPLETE CBC W/AUTO DIFF WBC: CPT

## 2020-12-20 PROCEDURE — 94760 N-INVAS EAR/PLS OXIMETRY 1: CPT

## 2020-12-20 PROCEDURE — 65270000029 HC RM PRIVATE

## 2020-12-20 RX ORDER — METOPROLOL SUCCINATE 25 MG/1
100 TABLET, EXTENDED RELEASE ORAL DAILY
Status: DISCONTINUED | OUTPATIENT
Start: 2020-12-21 | End: 2020-12-22 | Stop reason: HOSPADM

## 2020-12-20 RX ADMIN — DILTIAZEM HYDROCHLORIDE 60 MG: 30 TABLET, FILM COATED ORAL at 09:14

## 2020-12-20 RX ADMIN — Medication 10 ML: at 05:02

## 2020-12-20 RX ADMIN — Medication 10 ML: at 18:23

## 2020-12-20 RX ADMIN — Medication 10 ML: at 20:58

## 2020-12-20 RX ADMIN — FOLIC ACID 1 MG: 1 TABLET ORAL at 09:14

## 2020-12-20 RX ADMIN — ENOXAPARIN SODIUM 40 MG: 40 INJECTION SUBCUTANEOUS at 09:14

## 2020-12-20 RX ADMIN — FAMOTIDINE 20 MG: 20 TABLET, FILM COATED ORAL at 09:14

## 2020-12-20 RX ADMIN — FAMOTIDINE 20 MG: 20 TABLET, FILM COATED ORAL at 21:00

## 2020-12-20 RX ADMIN — DILTIAZEM HYDROCHLORIDE 60 MG: 30 TABLET, FILM COATED ORAL at 11:53

## 2020-12-20 RX ADMIN — ACETAMINOPHEN 650 MG: 325 TABLET, FILM COATED ORAL at 20:57

## 2020-12-20 RX ADMIN — METOPROLOL SUCCINATE 50 MG: 25 TABLET, EXTENDED RELEASE ORAL at 09:14

## 2020-12-20 RX ADMIN — THIAMINE HCL TAB 100 MG 100 MG: 100 TAB at 09:14

## 2020-12-20 RX ADMIN — ACETAMINOPHEN 650 MG: 325 TABLET, FILM COATED ORAL at 00:35

## 2020-12-20 RX ADMIN — DILTIAZEM HYDROCHLORIDE 60 MG: 30 TABLET, FILM COATED ORAL at 15:40

## 2020-12-20 RX ADMIN — Medication 10 ML: at 05:00

## 2020-12-20 NOTE — PROGRESS NOTES
Progress Note      12/20/2020 6:31 AM  NAME: Sam Martin   MRN:  [id number]   Admit Diagnosis: Paroxysmal SVT (supraventricular tachycardia) (HCC) [I47.1]  Paroxysmal SVT (supraventricular tachycardia) (HCC) [I47.1]      Problem List:   1. The patient presents for evaluation of chest pain  2.  Paroxysmal supraventricular tachycardia (PSVT) on admission  3.  Grade II (moderate) diastolic dysfunction, with pseudonormalization  4.  Previous transient ischemia attack (TIA)   5.  Hypertension  6.  Nicotine dependence  7.  Chronic obstructive pulmonary disease (emphysema)  8.  Recent hospitalization for dysphagia generalized weakness  9.  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  Status post percutaneous endoscopic gastrotomy (PEG) tube insertion     11.  Status post esophageal dilation and stent placement  12.  Status post cholecystectomy  13.  Percutaneous drainage of large pneumoperitoneum  14.  History of prostate adenocarcinoma (status post radiation therapy)  15.  Anemia       Subjective: The patient is seen and examined in room 473. He denies any current cardiovascular complaints. Overnight, the patient had a fever. Concomitantly he also had an episode of paroxysmal supraventricular tachycardia.     Medications Personally Reviewed:    Current Facility-Administered Medications   Medication Dose Route Frequency    famotidine (PEPCID) tablet 20 mg  20 mg Oral BID    folic acid (FOLVITE) tablet 1 mg  1 mg Oral DAILY    thiamine mononitrate (B-1) tablet 100 mg  100 mg Oral DAILY    traMADoL (ULTRAM) tablet 50 mg  50 mg Oral Q6H PRN    dilTIAZem IR (CARDIZEM) tablet 60 mg  60 mg Oral TIDAC    metoprolol succinate (TOPROL-XL) XL tablet 50 mg  50 mg Oral DAILY    butalbital-acetaminophen-caffeine (FIORICET, ESGIC) -40 mg per tablet 1 Tab  1 Tab Oral Q6H PRN    sodium chloride (NS) flush 5-40 mL  5-40 mL IntraVENous Q8H    sodium chloride (NS) flush 5-40 mL 5-40 mL IntraVENous PRN    acetaminophen (TYLENOL) tablet 650 mg  650 mg Oral Q6H PRN    Or    acetaminophen (TYLENOL) suppository 650 mg  650 mg Rectal Q6H PRN    polyethylene glycol (MIRALAX) packet 17 g  17 g Oral DAILY PRN    promethazine (PHENERGAN) tablet 12.5 mg  12.5 mg Oral Q6H PRN    Or    ondansetron (ZOFRAN) injection 4 mg  4 mg IntraVENous Q6H PRN    enoxaparin (LOVENOX) injection 40 mg  40 mg SubCUTAneous DAILY           Objective:      Physical Exam:  Last 24hrs VS reviewed since prior progress note. Most recent are:    Visit Vitals  BP (!) 157/83   Pulse 80   Temp (!) 101.2 °F (38.4 °C)   Resp 20   Ht 6' 0.99\" (1.854 m)   Wt 65 kg (143 lb 4.8 oz) Comment: RD obtained   SpO2 100%   BMI 18.91 kg/m²       Intake/Output Summary (Last 24 hours) at 12/20/2020 0631  Last data filed at 12/20/2020 0537  Gross per 24 hour   Intake    Output 200 ml   Net -200 ml        General Appearance: Well developed, well nourished, alert & oriented x 3, no acute distress. Chest: Lungs clear to auscultation bilaterally. Cardiovascular: JVP is not elevated, PMI is not palpable, normal intensity S1 and S2, without S3. Abdomen: Soft, non-tender, bowel sounds are active. Extremities: No edema bilaterally. Data Review    Telemetry: Sinus rhythm with recurrent episodes of paroxysmal supraventricular tachycardia  EKG:   []  No new EKG for review    Lab Data Personally Reviewed:    Recent Labs     12/18/20  0158 12/17/20  1500   WBC 9.0 8.6   HGB 11.6* 11.0*   HCT 35.8* 33.8*    186     No results for input(s): INR, PTP, APTT, INREXT in the last 72 hours.    Recent Labs     12/18/20  0158 12/17/20  1500    140   K 3.8 3.7    108   CO2 26 25   BUN 11 12   CREA 0.53* 0.47*   * 103*   CA 9.5 9.4   MG  --  1.8     Recent Labs     12/17/20  1500   TROIQ <0.05     No results found for: CHOL, CHOLX, CHLST, CHOLV, HDL, HDLP, LDL, LDLC, DLDLP, TGLX, TRIGL, TRIGP, CHHD, CHHDX    Recent Labs 12/17/20  1500   AP 86   TP 7.5   ALB 2.3*   GLOB 5.2*     No results for input(s): PH, PCO2, PO2 in the last 72 hours. Assessment/Plan:   1. Continue telemetry monitoring  2. Continue to monitor serum electrolytes, and renal function  3.   Continue current cardiovascular medications including diltiazem, enoxaparin, and metoprolol (increase dose)       Atilio Milton MD

## 2020-12-20 NOTE — PROGRESS NOTES
Hospitalist Progress Note         WILMER Ferrari, FNP-C    Daily Progress Note: 12/20/2020      Subjective:   Subjective   Patient seen on f/u A&O laying in bed  Reports fever night  No acute distress noted    Review of Systems:   Review of Systems   Constitutional: Positive for fever. HENT: Negative. Eyes: Negative. Respiratory: Negative. Cardiovascular: Negative. Gastrointestinal: Negative. Genitourinary: Negative. Musculoskeletal: Negative. Skin: Negative. Neurological: Negative. Endo/Heme/Allergies: Negative. Psychiatric/Behavioral: Negative. Objective:   Objective      Vitals:  Patient Vitals for the past 12 hrs:   Temp Pulse Resp BP SpO2   12/20/20 1207 99.2 °F (37.3 °C) 89  (!) 140/80 98 %   12/20/20 0813 97.4 °F (36.3 °C) 77  (!) 156/91 100 %   12/20/20 0300 (!) 101.2 °F (38.4 °C) 80 20 (!) 157/83         Physical Exam:  Physical Exam  Vitals signs and nursing note reviewed. Constitutional:       Comments: Thin appearing   HENT:      Head: Normocephalic. Nose: Nose normal.      Mouth/Throat:      Mouth: Mucous membranes are moist.   Eyes:      Extraocular Movements: Extraocular movements intact. Neck:      Musculoskeletal: Normal range of motion. Cardiovascular:      Rate and Rhythm: Normal rate and regular rhythm. Pulses: Normal pulses. Heart sounds: Normal heart sounds. Pulmonary:      Effort: Pulmonary effort is normal.      Breath sounds: Normal breath sounds. Abdominal:      General: Bowel sounds are normal.      Palpations: Abdomen is soft. Comments: Peg tube present   Musculoskeletal: Normal range of motion. Skin:     General: Skin is warm and dry. Capillary Refill: Capillary refill takes less than 2 seconds. Neurological:      Mental Status: He is alert and oriented to person, place, and time.    Psychiatric:         Mood and Affect: Mood normal.         Behavior: Behavior normal.          Lab Results:  No results found for this or any previous visit (from the past 24 hour(s)).        Diagnostic Images:  CT Results  (Last 48 hours)    None          Current Medications:    Current Facility-Administered Medications:     [START ON 12/21/2020] metoprolol succinate (TOPROL-XL) XL tablet 100 mg, 100 mg, Oral, DAILY, Carmela Khoury MD    famotidine (PEPCID) tablet 20 mg, 20 mg, Oral, BID, Joaquín Calvin MD, 20 mg at 70/45/70 8489    folic acid (FOLVITE) tablet 1 mg, 1 mg, Oral, DAILY, Joaquín Calvin MD, 1 mg at 12/20/20 5796    thiamine mononitrate (B-1) tablet 100 mg, 100 mg, Oral, DAILY, Joaquín Calvin MD, 100 mg at 12/20/20 0914    traMADoL (ULTRAM) tablet 50 mg, 50 mg, Oral, Q6H PRN, Joaquín Calvin MD, 50 mg at 12/19/20 2316    dilTIAZem IR (CARDIZEM) tablet 60 mg, 60 mg, Oral, TIDAC, Carmela Khoury MD, 60 mg at 12/20/20 1153    butalbital-acetaminophen-caffeine (FIORICET, ESGIC) -40 mg per tablet 1 Tab, 1 Tab, Oral, Q6H PRN, Eder Bansal MD, 1 Tab at 12/18/20 2129    sodium chloride (NS) flush 5-40 mL, 5-40 mL, IntraVENous, Q8H, Joaquín Calvin MD, 10 mL at 12/20/20 0502    sodium chloride (NS) flush 5-40 mL, 5-40 mL, IntraVENous, PRN, Joaquín Calvin MD, 10 mL at 12/19/20 0541    acetaminophen (TYLENOL) tablet 650 mg, 650 mg, Oral, Q6H PRN, 650 mg at 12/20/20 0035 **OR** acetaminophen (TYLENOL) suppository 650 mg, 650 mg, Rectal, Q6H PRN, Joaquín Calvin MD    polyethylene glycol (MIRALAX) packet 17 g, 17 g, Oral, DAILY PRN, Joaquín Calvin MD    promethazine (PHENERGAN) tablet 12.5 mg, 12.5 mg, Oral, Q6H PRN, 12.5 mg at 12/18/20 2130 **OR** ondansetron (ZOFRAN) injection 4 mg, 4 mg, IntraVENous, Q6H PRN, Joaquín Calvin MD    enoxaparin (LOVENOX) injection 40 mg, 40 mg, SubCUTAneous, DAILY, Joaquín Calvin MD, 40 mg at 12/20/20 9665       ASSESSMENT:    Paroxysmal supraventricular tachycardia  -s/p tx with IV diltiazem  -currently SR on tele, went into SVT overnight w/ fever 12/20  -continue oral cardizem 60mg tid, continue metoprolol 100mg  -cardiology following      Stage IV esophageal cancer  -likely secondary to smoking hx  -clear liquid diet, w/ tube feeds  -outpatient radiation    Moderate protein calorie malnutrition  -continue jevity tube feeds    Hypertension  -metoprolol increased 100mg      PLAN:  continue oral cardizem 60mg tid  increase metoprolol 100mg  Continue tele monitoring  Tube feeds per dietary  CM dispo planning  AM labs pending  Anticipate discharge in am pending patient remains clinically stable    Full Code  Dvt Prophylaxis  GI Prophylaxis  Home medications reviewed and reconciled      Above treatment plan reviewed and discussed with patient in detail at bedside, all questions answered.     Care Plan discussed with: interdisciplinary team    Ellie Jolley NP

## 2020-12-20 NOTE — PROGRESS NOTES
Patient verbalized understanding of ways to prevent falling in the hospital,bed at lowest position,call bell within reach

## 2020-12-21 ENCOUNTER — HOSPITAL ENCOUNTER (OUTPATIENT)
Dept: RADIATION THERAPY | Age: 75
Discharge: HOME | End: 2020-12-21
Payer: MEDICARE

## 2020-12-21 ENCOUNTER — APPOINTMENT (OUTPATIENT)
Dept: GENERAL RADIOLOGY | Age: 75
DRG: 309 | End: 2020-12-21
Attending: NURSE PRACTITIONER
Payer: MEDICARE

## 2020-12-21 LAB
APPEARANCE UR: CLEAR
BACTERIA URNS QL MICRO: NEGATIVE /HPF
BILIRUB UR QL: NEGATIVE
COLOR UR: ABNORMAL
EPITH CASTS URNS QL MICRO: ABNORMAL /LPF
GLUCOSE UR STRIP.AUTO-MCNC: NEGATIVE MG/DL
HGB UR QL STRIP: NEGATIVE
HYALINE CASTS URNS QL MICRO: ABNORMAL /LPF (ref 0–5)
KETONES UR QL STRIP.AUTO: NEGATIVE MG/DL
LEUKOCYTE ESTERASE UR QL STRIP.AUTO: NEGATIVE
MUCOUS THREADS URNS QL MICRO: ABNORMAL /LPF
NITRITE UR QL STRIP.AUTO: NEGATIVE
PH UR STRIP: 6 [PH] (ref 5–8)
PROT UR STRIP-MCNC: NEGATIVE MG/DL
RBC #/AREA URNS HPF: ABNORMAL /HPF (ref 0–5)
SP GR UR REFRACTOMETRY: 1.01 (ref 1–1.03)
UROBILINOGEN UR QL STRIP.AUTO: 4 EU/DL (ref 0.1–1)
WBC URNS QL MICRO: ABNORMAL /HPF (ref 0–4)

## 2020-12-21 PROCEDURE — 77386 HC IMRT TRMT DLVR COMPL: CPT

## 2020-12-21 PROCEDURE — 74011250636 HC RX REV CODE- 250/636: Performed by: INTERNAL MEDICINE

## 2020-12-21 PROCEDURE — 71045 X-RAY EXAM CHEST 1 VIEW: CPT

## 2020-12-21 PROCEDURE — 81001 URINALYSIS AUTO W/SCOPE: CPT

## 2020-12-21 PROCEDURE — 65270000029 HC RM PRIVATE

## 2020-12-21 PROCEDURE — 74011250637 HC RX REV CODE- 250/637: Performed by: INTERNAL MEDICINE

## 2020-12-21 PROCEDURE — 87086 URINE CULTURE/COLONY COUNT: CPT

## 2020-12-21 PROCEDURE — 87040 BLOOD CULTURE FOR BACTERIA: CPT

## 2020-12-21 RX ORDER — DILTIAZEM HYDROCHLORIDE 180 MG/1
180 CAPSULE, EXTENDED RELEASE ORAL DAILY
Status: DISCONTINUED | OUTPATIENT
Start: 2020-12-21 | End: 2020-12-22

## 2020-12-21 RX ADMIN — ACETAMINOPHEN 650 MG: 325 TABLET, FILM COATED ORAL at 04:32

## 2020-12-21 RX ADMIN — FAMOTIDINE 20 MG: 20 TABLET, FILM COATED ORAL at 21:05

## 2020-12-21 RX ADMIN — Medication 10 ML: at 13:40

## 2020-12-21 RX ADMIN — Medication 10 ML: at 06:04

## 2020-12-21 RX ADMIN — Medication 10 ML: at 22:00

## 2020-12-21 RX ADMIN — THIAMINE HCL TAB 100 MG 100 MG: 100 TAB at 10:11

## 2020-12-21 RX ADMIN — FAMOTIDINE 20 MG: 20 TABLET, FILM COATED ORAL at 10:11

## 2020-12-21 RX ADMIN — ACETAMINOPHEN 650 MG: 325 TABLET, FILM COATED ORAL at 21:05

## 2020-12-21 RX ADMIN — ACETAMINOPHEN 650 MG: 325 TABLET, FILM COATED ORAL at 13:55

## 2020-12-21 RX ADMIN — DILTIAZEM HYDROCHLORIDE 180 MG: 180 CAPSULE, EXTENDED RELEASE ORAL at 10:11

## 2020-12-21 RX ADMIN — FOLIC ACID 1 MG: 1 TABLET ORAL at 10:11

## 2020-12-21 RX ADMIN — METOPROLOL SUCCINATE 100 MG: 25 TABLET, EXTENDED RELEASE ORAL at 10:11

## 2020-12-21 RX ADMIN — ENOXAPARIN SODIUM 40 MG: 40 INJECTION SUBCUTANEOUS at 10:11

## 2020-12-21 NOTE — PROGRESS NOTES
Patient verbalized understanding of safety and fall prevention. call bell with in reach . bed in lowest position.

## 2020-12-21 NOTE — PROGRESS NOTES
Progress Note      12/21/2020 8:32 AM  NAME: Edna Redman   MRN:  [id number]   Admit Diagnosis: Paroxysmal SVT (supraventricular tachycardia) (HCC) [I47.1]  Paroxysmal SVT (supraventricular tachycardia) (HCC) [I47.1]      Problem List:   1. The patient presents for evaluation of chest pain  2.  Paroxysmal supraventricular tachycardia (PSVT) on admission  3.  Grade II (moderate) diastolic dysfunction, with pseudonormalization  4.  Previous transient ischemia attack (TIA)   5.  Hypertension  6.  Nicotine dependence  7.  Chronic obstructive pulmonary disease (emphysema)  8.  Recent hospitalization for dysphagia generalized weakness  9.  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  Status post percutaneous endoscopic gastrotomy (PEG) tube insertion     11.  Status post esophageal dilation and stent placement  12.  Status post cholecystectomy  13.  Percutaneous drainage of large pneumoperitoneum  14.  History of prostate adenocarcinoma (status post radiation therapy)  15.  Anemia       Subjective: The patient is seen and examined in room 473. He denies any current cardiovascular complaints. Overnight he had a fever, accompanied by an episode of paroxysmal supraventricular tachycardia (PSVT).     Medications Personally Reviewed:    Current Facility-Administered Medications   Medication Dose Route Frequency    metoprolol succinate (TOPROL-XL) XL tablet 100 mg  100 mg Oral DAILY    famotidine (PEPCID) tablet 20 mg  20 mg Oral BID    folic acid (FOLVITE) tablet 1 mg  1 mg Oral DAILY    thiamine mononitrate (B-1) tablet 100 mg  100 mg Oral DAILY    traMADoL (ULTRAM) tablet 50 mg  50 mg Oral Q6H PRN    dilTIAZem IR (CARDIZEM) tablet 60 mg  60 mg Oral TIDAC    butalbital-acetaminophen-caffeine (FIORICET, ESGIC) -40 mg per tablet 1 Tab  1 Tab Oral Q6H PRN    sodium chloride (NS) flush 5-40 mL  5-40 mL IntraVENous Q8H    sodium chloride (NS) flush 5-40 mL  5-40 mL IntraVENous PRN    acetaminophen (TYLENOL) tablet 650 mg  650 mg Oral Q6H PRN    Or    acetaminophen (TYLENOL) suppository 650 mg  650 mg Rectal Q6H PRN    polyethylene glycol (MIRALAX) packet 17 g  17 g Oral DAILY PRN    promethazine (PHENERGAN) tablet 12.5 mg  12.5 mg Oral Q6H PRN    Or    ondansetron (ZOFRAN) injection 4 mg  4 mg IntraVENous Q6H PRN    enoxaparin (LOVENOX) injection 40 mg  40 mg SubCUTAneous DAILY           Objective:      Physical Exam:  Last 24hrs VS reviewed since prior progress note. Most recent are:    Visit Vitals  BP (!) 159/87 (BP 1 Location: Right arm, BP Patient Position: At rest;Supine; Head of bed elevated (Comment degrees))   Pulse 84   Temp 99.4 °F (37.4 °C)   Resp 18   Ht 6' 0.99\" (1.854 m)   Wt 65 kg (143 lb 4.8 oz) Comment: RD obtained   SpO2 99%   BMI 18.91 kg/m²       Intake/Output Summary (Last 24 hours) at 12/21/2020 4125  Last data filed at 12/21/2020 0440  Gross per 24 hour   Intake 1200 ml   Output 750 ml   Net 450 ml        General Appearance: Well developed, well nourished, alert & oriented x 3, no acute distress. Chest: Lungs clear to auscultation bilaterally. Cardiovascular: JVP is not elevated, PMI is not displaced, normal intensity S1 and S2, without S3. Abdomen: Soft, non-tender, bowel sounds are active. Extremities: No edema bilaterally. Data Review    Telemetry: Sinus rhythm with episode of paroxysmal supraventricular tachycardia (pSVT)    EKG:   []  No new EKG for review    Lab Data Personally Reviewed:    Recent Labs     12/20/20 2040   WBC 8.2   HGB 11.9*   HCT 36.5*        No results for input(s): INR, PTP, APTT, INREXT in the last 72 hours. No results for input(s): NA, K, CL, CO2, BUN, CREA, GLU, CA, MG in the last 72 hours. No results for input(s): CPK, CKNDX, TROIQ in the last 72 hours.     No lab exists for component: CPKMB  No results found for: CHOL, CHOLX, CHLST, CHOLV, HDL, HDLP, LDL, LDLC, DLDLP, TGLX, TRIGL, TRIGP, CHHD, CHHDX    No results for input(s): AP, TBIL, TP, ALB, GLOB, GGT, AML, LPSE in the last 72 hours. No lab exists for component: SGOT, GPT, AMYP, HLPSE  No results for input(s): PH, PCO2, PO2 in the last 72 hours. Assessment/Plan:   1. Continue telemetry monitoring  2. Continue to monitor serum electrolytes, and renal function  3.   Continue current cardiovascular medications including diltiazem, enoxaparin, and metoprolol        Monica Zuluaga MD

## 2020-12-21 NOTE — PROGRESS NOTES
Hospitalist Progress Note         Robb Severin, APRN, FNP-C    Daily Progress Note: 12/21/2020      Subjective:   Subjective   Patient seen on f/u A&O laying in bed  Reports fever night 100.7  No acute distress noted    Review of Systems:   Review of Systems   Constitutional: Positive for fever. HENT: Negative. Eyes: Negative. Respiratory: Negative. Cardiovascular: Negative. Gastrointestinal: Negative. Genitourinary: Negative. Musculoskeletal: Negative. Skin: Negative. Neurological: Negative. Endo/Heme/Allergies: Negative. Psychiatric/Behavioral: Negative. Objective:   Objective      Vitals:  Patient Vitals for the past 12 hrs:   Temp Pulse Resp BP SpO2   12/21/20 1104 98.5 °F (36.9 °C) 84 18 (!) 146/76 98 %   12/21/20 0809 99.4 °F (37.4 °C) 84 18 (!) 159/87 99 %   12/21/20 0309 (!) 100.7 °F (38.2 °C) 86 18 (!) 154/78 100 %        Physical Exam:  Physical Exam  Vitals signs and nursing note reviewed. Constitutional:       Comments: Thin appearing   HENT:      Head: Normocephalic. Nose: Nose normal.      Mouth/Throat:      Mouth: Mucous membranes are moist.   Eyes:      Extraocular Movements: Extraocular movements intact. Neck:      Musculoskeletal: Normal range of motion. Cardiovascular:      Rate and Rhythm: Normal rate and regular rhythm. Pulses: Normal pulses. Heart sounds: Normal heart sounds. Pulmonary:      Effort: Pulmonary effort is normal.      Breath sounds: Normal breath sounds. Abdominal:      General: Bowel sounds are normal.      Palpations: Abdomen is soft. Comments: Peg tube present   Musculoskeletal: Normal range of motion. Skin:     General: Skin is warm and dry. Capillary Refill: Capillary refill takes less than 2 seconds. Neurological:      Mental Status: He is alert and oriented to person, place, and time.    Psychiatric:         Mood and Affect: Mood normal.         Behavior: Behavior normal.          Lab Results:  Recent Results (from the past 24 hour(s))   CBC WITH AUTOMATED DIFF    Collection Time: 12/20/20  8:40 PM   Result Value Ref Range    WBC 8.2 4.1 - 11.1 K/uL    RBC 3.71 (L) 4.10 - 5.70 M/uL    HGB 11.9 (L) 12.1 - 17.0 g/dL    HCT 36.5 (L) 36.6 - 50.3 %    MCV 98.4 80.0 - 99.0 FL    MCH 32.1 26.0 - 34.0 PG    MCHC 32.6 30.0 - 36.5 g/dL    RDW 13.2 11.5 - 14.5 %    PLATELET 763 490 - 374 K/uL    MPV 12.3 8.9 - 12.9 FL    NEUTROPHILS 68 32 - 75 %    LYMPHOCYTES 20 12 - 49 %    MONOCYTES 11 5 - 13 %    EOSINOPHILS 1 0 - 7 %    BASOPHILS 0 0 - 1 %    IMMATURE GRANULOCYTES 0 0.0 - 0.5 %    ABS. NEUTROPHILS 5.7 1.8 - 8.0 K/UL    ABS. LYMPHOCYTES 1.6 0.8 - 3.5 K/UL    ABS. MONOCYTES 0.9 0.0 - 1.0 K/UL    ABS. EOSINOPHILS 0.1 0.0 - 0.4 K/UL    ABS. BASOPHILS 0.0 0.0 - 0.1 K/UL    ABS. IMM.  GRANS. 0.0 0.00 - 0.04 K/UL    DF AUTOMATED            Diagnostic Images:  CT Results  (Last 48 hours)    None          Current Medications:    Current Facility-Administered Medications:     dilTIAZem ER (DILACOR XR) capsule 180 mg, 180 mg, Oral, DAILY, Jacquie Khoury MD, 180 mg at 12/21/20 1011    metoprolol succinate (TOPROL-XL) XL tablet 100 mg, 100 mg, Oral, DAILY, Jacquie Khoury MD, 100 mg at 12/21/20 1011    famotidine (PEPCID) tablet 20 mg, 20 mg, Oral, BID, Geetha Molina MD, 20 mg at 80/24/73 6892    folic acid (FOLVITE) tablet 1 mg, 1 mg, Oral, DAILY, Geetha Molina MD, 1 mg at 12/21/20 1011    thiamine mononitrate (B-1) tablet 100 mg, 100 mg, Oral, DAILY, Geetha Molina MD, 100 mg at 12/21/20 1011    traMADoL (ULTRAM) tablet 50 mg, 50 mg, Oral, Q6H PRN, Geetha Molina MD, 50 mg at 12/19/20 2316    butalbital-acetaminophen-caffeine (FIORICET, ESGIC) -40 mg per tablet 1 Tab, 1 Tab, Oral, Q6H PRN, Ortiz Antonio MD, 1 Tab at 12/18/20 2129    sodium chloride (NS) flush 5-40 mL, 5-40 mL, IntraVENous, Q8H, Geetha Molina MD, 10 mL at 12/21/20 0604    sodium chloride (NS) flush 5-40 mL, 5-40 mL, IntraVENous, PRN, Lincoln Dasilva MD, 10 mL at 12/19/20 0541    acetaminophen (TYLENOL) tablet 650 mg, 650 mg, Oral, Q6H PRN, 650 mg at 12/21/20 0432 **OR** acetaminophen (TYLENOL) suppository 650 mg, 650 mg, Rectal, Q6H PRN, Lincoln Dasilva MD    polyethylene glycol (MIRALAX) packet 17 g, 17 g, Oral, DAILY PRN, Lincoln Dasilva MD    promethazine (PHENERGAN) tablet 12.5 mg, 12.5 mg, Oral, Q6H PRN, 12.5 mg at 12/18/20 2130 **OR** ondansetron (ZOFRAN) injection 4 mg, 4 mg, IntraVENous, Q6H PRN, Lincoln Dasilva MD    enoxaparin (LOVENOX) injection 40 mg, 40 mg, SubCUTAneous, DAILY, Lincoln Dasilva MD, 40 mg at 12/21/20 1011       ASSESSMENT:    Paroxysmal supraventricular tachycardia  -s/p tx with IV diltiazem  -currently SR on tele, went into SVT overnight w/ fever 12/21  -continue oral cardizem 60mg tid, continue metoprolol 100mg  -cardiology following      Stage IV esophageal cancer  -likely secondary to smoking hx  -maintain NPO status, continue w/ tube feeds  -outpatient radiation    Moderate protein calorie malnutrition  -continue jevity tube feeds    Hypertension  -continue metoprolol 100mg  -diltiazem 180mg daily started    New onset fever:  -obtain cxr, cbc, blood cxs, urine cx        PLAN:  continue oral cardizem 60mg tid  increase metoprolol 100mg  Continue tele monitoring  Tube feeds per dietary  CM dispo planning  AM labs pending  Anticipate discharge in am pending patient remains clinically stable    Full Code  Dvt Prophylaxis  GI Prophylaxis  Home medications reviewed and reconciled      Above treatment plan reviewed and discussed with patient in detail at bedside, all questions answered.     Care Plan discussed with: interdisciplinary team    Ricky Coffman NP

## 2020-12-22 ENCOUNTER — HOSPITAL ENCOUNTER (OUTPATIENT)
Dept: RADIATION THERAPY | Age: 75
Discharge: HOME | End: 2020-12-22
Payer: MEDICARE

## 2020-12-22 VITALS
DIASTOLIC BLOOD PRESSURE: 71 MMHG | RESPIRATION RATE: 18 BRPM | HEART RATE: 70 BPM | TEMPERATURE: 98.5 F | WEIGHT: 143.3 LBS | BODY MASS INDEX: 18.99 KG/M2 | HEIGHT: 73 IN | OXYGEN SATURATION: 100 % | SYSTOLIC BLOOD PRESSURE: 127 MMHG

## 2020-12-22 LAB
CRP SERPL-MCNC: 1.62 MG/DL (ref 0–0.6)
PROCALCITONIN SERPL-MCNC: <0.05 NG/ML

## 2020-12-22 PROCEDURE — 74011250637 HC RX REV CODE- 250/637: Performed by: INTERNAL MEDICINE

## 2020-12-22 PROCEDURE — 84145 PROCALCITONIN (PCT): CPT

## 2020-12-22 PROCEDURE — 86140 C-REACTIVE PROTEIN: CPT

## 2020-12-22 PROCEDURE — 36415 COLL VENOUS BLD VENIPUNCTURE: CPT

## 2020-12-22 PROCEDURE — 77386 HC IMRT TRMT DLVR COMPL: CPT

## 2020-12-22 PROCEDURE — 74011250636 HC RX REV CODE- 250/636: Performed by: INTERNAL MEDICINE

## 2020-12-22 RX ORDER — DILTIAZEM HYDROCHLORIDE 30 MG/1
60 TABLET, FILM COATED ORAL
Status: DISCONTINUED | OUTPATIENT
Start: 2020-12-22 | End: 2020-12-22 | Stop reason: HOSPADM

## 2020-12-22 RX ORDER — DILTIAZEM HYDROCHLORIDE 60 MG/1
60 TABLET, FILM COATED ORAL 3 TIMES DAILY
Qty: 90 TAB | Refills: 0 | Status: SHIPPED | OUTPATIENT
Start: 2020-12-22 | End: 2021-01-03

## 2020-12-22 RX ORDER — METOPROLOL SUCCINATE 100 MG/1
100 TABLET, EXTENDED RELEASE ORAL DAILY
Qty: 30 TAB | Refills: 0 | Status: SHIPPED | OUTPATIENT
Start: 2020-12-23 | End: 2021-01-22

## 2020-12-22 RX ADMIN — FAMOTIDINE 20 MG: 20 TABLET, FILM COATED ORAL at 09:41

## 2020-12-22 RX ADMIN — THIAMINE HCL TAB 100 MG 100 MG: 100 TAB at 09:41

## 2020-12-22 RX ADMIN — Medication 10 ML: at 06:00

## 2020-12-22 RX ADMIN — FOLIC ACID 1 MG: 1 TABLET ORAL at 09:41

## 2020-12-22 RX ADMIN — METOPROLOL SUCCINATE 100 MG: 25 TABLET, EXTENDED RELEASE ORAL at 09:41

## 2020-12-22 RX ADMIN — DILTIAZEM HYDROCHLORIDE 60 MG: 30 TABLET, FILM COATED ORAL at 12:20

## 2020-12-22 RX ADMIN — ENOXAPARIN SODIUM 40 MG: 40 INJECTION SUBCUTANEOUS at 09:41

## 2020-12-22 RX ADMIN — DILTIAZEM HYDROCHLORIDE 60 MG: 30 TABLET, FILM COATED ORAL at 09:41

## 2020-12-22 NOTE — PROGRESS NOTES
Per Onesimo Lacey NP, patient is to take metoprolol succinate 100 mg daily and stop taking metoprolol tartrate 25 mg BID. This was reflected on patient's discharge paperwork.

## 2020-12-22 NOTE — DISCHARGE SUMMARY
Hospitalist Discharge Summary     Patient ID:    Claudette Hassan  [id number]  76 y.o.  1945    Admit date: 12/17/2020    Discharge date : 12/22/2020    Chronic Diagnoses:    Problem List as of 12/22/2020 Date Reviewed: 10/27/2020          Codes Class Noted - Resolved    * (Principal) Paroxysmal SVT (supraventricular tachycardia) (HCC) ICD-10-CM: I47.1  ICD-9-CM: 427.0  12/17/2020 - Present        Esophageal mass ICD-10-CM: K22.8  ICD-9-CM: 530.89  10/26/2020 - Present          22    Final Diagnoses:   Principal Problem:    Paroxysmal SVT (supraventricular tachycardia) ([address] (12/17/2020)        Reason for Hospitalization:  Claudette Hassan is a 76 y.o. male with history of stage IV esophageal carcinoma about to begin radiation therapy. Transferred from [facility] emergency department for evaluation of supraventricular tachycardia. Patient with known history of paroxysmal supraventricular tachycardia on oral Cardizem presented to the facility with complaints of mid substernal chest pain. Noted with heart rate over 170 bpm.  Started on a Cardizem drip and sent over to [facility] for further evaluation. Currently in sinus rhythm on Cardizem drip. No chest pain or shortness of breath. No recent fevers. Will be admitted for overnight evaluation    Hospital Course:   Pt admitted for SVT, abrupt onset, was initially started on IV cardizem drip and transition to oral cardizem. Heart rate has been NSR, no further episodes of SVT. Pt to continue with current prescribed medications and follow up with cardiology in 2 weeks after discharge. Previous echocardiogram in November showing EF 45%, with grade I LVH diastolic dysfunction. Pt to follow up with oncology for scheduled outpatient radiation treatments for esophageal cancer.     Discharge Medications:   Current Discharge Medication List      START taking these medications    Details   dilTIAZem IR (CARDIZEM) 60 mg tablet Take 1 Tab by mouth three (3) times daily for 30 days. Indications: high blood pressure, paroxysmal supraventricular tachycardia  Qty: 90 Tab, Refills: 0         CONTINUE these medications which have CHANGED    Details   metoprolol succinate (TOPROL-XL) 100 mg tablet Take 1 Tab by mouth daily for 30 days. Indications: paroxysmal supraventricular tachycardia  Qty: 30 Tab, Refills: 0         CONTINUE these medications which have NOT CHANGED    Details   metoprolol tartrate (LOPRESSOR) 25 mg tablet Take  by mouth two (2) times a day. HYDROcodone-acetaminophen (NORCO) 5-325 mg per tablet Take 1 Tab by mouth every eight (8) hours as needed for Pain. dexAMETHasone (DECADRON) 4 mg tablet Take  by mouth every twelve (12) hours. Indications: Night before Chemo      traMADoL (ULTRAM) 50 mg tablet Take 50 mg by mouth every six (6) hours as needed for Pain.      famotidine (PEPCID) 20 mg tablet Take 1 Tab by mouth two (2) times a day. Qty: 30 Tab, Refills: 1      folic acid (FOLVITE) 1 mg tablet Take 1 Tab by mouth daily. Qty: 30 Tab, Refills: 1      thiamine mononitrate (B-1) 100 mg tablet Take 1 Tab by mouth daily. Qty: 30 Tab, Refills: 1      ondansetron hcl (Zofran) 4 mg tablet Take 4 mg by mouth every eight (8) hours as needed for Nausea or Vomiting. STOP taking these medications       dilTIAZem ER (DILACOR XR) 180 mg capsule Comments:   Reason for Stopping: Follow up Care:    1. Mruray Oneal MD in 1-2 weeks. Follow-up Information     Follow up With Specialties Details Why Ivet Ayala MD Cardiology, Internal Medicine In 1 month  [id number]  [phone]      Wendy Mcelroy MD Cardiology In 2 weeks  [phone]              Patient Follow Up Instructions:    Activity: Activity as tolerated  Diet:  PEG feeding     Condition at Discharge: Stable  __________________________________________________________________    Disposition  Home or Self Care  ____________________________________________________________________    Code Status:  Full Code  ___________________________________________________________________    Discharge Exam:  Patient seen and examined by me on discharge day. Physical Exam   Constitutional: He is oriented to person, place, and time. He appears well-developed. No distress. Neck: Normal range of motion. Neck supple. Cardiovascular: Normal rate, regular rhythm, normal heart sounds and intact distal pulses. Pulmonary/Chest: Effort normal.   Abdominal: Soft. Bowel sounds are normal.   PEG tube intact   Musculoskeletal: Normal range of motion. Neurological: He is alert and oriented to person, place, and time. Skin: Skin is warm and dry. Psychiatric: He has a normal mood and affect.  His behavior is normal.        CONSULTATIONS: Cardiology    Significant Diagnostic Studies:   Recent Results (from the past 24 hour(s))   URINALYSIS W/MICROSCOPIC    Collection Time: 12/21/20  4:40 PM   Result Value Ref Range    Color Yellow/Straw      Appearance Clear Clear      Specific gravity 1.011 1.003 - 1.030      pH (UA) 6.0 5.0 - 8.0      Protein Negative Negative mg/dL    Glucose Negative Negative mg/dL    Ketone Negative Negative mg/dL    Bilirubin Negative Negative      Blood Negative Negative      Urobilinogen 4.0 (H) 0.1 - 1.0 EU/dL    Nitrites Negative Negative      Leukocyte Esterase Negative Negative      WBC 0-4 0 - 4 /hpf    RBC 0-5 0 - 5 /hpf    Epithelial cells Few Few /lpf    Bacteria Negative Negative /hpf    Mucus Trace (A) Negative /lpf    Hyaline cast 0-2 0 - 5 /lpf   C REACTIVE PROTEIN, QT    Collection Time: 12/22/20  9:30 AM   Result Value Ref Range    C-Reactive protein 1.62 (H) 0.00 - 0.60 mg/dL   PROCALCITONIN    Collection Time: 12/22/20  9:30 AM   Result Value Ref Range    Procalcitonin <0.05 (H) 0 ng/mL     XR CHEST PORT   Final Result      XR CHEST SNGL V   Final Result          Discharge: time spent 35 minutes in discharge  Education and counseling.      Signed:  Elton Chowdary NP  12/22/2020  2:29 PM

## 2020-12-22 NOTE — PROGRESS NOTES
Bedside and Verbal shift change report given to Ellsworth Canavan, RN (oncoming nurse) by Marti Tanner RN (offgoing nurse). Report included the following information SBAR, MAR, Recent Results and Cardiac Rhythm - NSR.

## 2020-12-22 NOTE — PROGRESS NOTES
IRA plan: Home with family care  RUR score 16%    Spoke with patient at bedside about discharge planning. Per patient, he resides at home with girlfriend in an apartment with three steps to get into home. Patient denies any recent falls and reports that he has a walker to use for ambulation if needed. Patient reports that he is independent with ADLs and medication management. Patient reports that his girlfriend, Regulo Duque, assists with tube feedings at home. Patient reports that he has supplies and tube feeding at home at this time. CM discussed potential need for home health for nursing follow up, disease management, and/or therapy. Patient politely declines home health at this time. Patient to discharge home with family care; notified attending, David Jimenez NP.     PCP: Dr. Hargis Jeans  Patient reports that son provides transport to doctor appointments.

## 2020-12-22 NOTE — PROGRESS NOTES
Progress Note      12/22/2020 5:55 AM  NAME: Contreras Fry   MRN:  [id number]   Admit Diagnosis: Paroxysmal SVT (supraventricular tachycardia) (HCC) [I47.1]  Paroxysmal SVT (supraventricular tachycardia) (HCC) [I47.1]      Problem List:   1.  The patient presents for evaluation of chest pain  2.  Paroxysmal supraventricular tachycardia (PSVT) on admission  3.  Grade II (moderate) diastolic dysfunction, with pseudonormalization  4.  Previous transient ischemia attack (TIA)   5.  Hypertension  6.  Nicotine dependence  7.  Chronic obstructive pulmonary disease (emphysema)  8.  Recent hospitalization for dysphagia generalized weakness  9.  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  Status post percutaneous endoscopic gastrotomy (PEG) tube insertion     11.  Status post esophageal dilation and stent placement  12.  Status post cholecystectomy  13.  Percutaneous drainage of large pneumoperitoneum  14.  History of prostate adenocarcinoma (status post radiation therapy)  15.  Anemia       Subjective: The patient is seen and examined in room 473. There were no acute cardiovascular events reported overnight. Currently, he denies any cardiovascular complaints. The patient has benefited from diltiazem administration. In the past, the patient has had difficulty swallowing. His current diltiazem cannot be crushed. Will resume 3 times daily diltiazem which if necessary may be crushed and administered at home.     Medications Personally Reviewed:    Current Facility-Administered Medications   Medication Dose Route Frequency    dilTIAZem ER (DILACOR XR) capsule 180 mg  180 mg Oral DAILY    metoprolol succinate (TOPROL-XL) XL tablet 100 mg  100 mg Oral DAILY    famotidine (PEPCID) tablet 20 mg  20 mg Oral BID    folic acid (FOLVITE) tablet 1 mg  1 mg Oral DAILY    thiamine mononitrate (B-1) tablet 100 mg  100 mg Oral DAILY    traMADoL (ULTRAM) tablet 50 mg  50 mg Oral Q6H PRN    butalbital-acetaminophen-caffeine (FIORICET, ESGIC) -40 mg per tablet 1 Tab  1 Tab Oral Q6H PRN    sodium chloride (NS) flush 5-40 mL  5-40 mL IntraVENous Q8H    sodium chloride (NS) flush 5-40 mL  5-40 mL IntraVENous PRN    acetaminophen (TYLENOL) tablet 650 mg  650 mg Oral Q6H PRN    Or    acetaminophen (TYLENOL) suppository 650 mg  650 mg Rectal Q6H PRN    polyethylene glycol (MIRALAX) packet 17 g  17 g Oral DAILY PRN    promethazine (PHENERGAN) tablet 12.5 mg  12.5 mg Oral Q6H PRN    Or    ondansetron (ZOFRAN) injection 4 mg  4 mg IntraVENous Q6H PRN    enoxaparin (LOVENOX) injection 40 mg  40 mg SubCUTAneous DAILY           Objective:      Physical Exam:  Last 24hrs VS reviewed since prior progress note. Most recent are:    Visit Vitals  /73   Pulse 83   Temp 98.2 °F (36.8 °C)   Resp 18   Ht 6' 0.99\" (1.854 m)   Wt 65 kg (143 lb 4.8 oz) Comment: RD obtained   SpO2 98%   BMI 18.91 kg/m²       Intake/Output Summary (Last 24 hours) at 12/22/2020 0555  Last data filed at 12/21/2020 2000  Gross per 24 hour   Intake 1980 ml   Output    Net 1980 ml        General Appearance: Well developed, thin, in no acute respiratory distress. Chest: Lungs clear to auscultation bilaterally. Cardiovascular: JVP is not elevated, PMI is not displaced, normal intensity S1 and S2, without S3. Abdomen: Soft, non-tender, bowel sounds are active. Extremities: No edema bilaterally. Data Review    Telemetry: Sinus rhythm without ventricular ectopy    EKG:   [x]  No new EKG for review    Lab Data Personally Reviewed:    Recent Labs     12/20/20 2040   WBC 8.2   HGB 11.9*   HCT 36.5*        No results for input(s): INR, PTP, APTT, INREXT in the last 72 hours. No results for input(s): NA, K, CL, CO2, BUN, CREA, GLU, CA, MG in the last 72 hours. No results for input(s): CPK, CKNDX, TROIQ in the last 72 hours.     No lab exists for component: CPKMB  No results found for: CHOL, CHOLX, CHLST, CHOLV, HDL, HDLP, LDL, LDLC, DLDLP, TGLX, TRIGL, TRIGP, CHHD, CHHDX    No results for input(s): AP, TBIL, TP, ALB, GLOB, GGT, AML, LPSE in the last 72 hours. No lab exists for component: SGOT, GPT, AMYP, HLPSE  No results for input(s): PH, PCO2, PO2 in the last 72 hours. Assessment/Plan:   1. Continue telemetry monitoring while hospitalized  2. Continue to monitor serum electrolytes, and renal function  3. Continue current cardiovascular medications including diltiazem (3 times daily), enoxaparin, and metoprolol  4. Will sign off, but remain available as needed  5.   The patient is to follow-up in our office within 1 to 2 weeks after discharge     Vishal Gonzalez MD

## 2020-12-22 NOTE — PROGRESS NOTES
Comprehensive Nutrition Assessment    Type and Reason for Visit: Reassess(Interim)    Nutrition Recommendations/Plan:   Advance to Full Liquids diet for pleasure     Initiate TF via PEG, bolus feeds of TwoCal 240ml bolus q3hrs from 8AM-8PM (x12hrs, 4 feeds/day)              Flush with 240mL H20 after each feed   Provides 1920kcals, 80g protein, 1632ml fluid (meets ~100% EENs, 95% pro needs, and 84% fluid needs  *Pt able to take PO full liquids as well, will likely make up difference in fluid needs.      Please add PRN vs scheduled daily bowel regimen, pt with hx constipation     Nursing to document TF rate, flushes, GRV, BMs, WEEKLY wts, and GI s/s     Nutrition Assessment:  Admitted for Afib, tachycardia. Adjusted cardiac medications, cardiology signed off this AM. Pt with new PEG placed at last admit, TF orders started 12/18 per home regimen, on clear liquid diet at admit for pleasure. Today RD unable to assess pt PO intakes however pt endorsed no issues with TF, asking about receiving prune juice for constipation, RD discussed with NP to advance to full liquid diet per home regimen to Prune juice may be ordered. Labs: H/H 11.9/36.5, Cr 0.53, . Meds: Fioricet, PO Diltiazem, Pepcid, B9, antiemetics, B1, PRN miralax. Malnutrition Assessment:  Malnutrition Status:  Severe malnutrition    Context:  Chronic illness       Estimated Daily Nutrient Needs:  Energy (kcal): 1950kcals (30kcals/kg); Weight Used for Energy Requirements: Current  Protein (g): 85g (1.3g/kg); Weight Used for Protein Requirements: Current  Fluid (ml/day): 1950ml; Method Used for Fluid Requirements: 1 ml/kcal   Needs for cancer (negative for mets) and wt gain    Nutrition Related Findings:  NFPE from last admit finding severe fat and muscle wasting, will repeat NFPE as appropriate. No edema. +Constipation per pt, last BM 12/19 per pt but 12/20 per EMR.  No N/V. +dyshagia, cleared for liquids per SLP at last admit and pt stated he is tolerating well. PEG in place. Wounds:    None       Current Nutrition Therapies:  DIET TUBE FEEDING TwoCal HN TwoCal BOLUS  DIET CLEAR LIQUID  Current Tube Feeding (TF) Orders:   · Feeding Route: PEG  · Formula: TwoCal  · Schedule: Bolus    · Regimen: 240ml bolus 4x/day (8AM-8PM)  · Additives/Modulars:    · Water Flushes: 240ml after each feed  · Current TF & Flush Orders Provides: 1920kcals, 80g pro, and 1632ml fluid (meets 100% EENs, 95% protein, and 84% fluid needs--adequate)  · Goal TF & Flush Orders Provides: TF at goal      Anthropometric Measures:  · Height:  6' 0.99\" (185.4 cm)  · Current Body Wt:  65 kg (143 lb 4.8 oz)(12/18)   · Admission Body Wt:  (none)    · Usual Body Wt:  79.4 kg (175 lb)(per pt in sept 2020)     · Ideal Body Wt:  184 lbs:  77.9 %   · BMI Category:  Underweight (BMI less than 22) age over 72     Wt hx: 65kg (12/18), 63kg (11/10), 64.5kg (11/05), 65.9kg (11/03), 65.4kg (11/02),  64.6kg (10/31), 64kg(10/26)  At last admit pt reported 35lb wt loss (15.9kg) x1-2 months (19.9%, severe). Wt stability at last admit, pt endorsed wt gain during interview however not yet noticeable per measured wts.     Nutrition Diagnosis:   · Inadequate oral intake related to swallowing difficulty(2/2 esophageal cancer with obstructing mass) as evidenced by nutrition support-enteral nutrition      Nutrition Interventions:   Food and/or Nutrient Delivery: Modify current diet, Continue tube feeding(Advance to full liquid diet)  Nutrition Education and Counseling: No recommendations at this time  Coordination of Nutrition Care: Continue to monitor while inpatient    Goals:  Intake >75% EEN via PO and TF in > 5 days (progressing)  BM every 1-2 days (not progressing)  Wt gain +/-0.5kg/wk   Zenon Metcalf    Nutrition Monitoring and Evaluation:   Behavioral-Environmental Outcomes: None identified  Food/Nutrient Intake Outcomes: Food and nutrient intake, Enteral nutrition intake/tolerance, Diet advancement/tolerance  Physical Signs/Symptoms Outcomes: Weight, Chewing or swallowing, Nutrition focused physical findings, GI status    Discharge Planning:    Enteral nutrition     Electronically signed by Chino Dawson RD  on 12/22/2020 at 11:21 AM    Contact: Ext [phone]

## 2020-12-22 NOTE — PROGRESS NOTES
Discharge orders received. IV removed, catheter tip intact. Telemetry box removed and returned to the telemetry room. Discharge instructions discussed including discharge teaching, new medications, stopped medications, the next dose of each medication, follow-up appointments, when to call the provider, and when to call 911. Pt verbalized understanding. Pt wheeled off the floor at [phone].  Pt safe to discharge at this time per Armand Warner NP.

## 2020-12-23 ENCOUNTER — APPOINTMENT (OUTPATIENT)
Dept: RADIATION THERAPY | Age: 75
End: 2020-12-23
Payer: MEDICARE

## 2020-12-23 LAB
BACTERIA SPEC CULT: NORMAL
COLONY COUNT,CNT: NORMAL
COLONY COUNT,CNT: NORMAL
SPECIAL REQUESTS,SREQ: NORMAL

## 2020-12-28 ENCOUNTER — APPOINTMENT (OUTPATIENT)
Dept: RADIATION THERAPY | Age: 75
End: 2020-12-28
Payer: MEDICARE

## 2020-12-28 LAB
BACTERIA SPEC CULT: NORMAL
SPECIAL REQUESTS,SREQ: NORMAL

## 2020-12-29 ENCOUNTER — APPOINTMENT (OUTPATIENT)
Dept: GENERAL RADIOLOGY | Age: 75
DRG: 177 | End: 2020-12-29
Attending: STUDENT IN AN ORGANIZED HEALTH CARE EDUCATION/TRAINING PROGRAM
Payer: MEDICARE

## 2020-12-29 ENCOUNTER — HOSPITAL ENCOUNTER (INPATIENT)
Age: 75
LOS: 5 days | Discharge: HOME | DRG: 177 | End: 2021-01-03
Attending: STUDENT IN AN ORGANIZED HEALTH CARE EDUCATION/TRAINING PROGRAM | Admitting: FAMILY MEDICINE
Payer: MEDICARE

## 2020-12-29 ENCOUNTER — HOSPITAL ENCOUNTER (OUTPATIENT)
Dept: RADIATION THERAPY | Age: 75
Discharge: HOME | End: 2020-12-29
Payer: MEDICARE

## 2020-12-29 DIAGNOSIS — E83.42 HYPOMAGNESEMIA: ICD-10-CM

## 2020-12-29 DIAGNOSIS — U07.1 COVID-19 VIRUS INFECTION: ICD-10-CM

## 2020-12-29 DIAGNOSIS — I47.1 PAROXYSMAL SVT (SUPRAVENTRICULAR TACHYCARDIA) (HCC): ICD-10-CM

## 2020-12-29 DIAGNOSIS — K22.89 ESOPHAGEAL MASS: ICD-10-CM

## 2020-12-29 DIAGNOSIS — D69.6 THROMBOCYTOPENIA (HCC): ICD-10-CM

## 2020-12-29 DIAGNOSIS — I47.1 SVT (SUPRAVENTRICULAR TACHYCARDIA) (HCC): Primary | ICD-10-CM

## 2020-12-29 LAB
ALBUMIN SERPL-MCNC: 2.1 G/DL (ref 3.5–5)
ALBUMIN/GLOB SERPL: 0.4 {RATIO} (ref 1.1–2.2)
ALP SERPL-CCNC: 79 U/L (ref 45–117)
ALT SERPL-CCNC: 66 U/L (ref 12–78)
ANION GAP SERPL CALC-SCNC: 7 MMOL/L (ref 5–15)
AST SERPL W P-5'-P-CCNC: 133 U/L (ref 15–37)
BILIRUB SERPL-MCNC: 0.9 MG/DL (ref 0.2–1)
BUN SERPL-MCNC: 18 MG/DL (ref 6–20)
BUN/CREAT SERPL: 25 (ref 12–20)
CA-I BLD-MCNC: 8.9 MG/DL (ref 8.5–10.1)
CHLORIDE SERPL-SCNC: 109 MMOL/L (ref 97–108)
CO2 SERPL-SCNC: 21 MMOL/L (ref 21–32)
COVID-19 RAPID TEST, COVR: DETECTED
CREAT SERPL-MCNC: 0.73 MG/DL (ref 0.7–1.3)
GLOBULIN SER CALC-MCNC: 5.7 G/DL (ref 2–4)
GLUCOSE SERPL-MCNC: 122 MG/DL (ref 65–100)
LACTATE SERPL-SCNC: 1.8 MMOL/L (ref 0.4–2)
MAGNESIUM SERPL-MCNC: <0.3 MG/DL (ref 1.6–2.4)
PHOSPHATE SERPL-MCNC: <0.5 MG/DL (ref 2.6–4.7)
POTASSIUM SERPL-SCNC: 5.3 MMOL/L (ref 3.5–5.1)
PROT SERPL-MCNC: 7.8 G/DL (ref 6.4–8.2)
SARS-COV-2, COV2: NORMAL
SODIUM SERPL-SCNC: 137 MMOL/L (ref 136–145)
SPECIMEN SOURCE: ABNORMAL
TROPONIN I SERPL-MCNC: <0.05 NG/ML

## 2020-12-29 PROCEDURE — 36415 COLL VENOUS BLD VENIPUNCTURE: CPT

## 2020-12-29 PROCEDURE — 96374 THER/PROPH/DIAG INJ IV PUSH: CPT

## 2020-12-29 PROCEDURE — 84100 ASSAY OF PHOSPHORUS: CPT

## 2020-12-29 PROCEDURE — 84484 ASSAY OF TROPONIN QUANT: CPT

## 2020-12-29 PROCEDURE — 96365 THER/PROPH/DIAG IV INF INIT: CPT

## 2020-12-29 PROCEDURE — 74011250636 HC RX REV CODE- 250/636: Performed by: STUDENT IN AN ORGANIZED HEALTH CARE EDUCATION/TRAINING PROGRAM

## 2020-12-29 PROCEDURE — 74011250636 HC RX REV CODE- 250/636

## 2020-12-29 PROCEDURE — 87635 SARS-COV-2 COVID-19 AMP PRB: CPT

## 2020-12-29 PROCEDURE — 93005 ELECTROCARDIOGRAM TRACING: CPT

## 2020-12-29 PROCEDURE — 83605 ASSAY OF LACTIC ACID: CPT

## 2020-12-29 PROCEDURE — 83735 ASSAY OF MAGNESIUM: CPT

## 2020-12-29 PROCEDURE — 80053 COMPREHEN METABOLIC PANEL: CPT

## 2020-12-29 PROCEDURE — 96375 TX/PRO/DX INJ NEW DRUG ADDON: CPT

## 2020-12-29 PROCEDURE — 99285 EMERGENCY DEPT VISIT HI MDM: CPT

## 2020-12-29 PROCEDURE — 74011250637 HC RX REV CODE- 250/637: Performed by: STUDENT IN AN ORGANIZED HEALTH CARE EDUCATION/TRAINING PROGRAM

## 2020-12-29 PROCEDURE — 65270000029 HC RM PRIVATE

## 2020-12-29 PROCEDURE — 87804 INFLUENZA ASSAY W/OPTIC: CPT

## 2020-12-29 PROCEDURE — 74011000250 HC RX REV CODE- 250: Performed by: STUDENT IN AN ORGANIZED HEALTH CARE EDUCATION/TRAINING PROGRAM

## 2020-12-29 PROCEDURE — 71045 X-RAY EXAM CHEST 1 VIEW: CPT

## 2020-12-29 RX ORDER — FOLIC ACID 1 MG/1
1 TABLET ORAL DAILY
Status: DISCONTINUED | OUTPATIENT
Start: 2020-12-30 | End: 2021-01-03 | Stop reason: HOSPADM

## 2020-12-29 RX ORDER — TRAMADOL HYDROCHLORIDE 50 MG/1
50 TABLET ORAL
Status: DISCONTINUED | OUTPATIENT
Start: 2020-12-29 | End: 2021-01-03 | Stop reason: HOSPADM

## 2020-12-29 RX ORDER — ACETAMINOPHEN 500 MG
1000 TABLET ORAL ONCE
Status: COMPLETED | OUTPATIENT
Start: 2020-12-29 | End: 2020-12-29

## 2020-12-29 RX ORDER — DILTIAZEM HYDROCHLORIDE 5 MG/ML
15 INJECTION INTRAVENOUS
Status: COMPLETED | OUTPATIENT
Start: 2020-12-29 | End: 2020-12-29

## 2020-12-29 RX ORDER — ONDANSETRON 4 MG/1
4 TABLET, ORALLY DISINTEGRATING ORAL
Status: DISCONTINUED | OUTPATIENT
Start: 2020-12-29 | End: 2021-01-03 | Stop reason: HOSPADM

## 2020-12-29 RX ORDER — ONDANSETRON 4 MG/1
4 TABLET, FILM COATED ORAL
Status: DISCONTINUED | OUTPATIENT
Start: 2020-12-29 | End: 2021-01-03 | Stop reason: HOSPADM

## 2020-12-29 RX ORDER — SODIUM CHLORIDE 9 MG/ML
75 INJECTION, SOLUTION INTRAVENOUS CONTINUOUS
Status: DISCONTINUED | OUTPATIENT
Start: 2020-12-29 | End: 2020-12-31

## 2020-12-29 RX ORDER — ASPIRIN 325 MG/1
100 TABLET, FILM COATED ORAL DAILY
Status: DISCONTINUED | OUTPATIENT
Start: 2020-12-30 | End: 2021-01-03 | Stop reason: HOSPADM

## 2020-12-29 RX ORDER — ONDANSETRON 2 MG/ML
4 INJECTION INTRAMUSCULAR; INTRAVENOUS
Status: DISCONTINUED | OUTPATIENT
Start: 2020-12-29 | End: 2021-01-03 | Stop reason: HOSPADM

## 2020-12-29 RX ORDER — MAGNESIUM SULFATE HEPTAHYDRATE 40 MG/ML
2 INJECTION, SOLUTION INTRAVENOUS
Status: COMPLETED | OUTPATIENT
Start: 2020-12-29 | End: 2020-12-29

## 2020-12-29 RX ORDER — ENOXAPARIN SODIUM 100 MG/ML
40 INJECTION SUBCUTANEOUS DAILY
Status: DISCONTINUED | OUTPATIENT
Start: 2020-12-30 | End: 2021-01-03 | Stop reason: HOSPADM

## 2020-12-29 RX ORDER — DEXAMETHASONE 4 MG/1
4 TABLET ORAL EVERY 6 HOURS
Status: DISCONTINUED | OUTPATIENT
Start: 2020-12-30 | End: 2021-01-01

## 2020-12-29 RX ORDER — ACETAMINOPHEN 325 MG/1
650 TABLET ORAL
Status: DISCONTINUED | OUTPATIENT
Start: 2020-12-29 | End: 2021-01-03 | Stop reason: HOSPADM

## 2020-12-29 RX ORDER — METOPROLOL TARTRATE 5 MG/5ML
5 INJECTION INTRAVENOUS
Status: COMPLETED | OUTPATIENT
Start: 2020-12-29 | End: 2020-12-29

## 2020-12-29 RX ORDER — ADENOSINE 3 MG/ML
6 INJECTION, SOLUTION INTRAVENOUS
Status: COMPLETED | OUTPATIENT
Start: 2020-12-29 | End: 2020-12-29

## 2020-12-29 RX ORDER — ADENOSINE 3 MG/ML
INJECTION, SOLUTION INTRAVENOUS
Status: COMPLETED
Start: 2020-12-29 | End: 2020-12-29

## 2020-12-29 RX ORDER — DILTIAZEM HYDROCHLORIDE 30 MG/1
60 TABLET, FILM COATED ORAL 3 TIMES DAILY
Status: DISCONTINUED | OUTPATIENT
Start: 2020-12-30 | End: 2021-01-01

## 2020-12-29 RX ORDER — METOPROLOL TARTRATE 25 MG/1
25 TABLET, FILM COATED ORAL 2 TIMES DAILY
Status: DISCONTINUED | OUTPATIENT
Start: 2020-12-30 | End: 2020-12-29

## 2020-12-29 RX ORDER — METOPROLOL SUCCINATE 50 MG/1
100 TABLET, EXTENDED RELEASE ORAL DAILY
Status: DISCONTINUED | OUTPATIENT
Start: 2020-12-30 | End: 2021-01-03 | Stop reason: HOSPADM

## 2020-12-29 RX ORDER — FAMOTIDINE 20 MG/1
20 TABLET, FILM COATED ORAL 2 TIMES DAILY
Status: DISCONTINUED | OUTPATIENT
Start: 2020-12-30 | End: 2021-01-03 | Stop reason: HOSPADM

## 2020-12-29 RX ORDER — POLYETHYLENE GLYCOL 3350 17 G/17G
17 POWDER, FOR SOLUTION ORAL DAILY PRN
Status: DISCONTINUED | OUTPATIENT
Start: 2020-12-29 | End: 2021-01-03 | Stop reason: HOSPADM

## 2020-12-29 RX ORDER — ACETAMINOPHEN 650 MG/1
650 SUPPOSITORY RECTAL
Status: DISCONTINUED | OUTPATIENT
Start: 2020-12-29 | End: 2021-01-03 | Stop reason: HOSPADM

## 2020-12-29 RX ADMIN — MAGNESIUM SULFATE HEPTAHYDRATE 2 G: 40 INJECTION, SOLUTION INTRAVENOUS at 20:50

## 2020-12-29 RX ADMIN — MAGNESIUM SULFATE HEPTAHYDRATE 2 G: 40 INJECTION, SOLUTION INTRAVENOUS at 19:18

## 2020-12-29 RX ADMIN — ADENOSINE 6 MG: 3 INJECTION, SOLUTION INTRAVENOUS at 18:50

## 2020-12-29 RX ADMIN — SODIUM CHLORIDE 1000 ML: 9 INJECTION, SOLUTION INTRAVENOUS at 19:09

## 2020-12-29 RX ADMIN — ACETAMINOPHEN 1000 MG: 500 TABLET, FILM COATED ORAL at 19:07

## 2020-12-29 RX ADMIN — DILTIAZEM HYDROCHLORIDE 15 MG: 5 INJECTION INTRAVENOUS at 20:27

## 2020-12-29 RX ADMIN — METOPROLOL TARTRATE 5 MG: 1 INJECTION, SOLUTION INTRAVENOUS at 18:34

## 2020-12-29 NOTE — ED TRIAGE NOTES
Pt was at cancer clinic receiving chemotherapy and heart rate increased to 180s, EMS reports heart rate ranging from 60s-160s. Pt denies chest pain or sob.

## 2020-12-29 NOTE — ED NOTES
Pt was reassessed at this time, and no new changes were noted from the previous assessment. Pt is awake alert and oriented x4, pt vitals are stable and has no new complaints at this time. Pt heart rate is irregular and sinus tach, pt respiratory rate is normal and chest rise is symmetrical. Pt is resting, and has been updated on the plan of care. No new changes were noted based off previous assessment. Pt rounding complete, pt is awake alert and oriented x4, pt is resting on the stretcher, vitals are stable. Pt call bell within reach, and belongings are at bedside, pt updated on the plan of care at this time.

## 2020-12-29 NOTE — Clinical Note
Status[de-identified] INPATIENT [101]  Type of Bed: Medical [8]  Inpatient Hospitalization Certified Necessary for the Following Reasons: 3.  Patient receiving treatment that can only be provided in an inpatient setting (further clarification in H&P documentation)   Admitting Diagnosis: SVT [facility]supraventricular tachycardia) [facility] [797280]  Admitting Diagnosis: COVID-19 [[id number]]  Admitting Diagnosis: Hypomagnesemia [[id number]]  Admitting Physician: Jono Jimenez [[id number]]  Attending Physician: Jono Jimenez  [[id number]]  Estimated Length of Stay: 2 Midnights  Discharge Plan[de-identified] Home with Office Follow-up

## 2020-12-29 NOTE — ED PROVIDER NOTES
EMERGENCY DEPARTMENT HISTORY AND PHYSICAL EXAM      Date: 12/29/2020  Patient Name: Mj Sinha    History of Presenting Illness     Chief Complaint   Patient presents with    Fatigue    Irregular Heart Beat       History Provided By: Patient    HPI: Mj Sinha, 76 y.o. male with a past medical history significant Head and neck cancer, Hypertension, SVT presents to the ED with cc of rapid heartbeat. Patient reports that he was receiving chemotherapy today, and was noted to have a heart rate of 160s 170. Patient denies any symptoms no feelings of palpitations no chest pain no shortness of breath. Patient is taking metoprolol and diltiazem for paroxysmal SVT. Patient has noted some cough over the last several days, denies any fevers chills however patient febrile at triage. Denies any abdominal pain, no nausea vomiting. There are no other complaints, changes, or physical findings at this time.     PCP: Rossana Alexander MD    Current Facility-Administered Medications   Medication Dose Route Frequency Provider Last Rate Last Admin    phosphorus (K PHOS NEUTRAL) 250 mg tablet 1 Tab  1 Tab Oral BID Martina Rosado MD        folic acid (FOLVITE) tablet 1 mg  1 mg Oral DAILY Martina Rosado MD        thiamine mononitrate (B-1) tablet 100 mg  100 mg Oral DAILY Martina Rosado MD        ondansetron hcl [facility]) tablet 4 mg  4 mg Oral Q8H PRN Martina Rosado MD        dilTIAZem IR (CARDIZEM) tablet 60 mg  60 mg Oral TID Martina Rosado MD        metoprolol succinate (TOPROL-XL) XL tablet 100 mg  100 mg Oral DAILY Martina Rosado MD        famotidine (PEPCID) tablet 20 mg  20 mg Oral BID Anabela Rosado MD        dexAMETHasone (DECADRON) tablet 4 mg  4 mg Oral Q6H Anabela Rosado MD        traMADoL [place]) tablet 50 mg  50 mg Oral Q6H PRN Martina Rosado MD        0.9% sodium chloride infusion  75 mL/hr IntraVENous CONTINUOUS Martina Rosado MD        acetaminophen (TYLENOL) tablet 650 mg  650 mg Oral Q6H PRN Berna Rosado MD        Or    acetaminophen (TYLENOL) suppository 650 mg  650 mg Rectal Q6H PRN Berna Rosado MD        polyethylene glycol (MIRALAX) packet 17 g  17 g Oral DAILY PRN Berna Rosado MD        ondansetron (ZOFRAN ODT) tablet 4 mg  4 mg Oral Q8H PRN Berna Rosado MD        Or    [facility] injection 4 mg  4 mg IntraVENous Q6H PRN Berna Rosado MD        enoxaparin (LOVENOX) injection 40 mg  40 mg SubCUTAneous DAILY Berna Rosado MD        piperacillin-tazobactam (ZOSYN) 3.375 g in 0.9% sodium chloride (MBP/ADV) 100 mL MBP  3.375 g IntraVENous Q8H Anabela Rosado MD         Current Outpatient Medications   Medication Sig Dispense Refill    dilTIAZem IR (CARDIZEM) 60 mg tablet Take 1 Tab by mouth three (3) times daily for 30 days. Indications: high blood pressure, paroxysmal supraventricular tachycardia 90 Tab 0    metoprolol succinate (TOPROL-XL) 100 mg tablet Take 1 Tab by mouth daily for 30 days. Indications: paroxysmal supraventricular tachycardia 30 Tab 0    ondansetron hcl (Zofran) 4 mg tablet Take 4 mg by mouth every eight (8) hours as needed for Nausea or Vomiting.  metoprolol tartrate (LOPRESSOR) 25 mg tablet Take  by mouth two (2) times a day.  HYDROcodone-acetaminophen (NORCO) 5-325 mg per tablet Take 1 Tab by mouth every eight (8) hours as needed for Pain.  dexAMETHasone (DECADRON) 4 mg tablet Take  by mouth every twelve (12) hours. Indications: Night before Chemo      traMADoL (ULTRAM) 50 mg tablet Take 50 mg by mouth every six (6) hours as needed for Pain.  famotidine (PEPCID) 20 mg tablet Take 1 Tab by mouth two (2) times a day. 30 Tab 1    folic acid (FOLVITE) 1 mg tablet Take 1 Tab by mouth daily. 30 Tab 1    thiamine mononitrate (B-1) 100 mg tablet Take 1 Tab by mouth daily.  30 Tab 1       Past History     Past Medical History:  Past Medical History:   Diagnosis Date    Anemia     Arthritis     Cancer ([facility] [address]     esophageal    Dysphagia     Gastrointestinal disorder     History of radiation therapy     for prostate cancer    Hx of carcinoma in situ of prostate 2017    s/p radiation     Hypertension     pt states doesnt take medication. 1 year    Stroke [facility])     TIA 20 years ago    SVT (supraventricular tachycardia) ([facility] [address]     Weight loss        Past Surgical History:  Past Surgical History:   Procedure Laterality Date    HX GASTROSTOMY      HX HEENT      lasik    HX HERNIA REPAIR      HX OTHER SURGICAL      PEG tube    IR DRAIN PROCEDURE W CATH  11/5/2020    IR INSERT TUNL CVC W PORT OVER 5 YEARS  12/8/2020    IR PLACE CVAD FLUORO GUIDE  12/8/2020       Family History:  No family history on file. Social History:  Social History     Tobacco Use    Smoking status: Current Every Day Smoker    Smokeless tobacco: Never Used    Tobacco comment: pt states he barely smokes 1 cig a day   Substance Use Topics    Alcohol use: Not Currently     Comment: pt stated he quit about 2-3 months ago    Drug use: Never       Allergies:  No Known Allergies      Review of Systems     Review of Systems   Constitutional: Negative for activity change, appetite change, chills and fever. HENT: Negative for congestion and sore throat. Eyes: Negative for photophobia and visual disturbance. Respiratory: Negative for choking, chest tightness and shortness of breath. Cardiovascular: Negative for chest pain, palpitations and leg swelling. Gastrointestinal: Negative for abdominal pain, diarrhea and nausea. Genitourinary: Negative for dysuria and flank pain. Musculoskeletal: Negative for arthralgias, back pain and gait problem. Skin: Negative for color change and pallor. Neurological: Negative for dizziness, light-headedness and headaches. Physical Exam     Physical Exam  Vitals signs and nursing note reviewed. Constitutional:       Appearance: Normal appearance.  He is ill-appearing. HENT:      Head: Normocephalic and atraumatic. Nose: Nose normal. No congestion. Mouth/Throat:      Mouth: Mucous membranes are moist.      Pharynx: Oropharynx is clear. Eyes:      Extraocular Movements: Extraocular movements intact. Conjunctiva/sclera: Conjunctivae normal.      Pupils: Pupils are equal, round, and reactive to light. Neck:      Musculoskeletal: Normal range of motion and neck supple. No neck rigidity or muscular tenderness. Cardiovascular:      Rate and Rhythm: Tachycardia present. Rhythm irregular. Pulses: Normal pulses. Heart sounds: Normal heart sounds. Pulmonary:      Effort: Pulmonary effort is normal.      Breath sounds: Normal breath sounds. Abdominal:      General: Abdomen is flat. Bowel sounds are normal.      Palpations: Abdomen is soft. Musculoskeletal: Normal range of motion. General: No swelling, tenderness or signs of injury. Skin:     General: Skin is warm and dry. Capillary Refill: Capillary refill takes less than 2 seconds. Neurological:      General: No focal deficit present. Mental Status: He is alert and oriented to person, place, and time. Cranial Nerves: No cranial nerve deficit. Sensory: No sensory deficit. Motor: No weakness.          Diagnostic Study Results     Labs -     Recent Results (from the past 12 hour(s))   METABOLIC PANEL, COMPREHENSIVE    Collection Time: 12/29/20  5:40 PM   Result Value Ref Range    Sodium 137 136 - 145 mmol/L    Potassium 5.3 (H) 3.5 - 5.1 mmol/L    Chloride 109 (H) 97 - 108 mmol/L    CO2 21 21 - 32 mmol/L    Anion gap 7 5 - 15 mmol/L    Glucose 122 (H) 65 - 100 mg/dL    BUN 18 6 - 20 mg/dL    Creatinine 0.73 0.70 - 1.30 mg/dL    BUN/Creatinine ratio 25 (H) 12 - 20      GFR est AA >60 >60 ml/min/1.73m2    GFR est non-AA >60 >60 ml/min/1.73m2    Calcium 8.9 8.5 - 10.1 mg/dL    Bilirubin, total 0.9 0.2 - 1.0 mg/dL    AST (SGOT) 133 (H) 15 - 37 U/L    ALT (SGPT) 66 12 - 78 U/L    Alk. phosphatase 79 45 - 117 U/L    Protein, total 7.8 6.4 - 8.2 g/dL    Albumin 2.1 (L) 3.5 - 5.0 g/dL    Globulin 5.7 (H) 2.0 - 4.0 g/dL    A-G Ratio 0.4 (L) 1.1 - 2.2     LACTIC ACID    Collection Time: 12/29/20  5:40 PM   Result Value Ref Range    Lactic acid 1.8 0.4 - 2.0 mmol/L   TROPONIN I    Collection Time: 12/29/20  5:40 PM   Result Value Ref Range    Troponin-I, Qt. <0.05 <0.05 ng/mL   MAGNESIUM    Collection Time: 12/29/20  5:40 PM   Result Value Ref Range    Magnesium <0.3 (LL) 1.6 - 2.4 mg/dL   PHOSPHORUS    Collection Time: 12/29/20  5:40 PM   Result Value Ref Range    Phosphorus <0.5 (LL) 2.6 - 4.7 mg/dL   SARS-COV-2    Collection Time: 12/29/20  6:15 PM   Result Value Ref Range    SARS-CoV-2 Please find results under separate order     COVID-19 RAPID TEST    Collection Time: 12/29/20  6:15 PM   Result Value Ref Range    Specimen source Nasopharyngeal      COVID-19 rapid test DETECTED (A) Not Detected         Radiologic Studies -   [unfilled]  CT Results  (Last 48 hours)    None        CXR Results  (Last 48 hours)               12/29/20 2110  XR CHEST PORT Final result    Impression:  Impression:   Persistent bilateral pulmonary nodules with interval development of right-sided   airspace disease/pneumonia. Narrative:  Study: Chest radiograph(s), 1 view       Clinical Indication: Fever. Comparison: Chest radiograph dated 12/21/2020. Findings:       Unchanged positioning of a right internal jugular approach Port-A-Cath. An   esophageal stent remains in place. The cardiac silhouette is normal and unchanged in size. Multiple nodular opacities throughout both lungs are similar to prior. Interval   development of patchy opacities in the right upper lung and right perihilar   region. No large pleural effusion. No discernible pneumothorax. Medical Decision Making and ED Course   I am the first provider for this patient.     I reviewed the vital signs, available nursing notes, past medical history, past surgical history, family history and social history. Vital Signs-Reviewed the patient's vital signs. Patient Vitals for the past 12 hrs:   Temp Pulse Resp BP SpO2   12/29/20 2348 99.9 °F (37.7 °C)       12/29/20 2158  (!) 110 22 130/71 96 %   12/29/20 2032 (!) 102.2 °F (39 °C) (!) 121 18 (!) 132/92 (!) 86 %   12/29/20 2027  (!) 160  (!) 138/92    12/29/20 1926  (!) 165  (!) 146/87 97 %   12/29/20 1853  (!) 165 18 (!) 140/89 98 %   12/29/20 1834  (!) 175  (!) 152/93    12/29/20 1757  (!) 175 18 (!) 157/125 98 %   12/29/20 1747  (!) 170      12/29/20 1746     98 %   12/29/20 1735 (!) 101.9 °F (38.8 °C) 100 18 (!) 160/100 98 %   12/29/20 1657 98.5 °F (36.9 °C) 62  (!) 150/88 98 %       EKG interpretation: (Preliminary)  Initial EKG shows narrow complex tachycardia 173, no ST elevations, normal axis  Subsequent EKG shows approximately 1 minute later shows paroxysmal narrow complex tachycardia intermittently sinus rhythm, no ST elevations      Records Reviewed: Nursing Notes and Old Medical Records    The patient presents with rapid heartbeat with a differential diagnosis of SVT, rapid A. fib, dehydration, febrile reaction          Provider Notes (Medical Decision Making):     MDM   80-year-old male, past medical history of esophageal cancer, SVT (paroxysmal ), presents to the emergency department for rapid heartbeat. Patient noted to be tachycardic to 170, normotensive, asymptomatic. Febrile to 101    EKG ordered, patient placed on cardiac monitor  Basic lab work drawn, including cardiac enzymes, influenza swab and Covid swab ordered, chest x-ray ordered      ED Course:   Initial assessment performed. The patients presenting problems have been discussed, and they are in agreement with the care plan formulated and outlined with them.   I have encouraged them to ask questions as they arise throughout their visit.    ED Course as of Dec 30 0011   Tue Dec 29, 2020   1838 Patient's  EKG initially showed irregular narrow complex tachycardia, questionable A. fib intermittent, versus SVT. Patient received 5 mg of metoprolol IV, heart rate slowed down to 130s however soon after began increased to 160s 170s, narrow complex, no P wave. At this time will attempt 6 mg adenosine,     [PZ]   1908 Adenosine he briefly decreased heart rate to this rhythm of 80, lasting approximately 10 to 15 seconds however subsequently heart rate increased dramatically increases to 160. patient's heart rate appears to be sinus rhythm with episodes of paroxysmal SVT, intermittently jumps from normal sinus 100 up to 150s 160s. Potentially could be from fever, upper respiratory infection, additionally patient's magnesium noted to be extremely low at 0.3. At this time will give patient IV hydration, Tylenol, magnesium replete replenishment, and continue to monitor patient    [PZ]   2042 Received diltiazem 15 mg, heart rate response, currently sinus rhythm 110 with intermittent paroxysmal increase to 120. [PZ]   2048 Patient discussed with Dr. Jose Antonio Gómez, accepts admission, will continue to monitor heart rate if patient's heart rate continues to escalate will order Cardizem drip. Dr. Jose Antonio Gómez agrees, recommends another 2 g of magnesium.     [PZ]      ED Course User Index  [PZ] Rose Barber MD         Procedures       Susan Conklin MD  Procedures               CRITICAL CARE NOTE :  6:45 PM  Amount of Critical Care Time: __60__(minutes)__    IMPENDING DETERIORATION -  ASSOCIATED RISK FACTORS - Hypotension and Dysrhythmia  MANAGEMENT- Bedside Assessment and Supervision of Care  INTERPRETATION -  ECG and Blood Pressure  INTERVENTIONS - hemodynamic mngmt and Metobolic interventions  CASE REVIEW - Hospitalist  TREATMENT RESPONSE -Improved  PERFORMED BY - Self    NOTES   :  I have spent critical care time involved in lab review, consultations with specialist, family decision- making, bedside attention and documentation. This time excludes time spent in any separate billed procedures. During this entire length of time I was immediately available to the patient . Tommie Khalil MD        Disposition     Admit to hospital  Admitted      Diagnosis     Clinical Impression:   1. SVT (supraventricular tachycardia) ([facility] [address]    2. COVID-19 virus infection    3. Hypomagnesemia        Attestations:    Tommie Khalil MD    Please note that this dictation was completed with [email or web address], the computer voice recognition software. Quite often unanticipated grammatical, syntax, homophones, and other interpretive errors are inadvertently transcribed by the computer software. Please disregard these errors. Please excuse any errors that have escaped final proofreading. Thank you.

## 2020-12-29 NOTE — ED NOTES
Pt HR broke to 60s, after adenosine, MD witnessed, pt HR still 160s cardiology being consulted, MD assessed pt and in room when pt got adenosine     Pt rounding complete, pt is awake alert and oriented x4, pt is resting on the stretcher, vitals are stable. Pt call bell within reach, and belongings are at bedside, pt updated on the plan of care at this time.

## 2020-12-29 NOTE — ED NOTES
Pt rounding complete, pt is awake alert and oriented x4, pt is resting on the stretcher, vitals are stable. Pt call bell within reach, and belongings are at bedside, pt updated on the plan of care at this time.      All lab work sent

## 2020-12-30 ENCOUNTER — APPOINTMENT (OUTPATIENT)
Dept: RADIATION THERAPY | Age: 75
End: 2020-12-30
Payer: MEDICARE

## 2020-12-30 LAB
ALBUMIN SERPL-MCNC: 1.9 G/DL (ref 3.5–5)
ALBUMIN/GLOB SERPL: 0.4 {RATIO} (ref 1.1–2.2)
ALP SERPL-CCNC: 71 U/L (ref 45–117)
ALT SERPL-CCNC: 59 U/L (ref 12–78)
ANION GAP SERPL CALC-SCNC: 4 MMOL/L (ref 5–15)
AST SERPL W P-5'-P-CCNC: 108 U/L (ref 15–37)
ATRIAL RATE: 192 BPM
ATRIAL RATE: 91 BPM
BASOPHILS # BLD: 0 K/UL (ref 0–0.1)
BASOPHILS NFR BLD: 0 % (ref 0–1)
BILIRUB SERPL-MCNC: 0.8 MG/DL (ref 0.2–1)
BUN SERPL-MCNC: 15 MG/DL (ref 6–20)
BUN/CREAT SERPL: 24 (ref 12–20)
CA-I BLD-MCNC: 8.3 MG/DL (ref 8.5–10.1)
CALCULATED R AXIS, ECG10: 23 DEGREES
CALCULATED R AXIS, ECG10: 44 DEGREES
CALCULATED T AXIS, ECG11: 74 DEGREES
CALCULATED T AXIS, ECG11: 76 DEGREES
CHLORIDE SERPL-SCNC: 109 MMOL/L (ref 97–108)
CO2 SERPL-SCNC: 23 MMOL/L (ref 21–32)
CREAT SERPL-MCNC: 0.63 MG/DL (ref 0.7–1.3)
DIAGNOSIS, 93000: NORMAL
DIAGNOSIS, 93000: NORMAL
DIFFERENTIAL METHOD BLD: ABNORMAL
EOSINOPHIL # BLD: 0 K/UL (ref 0–0.4)
EOSINOPHIL NFR BLD: 1 % (ref 0–7)
ERYTHROCYTE [DISTWIDTH] IN BLOOD BY AUTOMATED COUNT: 13.6 % (ref 11.5–14.5)
FLUAV AG NPH QL IA: NEGATIVE
FLUBV AG NOSE QL IA: NEGATIVE
GLOBULIN SER CALC-MCNC: 4.7 G/DL (ref 2–4)
GLUCOSE SERPL-MCNC: 121 MG/DL (ref 65–100)
HCT VFR BLD AUTO: 33.5 % (ref 36.6–50.3)
HGB BLD-MCNC: 10.8 G/DL (ref 12.1–17)
IMM GRANULOCYTES # BLD AUTO: 0.1 K/UL (ref 0–0.04)
IMM GRANULOCYTES NFR BLD AUTO: 1 % (ref 0–0.5)
LYMPHOCYTES # BLD: 0.2 K/UL (ref 0.8–3.5)
LYMPHOCYTES NFR BLD: 5 % (ref 12–49)
MCH RBC QN AUTO: 31.1 PG (ref 26–34)
MCHC RBC AUTO-ENTMCNC: 32.2 G/DL (ref 30–36.5)
MCV RBC AUTO: 96.5 FL (ref 80–99)
MONOCYTES # BLD: 0.3 K/UL (ref 0–1)
MONOCYTES NFR BLD: 7 % (ref 5–13)
NEUTS SEG # BLD: 3.8 K/UL (ref 1.8–8)
NEUTS SEG NFR BLD: 86 % (ref 32–75)
NRBC # BLD: 0 K/UL (ref 0–0.01)
NRBC BLD-RTO: 0 PER 100 WBC
PLATELET # BLD AUTO: 94 K/UL (ref 150–400)
PMV BLD AUTO: 12.6 FL (ref 8.9–12.9)
POTASSIUM SERPL-SCNC: 4 MMOL/L (ref 3.5–5.1)
PROT SERPL-MCNC: 6.6 G/DL (ref 6.4–8.2)
Q-T INTERVAL, ECG07: 242 MS
Q-T INTERVAL, ECG07: 274 MS
QRS DURATION, ECG06: 64 MS
QRS DURATION, ECG06: 80 MS
QTC CALCULATION (BEZET), ECG08: 370 MS
QTC CALCULATION (BEZET), ECG08: 419 MS
RBC # BLD AUTO: 3.47 M/UL (ref 4.1–5.7)
SODIUM SERPL-SCNC: 136 MMOL/L (ref 136–145)
VENTRICULAR RATE, ECG03: 141 BPM
VENTRICULAR RATE, ECG03: 141 BPM
WBC # BLD AUTO: 4.3 K/UL (ref 4.1–11.1)

## 2020-12-30 PROCEDURE — 80053 COMPREHEN METABOLIC PANEL: CPT

## 2020-12-30 PROCEDURE — 87086 URINE CULTURE/COLONY COUNT: CPT

## 2020-12-30 PROCEDURE — 74011250636 HC RX REV CODE- 250/636: Performed by: FAMILY MEDICINE

## 2020-12-30 PROCEDURE — 74011250637 HC RX REV CODE- 250/637: Performed by: FAMILY MEDICINE

## 2020-12-30 PROCEDURE — 99221 1ST HOSP IP/OBS SF/LOW 40: CPT | Performed by: INTERNAL MEDICINE

## 2020-12-30 PROCEDURE — 36415 COLL VENOUS BLD VENIPUNCTURE: CPT

## 2020-12-30 PROCEDURE — 85025 COMPLETE CBC W/AUTO DIFF WBC: CPT

## 2020-12-30 PROCEDURE — 74011000250 HC RX REV CODE- 250: Performed by: FAMILY MEDICINE

## 2020-12-30 PROCEDURE — 65270000029 HC RM PRIVATE

## 2020-12-30 PROCEDURE — 74011000258 HC RX REV CODE- 258: Performed by: FAMILY MEDICINE

## 2020-12-30 RX ORDER — DILTIAZEM HCL/D5W 125 MG/125
5 PLASTIC BAG, INJECTION (ML) INTRAVENOUS
Status: DISCONTINUED | OUTPATIENT
Start: 2020-12-30 | End: 2020-12-30

## 2020-12-30 RX ADMIN — THIAMINE HCL TAB 100 MG 100 MG: 100 TAB at 08:11

## 2020-12-30 RX ADMIN — ENOXAPARIN SODIUM 40 MG: 40 INJECTION SUBCUTANEOUS at 08:11

## 2020-12-30 RX ADMIN — DILTIAZEM HYDROCHLORIDE 60 MG: 30 TABLET, FILM COATED ORAL at 22:10

## 2020-12-30 RX ADMIN — SODIUM CHLORIDE 75 ML/HR: 9 INJECTION, SOLUTION INTRAVENOUS at 08:31

## 2020-12-30 RX ADMIN — DILTIAZEM HYDROCHLORIDE 60 MG: 30 TABLET, FILM COATED ORAL at 16:08

## 2020-12-30 RX ADMIN — FAMOTIDINE 20 MG: 20 TABLET, FILM COATED ORAL at 08:10

## 2020-12-30 RX ADMIN — DEXAMETHASONE 4 MG: 4 TABLET ORAL at 08:24

## 2020-12-30 RX ADMIN — Medication 5 MG/HR: at 03:57

## 2020-12-30 RX ADMIN — FAMOTIDINE 20 MG: 20 TABLET, FILM COATED ORAL at 22:11

## 2020-12-30 RX ADMIN — DIBASIC SODIUM PHOSPHATE, MONOBASIC POTASSIUM PHOSPHATE AND MONOBASIC SODIUM PHOSPHATE 1 TABLET: 852; 155; 130 TABLET ORAL at 11:51

## 2020-12-30 RX ADMIN — METOPROLOL SUCCINATE 100 MG: 50 TABLET, EXTENDED RELEASE ORAL at 08:11

## 2020-12-30 RX ADMIN — DEXAMETHASONE 4 MG: 4 TABLET ORAL at 03:56

## 2020-12-30 RX ADMIN — FOLIC ACID 1 MG: 1 TABLET ORAL at 08:11

## 2020-12-30 RX ADMIN — Medication 12.5 MG/HR: at 11:56

## 2020-12-30 RX ADMIN — DILTIAZEM HYDROCHLORIDE 60 MG: 30 TABLET, FILM COATED ORAL at 08:10

## 2020-12-30 RX ADMIN — PIPERACILLIN AND TAZOBACTAM 3.38 G: 3; .375 INJECTION, POWDER, LYOPHILIZED, FOR SOLUTION INTRAVENOUS at 18:07

## 2020-12-30 RX ADMIN — DEXAMETHASONE 4 MG: 4 TABLET ORAL at 18:07

## 2020-12-30 RX ADMIN — PIPERACILLIN AND TAZOBACTAM 3.38 G: 3; .375 INJECTION, POWDER, LYOPHILIZED, FOR SOLUTION INTRAVENOUS at 08:31

## 2020-12-30 RX ADMIN — DEXAMETHASONE 4 MG: 4 TABLET ORAL at 11:51

## 2020-12-30 NOTE — ED NOTES
Assumed care of patient. A&Ox4; no acute distress; no respiratory distress. Patient denies any complaints or pain at this time.

## 2020-12-30 NOTE — H&P
History and Physical    NAME: Gabby Ferrera   :     MRN:  [id number]     Date/Time:  2020 9:33 AM    Patient PCP: Jhony Machuca MD  ______________________________________________________________________             Subjective:     CHIEF COMPLAINT:     Palpitation    HISTORY OF PRESENT ILLNESS:       Patient is a 76y.o. year old male past medical history of stage IV esophageal cancer with radiation history of PEG tube placement  Who recently discharged from the hospital with SVT discharged on diltiazem and metoprolol came back to the ER with same complaint of palpitation seen by the ER physician EKG shows SVT work-up done in the ER shows SVT received Cardizem and start on Cardizem drip patient also have fever and recently positive for COVID-19 patient was admitted for further evaluation and treatment  Also blood work shows hypomagnesia and hypophosphatemia    Past Medical History:   Diagnosis Date    Anemia     Arthritis     Cancer ([address]     esophageal    Dysphagia     Gastrointestinal disorder     History of radiation therapy     for prostate cancer    Hx of carcinoma in situ of prostate 2017    s/p radiation     Hypertension     pt states doesnt take medication. 1 year    Stroke [facility])     TIA 20 years ago    SVT (supraventricular tachycardia) (HCC)     Weight loss         Past Surgical History:   Procedure Laterality Date    HX GASTROSTOMY      HX HEENT      lasik    HX HERNIA REPAIR      HX OTHER SURGICAL      PEG tube    IR DRAIN PROCEDURE W CATH  2020    IR INSERT TUNL CVC W PORT OVER 5 YEARS  2020    IR PLACE CVAD FLUORO GUIDE  2020       Social History     Tobacco Use    Smoking status: Current Every Day Smoker    Smokeless tobacco: Never Used    Tobacco comment: pt states he barely smokes 1 cig a day   Substance Use Topics    Alcohol use: Not Currently     Comment: pt stated he quit about 2-3 months ago        No family history on file.     No Known Allergies     Prior to Admission medications    Medication Sig Start Date End Date Taking? Authorizing Provider   dilTIAZem IR (CARDIZEM) 60 mg tablet Take 1 Tab by mouth three (3) times daily for 30 days. Indications: high blood pressure, paroxysmal supraventricular tachycardia 12/22/20 1/21/21  Jayson Leone NP   metoprolol succinate (TOPROL-XL) 100 mg tablet Take 1 Tab by mouth daily for 30 days. Indications: paroxysmal supraventricular tachycardia 12/23/20 1/22/21  Jayson Leone NP   ondansetron hcl (Zofran) 4 mg tablet Take 4 mg by mouth every eight (8) hours as needed for Nausea or Vomiting. Provider, Historical   metoprolol tartrate (LOPRESSOR) 25 mg tablet Take  by mouth two (2) times a day. Provider, Historical   HYDROcodone-acetaminophen (NORCO) 5-325 mg per tablet Take 1 Tab by mouth every eight (8) hours as needed for Pain. Provider, Historical   dexAMETHasone (DECADRON) 4 mg tablet Take  by mouth every twelve (12) hours. Indications: Night before Chemo    Provider, Historical   traMADoL (ULTRAM) 50 mg tablet Take 50 mg by mouth every six (6) hours as needed for Pain. Provider, Historical   famotidine (PEPCID) 20 mg tablet Take 1 Tab by mouth two (2) times a day. 11/11/20   Segundo Doll PA-C   folic acid (FOLVITE) 1 mg tablet Take 1 Tab by mouth daily. 11/12/20   Segundo Doll PA-C   thiamine mononitrate (B-1) 100 mg tablet Take 1 Tab by mouth daily.  11/12/20   Segundo Doll PA-C         Current Facility-Administered Medications:     dilTIAZem (CARDIZEM) 125 mg/125 mL (1 mg/mL) dextrose 5% infusion, 5 mg/hr, IntraVENous, TITRATE, Anabela Rosado MD, Last Rate: 10 mL/hr at 12/30/20 0408, 10 mg/hr at 12/30/20 0408    phosphorus (K PHOS NEUTRAL) 250 mg tablet 1 Tab, 1 Tab, Oral, BID, Anabela Rosado MD    folic acid (FOLVITE) tablet 1 mg, 1 mg, Oral, DAILY, Anabela Rosado MD, 1 mg at 12/30/20 5673    thiamine mononitrate (B-1) tablet 100 mg, 100 mg, Oral, Carmen Hernandez MD, 100 mg at 12/30/20 0811    ondansetron hcl (ZOFRAN) tablet 4 mg, 4 mg, Oral, Q8H PRN, Ina Rosado MD    dilTIAZem IR (CARDIZEM) tablet 60 mg, 60 mg, Oral, TID, Ina Rosado MD, 60 mg at 12/30/20 0810    metoprolol succinate (TOPROL-XL) XL tablet 100 mg, 100 mg, Oral, DAILY, Anabela Rosado MD, 100 mg at 12/30/20 0566    famotidine (PEPCID) tablet 20 mg, 20 mg, Oral, BID, Anabela Rosado MD, 20 mg at 12/30/20 0810    dexAMETHasone (DECADRON) tablet 4 mg, 4 mg, Oral, Q6H, Anabela Rosado MD, 4 mg at 12/30/20 8363    traMADoL (ULTRAM) tablet 50 mg, 50 mg, Oral, Q6H PRN, Ina Rosado MD    0.9% sodium chloride infusion, 75 mL/hr, IntraVENous, CONTINUOUS, Anabela Rosado MD, Last Rate: 75 mL/hr at 12/30/20 0831, 75 mL/hr at 12/30/20 0831    acetaminophen (TYLENOL) tablet 650 mg, 650 mg, Oral, Q6H PRN **OR** acetaminophen (TYLENOL) suppository 650 mg, 650 mg, Rectal, Q6H PRN, Ina Rosado MD    polyethylene glycol (MIRALAX) packet 17 g, 17 g, Oral, DAILY PRN, Ina Rosado MD    ondansetron (ZOFRAN ODT) tablet 4 mg, 4 mg, Oral, Q8H PRN **OR** ondansetron (ZOFRAN) injection 4 mg, 4 mg, IntraVENous, Q6H PRN, Anabela Rosado MD    enoxaparin (LOVENOX) injection 40 mg, 40 mg, SubCUTAneous, DAILY, Anabela Rosado MD, 40 mg at 12/30/20 0811    piperacillin-tazobactam (ZOSYN) 3.375 g in 0.9% sodium chloride (MBP/ADV) 100 mL MBP, 3.375 g, IntraVENous, Q8H, Anabela Rosado MD, Last Rate: 200 mL/hr at 12/30/20 0831, 3.375 g at 12/30/20 7519    Current Outpatient Medications:     dilTIAZem IR (CARDIZEM) 60 mg tablet, Take 1 Tab by mouth three (3) times daily for 30 days. Indications: high blood pressure, paroxysmal supraventricular tachycardia, Disp: 90 Tab, Rfl: 0    metoprolol succinate (TOPROL-XL) 100 mg tablet, Take 1 Tab by mouth daily for 30 days.  Indications: paroxysmal supraventricular tachycardia, Disp: 30 Tab, Rfl: 0    ondansetron hcl (Zofran) 4 mg tablet, Take 4 mg by mouth every eight (8) hours as needed for Nausea or Vomiting., Disp: , Rfl:     metoprolol tartrate (LOPRESSOR) 25 mg tablet, Take  by mouth two (2) times a day., Disp: , Rfl:     HYDROcodone-acetaminophen (NORCO) 5-325 mg per tablet, Take 1 Tab by mouth every eight (8) hours as needed for Pain., Disp: , Rfl:     dexAMETHasone (DECADRON) 4 mg tablet, Take  by mouth every twelve (12) hours. Indications: Night before Chemo, Disp: , Rfl:     traMADoL (ULTRAM) 50 mg tablet, Take 50 mg by mouth every six (6) hours as needed for Pain., Disp: , Rfl:     famotidine (PEPCID) 20 mg tablet, Take 1 Tab by mouth two (2) times a day., Disp: 30 Tab, Rfl: 1    folic acid (FOLVITE) 1 mg tablet, Take 1 Tab by mouth daily. , Disp: 30 Tab, Rfl: 1    thiamine mononitrate (B-1) 100 mg tablet, Take 1 Tab by mouth daily. , Disp: 30 Tab, Rfl: 1    LAB DATA REVIEWED:    Recent Results (from the past 24 hour(s))   METABOLIC PANEL, COMPREHENSIVE    Collection Time: 12/29/20  5:40 PM   Result Value Ref Range    Sodium 137 136 - 145 mmol/L    Potassium 5.3 (H) 3.5 - 5.1 mmol/L    Chloride 109 (H) 97 - 108 mmol/L    CO2 21 21 - 32 mmol/L    Anion gap 7 5 - 15 mmol/L    Glucose 122 (H) 65 - 100 mg/dL    BUN 18 6 - 20 mg/dL    Creatinine 0.73 0.70 - 1.30 mg/dL    BUN/Creatinine ratio 25 (H) 12 - 20      GFR est AA >60 >60 ml/min/1.73m2    GFR est non-AA >60 >60 ml/min/1.73m2    Calcium 8.9 8.5 - 10.1 mg/dL    Bilirubin, total 0.9 0.2 - 1.0 mg/dL    AST (SGOT) 133 (H) 15 - 37 U/L    ALT (SGPT) 66 12 - 78 U/L    Alk.  phosphatase 79 45 - 117 U/L    Protein, total 7.8 6.4 - 8.2 g/dL    Albumin 2.1 (L) 3.5 - 5.0 g/dL    Globulin 5.7 (H) 2.0 - 4.0 g/dL    A-G Ratio 0.4 (L) 1.1 - 2.2     LACTIC ACID    Collection Time: 12/29/20  5:40 PM   Result Value Ref Range    Lactic acid 1.8 0.4 - 2.0 mmol/L   TROPONIN I    Collection Time: 12/29/20  5:40 PM   Result Value Ref Range    Troponin-I, Qt. <0.05 <0.05 ng/mL MAGNESIUM    Collection Time: 12/29/20  5:40 PM   Result Value Ref Range    Magnesium <0.3 (LL) 1.6 - 2.4 mg/dL   PHOSPHORUS    Collection Time: 12/29/20  5:40 PM   Result Value Ref Range    Phosphorus <0.5 (LL) 2.6 - 4.7 mg/dL   SARS-COV-2    Collection Time: 12/29/20  6:15 PM   Result Value Ref Range    SARS-CoV-2 Please find results under separate order     COVID-19 RAPID TEST    Collection Time: 12/29/20  6:15 PM   Result Value Ref Range    Specimen source Nasopharyngeal      COVID-19 rapid test DETECTED (A) Not Detected     INFLUENZA A & B AG (RAPID TEST)    Collection Time: 12/29/20 11:41 PM   Result Value Ref Range    Influenza A Antigen Negative Negative      Influenza B Antigen Negative Negative         XR Results (most recent):  Results from Hospital Encounter encounter on 12/29/20   XR CHEST PORT    Narrative Study: Chest radiograph(s), 1 view    Clinical Indication: Fever. Comparison: Chest radiograph dated 12/21/2020. Findings:    Unchanged positioning of a right internal jugular approach Port-A-Cath. An  esophageal stent remains in place. The cardiac silhouette is normal and unchanged in size. Multiple nodular opacities throughout both lungs are similar to prior. Interval  development of patchy opacities in the right upper lung and right perihilar  region. No large pleural effusion. No discernible pneumothorax. Impression Impression:  Persistent bilateral pulmonary nodules with interval development of right-sided  airspace disease/pneumonia. XR CHEST PORT   Final Result   Impression:   Persistent bilateral pulmonary nodules with interval development of right-sided   airspace disease/pneumonia. Review of Systems:  Constitutional: Negative for chills and fever. HENT: Negative. Eyes: Negative. Respiratory: Negative. Cardiovascular: Negative. Gastrointestinal: Negative for abdominal pain and nausea. Skin: Negative. Neurological: Negative. Objective:   VITALS:    Visit Vitals  BP (!) 142/80   Pulse (!) 110   Temp 99.9 °F (37.7 °C)   Resp 22   SpO2 97%       Physical Exam:   Constitutional: pt is oriented to person, place, and time. HENT:   Head: Normocephalic and atraumatic. Eyes: Pupils are equal, round, and reactive to light. EOM are normal.   Cardiovascular: Normal rate, regular rhythm and normal heart sounds. Pulmonary/Chest: Breath sounds normal. No wheezes. No rales. Exhibits no tenderness. Abdominal: Soft. Bowel sounds are normal. There is no abdominal tenderness. There is no rebound and no guarding. Musculoskeletal: Normal range of motion. Neurological: pt is alert and oriented to person, place, and time. Alert. Normal strength. No cranial nerve deficit or sensory deficit. Displays a negative Romberg sign.         ASSESSMENT & PLAN:  Acute SVT  COVID-19 infection  Hypomagnesia and hypophosphatemia  Cardiomyopathy ejection fraction about 45%  Stage IV esophageal carcinoma status post radiation   static post PEG tube placement  Fever    Patient admitted to telemetry floor cardiology ID consult  Start on IV Zosyn and Decadron  Continue metoprolol  mg daily  Continue Cardizem drip  Replace magnesium  and phosphorus  Repeat the labs today      ________________________________________________________________________    Signed: Dameon Dsouza MD

## 2020-12-30 NOTE — ED NOTES
Pt was reassessed at this time, and no new changes were noted from the previous assessment. Pt is awake alert and oriented x4, pt vitals are stable and has no new complaints at this time. Pt heart rate is irregular and sinus tach, pt respiratory rate is normal and chest rise is symmetrical. Pt is resting, and has been updated on the plan of care. No new changes were noted based off previous assessment. Pt rounding complete, pt is awake alert and oriented x4, pt is resting on the stretcher, vitals are stable. Pt call bell within reach, and belongings are at bedside, pt updated on the plan of care at this time.      Pt HR still maintaining 110s

## 2020-12-30 NOTE — CONSULTS
Infectious Disease Consult Note    Reason for Consult: COVID+   Date of Consultation: December 30, 2020  Date of Admission: 12/29/2020  Referring Physician:  Dr Jocelyne Lamb     HPI:  75-y.o BM presented w c/o palpitation while receiving chemotherapy on 12/29, ID consultation requested as pt tested positive for COVID-19. His medical history is significant for SVT, stage IV esophageal Ca, dysphagia s/p PEG tube placement, prostate CA, HTN, CVA, cardiomyopathy w EF of 45%, and GERD. He was reportedly recently discharged from [address] [place] [address] after an admission for SVT. He has been intermittently febrile since presentation with a T-max of 103.1F, Tachycardic with HR in the 170s and is maintaining O2 sats on RA. He is currently on cardizem drip. Today's labs show a normal WBC of 4.3, and Cr 0.63. CXR on 12/29 shows bilateral pulmonary nodules and airspace disease. He is on Zosyn and Decadron. . Pt was seen in the ED awaiting transfer to the floors. Review of Systems:     Gen: Generalized body/weakness    CV: Palpitations, denies chest pain    Lungs: Negative for shortness of breath, cough, wheezing   Abdomen: Negative for abdominal pain, nausea, vomiting, diarrhea, constipation   Genitourinary: Negative for genital pain or genital discharge     Neuro: Negative for headache, numbness, tingling, extremity weakness   Skin: Negative for rash, sores/open wounds   Musculoskeletal: Negative for joint pain, joint swelling, joint erythema    Psych: Negative for manic behavior       Past Medical History:  Past Medical History:   Diagnosis Date    Anemia     Arthritis     Cancer ([facility] [address]     esophageal    Dysphagia     Gastrointestinal disorder     History of radiation therapy     for prostate cancer    Hx of carcinoma in situ of prostate 2017    s/p radiation     Hypertension     pt states doesnt take medication.  1 year    Stroke [facility])     TIA 20 years ago    SVT (supraventricular tachycardia) ([facility] [address]     Weight loss        Past surgical history   Past Surgical History:   Procedure Laterality Date    HX GASTROSTOMY      HX HEENT      lasik    HX HERNIA REPAIR      HX OTHER SURGICAL      PEG tube    IR DRAIN PROCEDURE W CATH  11/5/2020    IR INSERT TUNL CVC W PORT OVER 5 YEARS  12/8/2020    IR PLACE CVAD FLUORO GUIDE  12/8/2020        Social History   Social History     Tobacco Use    Smoking status: Current Every Day Smoker    Smokeless tobacco: Never Used    Tobacco comment: pt states he barely smokes 1 cig a day   Substance Use Topics    Alcohol use: Not Currently     Comment: pt stated he quit about 2-3 months ago    Drug use: Never        Family history   No family history on file. Allergies:  No Known Allergies      Medications:  No current facility-administered medications on file prior to encounter. Current Outpatient Medications on File Prior to Encounter   Medication Sig Dispense Refill    dilTIAZem IR (CARDIZEM) 60 mg tablet Take 1 Tab by mouth three (3) times daily for 30 days. Indications: high blood pressure, paroxysmal supraventricular tachycardia 90 Tab 0    metoprolol succinate (TOPROL-XL) 100 mg tablet Take 1 Tab by mouth daily for 30 days. Indications: paroxysmal supraventricular tachycardia 30 Tab 0    ondansetron hcl (Zofran) 4 mg tablet Take 4 mg by mouth every eight (8) hours as needed for Nausea or Vomiting.  metoprolol tartrate (LOPRESSOR) 25 mg tablet Take  by mouth two (2) times a day.  HYDROcodone-acetaminophen (NORCO) 5-325 mg per tablet Take 1 Tab by mouth every eight (8) hours as needed for Pain.  dexAMETHasone (DECADRON) 4 mg tablet Take  by mouth every twelve (12) hours. Indications: Night before Chemo      traMADoL (ULTRAM) 50 mg tablet Take 50 mg by mouth every six (6) hours as needed for Pain.  famotidine (PEPCID) 20 mg tablet Take 1 Tab by mouth two (2) times a day. 30 Tab 1    folic acid (FOLVITE) 1 mg tablet Take 1 Tab by mouth daily.  30 Tab 1    thiamine mononitrate (B-1) 100 mg tablet Take 1 Tab by mouth daily. 30 Tab 1           Physical Exam:    Vitals:   Patient Vitals for the past 24 hrs:   Temp Pulse Resp BP SpO2   12/30/20 1200  81      12/30/20 1130 99.3 °F (37.4 °C) (!) 105 26 118/77 96 %   12/30/20 0810  (!) 110  (!) 142/80    12/30/20 0300  (!) 124 22 (!) 144/76 97 %   12/30/20 0200  (!) 111 21 (!) 164/78 98 %   12/30/20 0100  (!) 103 21 125/77 98 %   12/30/20 0000  94 19 (!) 140/83 98 %   12/29/20 2348 99.9 °F (37.7 °C)       12/29/20 2300  100 20 130/77 97 %   12/29/20 2158  (!) 110 22 130/71 96 %   12/29/20 2032 (!) 102.2 °F (39 °C) (!) 121 18 (!) 132/92 (!) 86 %   12/29/20 2027  (!) 160  (!) 138/92    12/29/20 1926  (!) 165  (!) 146/87 97 %   12/29/20 1853  (!) 165 18 (!) 140/89 98 %   12/29/20 1834  (!) 175  (!) 152/93    12/29/20 1757  (!) 175 18 (!) 157/125 98 %   12/29/20 1747  (!) 170      12/29/20 1746     98 %   12/29/20 1735 (!) 101.9 °F (38.8 °C) 100 18 (!) 160/100 98 %   12/29/20 1657 98.5 °F (36.9 °C) 62  (!) 150/88 98 %   ·   · GEN: NAD, AAO x 4  · HEENT: NCAT, PERRLA  · HEART: S1, S2+, RRR, No murmur   · Lungs: CTA B/l, decreased at the bases, no wheeze/rhonchi   · Abdomen: soft, distended, +BS, + peg tube    · Genitourinary: no genital discharge, no hahn  · Extremities: no edema  · Skin: no rash    Labs:   Recent Labs     12/30/20  0830   WBC 4.3   HGB 10.8*   HCT 33.5*   PLT 94*     Recent Labs     12/30/20  0830 12/29/20  1740   BUN 15 18   CREA 0.63* 0.73       Lab Results   Component Value Date/Time    C-Reactive protein 1.62 (H) 12/22/2020 09:30 AM      No results found for: SR       Microbiology Data: None      Imaging:   CXR 12/29: Persistent bilateral pulmonary nodules with interval development of right-sided  airspace disease/pneumonia. Assessment / Plan:     1. COVID 19+ w b/l infiltrates on CXR.  Maintaining O2 sats on RA, denies cough       Febrile w Tmax of 102.2F, normal Wbc on routine labs       Continue on Decadron, will leave on Zosyn due to concerns of aspiration PNA       Routine labs in the morning     2. SVT: Recurrent, HR in the 170s, seen by cardiology     3. H/o stage 4 esophageal Ca: On chemotherapy     4. Thrombocytopenia: May be from acute viral syndrome    5.  Other chronic problems per HPI       Zuleima Zuñiga MD     12/30/2020

## 2020-12-30 NOTE — CONSULTS
[facility] CARDIOLOGY  CARDIOLOGY CONSULTATION    REASON FOR CONSULT:  SVT    CHIEF COMPLAINT:  Palpitations    HISTORY OF PRESENT ILLNESS:  Mr. George Anguiano is a 76year-old male with past medical history significant for SVT, hypertension, current smoker, and esophageal cancer who presents yesterday for evaluation of SVT. He was recently discharged from [facility] for SVT, sent home on Diltiazem and metoprolol. He returned yesterday for palpitations while receiving chemotherapy. He was medicated with metoprolol 5mg ans diltiazem 15mg IVP and then started on Diltiazem drip. Labs and imaging reviewed, Magnesium 0.3, potassium 4.3, troponin negative X 1, CXR shows pulmonary nodules. On examination he chest pain, sob,palpitations, dizziness, swelling. He is COVID +    REVIEW OF SYSTEMS:  Per HPI above    Telemetry reviewed        Physical examination:  Vital signs are stable, Blood pressure 142/80,  Pulse 110  No apparent distress. Heart is tachycardic, rate and rhythm. Normal S1, S2, no murmurs are appreciated. Lungs are clear bilaterally. Abdomen is soft, nontender, normal bowel sounds. Extremities have no edema. Recent labs results and imaging reviewed. Notable findings include []. Discussed case with Dr. Breanna Alberts. This our impression and recommendation:    1. SVT: Currently rate controlled on Diltiazem drip at 10ml/hr. His rate is now in the 110s. Will continue Cardizem drip for now. Continue telemetry monitoring. Keep serum potassium between 4-5 and serum magnesium > 2. Magnesium 4gm repleted in ED. 2. Hypertnesion: Blood pressure acceptable. Continue medication regimen      Thank you for involving us in the care of this patient. Please do not hesitate to call if additional questions arise.

## 2020-12-31 ENCOUNTER — APPOINTMENT (OUTPATIENT)
Dept: RADIATION THERAPY | Age: 75
End: 2020-12-31
Payer: MEDICARE

## 2020-12-31 PROCEDURE — 99232 SBSQ HOSP IP/OBS MODERATE 35: CPT | Performed by: INTERNAL MEDICINE

## 2020-12-31 PROCEDURE — 74011250637 HC RX REV CODE- 250/637: Performed by: FAMILY MEDICINE

## 2020-12-31 PROCEDURE — 74011250636 HC RX REV CODE- 250/636: Performed by: FAMILY MEDICINE

## 2020-12-31 PROCEDURE — 65270000029 HC RM PRIVATE

## 2020-12-31 PROCEDURE — 74011000258 HC RX REV CODE- 258: Performed by: FAMILY MEDICINE

## 2020-12-31 RX ORDER — MIRTAZAPINE 15 MG/1
15 TABLET, FILM COATED ORAL
Status: DISCONTINUED | OUTPATIENT
Start: 2021-01-01 | End: 2020-12-31

## 2020-12-31 RX ORDER — MIRTAZAPINE 15 MG/1
7.5 TABLET, FILM COATED ORAL
Status: DISCONTINUED | OUTPATIENT
Start: 2020-12-31 | End: 2021-01-03 | Stop reason: HOSPADM

## 2020-12-31 RX ORDER — HYDROXYZINE HYDROCHLORIDE 25 MG/ML
12.5 INJECTION, SOLUTION INTRAMUSCULAR
Status: DISCONTINUED | OUTPATIENT
Start: 2020-12-31 | End: 2021-01-03 | Stop reason: HOSPADM

## 2020-12-31 RX ORDER — HALOPERIDOL 1 MG/1
1 TABLET ORAL
Status: DISCONTINUED | OUTPATIENT
Start: 2021-01-01 | End: 2021-01-03 | Stop reason: HOSPADM

## 2020-12-31 RX ADMIN — DEXAMETHASONE 4 MG: 4 TABLET ORAL at 22:11

## 2020-12-31 RX ADMIN — PIPERACILLIN AND TAZOBACTAM 3.38 G: 3; .375 INJECTION, POWDER, LYOPHILIZED, FOR SOLUTION INTRAVENOUS at 22:11

## 2020-12-31 RX ADMIN — DILTIAZEM HYDROCHLORIDE 60 MG: 30 TABLET, FILM COATED ORAL at 18:49

## 2020-12-31 RX ADMIN — TRAMADOL HYDROCHLORIDE 50 MG: 50 TABLET, COATED ORAL at 22:11

## 2020-12-31 RX ADMIN — DEXAMETHASONE 4 MG: 4 TABLET ORAL at 13:59

## 2020-12-31 RX ADMIN — DILTIAZEM HYDROCHLORIDE 60 MG: 30 TABLET, FILM COATED ORAL at 10:19

## 2020-12-31 RX ADMIN — FAMOTIDINE 20 MG: 20 TABLET, FILM COATED ORAL at 22:11

## 2020-12-31 RX ADMIN — HYDROXYZINE HYDROCHLORIDE 12.5 MG: 25 INJECTION, SOLUTION INTRAMUSCULAR at 13:54

## 2020-12-31 RX ADMIN — DILTIAZEM HYDROCHLORIDE 60 MG: 30 TABLET, FILM COATED ORAL at 22:11

## 2020-12-31 RX ADMIN — FOLIC ACID 1 MG: 1 TABLET ORAL at 10:19

## 2020-12-31 RX ADMIN — DEXAMETHASONE 4 MG: 4 TABLET ORAL at 07:19

## 2020-12-31 RX ADMIN — FAMOTIDINE 20 MG: 20 TABLET, FILM COATED ORAL at 10:19

## 2020-12-31 RX ADMIN — DIBASIC SODIUM PHOSPHATE, MONOBASIC POTASSIUM PHOSPHATE AND MONOBASIC SODIUM PHOSPHATE 1 TABLET: 852; 155; 130 TABLET ORAL at 01:14

## 2020-12-31 RX ADMIN — ENOXAPARIN SODIUM 40 MG: 40 INJECTION SUBCUTANEOUS at 10:19

## 2020-12-31 RX ADMIN — PIPERACILLIN AND TAZOBACTAM 3.38 G: 3; .375 INJECTION, POWDER, LYOPHILIZED, FOR SOLUTION INTRAVENOUS at 02:22

## 2020-12-31 RX ADMIN — THIAMINE HCL TAB 100 MG 100 MG: 100 TAB at 10:18

## 2020-12-31 RX ADMIN — METOPROLOL SUCCINATE 100 MG: 50 TABLET, EXTENDED RELEASE ORAL at 10:19

## 2020-12-31 RX ADMIN — DIBASIC SODIUM PHOSPHATE, MONOBASIC POTASSIUM PHOSPHATE AND MONOBASIC SODIUM PHOSPHATE 1 TABLET: 852; 155; 130 TABLET ORAL at 09:00

## 2020-12-31 NOTE — PROGRESS NOTES
Cardiology Progress Note    Patient seen and examined. This is a patient who is followed for SVT. No further arrhythmias on tele. Denies palpitation. No other complaints reported. Telemetry reviewed, SR PACs. Pertinent review of system items noted above, all other systems are negative. Current medications reviewed. Physical Examination  Visit Vitals  BP (!) 156/87 (BP 1 Location: Right arm, BP Patient Position: At rest)   Pulse 83   Temp 97.6 °F (36.4 °C)   Resp 20   Ht 5' 10\" (1.778 m)   Wt 62.2 kg (137 lb 2 oz)   SpO2 98%   BMI 19.68 kg/m²       No apparent distress. Heart is regular, rate and rhythm. Normal S1, S2, no murmurs are appreciated. Lungs are clear bilaterally. Abdomen is soft, nontender, normal bowel sounds. Extremities have no edema. Labs reviewed    Impression and Recommendations    1. SVT: No recurrence, continue dilt and toprol. Can change to long acting dilt. 2.  HTN:  Titrate as needed. Please do no hesitate to call if additional questions arise.

## 2020-12-31 NOTE — PROGRESS NOTES
Problem: Falls - Risk of  Goal: *Absence of Falls  Description: Document Miguel Angel Zambrano Fall Risk and appropriate interventions in the flowsheet.   Outcome: Progressing Towards Goal  Note: Fall Risk Interventions:  Mobility Interventions: Bed/chair exit alarm         Medication Interventions: Patient to call before getting OOB

## 2020-12-31 NOTE — PROGRESS NOTES
During admission, after removing his soiled gown, the patient asked where the old gown was. After informing him that it was in the soiled linen bag, he asked for the money that he placed in the pocket of the soiled gown. Upon retrieving it, I found a thick band of 20 dollar bills. I did not count the money, but am sure there was $200 or more. The patient would not allow me to lock it up for him, and he would not allow me to place it with his other belongings. After repeated warnings to him that he may loose it in the laundry, he persisted that he hold on to it. The money was last noted to be in his lap under the covers. Other night shift staff notified to prevent loss of money in soiled linens.

## 2020-12-31 NOTE — ROUTINE PROCESS
Admission skin assessment performed with Lorena Hall RN. Skin is warm, dry and intact. Nails are thick and dark. There is a PEG tube placed to the left upper side of abdomen, dressing placed.

## 2020-12-31 NOTE — PROGRESS NOTES
Infectious Disease Progress Note               Subjective:   Pt is confused and will like to be discharged home today. No acute event since last seen. Afebrile, hypertensive   Objective:   Physical Exam:     Visit Vitals  BP (!) 155/89 (BP 1 Location: Right arm, BP Patient Position: At rest)   Pulse 80   Temp 97.8 °F (36.6 °C)   Resp 20   Ht 5' 10\" (1.778 m)   Wt 137 lb 2 oz (62.2 kg)   SpO2 99%   BMI 19.68 kg/m²      O2 Device: Room air    Temp (24hrs), Av.8 °F (36.6 °C), Min:97.6 °F (36.4 °C), Max:98 °F (36.7 °C)    No intake/output data recorded.  1901 -  0700  In: 1483.3 [I.V.:1483.3]  Out: -     General: NAD, alert, confused   HEENT: ARELY, Moist mucosa   Lungs: CTA b/l   Heart: S1S2+, RRR, no murmur  Abdo: Soft, NT, ND, +BS   Exts: No edema, + pulses b/l   Skin: No wounds, No rashes or lesions      Data Review:       Recent Days:  Recent Labs     20  0830   WBC 4.3   HGB 10.8*   HCT 33.5*   PLT 94*     Recent Labs     20  0830 20  1740   BUN 15 18   CREA 0.63* 0.73       Lab Results   Component Value Date/Time    C-Reactive protein 1.62 (H) 2020 09:30 AM      Microbiology   Urine Cx : Neg     Diagnostics   CXR Results  (Last 48 hours)               20 2110  XR CHEST PORT Final result    Impression:  Impression:   Persistent bilateral pulmonary nodules with interval development of right-sided   airspace disease/pneumonia. Narrative:  Study: Chest radiograph(s), 1 view       Clinical Indication: Fever. Comparison: Chest radiograph dated 2020. Findings:       Unchanged positioning of a right internal jugular approach Port-A-Cath. An   esophageal stent remains in place. The cardiac silhouette is normal and unchanged in size. Multiple nodular opacities throughout both lungs are similar to prior. Interval   development of patchy opacities in the right upper lung and right perihilar   region.  No large pleural effusion. No discernible pneumothorax. Assessment/Plan     1. COVID 19+ w b/l infiltrates on CXR. Remains on RA, denies productive cough       Right airspace disease on CXR (12/29)       Afebrile w a normal WBC on most recent labs       Continue on Zosys for suspected aspiration PNA, and Decadron for COVID-19      Does not meet criteria for Remdesivir. Will consider Augmentin if unable to maintain IV access        2. SVT: Recurrent, HR in the 170s on presentation, better controlled      3. H/o stage 4 esophageal Ca: On chemotherapy      4.  Thrombocytopenia: May be from acute viral syndrome       Routine labs not done today       Re Perla MD    12/31/2020

## 2020-12-31 NOTE — PROGRESS NOTES
Comprehensive Nutrition Assessment    Type and Reason for Visit: Initial(Tube Feed)    Nutrition Recommendations/Plan:   Continue current cardiac, regular texture diet    Noted pt prefers liquids     Initiate TF via PEG, bolus feeds of TwoCal 240ml bolus q3hrs from 8AM-8PM (x12hrs, 4 feeds/day)              Flush with 240mL H20 after each feed   Provides 1920kcals, 80g protein, 1632ml fluid (meets > 100% needs)  *May do ad-roger feedings if PO intakes >50%     Please add PRN vs scheduled daily bowel regimen, pt with hx constipation     Nursing to document TF rate, flushes, GRV, BMs, WEEKLY wts, and GI s/s     Nutrition Assessment:  Admitted from cancer center d/t elevated HR, noted +COVID-19. Unable to to interview pt d/t isolation precautions and agitation this AM. RD spoke with RN who endorsed pt taking PO well, could take pills whole. RD notified of SLP rec and home regimen of full liquids for pleasure, will add TF orders but will keep PO diet for comfort. Will f/u for tolerance and continued need for nutrition interventions. Labs: Cr 0.63, , Corrected Ca 9.98, , Mg <0.3, Phos <0.5. Meds: Ns at 75ml/hr, Dexamethasone, diltiazem, pepcid, B9, vistaril, antiemetics, zosyn, B1. Malnutrition Assessment:  Malnutrition Status:  Severe malnutrition    Context:  Chronic illness     Findings of the 6 clinical characteristics of malnutrition:   Energy Intake:  Mild decrease in energy intake (specify)(TF at goal while inpatent, unclear recent home intakes)  Weight Loss:  7 - Greater than 5% over 1 month     Body Fat Loss:  (S) 7 - Severe body fat loss(needs new NFPE), Triceps   Muscle Mass Loss:  (S) 7 - Severe muscle mass loss(needs new NFPE), Calf (gastrocnemius)  Fluid Accumulation:  Unable to assess,      Estimated Daily Nutrient Needs:  Energy (kcal): 1866kcals (30kcals/kg); Weight Used for Energy Requirements: Current  Protein (g): 81g (1.3g/kg);  Weight Used for Protein Requirements: Current  Fluid (ml/day): 1866ml; Method Used for Fluid Requirements: 1 ml/kcal    Nutrition Related Findings:  NFPE from (10/26-11/11) admit finding severe fat and muscle wasting, will repeat NFPE as appropriate. No edema. No N/V/D/C per RN, last BM pta.  +dyshagia, cleared for liquids per SLP at aforementioned admit , PEG in place and used pta for sole source nutrition      Wounds:    None       Current Nutrition Therapies:  DIET CARDIAC Regular  DIET TUBE FEEDING TwoCal HN TwoCal, can give ad-roger if pt taking good PO. Pt prefers PO of liquids  Current Tube Feeding (TF) Orders:   · Goal TF & Flush Orders Provides: rec'd TF via PEG at goal of TwoCal bolus of 240ml 4x/d, flush with 240ml water after each feed; provides 1920kcals, 80g protein, 1632ml fluid    Anthropometric Measures:  · Height:  5' 10\" (177.8 cm)  · Current Body Wt:  62.2 kg (137 lb 2 oz)(12/31)  · Admission Body Wt:  137 lb 2 oz(12/31)    · Usual Body Wt:  79.4 kg (175 lb)(per pt in september 2020)     · Ideal Body Wt:  166 lbs:  82.6 %     · BMI Category:  Underweight (BMI less than 18.5)     Wt hx: 65kg (12/18), 63kg (11/10), 64.5kg (11/05), 65.9kg (11/03), 65.4kg (11/02),  64.6kg (10/31), 64kg(10/26)  At last admit pt reported 35lb wt loss (15.9kg) x1-2 months (19.9%, severe). Wt stability at last admit, pt endorsed wt gain during interview however not yet noticeable per measured wts.     Nutrition Diagnosis:   · Inadequate oral intake related to swallowing difficulty(2/2 esophageal cancer with obstructing mass) as evidenced by nutrition support-enteral nutrition      Nutrition Interventions:   Food and/or Nutrient Delivery: Continue current diet, Start tube feeding  Nutrition Education and Counseling: No recommendations at this time  Coordination of Nutrition Care: Continue to monitor while inpatient, Swallow evaluation    Goals:  Intake >75% EEN via PO and TF in > 5 days   Wt gain +/-0.5kg/wk         Nutrition Monitoring and Evaluation: Behavioral-Environmental Outcomes: None identified  Food/Nutrient Intake Outcomes: Food and nutrient intake, Diet advancement/tolerance, Enteral nutrition intake/tolerance  Physical Signs/Symptoms Outcomes: Weight, Chewing or swallowing, Nutrition focused physical findings    Discharge Planning:    Enteral nutrition     Electronically signed by Greta Francisco RD on 12/31/2020 at 3:03 PM    Contact: Ext [phone]

## 2020-12-31 NOTE — ROUTINE PROCESS
TRANSFER - OUT REPORT: 
 
Verbal report given to Rn Kailyn (name) on Aurelio Loza  being transferred to [facility](unit) for routine progression of care Report consisted of patients Situation, Background, Assessment and  
Recommendations(SBAR). Information from the following report(s) SBAR, Kardex, ED Summary, [facility] and Recent Results was reviewed with the receiving nurse. Lines:  
Venous Access Device Portacath 12/08/20 Upper chest (subclavicular area, right (Active) Peripheral IV 12/29/20 Left Antecubital (Active) Site Assessment Clean, dry, & intact 12/29/20 1921 Phlebitis Assessment 0 12/29/20 1921 Dressing Status Clean, dry, & intact 12/29/20 1921 Peripheral IV 12/30/20 Left; Lower Forearm (Active) Opportunity for questions and clarification was provided. Patient transported with: 
 Monitor

## 2020-12-31 NOTE — PROGRESS NOTES
General Daily Progress Note          Patient Name:   Kaleigh Olvera       YOB: 1945       Age:  76 y.o. Admit Date: 12/29/2020      Subjective:       Patient alert awake confused  Want to go home           Objective:     Visit Vitals  BP (!) 155/89 (BP 1 Location: Right arm, BP Patient Position: At rest)   Pulse 80   Temp 97.8 °F (36.6 °C)   Resp 20   Ht 5' 10\" (1.778 m)   Wt 62.2 kg (137 lb 2 oz)   SpO2 99%   BMI 19.68 kg/m²        No results found for this or any previous visit (from the past 24 hour(s)). [unfilled]      Review of Systems    Constitutional: Negative for chills and fever. HENT: Negative. Eyes: Negative. Respiratory: Negative. Cardiovascular: Negative. Gastrointestinal: Negative for abdominal pain and nausea. Skin: Negative. Neurological: Negative. Physical Exam:      Constitutional: Alert awake and confused  HENT:   Head: Normocephalic and atraumatic. Eyes: Pupils are equal, round, and reactive to light. EOM are normal.   Cardiovascular: Normal rate, regular rhythm and normal heart sounds. Pulmonary/Chest: Breath sounds normal. No wheezes. No rales. Exhibits no tenderness. Abdominal: Soft. Bowel sounds are normal. There is no abdominal tenderness. There is no rebound and no guarding. Musculoskeletal: Normal range of motion. Neurological: pt is alert and oriented to person, place, and time. XR CHEST PORT   Final Result   Impression:   Persistent bilateral pulmonary nodules with interval development of right-sided   airspace disease/pneumonia. No results found for this or any previous visit (from the past 24 hour(s)).     Results     Procedure Component Value Units Date/Time    CULTURE, URINE [[id number]] Collected: 12/30/20 0000    Order Status: Completed Specimen: Urine Updated: 12/31/20 0904    CULTURE, BLOOD, LINE [[id number]]     Order Status: Sent Specimen: Blood     INFLUENZA A & B AG (RAPID TEST) [[id number]] Collected: 12/29/20 2341    Order Status: Completed Specimen: Nasopharyngeal from Nasal washing Updated: 12/30/20 0203     Influenza A Antigen Negative        Influenza B Antigen Negative       COVID-19 RAPID TEST [[id number]]  (Abnormal) Collected: 12/29/20 1815    Order Status: Completed Specimen: Nasopharyngeal Updated: 12/29/20 1925     Specimen source Nasopharyngeal        COVID-19 rapid test DETECTED        Comment: Rapid Abbott ID Now   The specimen is POSITIVE for SARS-CoV-2, the novel coronavirus associated with COVID-19. This test has been authorized by the FDA under an Emergency Use Authorization (EUA) for use by authorized laboratories. Fact sheet for Healthcare Providers: [employer] Fact sheet for Patients: [employer]   Methodology: Isothermal Nucleic Acid Amplification Results verified, phoned to and [email or web address], URINE [[id number]] Collected: 12/21/20 1640    Order Status: Completed Specimen: Urine Updated: 12/23/20 0954     Special Requests: No Special Requests        Mereta Count 20,000        Mereta Count colonies/ml        Culture result:       Mixed urogenital marielle isolated          CULTURE, BLOOD [[id number]] Collected: 12/21/20 0845    Order Status: Completed Specimen: Blood Updated: 12/28/20 0707     Special Requests: No Special Requests        Culture result: No growth 6 days              Labs:     Recent Labs     12/30/20  0830   WBC 4.3   HGB 10.8*   HCT 33.5*   PLT 94*     Recent Labs     12/30/20  0830 12/29/20  1740    137   K 4.0 5.3*   * 109*   CO2 23 21   BUN 15 18   CREA 0.63* 0.73   * 122*   CA 8.3* 8.9   MG  --  <0.3*   PHOS  --  <0.5*     Recent Labs     12/30/20  0830 12/29/20  1740   ALT 59 66   AP 71 79   TBILI 0.8 0.9   TP 6.6 7.8   ALB 1.9* 2.1*   GLOB 4.7* 5.7*     No results for input(s): INR, PTP, APTT, INREXT in the last 72 hours.    No results for input(s): FE, TIBC, PSAT, FERR in the last 72 hours. No results found for: FOL, RBCF   No results for input(s): PH, PCO2, PO2 in the last 72 hours. Recent Labs     12/29/20  1740   TROIQ <0.05     No results found for: CHOL, CHOLX, CHLST, CHOLV, HDL, HDLP, LDL, LDLC, DLDLP, TGLX, TRIGL, TRIGP, CHHD, CHHDX  Lab Results   Component Value Date/Time    Glucose (POC) 105 (H) 11/10/2020 09:02 PM    Glucose (POC) 100 11/10/2020 05:18 PM    Glucose (POC) 101 (H) 11/10/2020 12:37 PM    Glucose (POC) 104 (H) 11/10/2020 07:28 AM    Glucose (POC) 103 (H) 11/09/2020 04:15 PM     Lab Results   Component Value Date/Time    Color Yellow/Straw 12/21/2020 04:40 PM    Appearance Clear 12/21/2020 04:40 PM    Specific gravity 1.011 12/21/2020 04:40 PM    pH (UA) 6.0 12/21/2020 04:40 PM    Protein Negative 12/21/2020 04:40 PM    Glucose Negative 12/21/2020 04:40 PM    Ketone Negative 12/21/2020 04:40 PM    Bilirubin Negative 12/21/2020 04:40 PM    Urobilinogen 4.0 (H) 12/21/2020 04:40 PM    Nitrites Negative 12/21/2020 04:40 PM    Leukocyte Esterase Negative 12/21/2020 04:40 PM    Epithelial cells Few 12/21/2020 04:40 PM    Bacteria Negative 12/21/2020 04:40 PM    WBC 0-4 12/21/2020 04:40 PM    RBC 0-5 12/21/2020 04:40 PM         Assessment:   Acute metabolic encephalopathy  Acute SVT  COVID-19 infection  Hypomagnesia and hypophosphatemia  Cardiomyopathy ejection fraction about 45%  Stage IV esophageal carcinoma status post radiation   static post PEG tube placement  Fever      Plan:     Patient on Decadron on diltiazem Pepcid metoprolol on IV Zosyn  .   Repeat the labs  Vistaril 25 mg IM every 6 hours as needed for restless and agitation  Ordered ammonia level  And psychiatrist consult        Current Facility-Administered Medications:     hydrOXYzine (VISTARIL) 25 mg/mL injection 12.5 mg, 12.5 mg, IntraMUSCular, Q6H PRN, Anabela Rosado MD    phosphorus (K PHOS NEUTRAL) 250 mg tablet 1 Tab, 1 Tab, Oral, BID, Ty Rosado MD, 1 Tab at 76/70/73 5153    folic acid (FOLVITE) tablet 1 mg, 1 mg, Oral, DAILY, Anabela Rosado MD, 1 mg at 12/31/20 1019    thiamine mononitrate (B-1) tablet 100 mg, 100 mg, Oral, DAILY, Anabela Rosado MD, 100 mg at 12/31/20 1018    ondansetron hcl (ZOFRAN) tablet 4 mg, 4 mg, Oral, Q8H PRN, Maty Rosado MD    dilTIAZem IR (CARDIZEM) tablet 60 mg, 60 mg, Oral, TID, Maty Rosado MD, 60 mg at 12/31/20 1019    metoprolol succinate (TOPROL-XL) XL tablet 100 mg, 100 mg, Oral, DAILY, Anabela Rosado MD, 100 mg at 12/31/20 1019    famotidine (PEPCID) tablet 20 mg, 20 mg, Oral, BID, Anabela Rosado MD, 20 mg at 12/31/20 1019    dexAMETHasone (DECADRON) tablet 4 mg, 4 mg, Oral, Q6H, Anabela Rosado MD, 4 mg at 12/31/20 0719    traMADoL (ULTRAM) tablet 50 mg, 50 mg, Oral, Q6H PRN, Maty Rosado MD    0.9% sodium chloride infusion, 75 mL/hr, IntraVENous, CONTINUOUS, Anabela Rosado MD, Last Rate: 75 mL/hr at 12/30/20 0831, 75 mL/hr at 12/30/20 0831    acetaminophen (TYLENOL) tablet 650 mg, 650 mg, Oral, Q6H PRN **OR** acetaminophen (TYLENOL) suppository 650 mg, 650 mg, Rectal, Q6H PRN, Maty Rosado MD    polyethylene glycol (MIRALAX) packet 17 g, 17 g, Oral, DAILY PRN, Maty Rosado MD    ondansetron (ZOFRAN ODT) tablet 4 mg, 4 mg, Oral, Q8H PRN **OR** ondansetron (ZOFRAN) injection 4 mg, 4 mg, IntraVENous, Q6H PRN, Anabela Rosado MD    enoxaparin (LOVENOX) injection 40 mg, 40 mg, SubCUTAneous, DAILY, Anabela Rosado MD, 40 mg at 12/31/20 1019    piperacillin-tazobactam (ZOSYN) 3.375 g in 0.9% sodium chloride (MBP/ADV) 100 mL MBP, 3.375 g, IntraVENous, Q8H, Anabela Rosado MD, Last Rate: 200 mL/hr at 12/31/20 1020, 3.375 g at 12/31/20 1020

## 2020-12-31 NOTE — PROGRESS NOTES
CM attempted to complete assessment. Patient is currently confused, walking into the hallways, attempting to go home. Attempted to reach [facility] listed Denicegianniwaqas Vazquez at [phone]. No answer, voice message left requesting returned call.

## 2021-01-01 LAB
ALBUMIN SERPL-MCNC: 2.1 G/DL (ref 3.5–5)
ALBUMIN/GLOB SERPL: 0.4 {RATIO} (ref 1.1–2.2)
ALP SERPL-CCNC: 88 U/L (ref 45–117)
ALT SERPL-CCNC: 70 U/L (ref 12–78)
ANION GAP SERPL CALC-SCNC: 9 MMOL/L (ref 5–15)
AST SERPL W P-5'-P-CCNC: 93 U/L (ref 15–37)
BACTERIA SPEC CULT: NORMAL
BILIRUB SERPL-MCNC: 0.5 MG/DL (ref 0.2–1)
BUN SERPL-MCNC: 15 MG/DL (ref 6–20)
BUN/CREAT SERPL: 27 (ref 12–20)
CA-I BLD-MCNC: 8.9 MG/DL (ref 8.5–10.1)
CHLORIDE SERPL-SCNC: 106 MMOL/L (ref 97–108)
CO2 SERPL-SCNC: 23 MMOL/L (ref 21–32)
CREAT SERPL-MCNC: 0.56 MG/DL (ref 0.7–1.3)
ERYTHROCYTE [DISTWIDTH] IN BLOOD BY AUTOMATED COUNT: 13.1 % (ref 11.5–14.5)
GLOBULIN SER CALC-MCNC: 4.9 G/DL (ref 2–4)
GLUCOSE SERPL-MCNC: 172 MG/DL (ref 65–100)
HCT VFR BLD AUTO: 32.7 % (ref 36.6–50.3)
HGB BLD-MCNC: 10.9 G/DL (ref 12.1–17)
MCH RBC QN AUTO: 32 PG (ref 26–34)
MCHC RBC AUTO-ENTMCNC: 33.3 G/DL (ref 30–36.5)
MCV RBC AUTO: 95.9 FL (ref 80–99)
PLATELET # BLD AUTO: 74 K/UL (ref 150–400)
PMV BLD AUTO: 12.9 FL (ref 8.9–12.9)
POTASSIUM SERPL-SCNC: 3.3 MMOL/L (ref 3.5–5.1)
PROT SERPL-MCNC: 7 G/DL (ref 6.4–8.2)
RBC # BLD AUTO: 3.41 M/UL (ref 4.1–5.7)
SODIUM SERPL-SCNC: 138 MMOL/L (ref 136–145)
SPECIAL REQUESTS,SREQ: NORMAL
WBC # BLD AUTO: 6.1 K/UL (ref 4.1–11.1)

## 2021-01-01 PROCEDURE — 74011250637 HC RX REV CODE- 250/637: Performed by: INTERNAL MEDICINE

## 2021-01-01 PROCEDURE — 74011250636 HC RX REV CODE- 250/636: Performed by: FAMILY MEDICINE

## 2021-01-01 PROCEDURE — 74011000258 HC RX REV CODE- 258: Performed by: FAMILY MEDICINE

## 2021-01-01 PROCEDURE — 74011250637 HC RX REV CODE- 250/637: Performed by: FAMILY MEDICINE

## 2021-01-01 PROCEDURE — 80053 COMPREHEN METABOLIC PANEL: CPT

## 2021-01-01 PROCEDURE — 65270000029 HC RM PRIVATE

## 2021-01-01 PROCEDURE — 36415 COLL VENOUS BLD VENIPUNCTURE: CPT

## 2021-01-01 PROCEDURE — 99232 SBSQ HOSP IP/OBS MODERATE 35: CPT | Performed by: INTERNAL MEDICINE

## 2021-01-01 PROCEDURE — 85027 COMPLETE CBC AUTOMATED: CPT

## 2021-01-01 PROCEDURE — 74011250637 HC RX REV CODE- 250/637: Performed by: PSYCHIATRY & NEUROLOGY

## 2021-01-01 RX ORDER — DEXAMETHASONE 4 MG/1
4 TABLET ORAL EVERY 6 HOURS
Status: DISCONTINUED | OUTPATIENT
Start: 2021-01-01 | End: 2021-01-03 | Stop reason: HOSPADM

## 2021-01-01 RX ORDER — DILTIAZEM HYDROCHLORIDE 120 MG/1
240 CAPSULE, COATED, EXTENDED RELEASE ORAL DAILY
Status: DISCONTINUED | OUTPATIENT
Start: 2021-01-01 | End: 2021-01-03 | Stop reason: HOSPADM

## 2021-01-01 RX ADMIN — DEXAMETHASONE 4 MG: 4 TABLET ORAL at 17:49

## 2021-01-01 RX ADMIN — PIPERACILLIN SODIUM AND TAZOBACTAM SODIUM 3.38 G: 3; .375 INJECTION, POWDER, LYOPHILIZED, FOR SOLUTION INTRAVENOUS at 21:00

## 2021-01-01 RX ADMIN — MIRTAZAPINE 7.5 MG: 15 TABLET, FILM COATED ORAL at 21:00

## 2021-01-01 RX ADMIN — PIPERACILLIN SODIUM AND TAZOBACTAM SODIUM 3.38 G: 3; .375 INJECTION, POWDER, LYOPHILIZED, FOR SOLUTION INTRAVENOUS at 13:08

## 2021-01-01 RX ADMIN — DEXAMETHASONE 4 MG: 4 TABLET ORAL at 05:26

## 2021-01-01 RX ADMIN — METOPROLOL SUCCINATE 100 MG: 50 TABLET, EXTENDED RELEASE ORAL at 10:15

## 2021-01-01 RX ADMIN — ENOXAPARIN SODIUM 40 MG: 40 INJECTION SUBCUTANEOUS at 10:15

## 2021-01-01 RX ADMIN — PIPERACILLIN SODIUM AND TAZOBACTAM SODIUM 3.38 G: 3; .375 INJECTION, POWDER, LYOPHILIZED, FOR SOLUTION INTRAVENOUS at 05:26

## 2021-01-01 RX ADMIN — FAMOTIDINE 20 MG: 20 TABLET, FILM COATED ORAL at 10:15

## 2021-01-01 RX ADMIN — HALOPERIDOL 1 MG: 1 TABLET ORAL at 21:00

## 2021-01-01 RX ADMIN — FOLIC ACID 1 MG: 1 TABLET ORAL at 10:15

## 2021-01-01 RX ADMIN — DILTIAZEM HYDROCHLORIDE 240 MG: 120 CAPSULE, COATED, EXTENDED RELEASE ORAL at 13:08

## 2021-01-01 RX ADMIN — DILTIAZEM HYDROCHLORIDE 60 MG: 30 TABLET, FILM COATED ORAL at 10:15

## 2021-01-01 RX ADMIN — THIAMINE HCL TAB 100 MG 100 MG: 100 TAB at 10:15

## 2021-01-01 RX ADMIN — FAMOTIDINE 20 MG: 20 TABLET, FILM COATED ORAL at 21:00

## 2021-01-01 RX ADMIN — DEXAMETHASONE 4 MG: 4 TABLET ORAL at 13:08

## 2021-01-01 NOTE — PROGRESS NOTES
Infectious Disease Progress Note           Subjective:   Much less confused, subjectively better, 1 sitter at bedside. Denies new complaints   Objective:   Physical Exam:     Visit Vitals  BP (!) 185/87 (BP 1 Location: Right arm, BP Patient Position: At rest)   Pulse 80   Temp 97.2 °F (36.2 °C)   Resp 20   Ht 5' 10\" (1.778 m)   Wt 137 lb 2 oz (62.2 kg)   SpO2 97%   BMI 19.68 kg/m²      O2 Device: Room air    Temp (24hrs), Av.3 °F (36.3 °C), Min:96.2 °F (35.7 °C), Max:98.1 °F (36.7 °C)    No intake/output data recorded. No intake/output data recorded. General: NAD, alert, confused   HEENT: ARELY, Moist mucosa   Lungs: CTA b/l, no wheeze/rhonchi    Heart: S1S2+, RRR, no murmur  Abdo: Soft, NT, ND, +BS   Exts: No edema, + pulses b/l   Skin: No wounds, No rashes or lesions    Data Review:       Recent Days:  Recent Labs     21  1310 20  0830   WBC 6.1 4.3   HGB 10.9* 10.8*   HCT 32.7* 33.5*   PLT 74* 94*     Recent Labs     21  1310 20  0830 20  1740   BUN 15 15 18   CREA 0.56* 0.63* 0.73       Lab Results   Component Value Date/Time    C-Reactive protein 1.62 (H) 2020 09:30 AM      Microbiology   Urine Cx : Neg     Diagnostics   CXR Results  (Last 48 hours)    None         Assessment/Plan     1. COVID 19+ w b/l airspace disease on CXR       Denies productive cough, maintaining O2 sat on RA      Afebrile, WBC WNLs       On day # 3 of Zosyn and decadron       May transition to Augmentin and steroid taper upon d/c         2. SVT: Recurrent, HR in the 170s on presentation      HR better controlled since admit      3. H/o stage 4 esophageal Ca: On chemotherapy      4.  Thrombocytopenia: ?From acute illness, trending down     Laureano Mendes MD    2021

## 2021-01-01 NOTE — CONSULTS
[address] [facility]    Name:  Mike Ramon  MR#:  [id number]  :  1945  ACCOUNT #:  [de-identified]  DATE OF SERVICE:  2020      PSYCHIATRIC CONSULTATION    REASON FOR CONSULT:  Psychosis. I saw the patient, chart reviewed, spoke with the nursing staff. This is a 70-year-old [facility] American gentleman admitted to medical inpatient from the ED while he was getting chemotherapy infusion, had palpitation and SVT. Was treated before and he is also COVID positive, on droplet precautions. The patient's conversation is a little bit rambling, difficulty expressing, disorganized. He did point to his throat and chest and says its cancer. Says he sees a cancer doctor, it sounds like, in   He lives with his girlfriend. Currently not working. 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 He used to smoke cigarettes and drink alcohol but not now. The patient denied any depression, hallucination but there is certain degree of perplexity, confusion noted. Difficulty expressing   Could not tell if he had any prior psychiatric treatment or not. Currently, he is not getting psychiatric treatment. CURRENT MEDICATIONS:  Decadron, p.r.n. Tylenol or hydroxyzine, Zofran, tramadol. VITAL SIGNS:  Today, blood pressure 161/91. LABORATORY DATA:  Ammonia ordered, not resulted yet. Metabolic panel on :  Elevated potassium 5.3, chloride 109, glucose 122, BUN elevated at 25. AST elevated at 133, albumin 2.1, globulin 5.7, lactic acid 1.8. Troponin 0.05. Magnesium 0.3. It is repleted. Phosphorus 0.5. COVID detected. The patient states he only has 11th grade education. He may be somewhat intellectually limited or maybe changes secondary to pathology. IMAGING STUDIES:  Showed findings consistent with esophageal cancer. MENTAL STATUS:  The patient is standing, alert, verbal, polite, very flat affect. Interested in going home, but not much forceful. Apparently, he was asking to go home. The patient knew this is Thursday, December 31, 2020, and tonight is the first of the New Year. No clear hallucination, delusion, or paranoia, but speech was really hard to understand, perplexed, he is confused and has difficulty expressing. Insight, he does have some rudimentary understanding of his cancer. He is able to describe    DIAGNOSIS:  Cancer of esophagus and other related lesion.   The patient does show some perplexity, confusion, rambling  and recommend low-dose Haldol 1 mg at night       MD FABIANA [email or web address]  D:  12/31/2020 22:57  T:  01/01/2021 4:48  JOB #:  [id number]

## 2021-01-01 NOTE — PROGRESS NOTES
Bedside and Verbal shift change report given to Honey Llanes RN (oncoming nurse) by Negro Peraza RN (offgoing nurse). Report included the following information SBAR, Recent Results and Cardiac Rhythm NSR.

## 2021-01-01 NOTE — PROGRESS NOTES
Problem: Falls - Risk of  Goal: *Absence of Falls  Description: Document Leatha Morrison Fall Risk and appropriate interventions in the flowsheet. Outcome: Progressing Towards Goal  Note: Fall Risk Interventions:  Mobility Interventions: Bed/chair exit alarm         Medication Interventions: Patient to call before getting OOB                   Problem: Pressure Injury - Risk of  Goal: *Prevention of pressure injury  Description: Document Robb Scale and appropriate interventions in the flowsheet. Outcome: Progressing Towards Goal  Note: Pressure Injury Interventions:  Sensory Interventions: Assess changes in LOC, Turn and reposition approx. every two hours (pillows and wedges if needed), Pad between skin to skin, Monitor skin under medical devices, Minimize linen layers, Maintain/enhance activity level, Keep linens dry and wrinkle-free, Float heels, Discuss PT/OT consult with provider    Moisture Interventions: Absorbent underpads, Apply protective barrier, creams and emollients, Moisture barrier, Minimize layers, Maintain skin hydration (lotion/cream), Check for incontinence Q2 hours and as needed    Activity Interventions: Increase time out of bed, PT/OT evaluation    Mobility Interventions: PT/OT evaluation, Turn and reposition approx.  every two hours(pillow and wedges), HOB 30 degrees or less, Float heels    Nutrition Interventions: Document food/fluid/supplement intake, Offer support with meals,snacks and hydration    Friction and Shear Interventions: Apply protective barrier, creams and emollients, Feet elevated on foot rest, Foam dressings/transparent film/skin sealants, HOB 30 degrees or less, Minimize layers, Transferring/repositioning devices

## 2021-01-01 NOTE — PROGRESS NOTES
General Daily Progress Note          Patient Name:   Heather Postal       YOB: 1945       Age:  76 y.o. Admit Date: 12/29/2020      Subjective:       Patient alert awake confused  Less restless           Objective:     Visit Vitals  /78 (BP 1 Location: Right arm, BP Patient Position: At rest)   Pulse 76   Temp 98.1 °F (36.7 °C)   Resp 20   Ht 5' 10\" (1.778 m)   Wt 62.2 kg (137 lb 2 oz)   SpO2 97%   BMI 19.68 kg/m²        No results found for this or any previous visit (from the past 24 hour(s)). [unfilled]      Review of Systems    Constitutional: Negative for chills and fever. HENT: Negative. Eyes: Negative. Respiratory: Negative. Cardiovascular: Negative. Gastrointestinal: Negative for abdominal pain and nausea. Skin: Negative. Neurological: Negative. Physical Exam:      Constitutional: Alert awake and confused  HENT:   Head: Normocephalic and atraumatic. Eyes: Pupils are equal, round, and reactive to light. EOM are normal.   Cardiovascular: Normal rate, regular rhythm and normal heart sounds. Pulmonary/Chest: Breath sounds normal. No wheezes. No rales. Exhibits no tenderness. Abdominal: Soft. Bowel sounds are normal. There is no abdominal tenderness. There is no rebound and no guarding. Musculoskeletal: Normal range of motion. Neurological: pt is alert and oriented to person, place, and time. XR CHEST PORT   Final Result   Impression:   Persistent bilateral pulmonary nodules with interval development of right-sided   airspace disease/pneumonia. No results found for this or any previous visit (from the past 24 hour(s)).     Results     Procedure Component Value Units Date/Time    CULTURE, URINE [[id number]] Collected: 12/30/20 0000    Order Status: Completed Specimen: Urine Updated: 01/01/21 1050     Special Requests: No Special Requests        Culture result: No Growth (<1000 cfu/mL)       CULTURE, BLOOD, LINE [[id number]] Order Status: Sent Specimen: Blood     INFLUENZA A & B AG (RAPID TEST) [[id number]] Collected: 12/29/20 2341    Order Status: Completed Specimen: Nasopharyngeal from Nasal washing Updated: 12/30/20 0203     Influenza A Antigen Negative        Influenza B Antigen Negative       COVID-19 RAPID TEST [[id number]]  (Abnormal) Collected: 12/29/20 1815    Order Status: Completed Specimen: Nasopharyngeal Updated: 12/29/20 1925     Specimen source Nasopharyngeal        COVID-19 rapid test DETECTED        Comment: Rapid Abbott ID Now   The specimen is POSITIVE for SARS-CoV-2, the novel coronavirus associated with COVID-19. This test has been authorized by the FDA under an Emergency Use Authorization (EUA) for use by authorized laboratories.    Fact sheet for Healthcare Providers: ConventionGazeHawkdate.co.nz Fact sheet for Patients: ConventionUpdate.co.nz   Methodology: Isothermal Nucleic Acid Amplification Results verified, phoned to and read back by [email or web address]       CULTURE, URINE [[id number]] Collected: 12/21/20 1640    Order Status: Completed Specimen: Urine Updated: 12/23/20 0954     Special Requests: No Special Requests        [place] Count 20,000        Naples Count colonies/ml        Culture result:       Mixed urogenital marielle isolated          CULTURE, BLOOD [[id number]] Collected: 12/21/20 0845    Order Status: Completed Specimen: Blood Updated: 12/28/20 0707     Special Requests: No Special Requests        Culture result: No growth 6 days              Labs:     Recent Labs     12/30/20  0830   WBC 4.3   HGB 10.8*   HCT 33.5*   PLT 94*     Recent Labs     12/30/20  0830 12/29/20  1740    137   K 4.0 5.3*   * 109*   CO2 23 21   BUN 15 18   CREA 0.63* 0.73   * 122*   CA 8.3* 8.9   MG  --  <0.3*   PHOS  --  <0.5*     Recent Labs     12/30/20  0830 12/29/20  1740   ALT 59 66   AP 71 79   TBILI 0.8 0.9   TP 6.6 7.8   ALB 1.9* 2.1*   GLOB 4.7* 5.7*     No results for input(s): INR, PTP, APTT, INREXT, INREXT in the last 72 hours. No results for input(s): FE, TIBC, PSAT, FERR in the last 72 hours. No results found for: FOL, RBCF   No results for input(s): PH, PCO2, PO2 in the last 72 hours. Recent Labs     12/29/20  1740   TROIQ <0.05     No results found for: CHOL, CHOLX, CHLST, CHOLV, HDL, HDLP, LDL, LDLC, DLDLP, TGLX, TRIGL, TRIGP, CHHD, CHHDX  Lab Results   Component Value Date/Time    Glucose (POC) 105 (H) 11/10/2020 09:02 PM    Glucose (POC) 100 11/10/2020 05:18 PM    Glucose (POC) 101 (H) 11/10/2020 12:37 PM    Glucose (POC) 104 (H) 11/10/2020 07:28 AM    Glucose (POC) 103 (H) 11/09/2020 04:15 PM     Lab Results   Component Value Date/Time    Color Yellow/Straw 12/21/2020 04:40 PM    Appearance Clear 12/21/2020 04:40 PM    Specific gravity 1.011 12/21/2020 04:40 PM    pH (UA) 6.0 12/21/2020 04:40 PM    Protein Negative 12/21/2020 04:40 PM    Glucose Negative 12/21/2020 04:40 PM    Ketone Negative 12/21/2020 04:40 PM    Bilirubin Negative 12/21/2020 04:40 PM    Urobilinogen 4.0 (H) 12/21/2020 04:40 PM    Nitrites Negative 12/21/2020 04:40 PM    Leukocyte Esterase Negative 12/21/2020 04:40 PM    Epithelial cells Few 12/21/2020 04:40 PM    Bacteria Negative 12/21/2020 04:40 PM    WBC 0-4 12/21/2020 04:40 PM    RBC 0-5 12/21/2020 04:40 PM         Assessment:   Acute metabolic encephalopathy  Acute SVT  COVID-19 infection  Hypomagnesia and hypophosphatemia  Cardiomyopathy ejection fraction about 45%  Stage IV esophageal carcinoma status post radiation   static post PEG tube placement  Fever      Plan:     Patient on Decadron on diltiazem Pepcid metoprolol on IV Zosyn  .   Repeat the labs  Vistaril 25 mg IM every 6 hours as needed for restless and agitation  Ordered ammonia level  And psychiatrist consult  Started on Haldol by the psychiatrist        Current Facility-Administered Medications:     dexAMETHasone (DECADRON) tablet 4 mg, 4 mg, Oral, Q6H, Shruthi Rosado MD, 4 mg at 01/01/21 1308    piperacillin-tazobactam (ZOSYN) 3.375 g in 0.9% sodium chloride (MBP/ADV) 100 mL MBP, 3.375 g, IntraVENous, Q8H, Anabela Rosado MD, Last Rate: 25 mL/hr at 01/01/21 1308, 3.375 g at 01/01/21 1308    dilTIAZem ER (CARDIZEM CD) capsule 240 mg, 240 mg, Oral, DAILY, Waylon ROLDAN MD, 240 mg at 01/01/21 1308    hydrOXYzine (VISTARIL) 25 mg/mL injection 12.5 mg, 12.5 mg, IntraMUSCular, Q6H PRN, Anabela Rosado MD, 12.5 mg at 12/31/20 1354    haloperidoL (HALDOL) tablet 1 mg, 1 mg, Oral, QHS, Mitesh Tsang MD    mirtazapine (REMERON) tablet 7.5 mg, 7.5 mg, Oral, QHS, Anabela Rosado MD    folic acid (FOLVITE) tablet 1 mg, 1 mg, Oral, DAILY, Anabela Rosado MD, 1 mg at 01/01/21 1015    thiamine mononitrate (B-1) tablet 100 mg, 100 mg, Oral, DAILY, Anabela Rosado MD, 100 mg at 01/01/21 1015    ondansetron hcl (ZOFRAN) tablet 4 mg, 4 mg, Oral, Q8H PRN, Denise Rosado MD    metoprolol succinate (TOPROL-XL) XL tablet 100 mg, 100 mg, Oral, DAILY, Anabela Rosado MD, 100 mg at 01/01/21 1015    famotidine (PEPCID) tablet 20 mg, 20 mg, Oral, BID, Anabela Rosado MD, 20 mg at 01/01/21 1015    traMADoL (ULTRAM) tablet 50 mg, 50 mg, Oral, Q6H PRN, Anabela Rosado MD, 50 mg at 12/31/20 2211    acetaminophen (TYLENOL) tablet 650 mg, 650 mg, Oral, Q6H PRN **OR** acetaminophen (TYLENOL) suppository 650 mg, 650 mg, Rectal, Q6H PRN, Denise Rosado MD    polyethylene glycol (MIRALAX) packet 17 g, 17 g, Oral, DAILY PRN, Denise Rosado MD    ondansetron (ZOFRAN ODT) tablet 4 mg, 4 mg, Oral, Q8H PRN **OR** ondansetron (ZOFRAN) injection 4 mg, 4 mg, IntraVENous, Q6H PRN, Anabela Rosado MD    enoxaparin (LOVENOX) injection 40 mg, 40 mg, SubCUTAneous, DAILY, Anabela Rosado MD, 40 mg at 01/01/21 1013

## 2021-01-01 NOTE — PROGRESS NOTES
Cardiology Progress Note    Patient seen and examined. This is a patient who is followed for SVT. Seems to be doing better. No palpitations. No chest pain or shortness of breath. No other complaints reported. Telemetry reviewed, remains in sinus rhythm. No further SVT appreciated. Pertinent review of system items noted above, all other systems are negative. Current medications reviewed. Physical Examination  Visit Vitals  BP (!) 148/104 (BP 1 Location: Right arm, BP Patient Position: At rest)   Pulse 69   Temp 98.1 °F (36.7 °C)   Resp 20   Ht 5' 10\" (1.778 m)   Wt 62.2 kg (137 lb 2 oz)   SpO2 97%   BMI 19.68 kg/m²       No apparent distress. Heart is regular, rate and rhythm. Normal S1, S2, no murmurs are appreciated. Lungs are clear bilaterally. Abdomen is soft, nontender, normal bowel sounds. Extremities have no edema. Labs reviewed    Impression and Recommendations    1. SVT:  No recurrence, continue diltiazem and toprol. Switching to long acting diltiazem. 2.  Hypertension: We will increase diltiazem dose to provide better control. Please do no hesitate to call if additional questions arise.

## 2021-01-02 PROCEDURE — 74011250637 HC RX REV CODE- 250/637: Performed by: FAMILY MEDICINE

## 2021-01-02 PROCEDURE — 74011250636 HC RX REV CODE- 250/636: Performed by: FAMILY MEDICINE

## 2021-01-02 PROCEDURE — 74011250637 HC RX REV CODE- 250/637: Performed by: NURSE PRACTITIONER

## 2021-01-02 PROCEDURE — 74011250637 HC RX REV CODE- 250/637: Performed by: INTERNAL MEDICINE

## 2021-01-02 PROCEDURE — 74011000258 HC RX REV CODE- 258: Performed by: FAMILY MEDICINE

## 2021-01-02 PROCEDURE — 74011250637 HC RX REV CODE- 250/637: Performed by: PSYCHIATRY & NEUROLOGY

## 2021-01-02 PROCEDURE — 65270000029 HC RM PRIVATE

## 2021-01-02 RX ORDER — POTASSIUM CHLORIDE 20 MEQ/1
40 TABLET, EXTENDED RELEASE ORAL ONCE
Status: COMPLETED | OUTPATIENT
Start: 2021-01-02 | End: 2021-01-02

## 2021-01-02 RX ADMIN — POTASSIUM CHLORIDE 40 MEQ: 1500 TABLET, EXTENDED RELEASE ORAL at 13:00

## 2021-01-02 RX ADMIN — METOPROLOL SUCCINATE 100 MG: 50 TABLET, EXTENDED RELEASE ORAL at 09:38

## 2021-01-02 RX ADMIN — ENOXAPARIN SODIUM 40 MG: 40 INJECTION SUBCUTANEOUS at 09:39

## 2021-01-02 RX ADMIN — TRAMADOL HYDROCHLORIDE 50 MG: 50 TABLET, COATED ORAL at 21:34

## 2021-01-02 RX ADMIN — DEXAMETHASONE 4 MG: 4 TABLET ORAL at 11:25

## 2021-01-02 RX ADMIN — PIPERACILLIN SODIUM AND TAZOBACTAM SODIUM 3.38 G: 3; .375 INJECTION, POWDER, LYOPHILIZED, FOR SOLUTION INTRAVENOUS at 21:34

## 2021-01-02 RX ADMIN — THIAMINE HCL TAB 100 MG 100 MG: 100 TAB at 09:39

## 2021-01-02 RX ADMIN — DEXAMETHASONE 4 MG: 4 TABLET ORAL at 00:53

## 2021-01-02 RX ADMIN — ACETAMINOPHEN 650 MG: 325 TABLET, FILM COATED ORAL at 14:01

## 2021-01-02 RX ADMIN — PIPERACILLIN SODIUM AND TAZOBACTAM SODIUM 3.38 G: 3; .375 INJECTION, POWDER, LYOPHILIZED, FOR SOLUTION INTRAVENOUS at 05:28

## 2021-01-02 RX ADMIN — DEXAMETHASONE 4 MG: 4 TABLET ORAL at 05:28

## 2021-01-02 RX ADMIN — DEXAMETHASONE 4 MG: 4 TABLET ORAL at 17:04

## 2021-01-02 RX ADMIN — HALOPERIDOL 1 MG: 1 TABLET ORAL at 21:34

## 2021-01-02 RX ADMIN — FAMOTIDINE 20 MG: 20 TABLET, FILM COATED ORAL at 09:39

## 2021-01-02 RX ADMIN — MIRTAZAPINE 7.5 MG: 15 TABLET, FILM COATED ORAL at 21:34

## 2021-01-02 RX ADMIN — FOLIC ACID 1 MG: 1 TABLET ORAL at 09:39

## 2021-01-02 RX ADMIN — DILTIAZEM HYDROCHLORIDE 240 MG: 120 CAPSULE, COATED, EXTENDED RELEASE ORAL at 09:39

## 2021-01-02 RX ADMIN — FAMOTIDINE 20 MG: 20 TABLET, FILM COATED ORAL at 21:34

## 2021-01-02 RX ADMIN — PIPERACILLIN SODIUM AND TAZOBACTAM SODIUM 3.38 G: 3; .375 INJECTION, POWDER, LYOPHILIZED, FOR SOLUTION INTRAVENOUS at 14:00

## 2021-01-02 NOTE — PROGRESS NOTES
Problem: Falls - Risk of  Goal: *Absence of Falls  Description: Document Earma Xavier Fall Risk and appropriate interventions in the flowsheet. Outcome: Progressing Towards Goal  Note: Fall Risk Interventions:  Mobility Interventions: Bed/chair exit alarm, PT Consult for mobility concerns, Strengthening exercises (ROM-active/passive), Utilize walker, cane, or other assistive device    Mentation Interventions: Adequate sleep, hydration, pain control, Bed/chair exit alarm, Door open when patient unattended, Evaluate medications/consider consulting pharmacy, Toileting rounds, Room close to nurse's station, Reorient patient, More frequent rounding    Medication Interventions: Bed/chair exit alarm, Patient to call before getting OOB, Teach patient to arise slowly    Elimination Interventions: Bed/chair exit alarm, Call light in reach, Patient to call for help with toileting needs, Toileting schedule/hourly rounds    History of Falls Interventions: Bed/chair exit alarm, Door open when patient unattended, Investigate reason for fall, Evaluate medications/consider consulting pharmacy, Vital signs minimum Q4HRs X 24 hrs (comment for end date), Room close to nurse's station         Problem: Pressure Injury - Risk of  Goal: *Prevention of pressure injury  Description: Document Robb Scale and appropriate interventions in the flowsheet.   Outcome: Progressing Towards Goal  Note: Pressure Injury Interventions:  Sensory Interventions: Assess changes in LOC, Float heels, Keep linens dry and wrinkle-free, Maintain/enhance activity level, Minimize linen layers, Monitor skin under medical devices    Moisture Interventions: Absorbent underpads, Apply protective barrier, creams and emollients, Moisture barrier, Minimize layers, Maintain skin hydration (lotion/cream)    Activity Interventions: Increase time out of bed, PT/OT evaluation    Mobility Interventions: Float heels, HOB 30 degrees or less, PT/OT evaluation    Nutrition Interventions: Document food/fluid/supplement intake, Offer support with meals,snacks and hydration    Friction and Shear Interventions: Minimize layers, Feet elevated on foot rest

## 2021-01-02 NOTE — PROGRESS NOTES
Progress Note  Date:2021       Room:[facility]  Patient Kenan Smith     YOB: 1945     Age:75 y.o. Subjective    Subjective:  Symptoms:  Stable. Diet:  Adequate intake. Activity level: Normal.    Pain:  He reports no pain. Review of Systems   Constitutional: Negative. HENT: Negative. Respiratory: Negative. Cardiovascular: Negative. Gastrointestinal: Negative. Genitourinary: Negative. Musculoskeletal: Negative. Neurological: Negative. Objective         Vitals Last 24 Hours:  TEMPERATURE:  Temp  Av.3 °F (36.3 °C)  Min: 96.2 °F (35.7 °C)  Max: 97.9 °F (36.6 °C)  RESPIRATIONS RANGE: Resp  Av.4  Min: 16  Max: 20  PULSE OXIMETRY RANGE: SpO2  Av %  Min: 96 %  Max: 98 %  PULSE RANGE: Pulse  Av.6  Min: 72  Max: 80  BLOOD PRESSURE RANGE: Systolic (32RZB), WSA:016 , Min:138 , OQE:628   ; Diastolic (67RVR), SAQIB:28, Min:72, Max:96    I/O (24Hr): No intake or output data in the 24 hours ending 21 1236  Objective:  General Appearance:  Comfortable, in no acute distress and not in pain. Vital signs: (most recent): Blood pressure (!) 155/96, pulse 74, temperature 97.7 °F (36.5 °C), resp. rate 18, height 5' 10\" (1.778 m), weight 62.2 kg (137 lb 2 oz), SpO2 96 %. Vital signs are normal.    Output: Producing urine and producing stool. HEENT: Normal HEENT exam.    Lungs:  Normal effort and normal respiratory rate. Breath sounds clear to auscultation. Heart: Normal rate. Regular rhythm. S2 normal.    Abdomen: Abdomen is soft. (Peg tube). Bowel sounds are normal.   There is no abdominal tenderness. Extremities: Normal range of motion. Pulses: Distal pulses are intact. Neurological: Patient is alert. Pupils:  Pupils are equal, round, and reactive to light. Skin:  Warm and dry.       Labs/Imaging/Diagnostics    Labs:  CBC:  Recent Labs     21  1310   WBC 6.1   RBC 3.41*   HGB 10.9* HCT 32.7*   MCV 95.9   RDW 13.1   PLT 74*     CHEMISTRIES:  Recent Labs     01/01/21  1310      K 3.3*      CO2 23   BUN 15   CA 8.9   PT/INR:No results for input(s): INR, INREXT in the last 72 hours. No lab exists for component: PROTIME  APTT:No results for input(s): APTT in the last 72 hours. LIVER PROFILE:  Recent Labs     01/01/21  1310   AST 93*   ALT 70     Lab Results   Component Value Date/Time    ALT (SGPT) 70 01/01/2021 01:10 PM    AST (SGOT) 93 (H) 01/01/2021 01:10 PM    Alk. phosphatase 88 01/01/2021 01:10 PM    Bilirubin, total 0.5 01/01/2021 01:10 PM       Imaging Last 24 Hours:  No results found. Assessment//Plan   Active Problems:    Hypomagnesemia (12/29/2020)      SVT (supraventricular tachycardia) ([facility] (12/29/2020)      COVID-19 (12/29/2020)      Assessment:  (Hypokalemia replace potassium  Acute metabolic encephalopathy  Acute SVT resolved cardiology signed off  COVID-19 infection  Hypomagnesia and hypophosphatemia  Cardiomyopathy ejection fraction about 45%  Stage IV esophageal carcinoma status post radiation   static post PEG tube placement  Fever resolved   ). Plan:   (Repeat labs in am  Continue current regimen  Plan possible discharge in am   Case discussed with Dr. Monica Briscoe).        Electronically signed by Jennifer Santillan NP on 1/2/2021 at 12:36 PM

## 2021-01-03 VITALS
WEIGHT: 137.13 LBS | BODY MASS INDEX: 19.63 KG/M2 | SYSTOLIC BLOOD PRESSURE: 151 MMHG | TEMPERATURE: 97.4 F | HEART RATE: 65 BPM | DIASTOLIC BLOOD PRESSURE: 81 MMHG | OXYGEN SATURATION: 98 % | RESPIRATION RATE: 16 BRPM | HEIGHT: 70 IN

## 2021-01-03 LAB
ANION GAP SERPL CALC-SCNC: 9 MMOL/L (ref 5–15)
BASOPHILS # BLD: 0 K/UL (ref 0–0.1)
BASOPHILS NFR BLD: 0 % (ref 0–1)
BUN SERPL-MCNC: 14 MG/DL (ref 6–20)
BUN/CREAT SERPL: 23 (ref 12–20)
CA-I BLD-MCNC: 9 MG/DL (ref 8.5–10.1)
CHLORIDE SERPL-SCNC: 102 MMOL/L (ref 97–108)
CO2 SERPL-SCNC: 24 MMOL/L (ref 21–32)
CREAT SERPL-MCNC: 0.6 MG/DL (ref 0.7–1.3)
DIFFERENTIAL METHOD BLD: ABNORMAL
EOSINOPHIL # BLD: 0 K/UL (ref 0–0.4)
EOSINOPHIL NFR BLD: 0 % (ref 0–7)
ERYTHROCYTE [DISTWIDTH] IN BLOOD BY AUTOMATED COUNT: 12.9 % (ref 11.5–14.5)
GLUCOSE SERPL-MCNC: 166 MG/DL (ref 65–100)
HCT VFR BLD AUTO: 34.8 % (ref 36.6–50.3)
HGB BLD-MCNC: 11.8 G/DL (ref 12.1–17)
IMM GRANULOCYTES # BLD AUTO: 0 K/UL (ref 0–0.04)
IMM GRANULOCYTES NFR BLD AUTO: 1 % (ref 0–0.5)
LYMPHOCYTES # BLD: 0.4 K/UL (ref 0.8–3.5)
LYMPHOCYTES NFR BLD: 6 % (ref 12–49)
MCH RBC QN AUTO: 32.2 PG (ref 26–34)
MCHC RBC AUTO-ENTMCNC: 33.9 G/DL (ref 30–36.5)
MCV RBC AUTO: 94.8 FL (ref 80–99)
MONOCYTES # BLD: 0.2 K/UL (ref 0–1)
MONOCYTES NFR BLD: 3 % (ref 5–13)
NEUTS SEG # BLD: 5.4 K/UL (ref 1.8–8)
NEUTS SEG NFR BLD: 90 % (ref 32–75)
PLATELET # BLD AUTO: 90 K/UL (ref 150–400)
PMV BLD AUTO: 12.4 FL (ref 8.9–12.9)
POTASSIUM SERPL-SCNC: 3.5 MMOL/L (ref 3.5–5.1)
RBC # BLD AUTO: 3.67 M/UL (ref 4.1–5.7)
SODIUM SERPL-SCNC: 135 MMOL/L (ref 136–145)
WBC # BLD AUTO: 5.9 K/UL (ref 4.1–11.1)

## 2021-01-03 PROCEDURE — 36415 COLL VENOUS BLD VENIPUNCTURE: CPT

## 2021-01-03 PROCEDURE — 74011250637 HC RX REV CODE- 250/637: Performed by: FAMILY MEDICINE

## 2021-01-03 PROCEDURE — 74011250637 HC RX REV CODE- 250/637: Performed by: INTERNAL MEDICINE

## 2021-01-03 PROCEDURE — 85025 COMPLETE CBC W/AUTO DIFF WBC: CPT

## 2021-01-03 PROCEDURE — 74011000258 HC RX REV CODE- 258: Performed by: FAMILY MEDICINE

## 2021-01-03 PROCEDURE — 74011250636 HC RX REV CODE- 250/636: Performed by: FAMILY MEDICINE

## 2021-01-03 PROCEDURE — 80048 BASIC METABOLIC PNL TOTAL CA: CPT

## 2021-01-03 RX ORDER — METOPROLOL SUCCINATE 100 MG/1
100 TABLET, EXTENDED RELEASE ORAL DAILY
Qty: 30 TAB | Refills: 0 | Status: SHIPPED | OUTPATIENT
Start: 2021-01-04

## 2021-01-03 RX ORDER — AMOXICILLIN AND CLAVULANATE POTASSIUM 875; 125 MG/1; MG/1
1 TABLET, FILM COATED ORAL EVERY 12 HOURS
Qty: 14 TAB | Refills: 0 | Status: SHIPPED | OUTPATIENT
Start: 2021-01-03 | End: 2021-01-16

## 2021-01-03 RX ORDER — DILTIAZEM HYDROCHLORIDE 240 MG/1
240 CAPSULE, COATED, EXTENDED RELEASE ORAL DAILY
Qty: 30 CAP | Refills: 0 | Status: SHIPPED | OUTPATIENT
Start: 2021-01-04

## 2021-01-03 RX ORDER — MIRTAZAPINE 7.5 MG/1
7.5 TABLET, FILM COATED ORAL
Qty: 30 TAB | Refills: 0 | Status: SHIPPED | OUTPATIENT
Start: 2021-01-03

## 2021-01-03 RX ORDER — HEPARIN 100 UNIT/ML
300 SYRINGE INTRAVENOUS AS NEEDED
Status: DISCONTINUED | OUTPATIENT
Start: 2021-01-03 | End: 2021-01-03 | Stop reason: HOSPADM

## 2021-01-03 RX ADMIN — PIPERACILLIN SODIUM AND TAZOBACTAM SODIUM 3.38 G: 3; .375 INJECTION, POWDER, LYOPHILIZED, FOR SOLUTION INTRAVENOUS at 05:21

## 2021-01-03 RX ADMIN — METOPROLOL SUCCINATE 100 MG: 50 TABLET, EXTENDED RELEASE ORAL at 09:00

## 2021-01-03 RX ADMIN — THIAMINE HCL TAB 100 MG 100 MG: 100 TAB at 09:00

## 2021-01-03 RX ADMIN — DEXAMETHASONE 4 MG: 4 TABLET ORAL at 01:00

## 2021-01-03 RX ADMIN — DEXAMETHASONE 4 MG: 4 TABLET ORAL at 05:21

## 2021-01-03 RX ADMIN — DILTIAZEM HYDROCHLORIDE 240 MG: 120 CAPSULE, COATED, EXTENDED RELEASE ORAL at 09:00

## 2021-01-03 RX ADMIN — ENOXAPARIN SODIUM 40 MG: 40 INJECTION SUBCUTANEOUS at 09:00

## 2021-01-03 RX ADMIN — FAMOTIDINE 20 MG: 20 TABLET, FILM COATED ORAL at 09:00

## 2021-01-03 RX ADMIN — TRAMADOL HYDROCHLORIDE 50 MG: 50 TABLET, COATED ORAL at 05:21

## 2021-01-03 RX ADMIN — FOLIC ACID 1 MG: 1 TABLET ORAL at 09:00

## 2021-01-03 NOTE — DISCHARGE SUMMARY
Admit date: 12/29/2020   Admitting Provider: Horace Spatz, MD    Discharge date: 1/3/2021  Discharging Provider: Josefa Bahena NP      * Admission Diagnoses: SVT (supraventricular tachycardia) ([facility] [I47.1]  COVID-19 [U07.1]  Hypomagnesemia [E83.42]    * Discharge Diagnoses:    Hospital Problems as of 1/3/2021 Date Reviewed: 10/27/2020          Codes Class Noted - Resolved POA    Hypomagnesemia ICD-10-CM: E83.42  ICD-9-CM: 275.2  12/29/2020 - Present Unknown        [facility] ICD-10-CM: I47.1  ICD-9-CM: 427.89  12/29/2020 - Present Unknown        COVID-19 ICD-10-CM: U07.1  ICD-9-CM: 079.89  12/29/2020 - Present Unknown              * Hospital Course:   51-year-old male with past medical history of stage IV esophageal cancer with radiation history of PEG tube placement was recently discharged from the hospital with SVT on diazepam and metoprolol. Patient returned with complaints of palpitation EKG showed SVT had received Cardizem and started on Cardizem drip. Patient had spiked a temp and was recently positive for Covid. Patient was noted to have hypomagnesia and hypophosphatemia. Patient was seen by cardiology switch to long-acting Cardizem and Toprol. Patient was placed on Zosyn and Decadron for Covid and seen by infectious disease who recommended Augmentin and steroid taper on discharge. Patient remained stable SVT resolved and patient was discharged home to follow-up with PCP. * Procedures:   * No surgery found *      Consults: Cardiology and ID    Significant Diagnostic Studies: labs:     Discharge Exam:  Visit Vitals  BP (!) 151/81   Pulse 65   Temp 97.4 °F (36.3 °C)   Resp 16   Ht 5' 10\" (1.778 m)   Wt 62.2 kg (137 lb 2 oz)   SpO2 98%   BMI 19.68 kg/m²     General:  Alert, cooperative, no distress, appears stated age. Head:  Normocephalic, without obvious abnormality, atraumatic. Eyes:  Conjunctivae/corneas clear. PERRL, EOMs intact.  Fundi benign Throat: Lips, mucosa, and tongue normal. Teeth and gums normal.   Neck: Supple, symmetrical, trachea midline, no adenopathy, thyroid: no enlargement/tenderness/nodules, no carotid bruit and no JVD. Back:   Symmetric, no curvature. ROM normal. No CVA tenderness. Lungs:   Clear to auscultation bilaterally. Chest wall:  No tenderness or deformity. Heart:  Regular rate and rhythm, S1, S2 normal, no murmur, click, rub or gallop. Abdomen:   Soft, non-tender. Bowel sounds normal. No masses,  No organomegaly. Extremities: Extremities normal, atraumatic, no cyanosis or edema. Pulses: 2+ and symmetric all extremities. Skin: Skin color, texture, turgor normal. No rashes or lesions       Neurologic: CNII-XII intact. Normal strength, sensation and reflexes throughout. * Discharge Condition: stable  * Disposition: Home    Discharge Medications:  Current Discharge Medication List      START taking these medications    Details   dilTIAZem ER (CARDIZEM CD) 240 mg capsule Take 1 Cap by mouth daily. Qty: 30 Cap, Refills: 0      mirtazapine (REMERON) 7.5 mg tablet Take 1 Tab by mouth nightly. Qty: 30 Tab, Refills: 0      amoxicillin-clavulanate (Augmentin) 875-125 mg per tablet Take 1 Tab by mouth every twelve (12) hours. Qty: 14 Tab, Refills: 0      prednisoLONE 5 mg (21 tabs) DsPk Use as directed  Qty: 21 Tab, Refills: 0         CONTINUE these medications which have CHANGED    Details   !! metoprolol succinate (TOPROL-XL) 100 mg tablet Take 1 Tab by mouth daily. Indications: paroxysmal supraventricular tachycardia  Qty: 30 Tab, Refills: 0       !! - Potential duplicate medications found. Please discuss with provider. CONTINUE these medications which have NOT CHANGED    Details   !! metoprolol succinate (TOPROL-XL) 100 mg tablet Take 1 Tab by mouth daily for 30 days.  Indications: paroxysmal supraventricular tachycardia  Qty: 30 Tab, Refills: 0      ondansetron hcl (Zofran) 4 mg tablet Take 4 mg by mouth every eight (8) hours as needed for Nausea or Vomiting. HYDROcodone-acetaminophen (NORCO) 5-325 mg per tablet Take 1 Tab by mouth every eight (8) hours as needed for Pain. dexAMETHasone (DECADRON) 4 mg tablet Take  by mouth every twelve (12) hours. Indications: Night before Chemo      traMADoL (ULTRAM) 50 mg tablet Take 50 mg by mouth every six (6) hours as needed for Pain.      famotidine (PEPCID) 20 mg tablet Take 1 Tab by mouth two (2) times a day. Qty: 30 Tab, Refills: 1      folic acid (FOLVITE) 1 mg tablet Take 1 Tab by mouth daily. Qty: 30 Tab, Refills: 1      thiamine mononitrate (B-1) 100 mg tablet Take 1 Tab by mouth daily. Qty: 30 Tab, Refills: 1       !! - Potential duplicate medications found. Please discuss with provider. STOP taking these medications       dilTIAZem IR (CARDIZEM) 60 mg tablet Comments:   Reason for Stopping:         metoprolol tartrate (LOPRESSOR) 25 mg tablet Comments:   Reason for Stopping:               * Follow-up Care/Patient Instructions:   Activity: Activity as tolerated  Diet: Regular Diet  Wound Care: None needed    Follow-up Information     Follow up With Specialties Details Why Contact Info    Howard aBrriga MD Cardiology, Internal Medicine   [address] [phone]            Signed:  Jesenia Hernandez NP  1/3/2021  11:33 AM

## 2021-01-03 NOTE — MANAGEMENT PLAN
Chart reviewed for post hospital care needs. Pt DC Home/Self Care. No case management intervention at this time.

## 2021-01-03 NOTE — DISCHARGE INSTRUCTIONS
Patient Education   Patient Education        Learning About Coronavirus (SFSJC-46)  Coronavirus [phone]): Overview  What is coronavirus (WNLSZ-15)? The coronavirus disease (COVID-19) is caused by a virus. It is an illness that was first found in December 2019. It has since spread worldwide. The virus can cause fever, cough, and trouble breathing. In severe cases, it can cause pneumonia and make it hard to breathe without help. It can cause death. This virus spreads person-to-person through droplets from coughing and sneezing. It can also spread when you are close to someone who is infected. And it can spread when you touch something that has the virus on it, such as a doorknob or a tabletop. Coronaviruses are a large group of viruses. They cause the common cold. They also cause more serious illnesses like [place] respiratory syndrome (MERS) and severe acute respiratory syndrome (SARS). COVID-19 is caused by a novel coronavirus. That means it's a new type that has not been seen in people before. How is COVID-19 treated? Mild illness can be treated at home, but more serious illness needs to be treated in the hospital. Treatment may include medicines to reduce symptoms, plus breathing support such as oxygen therapy or a ventilator. Other treatments, such as antiviral medicines, may help people who have COVID-19. What can you do to protect yourself from COVID-19? The best way to protect yourself from getting sick is to:  · Avoid areas where there is an outbreak. · Avoid contact with people who may be infected. · Avoid crowds and try to stay at least 6 feet away from other people. · Wash your hands often, especially after you cough or sneeze. Use soap and water, and scrub for at least 20 seconds. If soap and water aren't available, use an alcohol-based hand . · Avoid touching your mouth, nose, and eyes. What can you do to avoid spreading the virus to others?   To help avoid spreading the virus to others:  · Wash your hands often with soap or alcohol-based hand sanitizers. · Cover your mouth with a tissue when you cough or sneeze. Then throw the tissue in the trash. · Use a disinfectant to clean things that you touch often. These include doorknobs, remote controls, phones, and handles on your refrigerator and microwave. And don't forget countertops, tabletops, bathrooms, and computer keyboards. · Wear a cloth face cover if you have to go to public areas. If you know or suspect that you have COVID-19:  · Stay home. Don't go to school, work, or public areas. And don't use public transportation, ride-shares, or taxis unless you have no choice. · Leave your home only if you need to get medical care or testing. But call the doctor's office first so they know you're coming. And wear a face cover. · Limit contact with people in your home. If possible, stay in a separate bedroom and use a separate bathroom. · Wear a face cover whenever you're around other people. It can help stop the spread of the virus when you cough or sneeze. · Clean and disinfect your home every day. Use household  and disinfectant wipes or sprays. Take special care to clean things that you grab with your hands. · Self-isolate until it's safe to be around others again. ? If you have symptoms, it's safe when you haven't had a fever for 3 days and your symptoms have improved and it's been at least 10 days since your symptoms started. ? If you were exposed to the virus but don't have symptoms, it's safe to be around others 14 days after exposure. ? Talk to your doctor about whether you also need testing, especially if you have a weakened immune system. When to call for help  Call 911 anytime you think you may need emergency care. For example, call if:  · You have severe trouble breathing. (You can't talk at all.)  · You have constant chest pain or pressure. · You are severely dizzy or lightheaded.   · You are confused or can't think clearly. · Your face and lips have a blue color. · You passed out (lost consciousness) or are very hard to wake up. Call your doctor now if you develop symptoms such as:  · Shortness of breath. · Fever. · Cough. If you need to get care, call ahead to the doctor's office for instructions before you go. Make sure you wear a face cover to prevent exposing other people to the virus. Where can you get the latest information? The following health organizations are tracking and studying this virus. Their websites contain the most up-to-date information. Dang Prakash also learn what to do if you think you may have been exposed to the virus. · U.[place]. Centers for Disease Control and Prevention (CDC): The CDC provides updated news about the disease and travel advice. The website also tells you how to prevent the spread of infection. www.cdc.gov  · World Health Organization [facility]: WHO offers information about the virus outbreaks. WHO also has travel advice. www.who.int  Current as of: July 10, 2020               Content Version: 12.6  © 2006-2020 [employer]. Care instructions adapted under license by [employer] (which disclaims liability or warranty for this information). If you have questions about a medical condition or this instruction, always ask your healthcare professional. [address] any warranty or liability for your use of this information. COVID-19 Antibody Test: About This Test  What is it? An antibody test looks for antibodies in the blood. These are proteins that your immune system makes, usually after you're exposed to germs like viruses or bacteria or after you get a vaccine. Antibodies work to fight illness. A COVID-19 antibody test looks for antibodies to SARS-CoV-2, the virus that causes COVID-19. If you test positive for these antibodies, it could mean that you already had COVID-19. Why is it done?   This test can be used to diagnose a past infection with the virus that causes COVID-19. Many people who get COVID-19 never have symptoms or have only mild ones. Without antibody testing, these people might never know that they already had the virus. Antibody testing is important because:  · It could show who has already had COVID-19. These people might be safe to go back to work or school or to travel. · It could show who hasn't had the infection. These people are still at risk. They need to keep taking steps to avoid the virus. · It helps experts who are tracking COVID-19 learn more about the virus and how it spreads. How do you prepare for the test?  You don't need to do anything to prepare for this test. But be sure to follow any instructions your health care provider gives you. How is it done? This is a blood test. A health professional may prick your finger or use a needle to take a sample of blood from your arm. What do your results mean? The result is either positive or negative. A positive result means antibodies to SARS-CoV-2 were found. You probably already had COVID-19. But:  · You could get a \"false-positive\" result. The test might show that you have COVID-19 antibodies when you don't. The test may find antibodies that formed in response to another type of coronavirus. If this happens, you're still at risk for [id number]-[id number].  · It's not certain that having these antibodies will protect you from getting COVID-19 again. And if it does, it's not clear how long the protection lasts. A negative result means that these antibodies were not found. You probably haven't had COVID-19. But:  · You could get a \"false-negative\" result. It takes a while after you're infected for your immune system to make antibodies. You could have a negative result but be infected now. You'd need a different test (viral test) to know if you have COVID-19 now. Current as of: July 10, 2020               Content Version: 12.6  © 5456-0757 [employer]. Care instructions adapted under license by [employer] (which disclaims liability or warranty for this information). If you have questions about a medical condition or this instruction, always ask your healthcare professional. [address] any warranty or liability for your use of this information.

## 2021-01-03 NOTE — PROGRESS NOTES
Discharged to home self care. Vital signs are stable at this time. Chest port catheter flushed with 300 units of heparin and de- accessed, catheter intact upon removal. Patient wheeled down in wheelchair to private vehicle, left facility accompanied by son. Patient stated he had a cell phone and a watch with him during his stay. I had personally looked in the patients room for these

## 2021-01-03 NOTE — PROGRESS NOTES
Problem: Falls - Risk of  Goal: *Absence of Falls  Description: Document Leatha Morrison Fall Risk and appropriate interventions in the flowsheet. Outcome: Progressing Towards Goal  Note: Fall Risk Interventions:  Mobility Interventions: Bed/chair exit alarm, PT Consult for mobility concerns, Strengthening exercises (ROM-active/passive), Utilize walker, cane, or other assistive device    Mentation Interventions: Adequate sleep, hydration, pain control, Bed/chair exit alarm, Door open when patient unattended, Evaluate medications/consider consulting pharmacy, Toileting rounds, Room close to nurse's station, Reorient patient, More frequent rounding    Medication Interventions: Bed/chair exit alarm, Patient to call before getting OOB, Teach patient to arise slowly    Elimination Interventions: Bed/chair exit alarm, Call light in reach, Patient to call for help with toileting needs    History of Falls Interventions: Bed/chair exit alarm, Room close to nurse's station         Problem: Pressure Injury - Risk of  Goal: *Prevention of pressure injury  Description: Document Robb Scale and appropriate interventions in the flowsheet.   Outcome: Progressing Towards Goal  Note: Pressure Injury Interventions:  Sensory Interventions: Assess changes in LOC, Discuss PT/OT consult with provider, Float heels, Keep linens dry and wrinkle-free, Maintain/enhance activity level, Minimize linen layers, Monitor skin under medical devices    Moisture Interventions: Apply protective barrier, creams and emollients, Absorbent underpads, Moisture barrier, Minimize layers, Maintain skin hydration (lotion/cream)    Activity Interventions: Increase time out of bed, PT/OT evaluation    Mobility Interventions: PT/OT evaluation    Nutrition Interventions: Document food/fluid/supplement intake, Offer support with meals,snacks and hydration    Friction and Shear Interventions: Apply protective barrier, creams and emollients, Minimize layers

## 2021-01-04 ENCOUNTER — APPOINTMENT (OUTPATIENT)
Dept: RADIATION THERAPY | Age: 76
End: 2021-01-04

## 2021-01-05 ENCOUNTER — APPOINTMENT (OUTPATIENT)
Dept: RADIATION THERAPY | Age: 76
End: 2021-01-05

## 2021-01-06 ENCOUNTER — APPOINTMENT (OUTPATIENT)
Dept: RADIATION THERAPY | Age: 76
End: 2021-01-06

## 2021-01-07 ENCOUNTER — APPOINTMENT (OUTPATIENT)
Dept: RADIATION THERAPY | Age: 76
End: 2021-01-07

## 2021-01-08 ENCOUNTER — APPOINTMENT (OUTPATIENT)
Dept: RADIATION THERAPY | Age: 76
End: 2021-01-08

## 2021-01-09 ENCOUNTER — APPOINTMENT (OUTPATIENT)
Dept: CT IMAGING | Age: 76
DRG: 177 | End: 2021-01-09
Attending: INTERNAL MEDICINE
Payer: MEDICARE

## 2021-01-09 ENCOUNTER — HOSPITAL ENCOUNTER (INPATIENT)
Age: 76
LOS: 7 days | Discharge: HOME | DRG: 177 | End: 2021-01-16
Attending: INTERNAL MEDICINE | Admitting: HOSPITALIST
Payer: MEDICARE

## 2021-01-09 ENCOUNTER — APPOINTMENT (OUTPATIENT)
Dept: GENERAL RADIOLOGY | Age: 76
DRG: 177 | End: 2021-01-09
Attending: INTERNAL MEDICINE
Payer: MEDICARE

## 2021-01-09 DIAGNOSIS — J96.01 ACUTE RESPIRATORY FAILURE WITH HYPOXIA (HCC): Primary | ICD-10-CM

## 2021-01-09 LAB
ALBUMIN SERPL-MCNC: 1.8 G/DL (ref 3.5–5)
ALBUMIN/GLOB SERPL: 0.3 {RATIO} (ref 1.1–2.2)
ALP SERPL-CCNC: 109 U/L (ref 45–117)
ALT SERPL-CCNC: 59 U/L (ref 12–78)
ANION GAP SERPL CALC-SCNC: 8 MMOL/L (ref 5–15)
ARTERIAL PATENCY WRIST A: ABNORMAL
AST SERPL W P-5'-P-CCNC: 88 U/L (ref 15–37)
BASE DEFICIT BLDA-SCNC: 0.8 MMOL/L (ref 0–2)
BASOPHILS # BLD: 0 K/UL (ref 0–0.1)
BASOPHILS NFR BLD: 0 % (ref 0–1)
BDY SITE: ABNORMAL
BILIRUB SERPL-MCNC: 0.9 MG/DL (ref 0.2–1)
BUN SERPL-MCNC: 43 MG/DL (ref 6–20)
BUN/CREAT SERPL: 34 (ref 12–20)
CA-I BLD-MCNC: 9 MG/DL (ref 8.5–10.1)
CHLORIDE SERPL-SCNC: 115 MMOL/L (ref 97–108)
CK SERPL-CCNC: 91 NG/ML (ref 39–308)
CO2 SERPL-SCNC: 22 MMOL/L (ref 21–32)
COVID-19 RAPID TEST, COVR: POSITIVE
CREAT SERPL-MCNC: 1.25 MG/DL (ref 0.7–1.3)
CRP SERPL-MCNC: 12.2 MG/DL (ref 0–0.6)
D DIMER PPP FEU-MCNC: 17.18 UG/ML(FEU)
DIFFERENTIAL METHOD BLD: ABNORMAL
EOSINOPHIL # BLD: 0 K/UL (ref 0–0.4)
EOSINOPHIL NFR BLD: 0 % (ref 0–7)
ERYTHROCYTE [DISTWIDTH] IN BLOOD BY AUTOMATED COUNT: 14.6 % (ref 11.5–14.5)
FIO2 ON VENT: 21 %
GLOBULIN SER CALC-MCNC: 5.5 G/DL (ref 2–4)
GLUCOSE SERPL-MCNC: 158 MG/DL (ref 65–100)
HCO3 BLDA-SCNC: 24 MMOL/L (ref 22–26)
HCT VFR BLD AUTO: 40.6 % (ref 36.6–50.3)
HGB BLD-MCNC: 13.1 G/DL (ref 12.1–17)
IMM GRANULOCYTES # BLD AUTO: 0.1 K/UL (ref 0–0.04)
IMM GRANULOCYTES NFR BLD AUTO: 1 % (ref 0–0.5)
LACTATE SERPL-SCNC: 2.2 MMOL/L (ref 0.4–2)
LYMPHOCYTES # BLD: 0.6 K/UL (ref 0.8–3.5)
LYMPHOCYTES NFR BLD: 6 % (ref 12–49)
MCH RBC QN AUTO: 31.3 PG (ref 26–34)
MCHC RBC AUTO-ENTMCNC: 32.3 G/DL (ref 30–36.5)
MCV RBC AUTO: 97.1 FL (ref 80–99)
MONOCYTES # BLD: 0.4 K/UL (ref 0–1)
MONOCYTES NFR BLD: 5 % (ref 5–13)
NEUTS SEG # BLD: 7.8 K/UL (ref 1.8–8)
NEUTS SEG NFR BLD: 88 % (ref 32–75)
NRBC # BLD: 0.08 K/UL (ref 0–0.01)
NRBC BLD-RTO: 0.9 PER 100 WBC
PCO2 BLDA: 27 MMHG (ref 35–45)
PH BLDA: 7.5 [PH] (ref 7.35–7.45)
PLATELET # BLD AUTO: 68 K/UL (ref 150–400)
PO2 BLDA: 70 MMHG (ref 75–100)
POTASSIUM SERPL-SCNC: 3.9 MMOL/L (ref 3.5–5.1)
PROCALCITONIN SERPL-MCNC: 1.51 NG/ML
PROT SERPL-MCNC: 7.3 G/DL (ref 6.4–8.2)
RBC # BLD AUTO: 4.18 M/UL (ref 4.1–5.7)
SAO2 % BLD: 95 %
SAO2% DEVICE SAO2% SENSOR NAME: ABNORMAL
SARS-COV-2, COV2: NORMAL
SODIUM SERPL-SCNC: 145 MMOL/L (ref 136–145)
SPECIMEN SOURCE: ABNORMAL
TROPONIN I SERPL-MCNC: 1.33 NG/ML
WBC # BLD AUTO: 8.8 K/UL (ref 4.1–11.1)

## 2021-01-09 PROCEDURE — 74011250637 HC RX REV CODE- 250/637: Performed by: INTERNAL MEDICINE

## 2021-01-09 PROCEDURE — 71045 X-RAY EXAM CHEST 1 VIEW: CPT

## 2021-01-09 PROCEDURE — 65270000029 HC RM PRIVATE

## 2021-01-09 PROCEDURE — 36415 COLL VENOUS BLD VENIPUNCTURE: CPT

## 2021-01-09 PROCEDURE — 84145 PROCALCITONIN (PCT): CPT

## 2021-01-09 PROCEDURE — 99285 EMERGENCY DEPT VISIT HI MDM: CPT

## 2021-01-09 PROCEDURE — 82550 ASSAY OF CK (CPK): CPT

## 2021-01-09 PROCEDURE — 87635 SARS-COV-2 COVID-19 AMP PRB: CPT

## 2021-01-09 PROCEDURE — 85379 FIBRIN DEGRADATION QUANT: CPT

## 2021-01-09 PROCEDURE — 84484 ASSAY OF TROPONIN QUANT: CPT

## 2021-01-09 PROCEDURE — 80053 COMPREHEN METABOLIC PANEL: CPT

## 2021-01-09 PROCEDURE — 74011250636 HC RX REV CODE- 250/636: Performed by: HOSPITALIST

## 2021-01-09 PROCEDURE — 86140 C-REACTIVE PROTEIN: CPT

## 2021-01-09 PROCEDURE — 71275 CT ANGIOGRAPHY CHEST: CPT

## 2021-01-09 PROCEDURE — 87040 BLOOD CULTURE FOR BACTERIA: CPT

## 2021-01-09 PROCEDURE — 93005 ELECTROCARDIOGRAM TRACING: CPT

## 2021-01-09 PROCEDURE — 82803 BLOOD GASES ANY COMBINATION: CPT

## 2021-01-09 PROCEDURE — 74176 CT ABD & PELVIS W/O CONTRAST: CPT

## 2021-01-09 PROCEDURE — 85025 COMPLETE CBC W/AUTO DIFF WBC: CPT

## 2021-01-09 PROCEDURE — 83605 ASSAY OF LACTIC ACID: CPT

## 2021-01-09 PROCEDURE — 74011000636 HC RX REV CODE- 636: Performed by: INTERNAL MEDICINE

## 2021-01-09 PROCEDURE — 74011250636 HC RX REV CODE- 250/636: Performed by: INTERNAL MEDICINE

## 2021-01-09 PROCEDURE — 82728 ASSAY OF FERRITIN: CPT

## 2021-01-09 PROCEDURE — 74011000258 HC RX REV CODE- 258: Performed by: HOSPITALIST

## 2021-01-09 RX ORDER — SODIUM CHLORIDE 0.9 % (FLUSH) 0.9 %
5-40 SYRINGE (ML) INJECTION AS NEEDED
Status: DISCONTINUED | OUTPATIENT
Start: 2021-01-09 | End: 2021-01-16 | Stop reason: HOSPADM

## 2021-01-09 RX ORDER — PROMETHAZINE HYDROCHLORIDE 25 MG/1
12.5 TABLET ORAL
Status: DISCONTINUED | OUTPATIENT
Start: 2021-01-09 | End: 2021-01-16 | Stop reason: HOSPADM

## 2021-01-09 RX ORDER — ONDANSETRON 2 MG/ML
4 INJECTION INTRAMUSCULAR; INTRAVENOUS
Status: DISCONTINUED | OUTPATIENT
Start: 2021-01-09 | End: 2021-01-16 | Stop reason: HOSPADM

## 2021-01-09 RX ORDER — GUAIFENESIN 100 MG/5ML
162 LIQUID (ML) ORAL
Status: COMPLETED | OUTPATIENT
Start: 2021-01-09 | End: 2021-01-09

## 2021-01-09 RX ORDER — ACETAMINOPHEN 325 MG/1
650 TABLET ORAL
Status: DISCONTINUED | OUTPATIENT
Start: 2021-01-09 | End: 2021-01-16 | Stop reason: HOSPADM

## 2021-01-09 RX ORDER — POLYETHYLENE GLYCOL 3350 17 G/17G
17 POWDER, FOR SOLUTION ORAL DAILY PRN
Status: DISCONTINUED | OUTPATIENT
Start: 2021-01-09 | End: 2021-01-16 | Stop reason: HOSPADM

## 2021-01-09 RX ORDER — ACETAMINOPHEN 650 MG/1
650 SUPPOSITORY RECTAL
Status: DISCONTINUED | OUTPATIENT
Start: 2021-01-09 | End: 2021-01-16 | Stop reason: HOSPADM

## 2021-01-09 RX ORDER — SODIUM CHLORIDE 0.9 % (FLUSH) 0.9 %
5-40 SYRINGE (ML) INJECTION EVERY 8 HOURS
Status: DISCONTINUED | OUTPATIENT
Start: 2021-01-09 | End: 2021-01-15

## 2021-01-09 RX ADMIN — Medication 10 ML: at 19:45

## 2021-01-09 RX ADMIN — SODIUM CHLORIDE 1000 ML: 9 INJECTION, SOLUTION INTRAVENOUS at 19:34

## 2021-01-09 RX ADMIN — PIPERACILLIN SODIUM AND TAZOBACTAM SODIUM 3.38 G: 3; .375 INJECTION, POWDER, LYOPHILIZED, FOR SOLUTION INTRAVENOUS at 19:45

## 2021-01-09 RX ADMIN — ASPIRIN 162 MG: 81 TABLET, CHEWABLE ORAL at 19:46

## 2021-01-09 RX ADMIN — METHYLPREDNISOLONE SODIUM SUCCINATE 40 MG: 40 INJECTION, POWDER, FOR SOLUTION INTRAMUSCULAR; INTRAVENOUS at 19:41

## 2021-01-09 RX ADMIN — IOPAMIDOL 100 ML: 755 INJECTION, SOLUTION INTRAVENOUS at 16:10

## 2021-01-09 NOTE — H&P
History and Physical    Patient: Heather Baumann MRN: [id number]  SSN: [id number]    YOB: 1945  Age: 76 y.o. Sex: male      Subjective:      Chief Complaint: SOB    HPI: Heather Baumann is a 76 y.o. male with past medical history of esophageal cancer, cachexia, s/p PEG placement, hypertension and stroke was brought to the hospital with a complaint of worsening shortness of breath. Patient is not able to give much history and is slightly confused. He does not complain of any pain at this time. On evaluation in ER patient was found to be hypoxic with oxygen requirement of 4 L/min and tachycardic. He was also found to have elevated D-dimer of 17.18, troponin of 1.33. Following this hospital service has been requested to admit the patient for further work-up and management. Past Medical History:   Diagnosis Date    Anemia     Arthritis     Cancer ([facility] [address]     esophageal    Dysphagia     Gastrointestinal disorder     History of radiation therapy     for prostate cancer    Hx of carcinoma in situ of prostate 2017    s/p radiation     Hypertension     pt states doesnt take medication. 1 year    Stroke [facility])     TIA 20 years ago    SVT (supraventricular tachycardia) ([facility] [address]     Weight loss      Past Surgical History:   Procedure Laterality Date    HX GASTROSTOMY      HX HEENT      lasik    HX HERNIA REPAIR      HX OTHER SURGICAL      PEG tube    IR DRAIN PROCEDURE W CATH  11/5/2020    IR INSERT TUNL CVC W PORT OVER 5 YEARS  12/8/2020    IR PLACE CVAD FLUORO GUIDE  12/8/2020      No family history on file. Social History     Tobacco Use    Smoking status: Current Every Day Smoker    Smokeless tobacco: Never Used    Tobacco comment: pt states he barely smokes 1 cig a day   Substance Use Topics    Alcohol use: Not Currently     Comment: pt stated he quit about 2-3 months ago      Prior to Admission medications    Medication Sig Start Date End Date Taking?  Authorizing Provider dilTIAZem ER (CARDIZEM CD) 240 mg capsule Take 1 Cap by mouth daily. 1/4/21   Margy Clonts, NP   metoprolol succinate (TOPROL-XL) 100 mg tablet Take 1 Tab by mouth daily. Indications: paroxysmal supraventricular tachycardia 1/4/21   Rye Clonts, NP   mirtazapine (REMERON) 7.5 mg tablet Take 1 Tab by mouth nightly. 1/3/21   Rye Clonts, NP   amoxicillin-clavulanate (Augmentin) 875-125 mg per tablet Take 1 Tab by mouth every twelve (12) hours. 1/3/21   Rye Clonts, NP   prednisoLONE 5 mg (21 tabs) DsPk Use as directed 1/3/21   Margy Clonts, NP   metoprolol succinate (TOPROL-XL) 100 mg tablet Take 1 Tab by mouth daily for 30 days. Indications: paroxysmal supraventricular tachycardia 12/23/20 1/22/21  Jayson Leone NP   ondansetron hcl (Zofran) 4 mg tablet Take 4 mg by mouth every eight (8) hours as needed for Nausea or Vomiting. Provider, Historical   HYDROcodone-acetaminophen (NORCO) 5-325 mg per tablet Take 1 Tab by mouth every eight (8) hours as needed for Pain. Provider, Historical   dexAMETHasone (DECADRON) 4 mg tablet Take  by mouth every twelve (12) hours. Indications: Night before Chemo    Provider, Historical   traMADoL (ULTRAM) 50 mg tablet Take 50 mg by mouth every six (6) hours as needed for Pain. Provider, Historical   famotidine (PEPCID) 20 mg tablet Take 1 Tab by mouth two (2) times a day. 11/11/20   Liam Doll PA-C   folic acid (FOLVITE) 1 mg tablet Take 1 Tab by mouth daily. 11/12/20   Liam Doll PA-C   thiamine mononitrate (B-1) 100 mg tablet Take 1 Tab by mouth daily.  11/12/20   Liam Doll PA-C        No Known Allergies    Review of Systems:  Unable to review as he is not able to give ROS due to confusion      Objective:     Vitals:    01/09/21 1331 01/09/21 1333   BP: (!) 123/53    Pulse: (!) 110    Resp: 16    Temp: 98 °F (36.7 °C)    SpO2: 94% 93%   Weight: 62.1 kg (137 lb)    Height: 5' 10\" (1.778 m)         Physical Exam:  General: Awake and alert, slightly confused  HEAD: Normocephalic, atraumatic. Eye: PERRLA, EOMI. Throat and Neck: normal and no erythema or exudates noted. No mass   Lung: Clear to auscultation bilaterally without wheezes, rhonchi. Good air movement bilaterally. Heart: Regular rate and rhythm. Normal S1/S2. NO appreciated murmurs, rubs or gallops. Abdomen: soft, non-tender. Bowel sounds present in all four quadrants. No masses appreciated. PEG in situ  Extremities:  able to move all extremities normal, atraumatic  Skin: Clean, dry and intact without appreciated lesions. Neurologic: AOx3. Cranial nerves 2-12 and sensation grossly intact. Psychiatric: non focal, normal affect, normal thought process    CBC:   Lab Results   Component Value Date/Time    WBC 8.8 01/09/2021 02:10 PM    RBC 4.18 01/09/2021 02:10 PM    HGB 13.1 01/09/2021 02:10 PM    HCT 40.6 01/09/2021 02:10 PM    PLATELET 68 (L) 06/36/0175 02:10 PM     CMP:   Lab Results   Component Value Date/Time    Glucose 158 (H) 01/09/2021 02:10 PM    Sodium 145 01/09/2021 02:10 PM    Potassium 3.9 01/09/2021 02:10 PM    Chloride 115 (H) 01/09/2021 02:10 PM    CO2 22 01/09/2021 02:10 PM    BUN 43 (H) 01/09/2021 02:10 PM    Creatinine 1.25 01/09/2021 02:10 PM    Calcium 9.0 01/09/2021 02:10 PM    Anion gap 8 01/09/2021 02:10 PM    BUN/Creatinine ratio 34 (H) 01/09/2021 02:10 PM    Alk.  phosphatase 109 01/09/2021 02:10 PM    Protein, total 7.3 01/09/2021 02:10 PM    Albumin 1.8 (L) 01/09/2021 02:10 PM    Globulin 5.5 (H) 01/09/2021 02:10 PM    A-G Ratio 0.3 (L) 01/09/2021 02:10 PM     Cardiac markers: No results found for: CPK, CKND1, ZAYDA  ABGs:   Lab Results   Component Value Date/Time    pH 7.50 (H) 01/09/2021 01:45 PM    PO2 70 (L) 01/09/2021 01:45 PM    PCO2 27 (L) 01/09/2021 01:45 PM    BICARBONATE 24 01/09/2021 01:45 PM    BASE DEFICIT 0.8 01/09/2021 01:45 PM     Radiology review:   XR CHEST PORT   Final Result   Impression:   Overall mild improvement in right-sided air space disease since 12/29/2020. Persistent bilateral pulmonary nodules. CTA CHEST W OR W WO CONT    (Results Pending)   CT ABD PELV WO CONT    (Results Pending)         Assessment:     Active Problems:    Esophageal mass (10/26/2020)      Paroxysmal SVT (supraventricular tachycardia) ([facility] [address] (12/17/2020)      COVID-19 (12/29/2020)      Acute respiratory failure with hypoxia ([address] (1/9/2021)         Plan: Will admit the patient to telemetry  We will follow up on CT scan of chest and abdominal.  Patient is not a candidate for anticoagulation due to low platelet counts at this time. Follow-up on cardiac enzyme trend  Continue patient on metoprolol and Cardizem for tachycardias. Patient was given 1 dose of p.o. aspirin in ER. We will hold off giving further doses for now. Will get cardiology evaluation of the patient. Empirically start on IV antibiotic Zosyn as there was some concerns for pneumonia. Patient was found to be Covid positive on December 29, 2020. Mild patchy airspace disease is noticed on the chest x-ray. Will start on Solu-Medrol & on antibiotics.     Further plan of management will depend upon patient's clinical course in the hospital    Signed By: Freddie Elliott MD     January 9, 2021

## 2021-01-09 NOTE — ED TRIAGE NOTES
Patient brought in by EMS covid + 15 days ago, c/o sob and abdominal pain. On 4 liters O2 91%. HAd some runs of AFIB on the monitor.

## 2021-01-09 NOTE — ED PROVIDER NOTES
HPI  77-year-old male with past medical history of stage IV esophageal cancer with radiation history of PEG tube placement was recently discharged from the hospital with SVT on diazepam and metoprolol. Patient returned with complaints of palpitation EKG showed SVT had received Cardizem and started on Cardizem drip. Patient had spiked a temp and was recently positive for Covid when tested on 12/29/2020. Patient was placed on Zosyn and Decadron for Covid and seen by infectious disease who recommended Augmentin and steroid taper on discharge. Patient remained stable SVT resolved and patient was discharged home to follow-up with PCP. The patient returns today because of generalized weakness and dyspnea. The patient can not provide any history. Review of systems  Unable to obtain secondary to condition. Past Medical History:   Diagnosis Date    Anemia     Arthritis     Cancer ([address]     esophageal    Dysphagia     Gastrointestinal disorder     History of radiation therapy     for prostate cancer    Hx of carcinoma in situ of prostate 2017    s/p radiation     Hypertension     pt states doesnt take medication. 1 year    Stroke [facility])     TIA 20 years ago    SVT (supraventricular tachycardia) ([facility] [address]     Weight loss        Past Surgical History:   Procedure Laterality Date    HX GASTROSTOMY      HX HEENT      lasik    HX HERNIA REPAIR      HX OTHER SURGICAL      PEG tube    IR DRAIN PROCEDURE W CATH  11/5/2020    IR INSERT TUNL CVC W PORT OVER 5 YEARS  12/8/2020    IR PLACE CVAD FLUORO GUIDE  12/8/2020         No family history on file.     Social History     Socioeconomic History    Marital status: SINGLE     Spouse name: Not on file    Number of children: Not on file    Years of education: Not on file    Highest education level: Not on file   Occupational History    Not on file   Social Needs    Financial resource strain: Not on file    Food insecurity     Worry: Not on file     Inability: Not on file    Transportation needs     Medical: Not on file     Non-medical: Not on file   Tobacco Use    Smoking status: Current Every Day Smoker    Smokeless tobacco: Never Used    Tobacco comment: pt states he barely smokes 1 cig a day   Substance and Sexual Activity    Alcohol use: Not Currently     Comment: pt stated he quit about 2-3 months ago    Drug use: Never    Sexual activity: Not on file   Lifestyle    Physical activity     Days per week: Not on file     Minutes per session: Not on file    Stress: Not on file   Relationships    Social connections     Talks on phone: Not on file     Gets together: Not on file     Attends [employer] service: Not on file     Active member of club or organization: Not on file     Attends meetings of clubs or organizations: Not on file     Relationship status: Not on file    Intimate partner violence     Fear of current or ex partner: Not on file     Emotionally abused: Not on file     Physically abused: Not on file     Forced sexual activity: Not on file   Other Topics Concern    Not on file   Social History Narrative    Not on file         ALLERGIES: Patient has no known allergies. Vitals:    01/09/21 1331 01/09/21 1333   BP: (!) 123/53    Pulse: (!) 110    Resp: 16    Temp: 98 °F (36.7 °C)    SpO2: 94% 93%   Weight: 62.1 kg (137 lb)    Height: 5' 10\" (1.778 m)             Physical Exam   General: Well developed, thin patient appears not to feel well. Skin/Derm: Skin is warm and dry. Pulm: No tachypnea or retractions. Few inspiratory crackles at the bases. CV: Tachycardic and regular  GI: Firm, firm and diffusely tender. (+) Guarding. (+) Rebound. PEG tube noted. : No CVAT. Neuro: No facial asymmetry. Alert.   Psych: Affect is normal.  Non-anxious.      ------------------------------  Nurses Notes  Reviewed  ------------------------------  Labs  Recent Results (from the past 8 hour(s))   BLOOD GAS, ARTERIAL    Collection Time: 01/09/21  1:45 PM Result Value Ref Range    pH 7.50 (H) 7.35 - 7.45      PCO2 27 (L) 35 - 45 mmHg    PO2 70 (L) 75 - 100 mmHg    O2 SAT 95 (L) >95 %    BICARBONATE 24 22 - 26 mmol/L    BASE DEFICIT 0.8 0 - 2 mmol/L    O2 METHOD Room air      FIO2 21.0 %    SITE Right Radial      CAROLA'S TEST PASS     METABOLIC PANEL, COMPREHENSIVE    Collection Time: 01/09/21  2:10 PM   Result Value Ref Range    Sodium 145 136 - 145 mmol/L    Potassium 3.9 3.5 - 5.1 mmol/L    Chloride 115 (H) 97 - 108 mmol/L    CO2 22 21 - 32 mmol/L    Anion gap 8 5 - 15 mmol/L    Glucose 158 (H) 65 - 100 mg/dL    BUN 43 (H) 6 - 20 mg/dL    Creatinine 1.25 0.70 - 1.30 mg/dL    BUN/Creatinine ratio 34 (H) 12 - 20      GFR est AA >60 >60 ml/min/1.73m2    GFR est non-AA 56 (L) >60 ml/min/1.73m2    Calcium 9.0 8.5 - 10.1 mg/dL    Bilirubin, total 0.9 0.2 - 1.0 mg/dL    AST (SGOT) 88 (H) 15 - 37 U/L    ALT (SGPT) 59 12 - 78 U/L    Alk. phosphatase 109 45 - 117 U/L    Protein, total 7.3 6.4 - 8.2 g/dL    Albumin 1.8 (L) 3.5 - 5.0 g/dL    Globulin 5.5 (H) 2.0 - 4.0 g/dL    A-G Ratio 0.3 (L) 1.1 - 2.2     CK W/ REFLX CKMB    Collection Time: 01/09/21  2:10 PM   Result Value Ref Range    CK 91.0 39 - 308 ng/mL   TROPONIN I    Collection Time: 01/09/21  2:10 PM   Result Value Ref Range    Troponin-I, Qt. 1.33 (H) <0.05 ng/mL   PROCALCITONIN    Collection Time: 01/09/21  2:10 PM   Result Value Ref Range    Procalcitonin 1.51 (H) 0 ng/mL   D DIMER    Collection Time: 01/09/21  2:10 PM   Result Value Ref Range    D DIMER 17.18 (H) <0.50 ug/ml(FEU)   C REACTIVE PROTEIN, QT    Collection Time: 01/09/21  2:10 PM   Result Value Ref Range    C-Reactive protein 12.20 (H) 0.00 - 0.60 mg/dL       ------------------------------  Radiology  CT ABD PELV WO CONT  Narrative: Procedure: Axial CT of the abdomen and pelvis was performed without contrast.  Coronal and sagittal reconstructed images were rendered at the CT console and  were provided for interpretation.     Comparison: CT abdomen with contrast 11/5/2020    Findings:  CT abdomen: Cholelithiasis. Percutaneous gastrostomy tube within the lumen of  the stomach. Negative for free air or fluid within the abdomen. Nonspecific,  nonobstructive bowel gas pattern. Negative for mass lesion in the abdomen. The  liver, adrenals, spleen, small and large intestine appear unremarkable,  noncontrast enhanced. Nonobstructive, right mid renal calculus; negative for  evidence of hydronephrosis. No inflammatory process or mass within the abdomen. Bilateral pleural effusions are gone since prior examination. CT pelvis: There is no evidence of ureteric calculus or renal obstruction. Prostate is minimally enlarged. The urinary bladder is well distended. Small  phleboliths are seen in the pelvis. No filling defect within the urinary  bladder. Moderate degenerative change and degenerative disc disease of the  thoracic and lumbar spine. No inflammation or fluid collection. Impression: Impression:  1. Negative for acute abnormality. 2. Cholelithiasis. Right renal nephrolithiasis. 3. Percutaneous gastrostomy tube. CTA CHEST W OR W WO CONT  Narrative: Clinical History: Abdominal pain    Procedure: Axial CT angiogram of the chest was performed. Axial CT of the  abdomen and pelvis was performed with pre- and post-contrast axial image  acquisition. Oral contrast was not administered. Coronal and sagittal  reconstructed images were rendered at the CT console and were provided for  interpretation. Comparison: None    Findings:  CT chest angiogram:  The main, right and left pulmonary arteries are patent with no evidence of  filling defect to suggest embolus. The lobar and branch pulmonary arteries are  grossly patent. The aorta is patent and anatomic with no evidence of dissection. The airways are patent. Evaluation of the parenchyma is notable for numerous  parenchymal nodules of varying sizes throughout the lungs bilaterally.  Cystic  airspace disease in the upper and midlung zones. Largest nodular opacity  measures 18 mm in the right lower lobe and 23 mm in the left lower lobe. Pleural-based mass in the posterior right lower lobe measures 32 x 20 mm. No  significant axillary, mediastinal or hilar adenopathy. No pleural or pericardial  effusion. Esophageal stent is patent, partially opacified proximally and  distally in the dependent portion. Impression: Impression:     1. Negative for pulmonary embolus. 2. Multiple pulmonary masses of varying sizes compatible with metastases. 3. Esophageal stent is patent. XR CHEST PORT  Narrative: Study: Chest radiograph(s), 1 view    Clinical Indication: Chest pain. Comparison: Chest radiographs dated 12/29/2020. Findings:    Unchanged positioning of a right internal jugular approach Port-A-Cath which is  currently accessed with a Hernandez needle. An esophageal stent remains in place. The cardiac silhouette is normal and unchanged in size. Nodular opacities throughout both lungs persist. Compared to chest radiograph  dated 12/29/2020, there has been overall mild improvement in superpose patchy  right-sided opacities/airspace disease. No large pleural effusion. No  discernible pneumothorax. Impression: Impression:  Overall mild improvement in right-sided air space disease since 12/29/2020. Persistent bilateral pulmonary nodules.       ------------------------------  EKG    ------------------------------  Critical Care  30 min    Consult: Dr. Kindra Jean at 3:12 PM => Will see patient in the ED  Consult: Dr. Melissa Flores    ------------------------------  Diagnosis  Bilateral Pulmonary Nodules  Generalized weakness  Elevated Troponin  ------------------------------  Disposition  Admit  ------------------------------  Plan    ------------------------------

## 2021-01-10 LAB
ALBUMIN SERPL-MCNC: 1.6 G/DL (ref 3.5–5)
ALBUMIN/GLOB SERPL: 0.3 {RATIO} (ref 1.1–2.2)
ALP SERPL-CCNC: 94 U/L (ref 45–117)
ALT SERPL-CCNC: 53 U/L (ref 12–78)
ANION GAP SERPL CALC-SCNC: 8 MMOL/L (ref 5–15)
AST SERPL W P-5'-P-CCNC: 73 U/L (ref 15–37)
ATRIAL RATE: 106 BPM
BASOPHILS # BLD: 0 K/UL (ref 0–0.1)
BASOPHILS NFR BLD: 0 % (ref 0–1)
BILIRUB SERPL-MCNC: 0.9 MG/DL (ref 0.2–1)
BUN SERPL-MCNC: 37 MG/DL (ref 6–20)
BUN/CREAT SERPL: 41 (ref 12–20)
CA-I BLD-MCNC: 8.8 MG/DL (ref 8.5–10.1)
CALCULATED P AXIS, ECG09: 83 DEGREES
CALCULATED R AXIS, ECG10: 27 DEGREES
CALCULATED T AXIS, ECG11: 74 DEGREES
CHLORIDE SERPL-SCNC: 115 MMOL/L (ref 97–108)
CO2 SERPL-SCNC: 22 MMOL/L (ref 21–32)
CREAT SERPL-MCNC: 0.91 MG/DL (ref 0.7–1.3)
D DIMER PPP FEU-MCNC: 14.8 UG/ML(FEU)
DIAGNOSIS, 93000: NORMAL
DIFFERENTIAL METHOD BLD: ABNORMAL
EOSINOPHIL # BLD: 0 K/UL (ref 0–0.4)
EOSINOPHIL NFR BLD: 0 % (ref 0–7)
ERYTHROCYTE [DISTWIDTH] IN BLOOD BY AUTOMATED COUNT: 14.5 % (ref 11.5–14.5)
FERRITIN SERPL-MCNC: 5015 NG/ML (ref 26–388)
GLOBULIN SER CALC-MCNC: 5.3 G/DL (ref 2–4)
GLUCOSE SERPL-MCNC: 244 MG/DL (ref 65–100)
HCT VFR BLD AUTO: 33.6 % (ref 36.6–50.3)
HGB BLD-MCNC: 10.9 G/DL (ref 12.1–17)
IMM GRANULOCYTES # BLD AUTO: 0 K/UL (ref 0–0.04)
IMM GRANULOCYTES NFR BLD AUTO: 0 % (ref 0–0.5)
LYMPHOCYTES # BLD: 0.4 K/UL (ref 0.8–3.5)
LYMPHOCYTES NFR BLD: 5 % (ref 12–49)
MCH RBC QN AUTO: 31.5 PG (ref 26–34)
MCHC RBC AUTO-ENTMCNC: 32.4 G/DL (ref 30–36.5)
MCV RBC AUTO: 97.1 FL (ref 80–99)
MONOCYTES # BLD: 0.1 K/UL (ref 0–1)
MONOCYTES NFR BLD: 2 % (ref 5–13)
NEUTS SEG # BLD: 7 K/UL (ref 1.8–8)
NEUTS SEG NFR BLD: 93 % (ref 32–75)
NRBC # BLD: 0.03 K/UL (ref 0–0.01)
NRBC BLD-RTO: 0.4 PER 100 WBC
P-R INTERVAL, ECG05: 120 MS
PLATELET # BLD AUTO: 48 K/UL (ref 150–400)
POTASSIUM SERPL-SCNC: 3.8 MMOL/L (ref 3.5–5.1)
PROT SERPL-MCNC: 6.9 G/DL (ref 6.4–8.2)
Q-T INTERVAL, ECG07: 352 MS
QRS DURATION, ECG06: 80 MS
QTC CALCULATION (BEZET), ECG08: 467 MS
RBC # BLD AUTO: 3.46 M/UL (ref 4.1–5.7)
SODIUM SERPL-SCNC: 145 MMOL/L (ref 136–145)
VENTRICULAR RATE, ECG03: 106 BPM
WBC # BLD AUTO: 7.4 K/UL (ref 4.1–11.1)

## 2021-01-10 PROCEDURE — 36415 COLL VENOUS BLD VENIPUNCTURE: CPT

## 2021-01-10 PROCEDURE — 65270000029 HC RM PRIVATE

## 2021-01-10 PROCEDURE — 74011000258 HC RX REV CODE- 258: Performed by: HOSPITALIST

## 2021-01-10 PROCEDURE — 92610 EVALUATE SWALLOWING FUNCTION: CPT

## 2021-01-10 PROCEDURE — 80053 COMPREHEN METABOLIC PANEL: CPT

## 2021-01-10 PROCEDURE — 92526 ORAL FUNCTION THERAPY: CPT

## 2021-01-10 PROCEDURE — 85025 COMPLETE CBC W/AUTO DIFF WBC: CPT

## 2021-01-10 PROCEDURE — 74011250636 HC RX REV CODE- 250/636: Performed by: HOSPITALIST

## 2021-01-10 PROCEDURE — 85379 FIBRIN DEGRADATION QUANT: CPT

## 2021-01-10 PROCEDURE — 74011250637 HC RX REV CODE- 250/637: Performed by: HOSPITALIST

## 2021-01-10 RX ORDER — FOLIC ACID 1 MG/1
1 TABLET ORAL DAILY
Status: DISCONTINUED | OUTPATIENT
Start: 2021-01-10 | End: 2021-01-16 | Stop reason: HOSPADM

## 2021-01-10 RX ORDER — ASPIRIN 325 MG/1
100 TABLET, FILM COATED ORAL DAILY
Status: DISCONTINUED | OUTPATIENT
Start: 2021-01-10 | End: 2021-01-16 | Stop reason: HOSPADM

## 2021-01-10 RX ORDER — TRAMADOL HYDROCHLORIDE 50 MG/1
50 TABLET ORAL
Status: DISCONTINUED | OUTPATIENT
Start: 2021-01-10 | End: 2021-01-16 | Stop reason: HOSPADM

## 2021-01-10 RX ORDER — DILTIAZEM HYDROCHLORIDE 120 MG/1
240 CAPSULE, COATED, EXTENDED RELEASE ORAL DAILY
Status: DISCONTINUED | OUTPATIENT
Start: 2021-01-10 | End: 2021-01-14

## 2021-01-10 RX ORDER — MIRTAZAPINE 15 MG/1
7.5 TABLET, FILM COATED ORAL
Status: DISCONTINUED | OUTPATIENT
Start: 2021-01-10 | End: 2021-01-16 | Stop reason: HOSPADM

## 2021-01-10 RX ORDER — DEXAMETHASONE SODIUM PHOSPHATE 10 MG/ML
10 INJECTION INTRAMUSCULAR; INTRAVENOUS EVERY 12 HOURS
Status: COMPLETED | OUTPATIENT
Start: 2021-01-10 | End: 2021-01-14

## 2021-01-10 RX ORDER — METOPROLOL SUCCINATE 50 MG/1
100 TABLET, EXTENDED RELEASE ORAL DAILY
Status: DISCONTINUED | OUTPATIENT
Start: 2021-01-10 | End: 2021-01-16 | Stop reason: HOSPADM

## 2021-01-10 RX ORDER — FAMOTIDINE 20 MG/1
20 TABLET, FILM COATED ORAL 2 TIMES DAILY
Status: DISCONTINUED | OUTPATIENT
Start: 2021-01-10 | End: 2021-01-16 | Stop reason: HOSPADM

## 2021-01-10 RX ADMIN — FOLIC ACID 1 MG: 1 TABLET ORAL at 09:11

## 2021-01-10 RX ADMIN — FAMOTIDINE 20 MG: 20 TABLET, FILM COATED ORAL at 21:00

## 2021-01-10 RX ADMIN — THIAMINE HCL TAB 100 MG 100 MG: 100 TAB at 09:11

## 2021-01-10 RX ADMIN — Medication 10 ML: at 06:29

## 2021-01-10 RX ADMIN — DEXAMETHASONE SODIUM PHOSPHATE 10 MG: 10 INJECTION INTRAMUSCULAR; INTRAVENOUS at 09:16

## 2021-01-10 RX ADMIN — METHYLPREDNISOLONE SODIUM SUCCINATE 40 MG: 40 INJECTION, POWDER, FOR SOLUTION INTRAMUSCULAR; INTRAVENOUS at 06:28

## 2021-01-10 RX ADMIN — DILTIAZEM HYDROCHLORIDE 240 MG: 120 CAPSULE, COATED, EXTENDED RELEASE ORAL at 09:00

## 2021-01-10 RX ADMIN — DEXAMETHASONE SODIUM PHOSPHATE 10 MG: 10 INJECTION INTRAMUSCULAR; INTRAVENOUS at 21:00

## 2021-01-10 RX ADMIN — AZITHROMYCIN DIHYDRATE 500 MG: 500 INJECTION, POWDER, LYOPHILIZED, FOR SOLUTION INTRAVENOUS at 09:16

## 2021-01-10 RX ADMIN — PIPERACILLIN SODIUM AND TAZOBACTAM SODIUM 3.38 G: 3; .375 INJECTION, POWDER, LYOPHILIZED, FOR SOLUTION INTRAVENOUS at 17:58

## 2021-01-10 RX ADMIN — PIPERACILLIN SODIUM AND TAZOBACTAM SODIUM 3.38 G: 3; .375 INJECTION, POWDER, LYOPHILIZED, FOR SOLUTION INTRAVENOUS at 22:15

## 2021-01-10 RX ADMIN — Medication 10 ML: at 14:00

## 2021-01-10 RX ADMIN — METOPROLOL SUCCINATE 100 MG: 50 TABLET, EXTENDED RELEASE ORAL at 09:11

## 2021-01-10 RX ADMIN — FAMOTIDINE 20 MG: 20 TABLET, FILM COATED ORAL at 09:11

## 2021-01-10 RX ADMIN — PIPERACILLIN SODIUM AND TAZOBACTAM SODIUM 3.38 G: 3; .375 INJECTION, POWDER, LYOPHILIZED, FOR SOLUTION INTRAVENOUS at 06:28

## 2021-01-10 NOTE — ED NOTES
Spoke with pts son at [phone] who states that pt does eat soft foods at home as well as swallows his pills, he is also on tube feedings at home    Dr Arron Swift made aware and states that pt can have a pureed diet, diet and speech consult placed per Dr Arron Swift telephone order

## 2021-01-10 NOTE — PROGRESS NOTES
Hospitalist Progress Note               Daily Progress Note: 1/10/2021      Subjective: The patient is seen for follow  up.   66-year-old male with a history of esophageal cancer s/p stent placement, cachexia, PEG placement, hypertension and stroke was admitted to the hospital with worsening shortness of breath. Patient's Covid test is positive. There is suspicion of pulmonary metastases as well. Patient is a confused at baseline and unable to give any history. He is more alert then yesterday. Diet was resumed today    Medications reviewed  Current Facility-Administered Medications   Medication Dose Route Frequency    dilTIAZem ER (CARDIZEM CD) capsule 240 mg  240 mg Oral DAILY    famotidine (PEPCID) tablet 20 mg  20 mg Oral BID    folic acid (FOLVITE) tablet 1 mg  1 mg Oral DAILY    metoprolol succinate (TOPROL-XL) XL tablet 100 mg  100 mg Oral DAILY    mirtazapine (REMERON) tablet 7.5 mg  7.5 mg Oral QHS    thiamine mononitrate (B-1) tablet 100 mg  100 mg Oral DAILY    traMADoL (ULTRAM) tablet 50 mg  50 mg Oral Q6H PRN    sodium chloride (NS) flush 5-40 mL  5-40 mL IntraVENous Q8H    sodium chloride (NS) flush 5-40 mL  5-40 mL IntraVENous PRN    acetaminophen (TYLENOL) tablet 650 mg  650 mg Oral Q6H PRN    Or    acetaminophen (TYLENOL) suppository 650 mg  650 mg Rectal Q6H PRN    polyethylene glycol (MIRALAX) packet 17 g  17 g Oral DAILY PRN    promethazine (PHENERGAN) tablet 12.5 mg  12.5 mg Oral Q6H PRN    Or    ondansetron (ZOFRAN) injection 4 mg  4 mg IntraVENous Q6H PRN    piperacillin-tazobactam (ZOSYN) 3.375 g in 0.9% sodium chloride (MBP/ADV) 100 mL MBP  3.375 g IntraVENous Q8H    methylPREDNISolone (PF) (SOLU-MEDROL) injection 40 mg  40 mg IntraVENous Q8H     Current Outpatient Medications   Medication Sig    dilTIAZem ER (CARDIZEM CD) 240 mg capsule Take 1 Cap by mouth daily.  metoprolol succinate (TOPROL-XL) 100 mg tablet Take 1 Tab by mouth daily.  Indications: paroxysmal supraventricular tachycardia    mirtazapine (REMERON) 7.5 mg tablet Take 1 Tab by mouth nightly.  amoxicillin-clavulanate (Augmentin) 875-125 mg per tablet Take 1 Tab by mouth every twelve (12) hours.  prednisoLONE 5 mg (21 tabs) DsPk Use as directed    metoprolol succinate (TOPROL-XL) 100 mg tablet Take 1 Tab by mouth daily for 30 days. Indications: paroxysmal supraventricular tachycardia    ondansetron hcl (Zofran) 4 mg tablet Take 4 mg by mouth every eight (8) hours as needed for Nausea or Vomiting.  HYDROcodone-acetaminophen (NORCO) 5-325 mg per tablet Take 1 Tab by mouth every eight (8) hours as needed for Pain.  dexAMETHasone (DECADRON) 4 mg tablet Take  by mouth every twelve (12) hours. Indications: Night before Chemo    traMADoL (ULTRAM) 50 mg tablet Take 50 mg by mouth every six (6) hours as needed for Pain.  famotidine (PEPCID) 20 mg tablet Take 1 Tab by mouth two (2) times a day.  folic acid (FOLVITE) 1 mg tablet Take 1 Tab by mouth daily.  thiamine mononitrate (B-1) 100 mg tablet Take 1 Tab by mouth daily. Review of Systems:   Review of systems not obtained due to patient factors. Objective:   Physical Exam:     Visit Vitals  /78   Pulse 74   Temp 97.1 °F (36.2 °C)   Resp 22   Ht 5' 10\" (1.778 m)   Wt 62.1 kg (137 lb)   SpO2 97%   BMI 19.66 kg/m²    O2 Flow Rate (L/min): 6 l/min O2 Device: Nasal cannula    Temp (24hrs), Av.6 °F (36.4 °C), Min:97.1 °F (36.2 °C), Max:98 °F (36.7 °C)    No intake/output data recorded.  1901 - 01/10 0700  In: 1100 [I.V.:1100]  Out: -     PHYSICAL EXAM:  General: Awake and alert, pleasantly confused  HEAD: Normocephalic, atraumatic. Eye: PERRLA, EOMI. Throat and Neck: normal and no erythema or exudates noted. No mass   Lung: Clear to auscultation bilaterally without wheezes, rhonchi. Good air movement bilaterally. Heart: Regular rate and rhythm. Normal S1/S2. NO appreciated murmurs, rubs or gallops.     Abdomen: soft, non-tender. Bowel sounds present in all four quadrants. No masses appreciated. PEG in situ  Extremities:  able to move all extremities normal, atraumatic  Skin: Clean, dry and intact without appreciated lesions. Neurologic:  Awake, alert but not much communicative. Cranial nerves II to XII grossly intact  Psychiatric: Calm and cooperative    Data Review:       Recent Days:  Recent Labs     01/10/21  0300 01/09/21  1410   WBC 7.4 8.8   HGB 10.9* 13.1   HCT 33.6* 40.6   PLT 48* 68*     Recent Labs     01/10/21  0300 01/09/21  1410    145   K 3.8 3.9   * 115*   CO2 22 22   * 158*   BUN 37* 43*   CREA 0.91 1.25   CA 8.8 9.0   ALB 1.6* 1.8*   TBILI 0.9 0.9   ALT 53 59     Recent Labs     01/09/21  1345   PH 7.50*   PCO2 27*   PO2 70*   HCO3 24   FIO2 21.0           CTA CHEST W OR W WO CONT   Final Result   Impression:       1. Negative for pulmonary embolus. 2. Multiple pulmonary masses of varying sizes compatible with metastases. 3. Esophageal stent is patent. CT ABD PELV WO CONT   Final Result   Impression:   1. Negative for acute abnormality. 2. Cholelithiasis. Right renal nephrolithiasis. 3. Percutaneous gastrostomy tube. XR CHEST PORT   Final Result   Impression:   Overall mild improvement in right-sided air space disease since 12/29/2020. Persistent bilateral pulmonary nodules. Assessment/     Problem List:  Hospital Problems  Date Reviewed: 10/27/2020          Codes Class Noted POA    Acute respiratory failure with hypoxia [facility]) ICD-10-CM: J96.01  ICD-9-CM: 518.81  1/9/2021 Unknown        COVID-19 ICD-10-CM: U07.1  ICD-9-CM: 079.89  12/29/2020 Yes        Paroxysmal SVT (supraventricular tachycardia) (HCC) ICD-10-CM: I47.1  ICD-9-CM: 427.0  12/17/2020 Yes        Esophageal mass ICD-10-CM: K22.8  ICD-9-CM: 530.89  10/26/2020 Yes                     Plan:  1.   Acute respiratory failure due to Covid pneumonia: Patient's Covid test was positive on 29 December 2020, repeat test on January 9, 2021 is also positive. Will switch patient steroids to Decadron for now. Also start the patient on Zithromax empirically. 2.  D-dimers were elevated: CTA of the chest is negative for pulmonary embolism. Likely D-dimers are acute phase reactant to Covid infection. D-dimers are trending down. 3.  Paroxysmal supraventricular tachycardia: Continue patient on metoprolol and Cardizem. 4.  Elevated troponins. Will repeat patient's troponin level to trend.     Care Plan discussed with: Nurse Tyler Owens MD

## 2021-01-10 NOTE — PROGRESS NOTES
Problem: Dysphagia (Adult)  Goal: *Acute Goals and Plan of Care (Insert Text)  Description: -Patient will tolerate pureed textures and thin liquids without clinical signs or symptoms of aspiration for 5-7 day(s). Outcome: Not Progressing Towards Goal     Problem: Patient Education: Go to Patient Education Activity  Goal: Patient/Family Education  Outcome: Not Progressing Towards Goal     SPEECH LANGUAGE PATHOLOGY BEDSIDE SWALLOW EVALUATIONS  Patient: Zulma Ohara (76 y.o. male)  Date: 1/10/2021  Primary Diagnosis: Acute respiratory failure with hypoxia ([facility] [address] [J96.01]        Precautions: Aspiration/GERD precautions      ASSESSMENT :  Patient positioned upright, alert, and agreeable to bedside swallow evaluation. Patient noted to have declined speech intelligibility, but clinician able to understand 75% of speech. Nurse stated he has been taking pureed textures and thin liquids without difficulty. Patient has a history of esophageal cancer and has esophageal stents. Son reports he receives PEG tube feedings at home, but also receives pureed textures, thin liquids, and medications by mouth. He accepted ice water via straw and applesauce. He accepted x5 bites of applesauce and 4oz of water and declined further trials. He exhibited good oral manipulation and mastication of bolus. Maintained dry vocal quality with x1 cough. Good HLE via digital palpation. Recommending pureed textures and thin liquids along with PEG tube orders as written by MD. Skilled ST to follow. Patient will benefit from skilled intervention to address the above impairments. Patients rehabilitation potential is considered to be Good     PLAN :  Recommendations and Planned Interventions:  Allow pureed textures and thin liquids per MD orders and skilled ST to follow. Frequency/Duration: Patient will be followed by speech-language pathology 5 times a week to address goals. Discharge Recommendations:  To Be Determined     SUBJECTIVE: Patient I would like something to drink. OBJECTIVE:     CXR Results  (Last 48 hours)                 01/09/21 1426  XR CHEST PORT Final result    Impression:  Impression:   Overall mild improvement in right-sided air space disease since 12/29/2020. Persistent bilateral pulmonary nodules. Narrative:  Study: Chest radiograph(s), 1 view       Clinical Indication: Chest pain. Comparison: Chest radiographs dated 12/29/2020. Findings:       Unchanged positioning of a right internal jugular approach Port-A-Cath which is   currently accessed with a Hernandez needle. An esophageal stent remains in place. The cardiac silhouette is normal and unchanged in size. Nodular opacities throughout both lungs persist. Compared to chest radiograph   dated 12/29/2020, there has been overall mild improvement in superpose patchy   right-sided opacities/airspace disease. No large pleural effusion. No   discernible pneumothorax. CT Results  (Last 48 hours)                 01/09/21 1607  CTA CHEST W OR W WO CONT Final result    Impression:  Impression:        1. Negative for pulmonary embolus. 2. Multiple pulmonary masses of varying sizes compatible with metastases. 3. Esophageal stent is patent. Narrative:  Clinical History: Abdominal pain       Procedure: Axial CT angiogram of the chest was performed. Axial CT of the   abdomen and pelvis was performed with pre- and post-contrast axial image   acquisition. Oral contrast was not administered. Coronal and sagittal   reconstructed images were rendered at the CT console and were provided for   interpretation. Comparison: None       Findings:   CT chest angiogram:   The main, right and left pulmonary arteries are patent with no evidence of   filling defect to suggest embolus. The lobar and branch pulmonary arteries are   grossly patent. The aorta is patent and anatomic with no evidence of dissection.    The airways are patent. Evaluation of the parenchyma is notable for numerous   parenchymal nodules of varying sizes throughout the lungs bilaterally. Cystic   airspace disease in the upper and midlung zones. Largest nodular opacity   measures 18 mm in the right lower lobe and 23 mm in the left lower lobe. Pleural-based mass in the posterior right lower lobe measures 32 x 20 mm. No   significant axillary, mediastinal or hilar adenopathy. No pleural or pericardial   effusion. Esophageal stent is patent, partially opacified proximally and   distally in the dependent portion. 01/09/21 1607  CT ABD PELV WO CONT Final result    Impression:  Impression:   1. Negative for acute abnormality. 2. Cholelithiasis. Right renal nephrolithiasis. 3. Percutaneous gastrostomy tube. Narrative:  Procedure: Axial CT of the abdomen and pelvis was performed without contrast.   Coronal and sagittal reconstructed images were rendered at the CT console and   were provided for interpretation. Comparison: CT abdomen with contrast 11/5/2020       Findings:   CT abdomen: Cholelithiasis. Percutaneous gastrostomy tube within the lumen of   the stomach. Negative for free air or fluid within the abdomen. Nonspecific,   nonobstructive bowel gas pattern. Negative for mass lesion in the abdomen. The   liver, adrenals, spleen, small and large intestine appear unremarkable,   noncontrast enhanced. Nonobstructive, right mid renal calculus; negative for   evidence of hydronephrosis. No inflammatory process or mass within the abdomen. Bilateral pleural effusions are gone since prior examination. CT pelvis: There is no evidence of ureteric calculus or renal obstruction. Prostate is minimally enlarged. The urinary bladder is well distended. Small   phleboliths are seen in the pelvis. No filling defect within the urinary   bladder. Moderate degenerative change and degenerative disc disease of the   thoracic and lumbar spine.  No inflammation or fluid collection. Past Medical History:   Diagnosis Date    Anemia     Arthritis     Cancer ([address]     esophageal    Dysphagia     Gastrointestinal disorder     History of radiation therapy     for prostate cancer    Hx of carcinoma in situ of prostate 2017    s/p radiation     Hypertension     pt states doesnt take medication. 1 year    Stroke [facility])     TIA 20 years ago    SVT (supraventricular tachycardia) (HCC)     Weight loss      Past Surgical History:   Procedure Laterality Date    HX GASTROSTOMY      HX HEENT      lasik    HX HERNIA REPAIR      HX OTHER SURGICAL      PEG tube    IR DRAIN PROCEDURE W CATH  11/5/2020    IR INSERT TUNL CVC W PORT OVER 5 YEARS  12/8/2020    IR PLACE CVAD FLUORO GUIDE  12/8/2020     Prior Level of Function/Home Situation: Home with son     Diet prior to admission: PEG tube w/pureed foods, thin liquids, and medications by mouth  Current Diet:  Pureed w/PEG tube feedings                    Pain: 0          After treatment:   Patient left in no apparent distress in bed, Call bell within reach, and Nursing notified    COMMUNICATION/EDUCATION:   Patient was educated regarding purpose of SLP assessment, POC, diet recs and sw safety precautions. Patient demonstrated Good understanding as evidenced by verbal responses and head nods. The patient's plan of care including recommendations, planned interventions, and recommended diet changes were discussed with: Registered nurse. Patient/family agree to work toward stated goals and plan of care.     Thank you for this referral.  Kelley Johnson M.S. CCC-SLP

## 2021-01-10 NOTE — CONSULTS
Consult    Patient: Brayan Pfeiffer MRN: [id number]  SSN: [id number]    YOB: 1945  Age: 75 y.o.  Sex: male      Subjective:      Brayan Pfeiffer is a 75 y.o. male who is being seen for elevated troponin.  Patient with history of metastatic esophageal cancer, history of prostate cancer status post radiation therapy, history of hypertension and TIA, SVT and recent history of atrial fibrillation who presented with worsening shortness of breath and he was positive for COVID-19.  His troponin were elevated and hence cardiology were consulted.  Denied having any chest pain anyhow.  Patient was more lethargic yesterday and he appears to be more awake and alert today.    Past Medical History:   Diagnosis Date   • Anemia    • Arthritis    • Cancer (HCC)     esophageal   • Dysphagia    • Gastrointestinal disorder    • History of radiation therapy     for prostate cancer   • Hx of carcinoma in situ of prostate 2017    s/p radiation    • Hypertension     pt states doesnt take medication. 1 year   • Stroke (HCC)     TIA 20 years ago   • SVT (supraventricular tachycardia) (HCC)    • Weight loss      Past Surgical History:   Procedure Laterality Date   • HX GASTROSTOMY     • HX HEENT      lasik   • HX HERNIA REPAIR     • HX OTHER SURGICAL      PEG tube   • IR DRAIN PROCEDURE W CATH  11/5/2020   • IR INSERT TUNL CVC W PORT OVER 5 YEARS  12/8/2020   • IR PLACE CVAD FLUORO GUIDE  12/8/2020      No family history on file.  Social History     Tobacco Use   • Smoking status: Current Every Day Smoker   • Smokeless tobacco: Never Used   • Tobacco comment: pt states he barely smokes 1 cig a day   Substance Use Topics   • Alcohol use: Not Currently     Comment: pt stated he quit about 2-3 months ago      Current Facility-Administered Medications   Medication Dose Route Frequency Provider Last Rate Last Admin   • dilTIAZem ER (CARDIZEM CD) capsule 240 mg  240 mg Oral DAILY Reinier Sin MD   240 mg at 01/10/21 0900   famotidine (PEPCID) tablet 20 mg  20 mg Oral BID Lynda Cerrato MD   20 mg at 73/46/35 3868    folic acid (FOLVITE) tablet 1 mg  1 mg Oral DAILY Lynda Cerrato MD   1 mg at 01/10/21 0911    metoprolol succinate (TOPROL-XL) XL tablet 100 mg  100 mg Oral DAILY Lynda Cerrato MD   100 mg at 01/10/21 0911    mirtazapine (REMERON) tablet 7.5 mg  7.5 mg Oral QHS Lynda Cerrato MD   Stopped at 01/10/21 4519    thiamine mononitrate (B-1) tablet 100 mg  100 mg Oral DAILY Lynda Cerrato MD   100 mg at 01/10/21 0911    traMADoL (ULTRAM) tablet 50 mg  50 mg Oral Q6H PRN Lynda Cerrato MD        dexamethasone (PF) (DECADRON) 10 mg/mL injection 10 mg  10 mg IntraVENous Q12H Lynda Cerrato MD   10 mg at 01/10/21 0916    azithromycin (ZITHROMAX) 500 mg in 0.9% sodium chloride 250 mL (VIAL-MATE)  500 mg IntraVENous Q24H Lynda Cerrato MD   500 mg at 01/10/21 0916    sodium chloride (NS) flush 5-40 mL  5-40 mL IntraVENous Q8H Lynda Cerrato MD   10 mL at 01/10/21 0629    sodium chloride (NS) flush 5-40 mL  5-40 mL IntraVENous PRN Lynda Cerrato MD        acetaminophen (TYLENOL) tablet 650 mg  650 mg Oral Q6H PRN Lynda Cerrato MD        Or   [facility] acetaminophen (TYLENOL) suppository 650 mg  650 mg Rectal Q6H PRN Lynda Cerrato MD        polyethylene glycol (MIRALAX) packet 17 g  17 g Oral DAILY PRN Lynda Cerrato MD        promethazine (PHENERGAN) tablet 12.5 mg  12.5 mg Oral Q6H PRN Lynda Cerrato MD        Or    ondansetron [facility]) injection 4 mg  4 mg IntraVENous Q6H PRN Lynda Cerrato MD        piperacillin-tazobactam (ZOSYN) 3.375 g in 0.9% sodium chloride (MBP/ADV) 100 mL MBP  3.375 g IntraVENous Q8H Lynda Cerrato  mL/hr at 01/10/21 0628 3.375 g at 01/10/21 4140     Current Outpatient Medications   Medication Sig Dispense Refill    dilTIAZem ER (CARDIZEM CD) 240 mg capsule Take 1 Cap by mouth daily. 30 Cap 0    metoprolol succinate (TOPROL-XL) 100 mg tablet Take 1 Tab by mouth daily. Indications: paroxysmal supraventricular tachycardia 30 Tab 0    mirtazapine (REMERON) 7.5 mg tablet Take 1 Tab by mouth nightly. 30 Tab 0    amoxicillin-clavulanate (Augmentin) 875-125 mg per tablet Take 1 Tab by mouth every twelve (12) hours. 14 Tab 0    prednisoLONE 5 mg (21 tabs) DsPk Use as directed 21 Tab 0    metoprolol succinate (TOPROL-XL) 100 mg tablet Take 1 Tab by mouth daily for 30 days. Indications: paroxysmal supraventricular tachycardia 30 Tab 0    ondansetron hcl (Zofran) 4 mg tablet Take 4 mg by mouth every eight (8) hours as needed for Nausea or Vomiting.  HYDROcodone-acetaminophen (NORCO) 5-325 mg per tablet Take 1 Tab by mouth every eight (8) hours as needed for Pain.  dexAMETHasone (DECADRON) 4 mg tablet Take  by mouth every twelve (12) hours. Indications: Night before Chemo      traMADoL (ULTRAM) 50 mg tablet Take 50 mg by mouth every six (6) hours as needed for Pain.  famotidine (PEPCID) 20 mg tablet Take 1 Tab by mouth two (2) times a day. 30 Tab 1    folic acid (FOLVITE) 1 mg tablet Take 1 Tab by mouth daily. 30 Tab 1    thiamine mononitrate (B-1) 100 mg tablet Take 1 Tab by mouth daily. 30 Tab 1        No Known Allergies    Review of Systems:  A comprehensive review of systems was negative except for that written in the History of Present Illness. Objective:     Vitals:    01/09/21 2330 01/10/21 0230 01/10/21 0440 01/10/21 0530   BP: (!) 136/94 136/83  131/78   Pulse: 88 83  74   Resp: 20 22  22   Temp: 97.7 °F (36.5 °C)  97.1 °F (36.2 °C)    SpO2: 94% 94%  97%   Weight:       Height:            Physical Exam:  General:   Appears to be lethargic and tired and exhausted, cachectic. Eyes:  Conjunctivae/corneas clear. PERRL, EOMs intact. Fundi benign   Ears:  Normal TMs and external ear canals both ears. Nose: Nares normal. Septum midline. Mucosa normal. No drainage or sinus tenderness.    Mouth/Throat: Lips, mucosa, and tongue normal. Teeth and gums normal. Neck: Supple, symmetrical, trachea midline, no adenopathy, thyroid: no enlargment/tenderness/nodules, no carotid bruit and no JVD. Back:   Symmetric, no curvature. ROM normal. No CVA tenderness. Lungs:   Clear to auscultation bilaterally. Heart:  Regular rate and rhythm, S1, S2 normal, no murmur, click, rub or gallop. Abdomen:    Cachectic   Extremities: Extremities normal, atraumatic, no cyanosis or edema. Pulses: 2+ and symmetric all extremities. Skin: Skin color, texture, turgor normal. No rashes or lesions   Lymph nodes: Cervical, supraclavicular, and axillary nodes normal.   Neurologic: CNII-XII intact. Normal strength, sensation and reflexes throughout. Assessment:     Hospital Problems  Date Reviewed: 10/27/2020          Codes Class Noted POA    Acute respiratory failure with hypoxia [facility]) ICD-10-CM: J96.01  ICD-9-CM: 518.81  1/9/2021 Unknown        COVID-19 ICD-10-CM: U07.1  ICD-9-CM: 079.89  12/29/2020 Yes        Paroxysmal SVT (supraventricular tachycardia) (HCC) ICD-10-CM: I47.1  ICD-9-CM: 427.0  12/17/2020 Yes        Esophageal mass ICD-10-CM: K22.8  ICD-9-CM: 530.89  10/26/2020 Yes          Patient is 70-year-old -American male with:  1. Non-STEMI, possibly type II  2. Altered mental status  3. SVT  4. Esophageal cancer status post stent placement  5. Cachexia status post PEG tube placement  6. Hypertension  7. Stroke. 8.  Covid pneumonitis  9. Thrombocytopenia, platelet 40 8K    Plan:     EKG recently showed atrial fibrillation with RVR with nonspecific ST-T wave changes with LVH. He was recently seen by cardiology for SVT. Patient with history of esophageal cancer and cachexia status post PEG tube placement. He is more awake and alert per my discussion with nurse at bedside. Denied having any chest pain. He is currently on azithromycin, diltiazem, metoprolol. His hemoglobin is 10.9, platelet is 48.   We will monitor vital signs and rhythm and his troponin until trending down. No antiplatelet for now due to severe thrombocytopenia. His platelet count were greater than 200 back in December 28. Potassium 3.8, sodium 145, creatinine and BUN are acceptable. LFTs showed elevated ALT and AST. His CPK 91 and his D-dimer was 14.8. Recently was 17.1. CT angiogram was negative for PE. It showed multiple pulmonary masses of varying size compatible with metastasis. Esophageal stent is patent. Poor overall prognosis. Continue medical management    Thank you  For involving me in Mr. Buckley Ar care. I will follow.     Signed By: Shadia Gonzalez MD     January 10, 2021

## 2021-01-11 ENCOUNTER — APPOINTMENT (OUTPATIENT)
Dept: RADIATION THERAPY | Age: 76
End: 2021-01-11

## 2021-01-11 LAB
D DIMER PPP FEU-MCNC: 17.72 UG/ML(FEU)
TROPONIN I SERPL-MCNC: <0.05 NG/ML

## 2021-01-11 PROCEDURE — 74011250637 HC RX REV CODE- 250/637: Performed by: HOSPITALIST

## 2021-01-11 PROCEDURE — 84484 ASSAY OF TROPONIN QUANT: CPT

## 2021-01-11 PROCEDURE — 74011250636 HC RX REV CODE- 250/636: Performed by: HOSPITALIST

## 2021-01-11 PROCEDURE — 36415 COLL VENOUS BLD VENIPUNCTURE: CPT

## 2021-01-11 PROCEDURE — 74011250637 HC RX REV CODE- 250/637: Performed by: FAMILY MEDICINE

## 2021-01-11 PROCEDURE — 85379 FIBRIN DEGRADATION QUANT: CPT

## 2021-01-11 PROCEDURE — 74011000258 HC RX REV CODE- 258: Performed by: HOSPITALIST

## 2021-01-11 PROCEDURE — 65270000029 HC RM PRIVATE

## 2021-01-11 RX ORDER — QUETIAPINE FUMARATE 25 MG/1
25 TABLET, FILM COATED ORAL
Status: COMPLETED | OUTPATIENT
Start: 2021-01-11 | End: 2021-01-11

## 2021-01-11 RX ADMIN — PIPERACILLIN SODIUM AND TAZOBACTAM SODIUM 3.38 G: 3; .375 INJECTION, POWDER, LYOPHILIZED, FOR SOLUTION INTRAVENOUS at 21:08

## 2021-01-11 RX ADMIN — FOLIC ACID 1 MG: 1 TABLET ORAL at 09:47

## 2021-01-11 RX ADMIN — THIAMINE HCL TAB 100 MG 100 MG: 100 TAB at 09:47

## 2021-01-11 RX ADMIN — DILTIAZEM HYDROCHLORIDE 240 MG: 120 CAPSULE, COATED, EXTENDED RELEASE ORAL at 09:00

## 2021-01-11 RX ADMIN — QUETIAPINE FUMARATE 25 MG: 25 TABLET ORAL at 03:37

## 2021-01-11 RX ADMIN — FAMOTIDINE 20 MG: 20 TABLET, FILM COATED ORAL at 21:10

## 2021-01-11 RX ADMIN — Medication 10 ML: at 03:38

## 2021-01-11 RX ADMIN — DEXAMETHASONE SODIUM PHOSPHATE 10 MG: 10 INJECTION INTRAMUSCULAR; INTRAVENOUS at 09:48

## 2021-01-11 RX ADMIN — AZITHROMYCIN DIHYDRATE 500 MG: 500 INJECTION, POWDER, LYOPHILIZED, FOR SOLUTION INTRAVENOUS at 09:47

## 2021-01-11 RX ADMIN — MIRTAZAPINE 7.5 MG: 15 TABLET, FILM COATED ORAL at 21:13

## 2021-01-11 RX ADMIN — PIPERACILLIN SODIUM AND TAZOBACTAM SODIUM 3.38 G: 3; .375 INJECTION, POWDER, LYOPHILIZED, FOR SOLUTION INTRAVENOUS at 06:07

## 2021-01-11 RX ADMIN — METOPROLOL SUCCINATE 100 MG: 50 TABLET, EXTENDED RELEASE ORAL at 09:47

## 2021-01-11 RX ADMIN — DEXAMETHASONE SODIUM PHOSPHATE 10 MG: 10 INJECTION INTRAMUSCULAR; INTRAVENOUS at 21:09

## 2021-01-11 RX ADMIN — MIRTAZAPINE 7.5 MG: 15 TABLET, FILM COATED ORAL at 02:17

## 2021-01-11 RX ADMIN — PIPERACILLIN SODIUM AND TAZOBACTAM SODIUM 3.38 G: 3; .375 INJECTION, POWDER, LYOPHILIZED, FOR SOLUTION INTRAVENOUS at 15:27

## 2021-01-11 NOTE — PROGRESS NOTES
Hospitalist Progress Note               Daily Progress Note: 1/11/2021      Subjective:     75M, h/o esophageal cancer s/p stent and pAFIB with acute hypoxic respiratory failure s/t COVID19 pneumonia in the setting of pulmonary mets. Currently confused  On 5L NC    Medications reviewed  Current Facility-Administered Medications   Medication Dose Route Frequency    dilTIAZem ER (CARDIZEM CD) capsule 240 mg  240 mg Oral DAILY    famotidine (PEPCID) tablet 20 mg  20 mg Oral BID    folic acid (FOLVITE) tablet 1 mg  1 mg Oral DAILY    metoprolol succinate (TOPROL-XL) XL tablet 100 mg  100 mg Oral DAILY    mirtazapine (REMERON) tablet 7.5 mg  7.5 mg Oral QHS    thiamine mononitrate (B-1) tablet 100 mg  100 mg Oral DAILY    traMADoL (ULTRAM) tablet 50 mg  50 mg Oral Q6H PRN    dexamethasone (PF) (DECADRON) 10 mg/mL injection 10 mg  10 mg IntraVENous Q12H    azithromycin (ZITHROMAX) 500 mg in 0.9% sodium chloride 250 mL (VIAL-MATE)  500 mg IntraVENous Q24H    sodium chloride (NS) flush 5-40 mL  5-40 mL IntraVENous Q8H    sodium chloride (NS) flush 5-40 mL  5-40 mL IntraVENous PRN    acetaminophen (TYLENOL) tablet 650 mg  650 mg Oral Q6H PRN    Or    acetaminophen (TYLENOL) suppository 650 mg  650 mg Rectal Q6H PRN    polyethylene glycol (MIRALAX) packet 17 g  17 g Oral DAILY PRN    promethazine (PHENERGAN) tablet 12.5 mg  12.5 mg Oral Q6H PRN    Or    ondansetron (ZOFRAN) injection 4 mg  4 mg IntraVENous Q6H PRN    piperacillin-tazobactam (ZOSYN) 3.375 g in 0.9% sodium chloride (MBP/ADV) 100 mL MBP  3.375 g IntraVENous Q8H     Current Outpatient Medications   Medication Sig    dilTIAZem ER (CARDIZEM CD) 240 mg capsule Take 1 Cap by mouth daily.  metoprolol succinate (TOPROL-XL) 100 mg tablet Take 1 Tab by mouth daily. Indications: paroxysmal supraventricular tachycardia    mirtazapine (REMERON) 7.5 mg tablet Take 1 Tab by mouth nightly.     amoxicillin-clavulanate (Augmentin) 875-125 mg per tablet Take 1 Tab by mouth every twelve (12) hours.  prednisoLONE 5 mg (21 tabs) DsPk Use as directed    metoprolol succinate (TOPROL-XL) 100 mg tablet Take 1 Tab by mouth daily for 30 days. Indications: paroxysmal supraventricular tachycardia    ondansetron hcl (Zofran) 4 mg tablet Take 4 mg by mouth every eight (8) hours as needed for Nausea or Vomiting.  HYDROcodone-acetaminophen (NORCO) 5-325 mg per tablet Take 1 Tab by mouth every eight (8) hours as needed for Pain.  dexAMETHasone (DECADRON) 4 mg tablet Take  by mouth every twelve (12) hours. Indications: Night before Chemo    traMADoL (ULTRAM) 50 mg tablet Take 50 mg by mouth every six (6) hours as needed for Pain.  famotidine (PEPCID) 20 mg tablet Take 1 Tab by mouth two (2) times a day.  folic acid (FOLVITE) 1 mg tablet Take 1 Tab by mouth daily.  thiamine mononitrate (B-1) 100 mg tablet Take 1 Tab by mouth daily. Review of Systems:   Review of systems not obtained due to patient factors. Objective:   Physical Exam:     Visit Vitals  BP (!) 144/84   Pulse 95   Temp 97.8 °F (36.6 °C)   Resp 21   Ht 5' 10\" (1.778 m)   Wt 62.1 kg (137 lb)   SpO2 97%   BMI 19.66 kg/m²    O2 Flow Rate (L/min): 5 l/min O2 Device: Nasal cannula    Temp (24hrs), Av.8 °F (36.6 °C), Min:97.8 °F (36.6 °C), Max:97.8 °F (36.6 °C)    No intake/output data recorded.  1901 -  0700  In: 1100 [I.V.:1100]  Out: -     PHYSICAL EXAM:  General: Awake and alert, pleasantly confused  HEAD: Normocephalic, atraumatic. Eye: PERRLA, EOMI. Throat and Neck: normal and no erythema or exudates noted. No mass   Lung: Clear to auscultation bilaterally without wheezes, rhonchi. Good air movement bilaterally. Heart: Regular rate and rhythm. Normal S1/S2. NO appreciated murmurs, rubs or gallops. Abdomen: soft, non-tender. Bowel sounds present in all four quadrants. No masses appreciated.  PEG in situ  Extremities:  able to move all extremities normal, atraumatic  Skin: Clean, dry and intact without appreciated lesions. Neurologic:  Awake, alert but not much communicative. Cranial nerves II to XII grossly intact  Psychiatric: Calm and cooperative    Data Review:       Recent Days:  Recent Labs     01/10/21  0300 01/09/21  1410   WBC 7.4 8.8   HGB 10.9* 13.1   HCT 33.6* 40.6   PLT 48* 68*     Recent Labs     01/10/21  0300 01/09/21  1410    145   K 3.8 3.9   * 115*   CO2 22 22   * 158*   BUN 37* 43*   CREA 0.91 1.25   CA 8.8 9.0   ALB 1.6* 1.8*   TBILI 0.9 0.9   ALT 53 59     Recent Labs     01/09/21  1345   PH 7.50*   PCO2 27*   PO2 70*   HCO3 24   FIO2 21.0           CTA CHEST W OR W WO CONT   Final Result   Impression:       1. Negative for pulmonary embolus. 2. Multiple pulmonary masses of varying sizes compatible with metastases. 3. Esophageal stent is patent. CT ABD PELV WO CONT   Final Result   Impression:   1. Negative for acute abnormality. 2. Cholelithiasis. Right renal nephrolithiasis. 3. Percutaneous gastrostomy tube. XR CHEST PORT   Final Result   Impression:   Overall mild improvement in right-sided air space disease since 12/29/2020. Persistent bilateral pulmonary nodules. Assessment/     Problem List:  Hospital Problems  Date Reviewed: 10/27/2020          Codes Class Noted POA    Acute respiratory failure with hypoxia [facility]) ICD-10-CM: J96.01  ICD-9-CM: 518.81  1/9/2021 Unknown        COVID-19 ICD-10-CM: U07.1  ICD-9-CM: 079.89  12/29/2020 Yes        Paroxysmal SVT (supraventricular tachycardia) (HCC) ICD-10-CM: I47.1  ICD-9-CM: 427.0  12/17/2020 Yes        Esophageal mass ICD-10-CM: K22.8  ICD-9-CM: 530.89  10/26/2020 Yes                   (1) Acute hypoxic respiratory failure: s/t COVID19 pneumonia, on azithromycin,     (2) COVID-19 pneumonitis: decadron, azithromycin,    (3) possible obstructive pneumonia: zosyn, sputum cx    (3) pAFIB: cardizem and metoprolol.  stable    (4) esophageal cancer s/t stent complicated by mets    (5) GERD: famotidine    DVT ppx: lovenox     DISPO: I will call the MPOA for possible hospice, give last admission states family aware of his poor prognosis I doubt it would change, plan for dc when o2 improved to 2L. dispo as per PT in 2-3 days.        Care Plan discussed with: Nurse        Byron Carcamo MD

## 2021-01-11 NOTE — ED NOTES
Bedside shift change report given to Yuridia (oncoming nurse) by Kyara Norman (offgoing nurse). Report included the following information SBAR, ED Summary, Intake/Output, MAR and Recent Results.

## 2021-01-11 NOTE — PROGRESS NOTES
1/11/21. CM spoke with daughter Minh Ramirez @ [phone]) regarding D/C Plan & RTH/Dot Phrase. Son Fransisca Ponce @ [phone]) not available - message left. PCP is Dr. Thu Esposito - seen last in June 2020 & seen DARIN LUGO in September of 2020. Pt lives @ home with  & uses no DME. Family/son Fransisca Ponce to transport home upon discharge.

## 2021-01-11 NOTE — PROGRESS NOTES
1/11/21. MODERATE RISK SCORE DOT PHRASE:    Reason for Admission: Respiratory Failure    RUR Score: 26%    PCP: Dr. Sony Arcos - seen last in June 2020. Seen GI MD in Oct 20/20. Virtual Visit: Yes    Concerns: None    ACP: No    Plan for utilizing home health. Referral sent back to [facility] via [employer]. Choice form agreed verbally via son Ry Fair @ [phone]. Pt also uses walker. Transition of Care: Pt live @ home with . Ambulates with walker. Son/family to transport home upon discharge.

## 2021-01-11 NOTE — PROGRESS NOTES
1/11/21. Moses Ponce @ [phone]) returned call. Stated pt does have walker & home health via  [facility]. Choice Letter agreed verbally via phone & referral sent via [employer].

## 2021-01-11 NOTE — PROGRESS NOTES
Progress Note    Patient: Aurelio Loza MRN: [id number]  SSN: [id number]    YOB: 1945  Age: 76 y.o. Sex: male      Admit Date: 1/9/2021    LOS: 2 days     Subjective:     No acute events overnight    Objective:     Vitals:    01/11/21 0400 01/11/21 0609 01/11/21 0700 01/11/21 0900   BP: (!) 130/99 133/75  (!) 144/84   Pulse: 82 78 79 95   Resp: 18 21     Temp:  97.8 °F (36.6 °C)     SpO2: 97% 97%     Weight:       Height:            Intake and Output:  Current Shift: No intake/output data recorded. Last three shifts: 01/09 1901 - 01/11 0700  In: 1100 [I.V.:1100]  Out: -     Physical Exam:   General:  Alert, cooperative, no distress, appears stated age. Eyes:  Conjunctivae/corneas clear. PERRL, EOMs intact. Fundi benign   Ears:  Normal TMs and external ear canals both ears. Nose: Nares normal. Septum midline. Mucosa normal. No drainage or sinus tenderness. Mouth/Throat: Lips, mucosa, and tongue normal. Teeth and gums normal.   Neck: Supple, symmetrical, trachea midline, no adenopathy, thyroid: no enlargment/tenderness/nodules, no carotid bruit and no JVD. Back:   Symmetric, no curvature. ROM normal. No CVA tenderness. Lungs:   Clear to auscultation bilaterally. Heart:  Regular rate and rhythm, S1, S2 normal, no murmur, click, rub or gallop. Abdomen:   Soft, non-tender. Bowel sounds normal. No masses,  No organomegaly. Extremities: Extremities normal, atraumatic, no cyanosis or edema. Pulses: 2+ and symmetric all extremities. Skin: Skin color, texture, turgor normal. No rashes or lesions   Lymph nodes: Cervical, supraclavicular, and axillary nodes normal.   Neurologic: CNII-XII intact. Normal strength, sensation and reflexes throughout. Lab/Data Review: All lab results for the last 24 hours reviewed.        Assessment:     Active Problems:    Esophageal mass (10/26/2020)      Paroxysmal SVT (supraventricular tachycardia) ([address] (12/17/2020)      COVID-19 (12/29/2020)      Acute respiratory failure with hypoxia ([facility] [address] (1/9/2021)    Patient is 63-year-old -American male with:  1. Non-STEMI, possibly type II  2. Altered mental status  3. SVT  4. Esophageal cancer status post stent placement  5. Cachexia status post PEG tube placement  6. Hypertension  7. Stroke. 8.  Covid pneumonitis  9. Thrombocytopenia, platelet 40 8K       Plan:     Is troponin is less than 0.05 this morning. Blood pressure is okay. Currently on diltiazem, metoprolol. No further cardiac testing planned at this time. Continue supportive care. Plans noted for possible hospice. I will sign off and remain available if needed.     Signed By: Miguel Horn MD     January 11, 2021

## 2021-01-12 ENCOUNTER — APPOINTMENT (OUTPATIENT)
Dept: RADIATION THERAPY | Age: 76
End: 2021-01-12
Payer: MEDICARE

## 2021-01-12 LAB — D DIMER PPP FEU-MCNC: 18.87 UG/ML(FEU)

## 2021-01-12 PROCEDURE — 36415 COLL VENOUS BLD VENIPUNCTURE: CPT

## 2021-01-12 PROCEDURE — 65270000029 HC RM PRIVATE

## 2021-01-12 PROCEDURE — 74011250637 HC RX REV CODE- 250/637: Performed by: HOSPITALIST

## 2021-01-12 PROCEDURE — 74011000258 HC RX REV CODE- 258: Performed by: HOSPITALIST

## 2021-01-12 PROCEDURE — 74011250636 HC RX REV CODE- 250/636: Performed by: HOSPITALIST

## 2021-01-12 PROCEDURE — 85379 FIBRIN DEGRADATION QUANT: CPT

## 2021-01-12 RX ADMIN — FOLIC ACID 1 MG: 1 TABLET ORAL at 09:29

## 2021-01-12 RX ADMIN — Medication 10 ML: at 22:43

## 2021-01-12 RX ADMIN — Medication 10 ML: at 22:00

## 2021-01-12 RX ADMIN — DEXAMETHASONE SODIUM PHOSPHATE 10 MG: 10 INJECTION INTRAMUSCULAR; INTRAVENOUS at 22:42

## 2021-01-12 RX ADMIN — THIAMINE HCL TAB 100 MG 100 MG: 100 TAB at 09:29

## 2021-01-12 RX ADMIN — Medication 10 ML: at 14:00

## 2021-01-12 RX ADMIN — AZITHROMYCIN DIHYDRATE 500 MG: 500 INJECTION, POWDER, LYOPHILIZED, FOR SOLUTION INTRAVENOUS at 09:28

## 2021-01-12 RX ADMIN — MIRTAZAPINE 7.5 MG: 15 TABLET, FILM COATED ORAL at 22:56

## 2021-01-12 RX ADMIN — PIPERACILLIN SODIUM AND TAZOBACTAM SODIUM 3.38 G: 3; .375 INJECTION, POWDER, LYOPHILIZED, FOR SOLUTION INTRAVENOUS at 22:42

## 2021-01-12 RX ADMIN — FAMOTIDINE 20 MG: 20 TABLET, FILM COATED ORAL at 09:29

## 2021-01-12 RX ADMIN — PIPERACILLIN SODIUM AND TAZOBACTAM SODIUM 3.38 G: 3; .375 INJECTION, POWDER, LYOPHILIZED, FOR SOLUTION INTRAVENOUS at 17:13

## 2021-01-12 RX ADMIN — Medication 10 ML: at 09:00

## 2021-01-12 RX ADMIN — FAMOTIDINE 20 MG: 20 TABLET, FILM COATED ORAL at 22:42

## 2021-01-12 RX ADMIN — METOPROLOL SUCCINATE 100 MG: 50 TABLET, EXTENDED RELEASE ORAL at 09:29

## 2021-01-12 RX ADMIN — DILTIAZEM HYDROCHLORIDE 240 MG: 120 CAPSULE, COATED, EXTENDED RELEASE ORAL at 09:38

## 2021-01-12 RX ADMIN — DEXAMETHASONE SODIUM PHOSPHATE 10 MG: 10 INJECTION INTRAMUSCULAR; INTRAVENOUS at 09:29

## 2021-01-12 NOTE — CONSULTS
Hematology/Oncology Consult    Patient: Zayda Padilla MRN: [id number]     YOB: 1945  Age: 76 y.o. Sex: male      HPI      Zayda Padilla is a 76 y.o. male who is being seen for metastatic esophageal cancer. Mr. Bri Pace follows with Dr. John Abbott for metastatic esophageal cancer and has been on chemotherapy and RT recently. He was diagnosed with COVID 2 weeks ago and since then his performance status has further deteriorated. He has a PEG tube for nutrition. The patient continues to be in the ER, waiting for a bed. The patient is unable to provide any history. Most of the history was obtained by review of medical records and also in talking with the ER nurse, his son Catrina Pro and also Dr. John Abbott. ROS: could not be obtained due to mental status. Past Medical History:   Diagnosis Date    Anemia     Arthritis     Cancer ([facility]     esophageal    Dysphagia     Gastrointestinal disorder     History of radiation therapy     for prostate cancer    Hx of carcinoma in situ of prostate 2017    s/p radiation     Hypertension     pt states doesnt take medication. 1 year    Stroke [facility])     TIA 20 years ago    SVT (supraventricular tachycardia) ([facility]     Weight loss      Past Surgical History:   Procedure Laterality Date    HX GASTROSTOMY      HX HEENT      lasik    HX HERNIA REPAIR      HX OTHER SURGICAL      PEG tube    IR DRAIN PROCEDURE W CATH  11/5/2020    IR INSERT TUNL CVC W PORT OVER 5 YEARS  12/8/2020    IR PLACE CVAD FLUORO GUIDE  12/8/2020      No family history on file.   Social History     Tobacco Use    Smoking status: Current Every Day Smoker    Smokeless tobacco: Never Used    Tobacco comment: pt states he barely smokes 1 cig a day   Substance Use Topics    Alcohol use: Not Currently     Comment: pt stated he quit about 2-3 months ago      Current Facility-Administered Medications   Medication Dose Route Frequency Provider Last Rate Last Admin    dilTIAZem ER (CARDIZEM CD) capsule 240 mg  240 mg Oral DAILY Kristen Orr MD   240 mg at 01/12/21 7163    famotidine (PEPCID) tablet 20 mg  20 mg Oral BID Kristen Orr MD   20 mg at 55/58/55 8753    folic acid (FOLVITE) tablet 1 mg  1 mg Oral DAILY Kristen Orr MD   1 mg at 01/12/21 8131    metoprolol succinate (TOPROL-XL) XL tablet 100 mg  100 mg Oral DAILY Kristen Orr MD   100 mg at 01/12/21 1946    mirtazapine (REMERON) tablet 7.5 mg  7.5 mg Oral QHS Kristen Orr MD   7.5 mg at 01/11/21 2113    thiamine mononitrate (B-1) tablet 100 mg  100 mg Oral DAILY Kristen Orr MD   100 mg at 01/12/21 0929    traMADoL (ULTRAM) tablet 50 mg  50 mg Oral Q6H PRN Kristen Orr MD        dexamethasone (PF) (DECADRON) 10 mg/mL injection 10 mg  10 mg IntraVENous Q12H Kristen Orr MD   10 mg at 01/12/21 0929    azithromycin (ZITHROMAX) 500 mg in 0.9% sodium chloride 250 mL (VIAL-MATE)  500 mg IntraVENous Q24H Kristen Orr MD   500 mg at 01/12/21 0928    sodium chloride (NS) flush 5-40 mL  5-40 mL IntraVENous Q8H Kristen Orr MD   10 mL at 01/12/21 1400    sodium chloride (NS) flush 5-40 mL  5-40 mL IntraVENous PRN Kristen Orr MD        acetaminophen (TYLENOL) tablet 650 mg  650 mg Oral Q6H PRN Kristen Orr MD        Or   [employer] acetaminophen (TYLENOL) suppository 650 mg  650 mg Rectal Q6H PRN Kristen Orr MD        polyethylene glycol (MIRALAX) packet 17 g  17 g Oral DAILY PRN Kristen Orr MD        promethazine (PHENERGAN) tablet 12.5 mg  12.5 mg Oral Q6H PRN Kristen Orr MD        Or    ondansetron [facility]) injection 4 mg  4 mg IntraVENous Q6H PRN Kristen Orr MD        piperacillin-tazobactam (ZOSYN) 3.375 g in 0.9% sodium chloride (MBP/ADV) 100 mL MBP  3.375 g IntraVENous Q8H Kristen Orr  mL/hr at 01/12/21 1713 3.375 g at 01/12/21 1713     Current Outpatient Medications   Medication Sig Dispense Refill    dilTIAZem ER (CARDIZEM CD) 240 mg capsule Take 1 Cap by mouth daily. 30 Cap 0    metoprolol succinate (TOPROL-XL) 100 mg tablet Take 1 Tab by mouth daily. Indications: paroxysmal supraventricular tachycardia 30 Tab 0    mirtazapine (REMERON) 7.5 mg tablet Take 1 Tab by mouth nightly. 30 Tab 0    amoxicillin-clavulanate (Augmentin) 875-125 mg per tablet Take 1 Tab by mouth every twelve (12) hours. 14 Tab 0    prednisoLONE 5 mg (21 tabs) DsPk Use as directed 21 Tab 0    metoprolol succinate (TOPROL-XL) 100 mg tablet Take 1 Tab by mouth daily for 30 days. Indications: paroxysmal supraventricular tachycardia 30 Tab 0    ondansetron hcl (Zofran) 4 mg tablet Take 4 mg by mouth every eight (8) hours as needed for Nausea or Vomiting.  HYDROcodone-acetaminophen (NORCO) 5-325 mg per tablet Take 1 Tab by mouth every eight (8) hours as needed for Pain.  dexAMETHasone (DECADRON) 4 mg tablet Take  by mouth every twelve (12) hours. Indications: Night before Chemo      traMADoL (ULTRAM) 50 mg tablet Take 50 mg by mouth every six (6) hours as needed for Pain.  famotidine (PEPCID) 20 mg tablet Take 1 Tab by mouth two (2) times a day. 30 Tab 1    folic acid (FOLVITE) 1 mg tablet Take 1 Tab by mouth daily. 30 Tab 1    thiamine mononitrate (B-1) 100 mg tablet Take 1 Tab by mouth daily.  30 Tab 1        No Known Allergies    Review of Systems: Could not obtained due to mental status        Objective:     Vitals:    01/12/21 0730 01/12/21 0730 01/12/21 0925 01/12/21 1717   BP: (!) 156/91  (!) 143/69 (!) 163/73   Pulse: 76  78 70   Resp: 20  20 21   Temp: 96.9 °F (36.1 °C)      SpO2: 98% 98% 99% 98%   Weight:       Height:            Physical Exam:  Confused    Lab/Data Review:  Recent Labs     01/10/21  0300   WBC 7.4   HGB 10.9*   HCT 33.6*   PLT 48*     Recent Labs     01/10/21  0300      K 3.8   *   CO2 22   *   BUN 37*   CREA 0.91   CA 8.8   ALB 1.6*   TBILI 0.9   ALT 53     No results for input(s): PH, PCO2, PO2, HCO3, FIO2 in the last 72 hours. Recent Results (from the past 24 hour(s))   D DIMER    Collection Time: 01/12/21  4:30 AM   Result Value Ref Range    D DIMER 18.87 (H) <0.50 ug/ml(FEU)        Cta Chest W Or W Wo Cont    Result Date: 1/9/2021  Impression: 1. Negative for pulmonary embolus. 2. Multiple pulmonary masses of varying sizes compatible with metastases. 3. Esophageal stent is patent. Ct Abd Pelv Wo Cont    Result Date: 1/9/2021  Impression: 1. Negative for acute abnormality. 2. Cholelithiasis. Right renal nephrolithiasis. 3. Percutaneous gastrostomy tube. Xr Chest Port    Result Date: 1/9/2021  Impression: Overall mild improvement in right-sided air space disease since 12/29/2020. Persistent bilateral pulmonary nodules. Assessment and Plan:     Hospital Problems  Date Reviewed: 10/27/2020          Codes Class Noted POA    Acute respiratory failure with hypoxia [facility]) ICD-10-CM: J96.01  ICD-9-CM: 518.81  1/9/2021 Unknown        COVID-19 ICD-10-CM: U07.1  ICD-9-CM: 079.89  12/29/2020 Yes        Paroxysmal SVT (supraventricular tachycardia) (HCC) ICD-10-CM: I47.1  ICD-9-CM: 427.0  12/17/2020 Yes        Esophageal mass ICD-10-CM: K22.8  ICD-9-CM: 530.89  10/26/2020 Yes              Stage IV Esophageal Cancer;  - Poor prognosis. - Deteriorating performance status and not a candidate for further systemic treatment. COVID positivity:  - Per primary team.    Discussed in detail with patient's son- Jasmina Osuna on the phone. He reports that the patient has been in bed for the last 3 weeks and has not been communicating. He asks me about hospice care and I feel this is appropriate given deteriorating performance status. Also discussed with Dr. Galina Mccurdy, who also agrees with hospice care for the patient. We will consult CM for hospice care. D/w Dr. Frederick Medley.     Signed By: Mio Goyal MD     January 12, 2021

## 2021-01-12 NOTE — PROGRESS NOTES
Hospitalist Progress Note               Daily Progress Note: 1/12/2021      Subjective:     75M, h/o esophageal cancer s/p stent and pAFIB with acute hypoxic respiratory failure s/t COVID19 pneumonia in the setting of pulmonary mets. Currently confused  On 5L NC    Discussed with family - agreeed for hospice    Medications reviewed  Current Facility-Administered Medications   Medication Dose Route Frequency    dilTIAZem ER (CARDIZEM CD) capsule 240 mg  240 mg Oral DAILY    famotidine (PEPCID) tablet 20 mg  20 mg Oral BID    folic acid (FOLVITE) tablet 1 mg  1 mg Oral DAILY    metoprolol succinate (TOPROL-XL) XL tablet 100 mg  100 mg Oral DAILY    mirtazapine (REMERON) tablet 7.5 mg  7.5 mg Oral QHS    thiamine mononitrate (B-1) tablet 100 mg  100 mg Oral DAILY    traMADoL (ULTRAM) tablet 50 mg  50 mg Oral Q6H PRN    dexamethasone (PF) (DECADRON) 10 mg/mL injection 10 mg  10 mg IntraVENous Q12H    azithromycin (ZITHROMAX) 500 mg in 0.9% sodium chloride 250 mL (VIAL-MATE)  500 mg IntraVENous Q24H    sodium chloride (NS) flush 5-40 mL  5-40 mL IntraVENous Q8H    sodium chloride (NS) flush 5-40 mL  5-40 mL IntraVENous PRN    acetaminophen (TYLENOL) tablet 650 mg  650 mg Oral Q6H PRN    Or    acetaminophen (TYLENOL) suppository 650 mg  650 mg Rectal Q6H PRN    polyethylene glycol (MIRALAX) packet 17 g  17 g Oral DAILY PRN    promethazine (PHENERGAN) tablet 12.5 mg  12.5 mg Oral Q6H PRN    Or    ondansetron (ZOFRAN) injection 4 mg  4 mg IntraVENous Q6H PRN    piperacillin-tazobactam (ZOSYN) 3.375 g in 0.9% sodium chloride (MBP/ADV) 100 mL MBP  3.375 g IntraVENous Q8H     Current Outpatient Medications   Medication Sig    dilTIAZem ER (CARDIZEM CD) 240 mg capsule Take 1 Cap by mouth daily.  metoprolol succinate (TOPROL-XL) 100 mg tablet Take 1 Tab by mouth daily. Indications: paroxysmal supraventricular tachycardia    mirtazapine (REMERON) 7.5 mg tablet Take 1 Tab by mouth nightly.     amoxicillin-clavulanate (Augmentin) 875-125 mg per tablet Take 1 Tab by mouth every twelve (12) hours.  prednisoLONE 5 mg (21 tabs) DsPk Use as directed    metoprolol succinate (TOPROL-XL) 100 mg tablet Take 1 Tab by mouth daily for 30 days. Indications: paroxysmal supraventricular tachycardia    ondansetron hcl (Zofran) 4 mg tablet Take 4 mg by mouth every eight (8) hours as needed for Nausea or Vomiting.  HYDROcodone-acetaminophen (NORCO) 5-325 mg per tablet Take 1 Tab by mouth every eight (8) hours as needed for Pain.  dexAMETHasone (DECADRON) 4 mg tablet Take  by mouth every twelve (12) hours. Indications: Night before Chemo    traMADoL (ULTRAM) 50 mg tablet Take 50 mg by mouth every six (6) hours as needed for Pain.  famotidine (PEPCID) 20 mg tablet Take 1 Tab by mouth two (2) times a day.  folic acid (FOLVITE) 1 mg tablet Take 1 Tab by mouth daily.  thiamine mononitrate (B-1) 100 mg tablet Take 1 Tab by mouth daily. Review of Systems:   Review of systems not obtained due to patient factors. Objective:   Physical Exam:     Visit Vitals  BP (!) 163/73 (BP 1 Location: Right arm, BP Patient Position: At rest)   Pulse 70   Temp 96.9 °F (36.1 °C)   Resp 21   Ht 5' 10\" (1.778 m)   Wt 62.1 kg (137 lb)   SpO2 98%   BMI 19.66 kg/m²    O2 Flow Rate (L/min): 3 l/min O2 Device: Nasal cannula    Temp (24hrs), Av.9 °F (36.1 °C), Min:96.9 °F (36.1 °C), Max:96.9 °F (36.1 °C)    No intake/output data recorded. No intake/output data recorded. PHYSICAL EXAM:  General: Awake and alert, pleasantly confused  HEAD: Normocephalic, atraumatic. Eye: PERRLA, EOMI. Throat and Neck: normal and no erythema or exudates noted. No mass   Lung: Clear to auscultation bilaterally without wheezes, rhonchi. Good air movement bilaterally. Heart: Regular rate and rhythm. Normal S1/S2. NO appreciated murmurs, rubs or gallops. Abdomen: soft, non-tender. Bowel sounds present in all four quadrants.  No masses appreciated. PEG in situ  Extremities:  able to move all extremities normal, atraumatic  Skin: Clean, dry and intact without appreciated lesions. Neurologic:  Awake, alert but not much communicative. Cranial nerves II to XII grossly intact  Psychiatric: Calm and cooperative    Data Review:       Recent Days:  Recent Labs     01/10/21  0300   WBC 7.4   HGB 10.9*   HCT 33.6*   PLT 48*     Recent Labs     01/10/21  0300      K 3.8   *   CO2 22   *   BUN 37*   CREA 0.91   CA 8.8   ALB 1.6*   TBILI 0.9   ALT 53     No results for input(s): PH, PCO2, PO2, HCO3, FIO2 in the last 72 hours. CTA CHEST W OR W WO CONT   Final Result   Impression:       1. Negative for pulmonary embolus. 2. Multiple pulmonary masses of varying sizes compatible with metastases. 3. Esophageal stent is patent. CT ABD PELV WO CONT   Final Result   Impression:   1. Negative for acute abnormality. 2. Cholelithiasis. Right renal nephrolithiasis. 3. Percutaneous gastrostomy tube. XR CHEST PORT   Final Result   Impression:   Overall mild improvement in right-sided air space disease since 12/29/2020. Persistent bilateral pulmonary nodules. Assessment/     Problem List:  Hospital Problems  Date Reviewed: 10/27/2020          Codes Class Noted POA    Acute respiratory failure with hypoxia [facility]) ICD-10-CM: J96.01  ICD-9-CM: 518.81  1/9/2021 Unknown        COVID-19 ICD-10-CM: U07.1  ICD-9-CM: 079.89  12/29/2020 Yes        Paroxysmal SVT (supraventricular tachycardia) (HCC) ICD-10-CM: I47.1  ICD-9-CM: 427.0  12/17/2020 Yes        Esophageal mass ICD-10-CM: K22.8  ICD-9-CM: 530.89  10/26/2020 Yes                   (1) Acute hypoxic respiratory failure: s/t COVID19 pneumonia, on azithromycin,     (2) COVID-19 pneumonitis: decadron, azithromycin,    (3) possible obstructive pneumonia: zosyn, sputum cx    (3) pAFIB: cardizem and metoprolol.  stable    (4) esophageal cancer s/t stent complicated by mets    (5) GERD: famotidine    DVT ppx: lovenox     DISPO:iscussed with family - agreeed for hospice    Care Plan discussed with: Nurse Mari Olsen MD

## 2021-01-12 NOTE — PROGRESS NOTES
1/12/21. MD informed PATRIA that family had agreed to hospice service. PATRIA spoke with mary Khalil Kris @ [phone]) - osn confirmed & requested any hospice company. Choice form reviewed & agreed verbally. Referral sent via alessandro.

## 2021-01-13 LAB — D DIMER PPP FEU-MCNC: 16.65 UG/ML(FEU)

## 2021-01-13 PROCEDURE — 94762 N-INVAS EAR/PLS OXIMTRY CONT: CPT

## 2021-01-13 PROCEDURE — 36415 COLL VENOUS BLD VENIPUNCTURE: CPT

## 2021-01-13 PROCEDURE — 74011000258 HC RX REV CODE- 258: Performed by: HOSPITALIST

## 2021-01-13 PROCEDURE — 74011250636 HC RX REV CODE- 250/636: Performed by: HOSPITALIST

## 2021-01-13 PROCEDURE — 85379 FIBRIN DEGRADATION QUANT: CPT

## 2021-01-13 PROCEDURE — 74011250637 HC RX REV CODE- 250/637: Performed by: HOSPITALIST

## 2021-01-13 PROCEDURE — 77010033678 HC OXYGEN DAILY

## 2021-01-13 PROCEDURE — 65270000029 HC RM PRIVATE

## 2021-01-13 RX ADMIN — DILTIAZEM HYDROCHLORIDE 240 MG: 120 CAPSULE, COATED, EXTENDED RELEASE ORAL at 09:25

## 2021-01-13 RX ADMIN — FAMOTIDINE 20 MG: 20 TABLET, FILM COATED ORAL at 09:25

## 2021-01-13 RX ADMIN — PIPERACILLIN SODIUM AND TAZOBACTAM SODIUM 3.38 G: 3; .375 INJECTION, POWDER, LYOPHILIZED, FOR SOLUTION INTRAVENOUS at 06:11

## 2021-01-13 RX ADMIN — FAMOTIDINE 20 MG: 20 TABLET, FILM COATED ORAL at 21:13

## 2021-01-13 RX ADMIN — PIPERACILLIN SODIUM AND TAZOBACTAM SODIUM 3.38 G: 3; .375 INJECTION, POWDER, LYOPHILIZED, FOR SOLUTION INTRAVENOUS at 21:14

## 2021-01-13 RX ADMIN — Medication 10 ML: at 06:11

## 2021-01-13 RX ADMIN — DEXAMETHASONE SODIUM PHOSPHATE 10 MG: 10 INJECTION INTRAMUSCULAR; INTRAVENOUS at 22:01

## 2021-01-13 RX ADMIN — THIAMINE HCL TAB 100 MG 100 MG: 100 TAB at 09:25

## 2021-01-13 RX ADMIN — METOPROLOL SUCCINATE 100 MG: 50 TABLET, EXTENDED RELEASE ORAL at 09:26

## 2021-01-13 RX ADMIN — FOLIC ACID 1 MG: 1 TABLET ORAL at 09:25

## 2021-01-13 RX ADMIN — AZITHROMYCIN DIHYDRATE 500 MG: 500 INJECTION, POWDER, LYOPHILIZED, FOR SOLUTION INTRAVENOUS at 09:38

## 2021-01-13 RX ADMIN — MIRTAZAPINE 7.5 MG: 15 TABLET, FILM COATED ORAL at 21:12

## 2021-01-13 RX ADMIN — DEXAMETHASONE SODIUM PHOSPHATE 10 MG: 10 INJECTION INTRAMUSCULAR; INTRAVENOUS at 09:25

## 2021-01-13 RX ADMIN — Medication 10 ML: at 21:14

## 2021-01-13 NOTE — ED NOTES
Bedside shift change report given to Kristian Gaytan (oncoming nurse) by Sofi Velasquez RN (offgoing nurse). Report included the following information SBAR, Kardex, Intake/Output, MAR and Recent Results. n/a

## 2021-01-13 NOTE — PROGRESS NOTES
CM received a phone call from Randall Calderon with [facility]. Cm was informed pt's landlord will not allow pt to return to the home COVID positive. Son is now seeking SNF with hospice. Cm called pt's son - Sathish Ojeda [phone] to f/up on pt's plan of care. Son confirmed he wants his father placed in a SNF with hospice. Son is agreeable with CM sending referrals out to SNF's to see who has capability and capacity to meet pt's needs and CM following up with him to present the facilities that can accept. CM discussed completing a [facility] for long term care. Cm asked Mr. Alex Landers some questions to complete [facility]. CM will updated Mr. Alex Landers on facility acceptance. CM notified Dr. Dustin Wen. CM attempted to contact Randall Calderon with [facility] [phone]. No answer. Cm messaged [facility] via [facility]. [facility] called pt's son - Sathish Ojeda and updated him on dc plan. CM will call son again tomorrow.

## 2021-01-13 NOTE — DISCHARGE SUMMARY
Physician Discharge Summary     Patient ID:    Gabby Ferrera  [id number]  76 y.o.  1945    Admit date: 1/9/2021    Discharge date : 1/13/2021    Chronic Diagnoses:    Problem List as of 1/13/2021 Date Reviewed: 10/27/2020          Codes Class Noted - Resolved    Acute respiratory failure with hypoxia ([facility] ICD-10-CM: J96.01  ICD-9-CM: 518.81  1/9/2021 - Present        Hypomagnesemia ICD-10-CM: E83.42  ICD-9-CM: 275.2  12/29/2020 - Present        SVT (supraventricular tachycardia) (HCC) ICD-10-CM: I47.1  ICD-9-CM: 427.89  12/29/2020 - Present        COVID-19 ICD-10-CM: U07.1  ICD-9-CM: 079.89  12/29/2020 - Present        Paroxysmal SVT (supraventricular tachycardia) (HCC) ICD-10-CM: I47.1  ICD-9-CM: 427.0  12/17/2020 - Present        Esophageal mass ICD-10-CM: K22.8  ICD-9-CM: 530.89  10/26/2020 - Present          22    Final Diagnoses:   Acute respiratory failure with hypoxia ([facility] [J96.01]    Reason for Hospitalization:    Worsening shortness of breath      Hospital Course:     75M, h/o esophageal cancer s/p stent and pAFIB with acute hypoxic respiratory failure s/t COVID19 pneumonia in the setting of pulmonary mets. While being treated for COVID 19, CT chest showed mets to lungs from esophagela cancer, oncology for consulted for input, after discussing quality of life from here onwards it was our medical opinion to opt for hospice, son Baron Calderón was called and he agreed. Discharge Medications:   Current Discharge Medication List      CONTINUE these medications which have NOT CHANGED    Details   dilTIAZem ER (CARDIZEM CD) 240 mg capsule Take 1 Cap by mouth daily. Qty: 30 Cap, Refills: 0      !! metoprolol succinate (TOPROL-XL) 100 mg tablet Take 1 Tab by mouth daily. Indications: paroxysmal supraventricular tachycardia  Qty: 30 Tab, Refills: 0      mirtazapine (REMERON) 7.5 mg tablet Take 1 Tab by mouth nightly.   Qty: 30 Tab, Refills: 0      prednisoLONE 5 mg (21 tabs) DsPk Use as directed  Qty: 21 Tab, Refills: 0      !! metoprolol succinate (TOPROL-XL) 100 mg tablet Take 1 Tab by mouth daily for 30 days. Indications: paroxysmal supraventricular tachycardia  Qty: 30 Tab, Refills: 0      ondansetron hcl (Zofran) 4 mg tablet Take 4 mg by mouth every eight (8) hours as needed for Nausea or Vomiting. HYDROcodone-acetaminophen (NORCO) 5-325 mg per tablet Take 1 Tab by mouth every eight (8) hours as needed for Pain. dexAMETHasone (DECADRON) 4 mg tablet Take  by mouth every twelve (12) hours. Indications: Night before Chemo      traMADoL (ULTRAM) 50 mg tablet Take 50 mg by mouth every six (6) hours as needed for Pain.      famotidine (PEPCID) 20 mg tablet Take 1 Tab by mouth two (2) times a day. Qty: 30 Tab, Refills: 1      folic acid (FOLVITE) 1 mg tablet Take 1 Tab by mouth daily. Qty: 30 Tab, Refills: 1      thiamine mononitrate (B-1) 100 mg tablet Take 1 Tab by mouth daily. Qty: 30 Tab, Refills: 1       !! - Potential duplicate medications found. Please discuss with provider. STOP taking these medications       amoxicillin-clavulanate (Augmentin) 875-125 mg per tablet Comments:   Reason for Stopping: Follow up Care:    1. Mirella Calero MD in 1-2 weeks. Please call to set up an appointment shortly after discharge. Diet: cardiac    Disposition:  hospice    Advanced Directive:   FULL    DNR x     Discharge Exam:  See tPHYSICAL EXAM:  Donalee Mayor and alert, pleasantly confused  HEAD: Normocephalic, atraumatic. Eye: PERRLA, EOMI. Throat and Neck: normal and no erythema or exudates noted. No mass   Lung: Clear to auscultation bilaterally without wheezes, rhonchi. Good air movement bilaterally. Heart: Regular rate and rhythm. Normal S1/S2. NO appreciated murmurs, rubs or gallops.    Abdomen: soft, non-tender. Bowel sounds present in all four quadrants.  No masses appreciated. PEG in situ  Extremities:  able to move all extremities normal, atraumatic  Skin: Clean, dry and intact without appreciated lesions. Neurologic:  Awake, alert but not much communicative. Cranial nerves II to XII grossly intact  Psychiatric: Calm and cooperative. CONSULTATIONS: oncology    Significant Diagnostic Studies:     Radiologic Studies -   Results from Hospital Encounter encounter on 01/09/21   XR CHEST PORT    Narrative Study: Chest radiograph(s), 1 view    Clinical Indication: Chest pain. Comparison: Chest radiographs dated 12/29/2020. Findings:    Unchanged positioning of a right internal jugular approach Port-A-Cath which is  currently accessed with a Hernandez needle. An esophageal stent remains in place. The cardiac silhouette is normal and unchanged in size. Nodular opacities throughout both lungs persist. Compared to chest radiograph  dated 12/29/2020, there has been overall mild improvement in superpose patchy  right-sided opacities/airspace disease. No large pleural effusion. No  discernible pneumothorax. Impression Impression:  Overall mild improvement in right-sided air space disease since 12/29/2020. Persistent bilateral pulmonary nodules.       CT Results  (Last 48 hours)    None              Discharge time spent 35 minutes    Signed:  Paula Watts MD  1/13/2021  10:31 AM

## 2021-01-13 NOTE — PROGRESS NOTES
Patient arrived to floor via stretcher.  Patient oriented to self.  Skin assessment done with John LLAMAS RN.  Scattered scratches noted on bilateral lower legs, and a scab noted on left foot.  Bed in lowest position, call bell within reach.

## 2021-01-13 NOTE — PROGRESS NOTES
Cm spoke with Kristine Carter from [facility] [phone]. rKistine Carter has been communicating with the pt's son. Apparently, pt lives at home with Alisia Allen and she provides pt with care in the home. Due to a concern regarding pt's COVID status, the son will be providing pt with care if Ms. Santosh Payton is unable to. Kristien Carter is working on obtaining equipment for the pt. CM to f/up with pt's primary nurse to discuss transportation.

## 2021-01-13 NOTE — PROGRESS NOTES
Per chart review, family agreed for hospice. Will d/c ST at this time. Please re-consult as medically indicated.

## 2021-01-13 NOTE — PROGRESS NOTES
1/13/21. Per Ambar Metzger note with [address] son would like to speak with MD again before making decision for hospice. MD informed.

## 2021-01-14 LAB — D DIMER PPP FEU-MCNC: 15.47 UG/ML(FEU)

## 2021-01-14 PROCEDURE — 74011250636 HC RX REV CODE- 250/636: Performed by: HOSPITALIST

## 2021-01-14 PROCEDURE — 85379 FIBRIN DEGRADATION QUANT: CPT

## 2021-01-14 PROCEDURE — 74011000258 HC RX REV CODE- 258: Performed by: HOSPITALIST

## 2021-01-14 PROCEDURE — 36415 COLL VENOUS BLD VENIPUNCTURE: CPT

## 2021-01-14 PROCEDURE — 74011250637 HC RX REV CODE- 250/637: Performed by: HOSPITALIST

## 2021-01-14 PROCEDURE — 65270000029 HC RM PRIVATE

## 2021-01-14 PROCEDURE — 77010033678 HC OXYGEN DAILY

## 2021-01-14 PROCEDURE — 94762 N-INVAS EAR/PLS OXIMTRY CONT: CPT

## 2021-01-14 RX ORDER — DILTIAZEM HYDROCHLORIDE 30 MG/1
60 TABLET, FILM COATED ORAL
Status: DISCONTINUED | OUTPATIENT
Start: 2021-01-14 | End: 2021-01-16 | Stop reason: HOSPADM

## 2021-01-14 RX ADMIN — PIPERACILLIN SODIUM AND TAZOBACTAM SODIUM 3.38 G: 3; .375 INJECTION, POWDER, LYOPHILIZED, FOR SOLUTION INTRAVENOUS at 16:38

## 2021-01-14 RX ADMIN — AZITHROMYCIN DIHYDRATE 500 MG: 500 INJECTION, POWDER, LYOPHILIZED, FOR SOLUTION INTRAVENOUS at 08:01

## 2021-01-14 RX ADMIN — DEXAMETHASONE SODIUM PHOSPHATE 10 MG: 10 INJECTION INTRAMUSCULAR; INTRAVENOUS at 21:58

## 2021-01-14 RX ADMIN — MIRTAZAPINE 7.5 MG: 15 TABLET, FILM COATED ORAL at 21:58

## 2021-01-14 RX ADMIN — DEXAMETHASONE SODIUM PHOSPHATE 10 MG: 10 INJECTION INTRAMUSCULAR; INTRAVENOUS at 11:10

## 2021-01-14 RX ADMIN — PIPERACILLIN SODIUM AND TAZOBACTAM SODIUM 3.38 G: 3; .375 INJECTION, POWDER, LYOPHILIZED, FOR SOLUTION INTRAVENOUS at 21:57

## 2021-01-14 RX ADMIN — Medication 10 ML: at 05:25

## 2021-01-14 RX ADMIN — FOLIC ACID 1 MG: 1 TABLET ORAL at 11:04

## 2021-01-14 RX ADMIN — Medication 10 ML: at 22:00

## 2021-01-14 RX ADMIN — DILTIAZEM HYDROCHLORIDE 60 MG: 30 TABLET, FILM COATED ORAL at 16:39

## 2021-01-14 RX ADMIN — PIPERACILLIN SODIUM AND TAZOBACTAM SODIUM 3.38 G: 3; .375 INJECTION, POWDER, LYOPHILIZED, FOR SOLUTION INTRAVENOUS at 05:49

## 2021-01-14 RX ADMIN — FAMOTIDINE 20 MG: 20 TABLET, FILM COATED ORAL at 11:05

## 2021-01-14 RX ADMIN — THIAMINE HCL TAB 100 MG 100 MG: 100 TAB at 11:04

## 2021-01-14 RX ADMIN — METOPROLOL SUCCINATE 100 MG: 50 TABLET, EXTENDED RELEASE ORAL at 11:04

## 2021-01-14 RX ADMIN — Medication 10 ML: at 16:39

## 2021-01-14 RX ADMIN — FAMOTIDINE 20 MG: 20 TABLET, FILM COATED ORAL at 21:57

## 2021-01-14 NOTE — PROGRESS NOTES
Spiritual Care Assessment/Progress Note  [facility]      NAME: Tara Vasquez      MRN: [id number]  AGE: 76 y.o. SEX: male  Roman Catholic Affiliation: Catholic   Language: English     1/14/2021     Total Time (in minutes): 5     Spiritual Assessment begun in [address] through conversation with:         [x]Patient        [] Family    [] Friend(s)        Reason for Consult: Initial/Spiritual assessment, patient floor     Spiritual beliefs: (Please include comment if needed)     [] Identifies with a alberto tradition:         [] Supported by a alberto community:            [] Claims no spiritual orientation:           [] Seeking spiritual identity:                [] Adheres to an individual form of spirituality:           [x] Not able to assess:                           Identified resources for coping:      [] Prayer                               [] Music                  [] Guided Imagery     [] Family/friends                 [] Pet visits     [] Devotional reading                         [x] Unknown     [] Other:                                              Interventions offered during this visit: (See comments for more details)                Plan of Care:     [] Support spiritual and/or cultural needs    [] Support AMD and/or advance care planning process      [] Support grieving process   [] Coordinate Rites and/or Rituals    [] Coordination with community clergy   [] No spiritual needs identified at this time   [] Detailed Plan of Care below (See Comments)  [] Make referral to Music Therapy  [] Make referral to Pet Therapy     [] Make referral to Addiction services  [] Make referral to [facility]  [] Make referral to Spiritual Care Partner  [] No future visits requested        [x] Follow up upon further referrals     Comments: The purpose of the visit was to do a spiritual assessment on the patient. Due to contact precautions the visit was not held in person.  A phone call to the patient was attempted however the patient did not answer. However the patient's son was reached by phone. He shared that he misses his father terribly. He shared that he was grateful for the times he's spent his father. He cherishes his memories an knows his father want have to suffer long. The  provided the ministry of presence and the comfort of spiritual care on the unit. [facility]    can be reached by calling the  at [facility]  [phone]

## 2021-01-14 NOTE — ROUTINE PROCESS
Bedside and Verbal shift change report given to Marylene Edman RN (oncoming nurse) by Malgorzata Regalado (offgoing nurse).  Report included the following information SBAR, Kardex, Intake/Output, MAR, Accordion, Recent Results and Cardiac Rhythm SR.

## 2021-01-14 NOTE — PROGRESS NOTES
Resting with eyes closed. Open eyes to name. Oxygen tubing in mouth, same replaced to nostril. Patient reaching up with mittens oh hand to move tubing. oxygen saturation 98%

## 2021-01-14 NOTE — PROGRESS NOTES
Hospitalist Progress Note               Daily Progress Note: 1/14/2021      Subjective:     75M, h/o esophageal cancer s/p stent and pAFIB with acute hypoxic respiratory failure s/t COVID19 pneumonia in the setting of pulmonary mets. Currently confused  75M, h/o esophageal cancer s/p stent and pAFIB with acute hypoxic respiratory failure s/t COVID19 pneumonia in the setting of pulmonary mets.      While being treated for COVID 19, CT chest showed mets to lungs from esophagela cancer, oncology for consulted for input, after discussing quality of life from here onwards it was our medical opinion to opt for hospice, son Tremaine Bañuelos was called and he agreed.      Discussed with family - agreeed for hospice    Medications reviewed  Current Facility-Administered Medications   Medication Dose Route Frequency    dilTIAZem ER (CARDIZEM CD) capsule 240 mg  240 mg Oral DAILY    famotidine (PEPCID) tablet 20 mg  20 mg Oral BID    folic acid (FOLVITE) tablet 1 mg  1 mg Oral DAILY    metoprolol succinate (TOPROL-XL) XL tablet 100 mg  100 mg Oral DAILY    mirtazapine (REMERON) tablet 7.5 mg  7.5 mg Oral QHS    thiamine mononitrate (B-1) tablet 100 mg  100 mg Oral DAILY    traMADoL (ULTRAM) tablet 50 mg  50 mg Oral Q6H PRN    dexamethasone (PF) (DECADRON) 10 mg/mL injection 10 mg  10 mg IntraVENous Q12H    azithromycin (ZITHROMAX) 500 mg in 0.9% sodium chloride 250 mL (VIAL-MATE)  500 mg IntraVENous Q24H    sodium chloride (NS) flush 5-40 mL  5-40 mL IntraVENous Q8H    sodium chloride (NS) flush 5-40 mL  5-40 mL IntraVENous PRN    acetaminophen (TYLENOL) tablet 650 mg  650 mg Oral Q6H PRN    Or    acetaminophen (TYLENOL) suppository 650 mg  650 mg Rectal Q6H PRN    polyethylene glycol (MIRALAX) packet 17 g  17 g Oral DAILY PRN    promethazine (PHENERGAN) tablet 12.5 mg  12.5 mg Oral Q6H PRN    Or    ondansetron (ZOFRAN) injection 4 mg  4 mg IntraVENous Q6H PRN    piperacillin-tazobactam (ZOSYN) 3.375 g in 0.9% sodium chloride (MBP/ADV) 100 mL MBP  3.375 g IntraVENous Q8H       Review of Systems:   Review of systems not obtained due to patient factors. Objective:   Physical Exam:     Visit Vitals  BP (!) 157/106 Comment: RN notified   Pulse 66   Temp (!) 96.7 °F (35.9 °C)   Resp 18   Ht 5' 10\" (1.778 m)   Wt 62.1 kg (137 lb)   SpO2 94%   BMI 19.66 kg/m²    O2 Flow Rate (L/min): 4 l/min O2 Device: Nasal cannula    Temp (24hrs), Av.9 °F (36.1 °C), Min:96.7 °F (35.9 °C), Max:97.1 °F (36.2 °C)    No intake/output data recorded.  1901 -  0700  In: 120 [P.O.:120]  Out: -     PHYSICAL EXAM:  General: Awake and alert, pleasantly confused  HEAD: Normocephalic, atraumatic. Eye: PERRLA, EOMI. Throat and Neck: normal and no erythema or exudates noted. No mass   Lung: Clear to auscultation bilaterally without wheezes, rhonchi. Good air movement bilaterally. Heart: Regular rate and rhythm. Normal S1/S2. NO appreciated murmurs, rubs or gallops. Abdomen: soft, non-tender. Bowel sounds present in all four quadrants. No masses appreciated. PEG in situ  Extremities:  able to move all extremities normal, atraumatic  Skin: Clean, dry and intact without appreciated lesions. Neurologic:  Awake, alert but not much communicative. Cranial nerves II to XII grossly intact  Psychiatric: Calm and cooperative    Data Review:       Recent Days:  No results for input(s): WBC, HGB, HCT, PLT, HGBEXT, HCTEXT, PLTEXT, HGBEXT, HCTEXT, PLTEXT in the last 72 hours. No results for input(s): NA, K, CL, CO2, GLU, BUN, CREA, CA, MG, PHOS, ALB, TBIL, TBILI, ALT, INR, INREXT, INREXT in the last 72 hours. No lab exists for component: SGOT  No results for input(s): PH, PCO2, PO2, HCO3, FIO2 in the last 72 hours. CTA CHEST W OR W WO CONT   Final Result   Impression:       1. Negative for pulmonary embolus. 2. Multiple pulmonary masses of varying sizes compatible with metastases. 3. Esophageal stent is patent.           CT ABD PELV WO CONT   Final Result   Impression:   1. Negative for acute abnormality. 2. Cholelithiasis. Right renal nephrolithiasis. 3. Percutaneous gastrostomy tube. XR CHEST PORT   Final Result   Impression:   Overall mild improvement in right-sided air space disease since 12/29/2020. Persistent bilateral pulmonary nodules. Assessment/     Problem List:  Hospital Problems  Date Reviewed: 10/27/2020          Codes Class Noted POA    Acute respiratory failure with hypoxia [facility]) ICD-10-CM: J96.01  ICD-9-CM: 518.81  1/9/2021 Unknown        COVID-19 ICD-10-CM: U07.1  ICD-9-CM: 079.89  12/29/2020 Yes        Paroxysmal SVT (supraventricular tachycardia) (HCC) ICD-10-CM: I47.1  ICD-9-CM: 427.0  12/17/2020 Yes        Esophageal mass ICD-10-CM: K22.8  ICD-9-CM: 530.89  10/26/2020 Yes                   (1) Acute hypoxic respiratory failure: s/t COVID19 pneumonia, on azithromycin,     (2) COVID-19 pneumonitis: decadron, azithromycin,    (3) possible obstructive pneumonia: zosyn, sputum cx    (3) pAFIB: cardizem and metoprolol.  stable    (4) esophageal cancer s/t stent complicated by mets    (5) GERD: famotidine    DVT ppx: lovenox     DISPO:iscussed with family - agreeed for hospice    Care Plan discussed with: Nurse Omar Augustin MD

## 2021-01-14 NOTE — PROGRESS NOTES
15:00 Patient had oxygen saturation  the 89% on 4 L O2 nasal canula. Patient mouth breathing. Respiratory called states to increase it to 6 L. . increased to 6 L oxygen saturation increased to 96%. 16:48 Patient sleeping but restless. No response to verbal stimuli. Medications given via PEG tube.

## 2021-01-15 LAB
GLUCOSE BLD STRIP.AUTO-MCNC: 148 MG/DL (ref 65–100)
PERFORMED BY, TECHID: ABNORMAL

## 2021-01-15 PROCEDURE — 74011250636 HC RX REV CODE- 250/636: Performed by: HOSPITALIST

## 2021-01-15 PROCEDURE — 74011250637 HC RX REV CODE- 250/637: Performed by: HOSPITALIST

## 2021-01-15 PROCEDURE — 82962 GLUCOSE BLOOD TEST: CPT

## 2021-01-15 PROCEDURE — 94762 N-INVAS EAR/PLS OXIMTRY CONT: CPT

## 2021-01-15 PROCEDURE — 65270000029 HC RM PRIVATE

## 2021-01-15 PROCEDURE — 77010033678 HC OXYGEN DAILY

## 2021-01-15 PROCEDURE — 74011000258 HC RX REV CODE- 258: Performed by: HOSPITALIST

## 2021-01-15 RX ADMIN — PIPERACILLIN SODIUM AND TAZOBACTAM SODIUM 3.38 G: 3; .375 INJECTION, POWDER, LYOPHILIZED, FOR SOLUTION INTRAVENOUS at 21:43

## 2021-01-15 RX ADMIN — TRAMADOL HYDROCHLORIDE 50 MG: 50 TABLET, FILM COATED ORAL at 21:43

## 2021-01-15 RX ADMIN — FOLIC ACID 1 MG: 1 TABLET ORAL at 10:44

## 2021-01-15 RX ADMIN — Medication 10 ML: at 06:00

## 2021-01-15 RX ADMIN — Medication 10 ML: at 14:20

## 2021-01-15 RX ADMIN — THIAMINE HCL TAB 100 MG 100 MG: 100 TAB at 10:43

## 2021-01-15 RX ADMIN — AZITHROMYCIN DIHYDRATE 500 MG: 500 INJECTION, POWDER, LYOPHILIZED, FOR SOLUTION INTRAVENOUS at 11:04

## 2021-01-15 RX ADMIN — METOPROLOL SUCCINATE 100 MG: 50 TABLET, EXTENDED RELEASE ORAL at 10:44

## 2021-01-15 RX ADMIN — FAMOTIDINE 20 MG: 20 TABLET, FILM COATED ORAL at 21:43

## 2021-01-15 RX ADMIN — MIRTAZAPINE 7.5 MG: 15 TABLET, FILM COATED ORAL at 21:43

## 2021-01-15 RX ADMIN — FAMOTIDINE 20 MG: 20 TABLET, FILM COATED ORAL at 10:44

## 2021-01-15 RX ADMIN — PIPERACILLIN SODIUM AND TAZOBACTAM SODIUM 3.38 G: 3; .375 INJECTION, POWDER, LYOPHILIZED, FOR SOLUTION INTRAVENOUS at 06:00

## 2021-01-15 RX ADMIN — DILTIAZEM HYDROCHLORIDE 60 MG: 30 TABLET, FILM COATED ORAL at 08:45

## 2021-01-15 RX ADMIN — PIPERACILLIN SODIUM AND TAZOBACTAM SODIUM 3.38 G: 3; .375 INJECTION, POWDER, LYOPHILIZED, FOR SOLUTION INTRAVENOUS at 14:17

## 2021-01-15 NOTE — PROGRESS NOTES
Hospitalist Progress Note               Daily Progress Note: 1/15/2021      Subjective:     75M, h/o esophageal cancer s/p stent and pAFIB with acute hypoxic respiratory failure s/t COVID19 pneumonia in the setting of pulmonary mets. Currently confused  75M, h/o esophageal cancer s/p stent and pAFIB with acute hypoxic respiratory failure s/t COVID19 pneumonia in the setting of pulmonary mets.      While being treated for COVID 19, CT chest showed mets to lungs from esophagela cancer, oncology for consulted for input, after discussing quality of life from here onwards it was our medical opinion to opt for hospice, son Gina Arrington was called and he agreed.      Discussed with family - agreeed for hospice    Medications reviewed  Current Facility-Administered Medications   Medication Dose Route Frequency    dilTIAZem IR (CARDIZEM) tablet 60 mg  60 mg Oral TIDAC    famotidine (PEPCID) tablet 20 mg  20 mg Oral BID    folic acid (FOLVITE) tablet 1 mg  1 mg Oral DAILY    metoprolol succinate (TOPROL-XL) XL tablet 100 mg  100 mg Oral DAILY    mirtazapine (REMERON) tablet 7.5 mg  7.5 mg Oral QHS    thiamine mononitrate (B-1) tablet 100 mg  100 mg Oral DAILY    traMADoL (ULTRAM) tablet 50 mg  50 mg Oral Q6H PRN    azithromycin (ZITHROMAX) 500 mg in 0.9% sodium chloride 250 mL (VIAL-MATE)  500 mg IntraVENous Q24H    sodium chloride (NS) flush 5-40 mL  5-40 mL IntraVENous Q8H    sodium chloride (NS) flush 5-40 mL  5-40 mL IntraVENous PRN    acetaminophen (TYLENOL) tablet 650 mg  650 mg Oral Q6H PRN    Or    acetaminophen (TYLENOL) suppository 650 mg  650 mg Rectal Q6H PRN    polyethylene glycol (MIRALAX) packet 17 g  17 g Oral DAILY PRN    promethazine (PHENERGAN) tablet 12.5 mg  12.5 mg Oral Q6H PRN    Or    ondansetron (ZOFRAN) injection 4 mg  4 mg IntraVENous Q6H PRN    piperacillin-tazobactam (ZOSYN) 3.375 g in 0.9% sodium chloride (MBP/ADV) 100 mL MBP  3.375 g IntraVENous Q8H       Review of Systems: Review of systems not obtained due to patient factors. Objective:   Physical Exam:     Visit Vitals  BP (!) 168/95 (BP 1 Location: Right arm, BP Patient Position: At rest)   Pulse 71   Temp (!) 96.5 °F (35.8 °C)   Resp 22   Ht 5' 10\" (1.778 m)   Wt 62.1 kg (137 lb)   SpO2 98%   BMI 19.66 kg/m²    O2 Flow Rate (L/min): 4 l/min O2 Device: Nasal cannula    Temp (24hrs), Av.8 °F (36 °C), Min:96.5 °F (35.8 °C), Max:97.2 °F (36.2 °C)    No intake/output data recorded. No intake/output data recorded. PHYSICAL EXAM:  General: Awake and alert, pleasantly confused  HEAD: Normocephalic, atraumatic. Eye: PERRLA, EOMI. Throat and Neck: normal and no erythema or exudates noted. No mass   Lung: Clear to auscultation bilaterally without wheezes, rhonchi. Good air movement bilaterally. Heart: Regular rate and rhythm. Normal S1/S2. NO appreciated murmurs, rubs or gallops. Abdomen: soft, non-tender. Bowel sounds present in all four quadrants. No masses appreciated. PEG in situ  Extremities:  able to move all extremities normal, atraumatic  Skin: Clean, dry and intact without appreciated lesions. Neurologic:  Awake, alert but not much communicative. Cranial nerves II to XII grossly intact  Psychiatric: Calm and cooperative    Data Review:       Recent Days:  No results for input(s): WBC, HGB, HCT, PLT, HGBEXT, HCTEXT, PLTEXT, HGBEXT, HCTEXT, PLTEXT in the last 72 hours. No results for input(s): NA, K, CL, CO2, GLU, BUN, CREA, CA, MG, PHOS, ALB, TBIL, TBILI, ALT, INR, INREXT, INREXT in the last 72 hours. No lab exists for component: SGOT  No results for input(s): PH, PCO2, PO2, HCO3, FIO2 in the last 72 hours. CTA CHEST W OR W WO CONT   Final Result   Impression:       1. Negative for pulmonary embolus. 2. Multiple pulmonary masses of varying sizes compatible with metastases. 3. Esophageal stent is patent. CT ABD PELV WO CONT   Final Result   Impression:   1.  Negative for acute abnormality. 2. Cholelithiasis. Right renal nephrolithiasis. 3. Percutaneous gastrostomy tube. XR CHEST PORT   Final Result   Impression:   Overall mild improvement in right-sided air space disease since 12/29/2020. Persistent bilateral pulmonary nodules. Assessment/     Problem List:  Hospital Problems  Date Reviewed: 10/27/2020          Codes Class Noted POA    Acute respiratory failure with hypoxia [facility]) ICD-10-CM: J96.01  ICD-9-CM: 518.81  1/9/2021 Unknown        COVID-19 ICD-10-CM: U07.1  ICD-9-CM: 079.89  12/29/2020 Yes        Paroxysmal SVT (supraventricular tachycardia) (HCC) ICD-10-CM: I47.1  ICD-9-CM: 427.0  12/17/2020 Yes        Esophageal mass ICD-10-CM: K22.8  ICD-9-CM: 530.89  10/26/2020 Yes                   (1) Acute hypoxic respiratory failure: s/t COVID19 pneumonia, on azithromycin,     (2) COVID-19 pneumonitis: decadron, azithromycin,    (3) possible obstructive pneumonia: zosyn, sputum cx    (3) pAFIB: cardizem and metoprolol.  stable    (4) esophageal cancer s/t stent complicated by mets    (5) GERD: famotidine    DVT ppx: lovenox     DISPO:discussed with family - agreeed for hospice               Waiting hospice     Care Plan discussed with: Nurse Eder Cordova MD

## 2021-01-15 NOTE — PROGRESS NOTES
Problem: Patient Education: Go to Patient Education Activity  Goal: Patient/Family Education  Outcome: Not Progressing Towards Goal     Problem: Falls - Risk of  Goal: *Absence of Falls  Description: Document Lotus Gee Fall Risk and appropriate interventions in the flowsheet.   Outcome: Not Progressing Towards Goal  Note: Fall Risk Interventions:  Mobility Interventions: Bed/chair exit alarm    Mentation Interventions: Bed/chair exit alarm    Medication Interventions: Bed/chair exit alarm    Elimination Interventions: Call light in reach, Bed/chair exit alarm

## 2021-01-16 VITALS
BODY MASS INDEX: 19.61 KG/M2 | OXYGEN SATURATION: 93 % | HEART RATE: 212 BPM | WEIGHT: 137 LBS | DIASTOLIC BLOOD PRESSURE: 70 MMHG | SYSTOLIC BLOOD PRESSURE: 104 MMHG | HEIGHT: 70 IN | TEMPERATURE: 98.2 F | RESPIRATION RATE: 34 BRPM

## 2021-01-16 LAB
BACTERIA SPEC CULT: NORMAL
SPECIAL REQUESTS,SREQ: NORMAL

## 2021-01-16 PROCEDURE — 74011250636 HC RX REV CODE- 250/636: Performed by: HOSPITALIST

## 2021-01-16 PROCEDURE — 74011000258 HC RX REV CODE- 258: Performed by: HOSPITALIST

## 2021-01-16 PROCEDURE — 74011250637 HC RX REV CODE- 250/637: Performed by: HOSPITALIST

## 2021-01-16 PROCEDURE — 94660 CPAP INITIATION&MGMT: CPT

## 2021-01-16 PROCEDURE — 94762 N-INVAS EAR/PLS OXIMTRY CONT: CPT

## 2021-01-16 PROCEDURE — 77010033678 HC OXYGEN DAILY

## 2021-01-16 PROCEDURE — 5A09357 ASSISTANCE WITH RESPIRATORY VENTILATION, LESS THAN 24 CONSECUTIVE HOURS, CONTINUOUS POSITIVE AIRWAY PRESSURE: ICD-10-PCS | Performed by: HOSPITALIST

## 2021-01-16 RX ORDER — LORAZEPAM 2 MG/ML
1 INJECTION INTRAMUSCULAR
Status: DISCONTINUED | OUTPATIENT
Start: 2021-01-16 | End: 2021-01-16 | Stop reason: HOSPADM

## 2021-01-16 RX ORDER — MORPHINE SULFATE 2 MG/ML
2 INJECTION, SOLUTION INTRAMUSCULAR; INTRAVENOUS
Status: DISCONTINUED | OUTPATIENT
Start: 2021-01-16 | End: 2021-01-16 | Stop reason: HOSPADM

## 2021-01-16 RX ORDER — SCOLOPAMINE TRANSDERMAL SYSTEM 1 MG/1
1 PATCH, EXTENDED RELEASE TRANSDERMAL
Status: DISCONTINUED | OUTPATIENT
Start: 2021-01-16 | End: 2021-01-16 | Stop reason: HOSPADM

## 2021-01-16 RX ADMIN — MORPHINE SULFATE 2 MG: 2 INJECTION, SOLUTION INTRAMUSCULAR; INTRAVENOUS at 10:00

## 2021-01-16 RX ADMIN — TRAMADOL HYDROCHLORIDE 50 MG: 50 TABLET, FILM COATED ORAL at 03:45

## 2021-01-16 RX ADMIN — PIPERACILLIN SODIUM AND TAZOBACTAM SODIUM 3.38 G: 3; .375 INJECTION, POWDER, LYOPHILIZED, FOR SOLUTION INTRAVENOUS at 05:32

## 2021-01-16 RX ADMIN — DILTIAZEM HYDROCHLORIDE 60 MG: 30 TABLET, FILM COATED ORAL at 05:31

## 2021-01-16 RX ADMIN — LORAZEPAM 1 MG: 2 INJECTION INTRAMUSCULAR; INTRAVENOUS at 10:00

## 2021-01-16 NOTE — PROGRESS NOTES
CM reviewed chart. We are still awaiting for placement, Long term care at skilled nursing facility with hospice services. [facility] is following the patient.

## 2021-01-16 NOTE — PROGRESS NOTES
Hospitalist Progress Note               Daily Progress Note: 1/16/2021      Subjective:       Spoke to hebert, son, patient is a DNR/DNI. Spoke to him and explained the poor prognosis,       75M, h/o esophageal cancer s/p stent and pAFIB with acute hypoxic respiratory failure s/t COVID19 pneumonia in the setting of pulmonary mets. Currently confused  75M, h/o esophageal cancer s/p stent and pAFIB with acute hypoxic respiratory failure s/t COVID19 pneumonia in the setting of pulmonary mets.      While being treated for COVID 19, CT chest showed mets to lungs from esophagela cancer, oncology for consulted for input, after discussing quality of life from here onwards it was our medical opinion to opt for hospice, son Lana Hernandez was called and he agreed.      Discussed with family - agreeed for hospice    Medications reviewed  Current Facility-Administered Medications   Medication Dose Route Frequency    dilTIAZem IR (CARDIZEM) tablet 60 mg  60 mg Oral TIDAC    famotidine (PEPCID) tablet 20 mg  20 mg Oral BID    folic acid (FOLVITE) tablet 1 mg  1 mg Oral DAILY    metoprolol succinate (TOPROL-XL) XL tablet 100 mg  100 mg Oral DAILY    mirtazapine (REMERON) tablet 7.5 mg  7.5 mg Oral QHS    thiamine mononitrate (B-1) tablet 100 mg  100 mg Oral DAILY    traMADoL (ULTRAM) tablet 50 mg  50 mg Oral Q6H PRN    azithromycin (ZITHROMAX) 500 mg in 0.9% sodium chloride 250 mL (VIAL-MATE)  500 mg IntraVENous Q24H    sodium chloride (NS) flush 5-40 mL  5-40 mL IntraVENous PRN    acetaminophen (TYLENOL) tablet 650 mg  650 mg Oral Q6H PRN    Or    acetaminophen (TYLENOL) suppository 650 mg  650 mg Rectal Q6H PRN    polyethylene glycol (MIRALAX) packet 17 g  17 g Oral DAILY PRN    promethazine (PHENERGAN) tablet 12.5 mg  12.5 mg Oral Q6H PRN    Or    ondansetron (ZOFRAN) injection 4 mg  4 mg IntraVENous Q6H PRN    piperacillin-tazobactam (ZOSYN) 3.375 g in 0.9% sodium chloride (MBP/ADV) 100 mL MBP  3.375 g IntraVENous Q8H       Review of Systems:   Review of systems not obtained due to patient factors. Objective:   Physical Exam:     Visit Vitals  BP (!) 162/93 (BP 1 Location: Right arm)   Pulse (!) 109   Temp 98.2 °F (36.8 °C)   Resp 26   Ht 5' 10\" (1.778 m)   Wt 62.1 kg (137 lb)   SpO2 97%   BMI 19.66 kg/m²    O2 Flow Rate (L/min): 15 l/min O2 Device: BIPAP    Temp (24hrs), Av.6 °F (36.4 °C), Min:96.9 °F (36.1 °C), Max:98.2 °F (36.8 °C)    No intake/output data recorded.  1901 -  0700  In: 720   Out: 1300 [Urine:1300]    PHYSICAL EXAM:  General: Awake and alert, pleasantly confused  HEAD: Normocephalic, atraumatic. Eye: PERRLA, EOMI. Throat and Neck: normal and no erythema or exudates noted. No mass   Lung: Clear to auscultation bilaterally without wheezes, rhonchi. Good air movement bilaterally. Heart: Regular rate and rhythm. Normal S1/S2. NO appreciated murmurs, rubs or gallops. Abdomen: soft, non-tender. Bowel sounds present in all four quadrants. No masses appreciated. PEG in situ  Extremities:  able to move all extremities normal, atraumatic  Skin: Clean, dry and intact without appreciated lesions. Neurologic:  Awake, alert but not much communicative. Cranial nerves II to XII grossly intact  Psychiatric: Calm and cooperative    Data Review:       Recent Days:  No results for input(s): WBC, HGB, HCT, PLT, HGBEXT, HCTEXT, PLTEXT, HGBEXT, HCTEXT, PLTEXT in the last 72 hours. No results for input(s): NA, K, CL, CO2, GLU, BUN, CREA, CA, MG, PHOS, ALB, TBIL, TBILI, ALT, INR, INREXT, INREXT in the last 72 hours. No lab exists for component: SGOT  No results for input(s): PH, PCO2, PO2, HCO3, FIO2 in the last 72 hours. CTA CHEST W OR W WO CONT   Final Result   Impression:       1. Negative for pulmonary embolus. 2. Multiple pulmonary masses of varying sizes compatible with metastases. 3. Esophageal stent is patent. CT ABD PELV WO CONT   Final Result   Impression:   1.  Negative for acute abnormality. 2. Cholelithiasis. Right renal nephrolithiasis. 3. Percutaneous gastrostomy tube. XR CHEST PORT   Final Result   Impression:   Overall mild improvement in right-sided air space disease since 12/29/2020. Persistent bilateral pulmonary nodules. Assessment/     Problem List:  Hospital Problems  Date Reviewed: 10/27/2020          Codes Class Noted POA    Acute respiratory failure with hypoxia [facility]) ICD-10-CM: J96.01  ICD-9-CM: 518.81  1/9/2021 Unknown        COVID-19 ICD-10-CM: U07.1  ICD-9-CM: 079.89  12/29/2020 Yes        Paroxysmal SVT (supraventricular tachycardia) (HCC) ICD-10-CM: I47.1  ICD-9-CM: 427.0  12/17/2020 Yes        Esophageal mass ICD-10-CM: K22.8  ICD-9-CM: 530.89  10/26/2020 Yes                   (1) Acute hypoxic respiratory failure: s/t COVID19 pneumonia, on azithromycin,     (2) COVID-19 pneumonitis: decadron, azithromycin,    (3) possible obstructive pneumonia: zosyn, sputum cx    (3) pAFIB: cardizem and metoprolol. stable    (4) esophageal cancer s/t stent complicated by mets    (5) GERD: famotidine    DVT ppx: lovenox     DISPO:discussed with family - agreeed for hospice               Waiting hospice                Spoke to hebert, son, patient is a DNR/DNI.  Spoke to him and explained the poor prognosis,       Care Plan discussed with: Nurse Jennifer Abreu MD

## 2021-01-16 NOTE — PROGRESS NOTES
Visit attempted for patient in [facility] unit for inpatient death. No family members were present. Collaborated with nurse to provide family care who is supposedly on the way. Nurse will contact this  when the family arrives. Provided staff support and silent support. Chaplains will follow up if further referrals are requested. Chaplain Javier James M.Div.    can be reached by calling the  at [facility]  [phone]

## 2021-01-16 NOTE — PROGRESS NOTES
Came into room at 1455 and patient did not have a pulse or respirations. Pt status DNR/DNI. Had second RN, Kit Andrews, verify time of death. MD notified along with nursing supervisor Aniket Varela), ROLLY (Candi Lynn), Desirae, and  Tyron Cox.

## 2021-01-18 ENCOUNTER — APPOINTMENT (OUTPATIENT)
Dept: RADIATION THERAPY | Age: 76
End: 2021-01-18

## 2021-01-19 ENCOUNTER — APPOINTMENT (OUTPATIENT)
Dept: RADIATION THERAPY | Age: 76
End: 2021-01-19

## 2021-01-20 ENCOUNTER — APPOINTMENT (OUTPATIENT)
Dept: RADIATION THERAPY | Age: 76
End: 2021-01-20

## 2021-01-21 ENCOUNTER — APPOINTMENT (OUTPATIENT)
Dept: RADIATION THERAPY | Age: 76
End: 2021-01-21

## 2021-01-26 ENCOUNTER — APPOINTMENT (OUTPATIENT)
Dept: RADIATION THERAPY | Age: 76
End: 2021-01-26

## 2021-01-27 ENCOUNTER — APPOINTMENT (OUTPATIENT)
Dept: RADIATION THERAPY | Age: 76
End: 2021-01-27

## 2021-01-28 ENCOUNTER — APPOINTMENT (OUTPATIENT)
Dept: RADIATION THERAPY | Age: 76
End: 2021-01-28

## 2021-01-29 ENCOUNTER — APPOINTMENT (OUTPATIENT)
Dept: RADIATION THERAPY | Age: 76
End: 2021-01-29

## 2021-02-01 ENCOUNTER — APPOINTMENT (OUTPATIENT)
Dept: RADIATION THERAPY | Age: 76
End: 2021-02-01

## 2021-02-02 ENCOUNTER — APPOINTMENT (OUTPATIENT)
Dept: RADIATION THERAPY | Age: 76
End: 2021-02-02

## 2024-03-27 NOTE — CONSULTS
[address] [facility] SURGERY PROGRESS NOTE          Chief Complaint: None    History of Present Illness:    Mr. Jaguar Guerrero is a 76y.o. year old * male admitted with esophageal cancer. Initially underwent PEG tube placement followed by esophageal stent placement. Has free air & hence consulted. CT scan with contrast through PEG tube shows no extravasation of contrast through PEG tube site or small bowel. Has some abdominal pain with distension. No fever or chills. No BM or flatus. But overall he feels lot better. No nausea or vomiting. He was placed on tube feeds by GI. Also on TPN. Past Medical History:   Past Medical History:   Diagnosis Date    Hypertension        Past Surgical History: Right inguinal hernia repair with mesh. Allergy:No Known Allergies    Social History:  reports that he has been smoking. He has never used smokeless tobacco.     Family History:History reviewed. No pertinent family history.      Current Medications:  Current Facility-Administered Medications:     albuterol-ipratropium (DUO-NEB) 2.5 MG-0.5 MG/3 ML, 3 mL, Nebulization, Q6H RT, Sujata Murray PA-C, 3 mL at 11/04/20 1435    amino acid 4.25 % dextrose 5 % with electrolytes (CLINIMIX E) infusion, , IntraVENous, CONTINUOUS, Sujata Murray PA-C    fat emulsion 20% (LIPOSYN, INTRAlipid) infusion 250 mL, 250 mL, IntraVENous, CONTINUOUS, Sujata Murray PA-C    dilTIAZem (CARDIZEM) 125 mg/125 mL (1 mg/mL) dextrose 5% infusion, 5 mg/hr, IntraVENous, CONTINUOUS, Sujata Murray PA-C, Last Rate: 5 mL/hr at 11/04/20 1917, 5 mg/hr at 11/04/20 1917    piperacillin-tazobactam (ZOSYN) 3.375 g in 0.9% sodium chloride (MBP/ADV) 100 mL MBP, 3.375 g, IntraVENous, Q8H, Karmen Coyle MD    amino acid 4.25 % dextrose 5 % with electrolytes (CLINIMIX E) infusion, , IntraVENous, CONTINUOUS, MurraySujata cardoso PA-C, Last Rate: 83.8 mL/hr at 11/03/20 2149    labetaloL (NORMODYNE;TRANDATE) injection 20 mg, 20 mg, IntraVENous, Q4H PRN, Jorge Flaherty MD, 20 mg at 11/03/20 2101    metoprolol (LOPRESSOR) injection 2.5 mg, 2.5 mg, IntraVENous, Q6H PRN, Lenore Bray PA-C, 2.5 mg at 10/31/20 1240    metoprolol tartrate (LOPRESSOR) tablet 25 mg, 25 mg, Oral, Q12H, Kindra Doll PA-C, Stopped at 11/04/20 0900    thiamine mononitrate (B-1) tablet 100 mg, 100 mg, Oral, DAILY, Zara Horne MD, Stopped at 25/57/72 3390    folic acid (FOLVITE) tablet 1 mg, 1 mg, Oral, DAILY, Zara Horne MD, Stopped at 11/04/20 0900    multivitamin (ONE A DAY) tablet 1 Tab, 1 Tab, Oral, DAILY, Zara Horne MD, Stopped at 11/04/20 0900    famotidine (PEPCID) tablet 20 mg, 20 mg, Oral, BID, Zara Horne MD, Stopped at 11/04/20 0900    dextrose 5% - 0.9% NaCl with KCl 20 mEq/L infusion, 75 mL/hr, IntraVENous, CONTINUOUS, Kindra Doll PA-C, Last Rate: 75 mL/hr at 11/04/20 1918, 75 mL/hr at 11/04/20 1918    HYDROmorphone (DILAUDID) injection 0.5 mg, 0.5 mg, IntraVENous, Q4H PRN, Murali Doll PA-C, 0.5 mg at 11/03/20 2343    sodium chloride (NS) flush 5-40 mL, 5-40 mL, IntraVENous, Q8H, Heriberto Coyle MD, Stopped at 11/03/20 2200    sodium chloride (NS) flush 5-40 mL, 5-40 mL, IntraVENous, PRN, Jorge Flaherty MD, 10 mL at 11/02/20 1111    acetaminophen (TYLENOL) tablet 650 mg, 650 mg, Oral, Q6H PRN, 650 mg at 11/01/20 2137 **OR** acetaminophen (TYLENOL) suppository 650 mg, 650 mg, Rectal, Q6H PRN, Karmen Coyle MD    promethazine (PHENERGAN) tablet 12.5 mg, 12.5 mg, Oral, Q6H PRN **OR** ondansetron (ZOFRAN) injection 4 mg, 4 mg, IntraVENous, Q6H PRN, Jorge Flaherty MD, 4 mg at 11/02/20 1111     Immunization History: There is no immunization history on file for this patient. Complete    Review of Systems:     Constitutional:  no fever,  no chills,  no sweats, No weakness, No fatigue, No decreased activity.   Eye: No recent visual problem, No icterus, No discharge, No double vision. Ear/Nose/Mouth/Throat: No decreased hearing, No ear pain, No nasal congestion, No sore throat. Respiratory: No shortness of breath, No cough, No sputum production, No hemoptysis, No wheezing, No cyanosis. Cardiovascular: No chest pain, No palpitations, No bradycardia, No tachycardia, No peripheral edema, No syncope. Gastrointestinal: No nausea,  No vomiting, No diarrhea, No constipation, No heartburn,   abdominal pain & distension. Genitourinary: No dysuria, No hematuria, No change in urine stream, No urethral discharge, No lesions. Hematology/Lymphatics: No bruising tendency, No bleeding tendency, No petechiae, No swollen lymph glands. Endocrine: No excessive thirst, No polyuria, No cold intolerance, No heat intolerance, No excessive hunger. Immunologic: Not immunocompromised, No recurrent fevers, No recurrent infections. Musculoskeletal: No back pain, No neck pain, No joint pain, No muscle pain, No claudication, No decreased range of motion, No trauma. Integumentary: No rash, No pruritus, No abrasions. Neurologic: Alert and oriented X4, No abnormal balance, No headache, No confusion, No numbness, No tingling. Psychiatric: No anxiety, No depression, No frandy. Physical Exam:     Vitals & Measurements:     Wt Readings from Last 3 Encounters:   11/03/20 65.9 kg (145 lb 4.5 oz)     Temp Readings from Last 3 Encounters:   11/04/20 98.8 °F (37.1 °C)     BP Readings from Last 3 Encounters:   11/04/20 (!) 175/85     Pulse Readings from Last 3 Encounters:   11/04/20 74      Ht Readings from Last 3 Encounters:   11/03/20 6' 1\" (1.854 m)          General: well appearing, no acute distress  Head: Normal  Face: Nornal  HEENT: atraumatic, PERRLA, moist mucosa, normal pharynx, normal tonsils and adenoids, normal tongue, no fluid in sinuses  Neck: Trachea midline, no carotid bruit, no masses  Chest: Normal.  Respiratory: Normal chest wall expansion, CTA B, no r/r/w, no rubs  Cardiovascular: RRR, no m/r/g, Normal S1 and S2  Abdomen: Soft, mildly tender without guarding or rebound, distended, normal bowel sounds in all quadrants, no hepatosplenomegaly, no tympany. Incision scar: right inguinal area. Genitourinary: No inguinal hernia, normal external gentalia, Testis & scrotum normal, no renal angle tenderness  Rectal: deferred  Musculoskeletal: normal ROM in upper and lower extremities, No joint swelling. Integumentary: Warm, dry, and pink, with no rash, purpura, or petechia  Heme/Lymph: No lymphadenopathy, no bruises  Neurological:Cranial Nerves II-XII grossly intact, no gross motor or sensory deficit  Psychiatric: Cooperative with normal mood, affect, and cognition      Laboratory Values:   Recent Results (from the past 24 hour(s))   CBC WITH AUTOMATED DIFF    Collection Time: 11/04/20  7:45 AM   Result Value Ref Range    WBC 10.2 4.1 - 11.1 K/uL    RBC 3.82 (L) 4.10 - 5.70 M/uL    HGB 11.8 (L) 12.1 - 17.0 g/dL    HCT 36.3 (L) 36.6 - 50.3 %    MCV 95.0 80.0 - 99.0 FL    MCH 30.9 26.0 - 34.0 PG    MCHC 32.5 30.0 - 36.5 g/dL    RDW 13.5 11.5 - 14.5 %    PLATELET 388 633 - 629 K/uL    NRBC 0.0 0  WBC    ABSOLUTE NRBC 0.00 0.00 - 0.01 K/uL    NEUTROPHILS 71 32 - 75 %    LYMPHOCYTES 13 12 - 49 %    MONOCYTES 13 5 - 13 %    EOSINOPHILS 2 0 - 7 %    BASOPHILS 0 0 - 1 %    IMMATURE GRANULOCYTES 1 (H) 0.0 - 0.5 %    ABS. NEUTROPHILS 7.3 1.8 - 8.0 K/UL    ABS. LYMPHOCYTES 1.3 0.8 - 3.5 K/UL    ABS. MONOCYTES 1.3 (H) 0.0 - 1.0 K/UL    ABS. EOSINOPHILS 0.2 0.0 - 0.4 K/UL    ABS. BASOPHILS 0.0 0.0 - 0.1 K/UL    ABS. IMM.  GRANS. 0.1 (H) 0.00 - 0.04 K/UL    DF AUTOMATED      RBC COMMENTS Pily cells  1+       METABOLIC PANEL, COMPREHENSIVE    Collection Time: 11/04/20  1:35 PM   Result Value Ref Range    Sodium 131 (L) 136 - 145 mmol/L    Potassium 6.0 (H) 3.5 - 5.1 mmol/L    Chloride 102 97 - 108 mmol/L    CO2 28 21 - 32 mmol/L    Anion gap 1 (L) 5 - 15 mmol/L    Glucose 102 (H) 65 - 100 mg/dL    BUN 8 6 - 20 mg/dL    Creatinine 0.54 (L) 0.70 - 1.30 mg/dL    BUN/Creatinine ratio 15 12 - 20      GFR est AA >60 >60 ml/min/1.73m2    GFR est non-AA >60 >60 ml/min/1.73m2    Calcium 8.7 8.5 - 10.1 mg/dL    Bilirubin, total 1.3 (H) 0.2 - 1.0 mg/dL    AST (SGOT) 101 (H) 15 - 37 U/L    ALT (SGPT) 29 12 - 78 U/L    Alk. phosphatase 50 45 - 117 U/L    Protein, total 6.8 6.4 - 8.2 g/dL    Albumin 2.2 (L) 3.5 - 5.0 g/dL    Globulin 4.6 (H) 2.0 - 4.0 g/dL    A-G Ratio 0.5 (L) 1.1 - 2.2     VANCOMYCIN, TROUGH    Collection Time: 11/04/20  1:35 PM   Result Value Ref Range    Vancomycin,trough 8.5 5.0 - 10.0 ug/mL         XR CHEST PORT   Final Result      CT ABD PELV W CONT   Final Result   IMPRESSION: (+) Free intraperitoneal air. Interval placement of G-tube with its   tip projected into stomach lumen. Exact source for free air undetermined. Correlate to recent surgery. Unable to   exclude bowel perforation as possible cause. Moderate volume dependent pleural effusions with associated lower lobe lung   compressive atelectasis. Report called to 4W. Spoke with patient's nurse. XR CHEST PORT   Final Result   IMPRESSION: Probable right perihilar atelectasis. .. Pneumoperitoneum again   noted. Esophageal stent. XR CHEST SNGL V   Final Result   Impression:      Interval placement of a proximal to mid esophageal stent. Free air in the imaged upper abdomen. Mild atelectasis at the lung bases. Findings discussed with Anita Roldan on 11/1/2020 at 1125. XR FLUOROSCOPY UNDER 60 MINUTES   Final Result      CT ABD PELV W CONT   Final Result   Impression:   1. No specific evidence of abdominopelvic metastatic disease. 2.  Prostatomegaly. Wall thickening of the urinary bladder may be due to chronic   outlet obstruction and/or cystitis. 3.  Borderline wall thickening of the stomach. May be related to   underdistention, but correlate for gastritis.    4. Nonobstructing right renal calculus. 5.  See full report for detailed findings. See recently reported chest CT for   supradiaphragmatic findings. CT CHEST W CONT   Final Result   Impression: Enhancing mass of the mid thoracic esophagus measuring proximally 6   x 4 x 3 cm causing dilatation esophagus and obstruction of the upper esophagus   which is fluid filled and patulous. 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 I called Dr. Latha Rubio with this result and recommendation   for upper endoscopy at 3:15 PM.      Emphysema. Coronary artery calcifications. Right kidney stone. Please see full report. CT NECK SOFT TISSUE W CONT   Final Result      XR CHEST SNGL V   Final Result   Impression: Unremarkable chest x-ray, except for degenerative change of the   spine. Assessment:  Problem List Items Addressed This Visit        Other    Esophageal mass - Primary      Other Visit Diagnoses     Hypokalemia        Weight loss        Dehydration           Free air after EGD for placement of esophageal stent by GI. I believe that the free air is secondary to leak through PEG tube site while performing EGD & esophageal stent placement by GI. CT scan shows no obvious extravasation of PO contrast.  However will closely monitor him with antibiotics and change in clinical /physical findings may necessitate an exploration. Plan:    1. Admission  2. Diet: NPO  3. Do not increase in the tube feed rates. Check for residual.   4. IV fluids  5. SCD  6. IS  7. Pain medications  8. Antibiotics  9. Nausea medication  10. Christiansen  11. G tube to gravity  12. Labs  12. Thank you for the consultation & allowing me to participate in the care of this patient. Principal Discharge DX:	Pulmonary embolism  Secondary Diagnosis:	DVT, lower extremity   1

## (undated) DEVICE — ESOPHAGEAL/PYLORIC/COLONIC WIREGUIDED BALLOON DILATATION CATHETER: Brand: CRE WIREGUIDED

## (undated) DEVICE — SYR ASSEMB INFL BLLN 60ML --

## (undated) DEVICE — SPHINCTEROTOME: Brand: HYDRATOME RX 44

## (undated) DEVICE — BLOCK BITE STD GRN ORAL AD W/O STRP SLD PLAS DISP BITEGARD

## (undated) DEVICE — KIT GASTMY PERC PEG PULL 20FR -- ENDOVIVE BX/2

## (undated) DEVICE — THE ENDO CARRY-ON PROCEDURE KIT CONTAINS ALL OF THE SUPPLIES AND INFECTION PREVENTION PRODUCTS NEEDED FOR ENDOSCOPIC PROCEDURES: Brand: ENDO CARRY-ON PROCEDURE KIT